# Patient Record
Sex: FEMALE | Race: WHITE | NOT HISPANIC OR LATINO | Employment: OTHER | URBAN - METROPOLITAN AREA
[De-identification: names, ages, dates, MRNs, and addresses within clinical notes are randomized per-mention and may not be internally consistent; named-entity substitution may affect disease eponyms.]

---

## 2017-01-26 ENCOUNTER — ALLSCRIPTS OFFICE VISIT (OUTPATIENT)
Dept: OTHER | Facility: OTHER | Age: 72
End: 2017-01-26

## 2017-01-26 DIAGNOSIS — R73.09 OTHER ABNORMAL GLUCOSE: ICD-10-CM

## 2017-01-26 DIAGNOSIS — Z12.31 ENCOUNTER FOR SCREENING MAMMOGRAM FOR MALIGNANT NEOPLASM OF BREAST: ICD-10-CM

## 2017-01-26 DIAGNOSIS — M81.0 AGE-RELATED OSTEOPOROSIS WITHOUT CURRENT PATHOLOGICAL FRACTURE: ICD-10-CM

## 2017-01-26 DIAGNOSIS — E55.9 VITAMIN D DEFICIENCY: ICD-10-CM

## 2017-01-26 DIAGNOSIS — I10 ESSENTIAL (PRIMARY) HYPERTENSION: ICD-10-CM

## 2017-02-02 ENCOUNTER — ALLSCRIPTS OFFICE VISIT (OUTPATIENT)
Dept: OTHER | Facility: OTHER | Age: 72
End: 2017-02-02

## 2017-02-20 ENCOUNTER — GENERIC CONVERSION - ENCOUNTER (OUTPATIENT)
Dept: OTHER | Facility: OTHER | Age: 72
End: 2017-02-20

## 2017-02-24 ENCOUNTER — ALLSCRIPTS OFFICE VISIT (OUTPATIENT)
Dept: OTHER | Facility: OTHER | Age: 72
End: 2017-02-24

## 2017-02-27 ENCOUNTER — GENERIC CONVERSION - ENCOUNTER (OUTPATIENT)
Dept: OTHER | Facility: OTHER | Age: 72
End: 2017-02-27

## 2017-06-14 ENCOUNTER — ALLSCRIPTS OFFICE VISIT (OUTPATIENT)
Dept: OTHER | Facility: OTHER | Age: 72
End: 2017-06-14

## 2017-06-14 DIAGNOSIS — M54.50 LOW BACK PAIN: ICD-10-CM

## 2017-06-14 LAB
BILIRUB UR QL STRIP: 3
CLARITY UR: NORMAL
COLOR UR: YELLOW
GLUCOSE (HISTORICAL): NORMAL
HGB UR QL STRIP.AUTO: NORMAL
KETONES UR STRIP-MCNC: NORMAL MG/DL
LEUKOCYTE ESTERASE UR QL STRIP: NORMAL
NITRITE UR QL STRIP: NORMAL
PH UR STRIP.AUTO: 5 [PH]
PROT UR STRIP-MCNC: NORMAL MG/DL
SP GR UR STRIP.AUTO: 1.02
UROBILINOGEN UR QL STRIP.AUTO: 1

## 2017-09-19 ENCOUNTER — TRANSCRIBE ORDERS (OUTPATIENT)
Dept: ADMINISTRATIVE | Facility: HOSPITAL | Age: 72
End: 2017-09-19

## 2017-09-19 DIAGNOSIS — M51.26 HERNIATED NUCLEUS PULPOSUS, LUMBAR: Primary | ICD-10-CM

## 2017-09-25 ENCOUNTER — HOSPITAL ENCOUNTER (OUTPATIENT)
Dept: MRI IMAGING | Facility: HOSPITAL | Age: 72
Discharge: HOME | End: 2017-09-25
Payer: COMMERCIAL

## 2017-09-25 DIAGNOSIS — M51.26 HERNIATED NUCLEUS PULPOSUS, LUMBAR: ICD-10-CM

## 2017-09-25 PROCEDURE — 72148 MRI LUMBAR SPINE W/O DYE: CPT

## 2018-01-09 NOTE — MISCELLANEOUS
Message   Recorded as Task   Date: 02/20/2017 10:54 AM, Created By: Ashley Juarez   Task Name: Call Back   Assigned To: Davida Anders   Regarding Patient: Gregory Ayers, Status: In Progress   Comment:    Ely Barreto - 20 Feb 2017 10:54 AM     TASK CREATED  Caller: Self; Other; [phone] (Home)  DR Maximiliano Lino - PT IS CALLING TO LET YOU KNOW THAT SHE CAN'T DO FASTING BW BECAUSE SHE IS A DIABETIC  PT'S FRIEND BARAK ISSA  HE STATES THE PT IS NOT A DIABETIC  BUT SHE THINKS SHE IS    WOULD LIKE TO SPEAK TO  YOU  George Zeng - 20 Feb 2017 11:11 AM     TASK IN PROGRESS   George Zeng - 20 Feb 2017 11:11 AM     TASK REASSIGNED: Previously Assigned To [facility]entr,team   PT told me if she skips breakfast, she gets hypoglycemic  Told pt it was ok to eat a small breakfast before getting BW        Signatures   Electronically signed by : Axel Hood DO; Feb 20 2017 12:34PM EST                       (Author)

## 2018-01-12 VITALS
DIASTOLIC BLOOD PRESSURE: 62 MMHG | RESPIRATION RATE: 16 BRPM | HEART RATE: 76 BPM | HEIGHT: 62 IN | TEMPERATURE: 97.7 F | WEIGHT: 165 LBS | BODY MASS INDEX: 30.36 KG/M2 | SYSTOLIC BLOOD PRESSURE: 112 MMHG

## 2018-01-13 VITALS
HEART RATE: 60 BPM | BODY MASS INDEX: 28.71 KG/M2 | HEIGHT: 62 IN | SYSTOLIC BLOOD PRESSURE: 104 MMHG | WEIGHT: 156 LBS | DIASTOLIC BLOOD PRESSURE: 60 MMHG | RESPIRATION RATE: 16 BRPM | TEMPERATURE: 97.7 F

## 2018-01-14 VITALS
RESPIRATION RATE: 16 BRPM | DIASTOLIC BLOOD PRESSURE: 82 MMHG | SYSTOLIC BLOOD PRESSURE: 110 MMHG | BODY MASS INDEX: 26.13 KG/M2 | HEART RATE: 64 BPM | WEIGHT: 142 LBS | TEMPERATURE: 98.6 F | HEIGHT: 62 IN

## 2018-01-16 NOTE — PROGRESS NOTES
Assessment    1  Viral infection (403 04) (B34 9)    Plan  Need for influenza vaccination, Need for pneumococcal vaccination    · Fluzone High-Dose 0 5 ML Intramuscular Suspension Prefilled Syringe  Need for pneumococcal vaccination    · Prevnar 13 Intramuscular Suspension  Viral infection    · Fluticasone Propionate 50 MCG/ACT Nasal Suspension (Flonase Allergy Relief);  USE 1 SPRAY IN EACH NOSTRIL TWICE DAILY    Discussion/Summary    72 yo F who presents with c/o productive cough, runny nose, congestion, sore throat, headaches  Rapid Flu done in office today is negative  Pt received Pneumococcal and Influenza vaccine today  Advised her to continue supportive therapy with Tylenol, Flonase, Nyquil, drinking a lot of fluids, salt water gargle and throat lozenges for sore throat  Pt told to follow up if symptoms do not get better in 2 weeks or if symptoms worsen despite current management  Chief Complaint  f/u meds      History of Present Illness  Marlen Joseph is a 72 yo F who presents today for c/o headaches, subjective fever, sore throat, cough with whitish sputum, congestion and runny nose that started 2 days ago and has progressively been getting worse  Pt has tried Nyquil and Flonase with mild relief  Pt's  was recently sick with similar symptoms and got better with supportive treatment  Review of Systems    Constitutional: fever, feeling poorly and chills  Eyes: No complaints of eye pain, no red eyes, no eyesight problems, no discharge, no dry eyes, no itching of eyes  ENT: sore throat and nasal discharge, but no earache, no nosebleeds, no hearing loss and no hoarseness  Cardiovascular: No complaints of slow heart rate, no fast heart rate, no chest pain, no palpitations, no leg claudication, no lower extremity edema  Respiratory: shortness of breath and cough, but no wheezing  Gastrointestinal: no abdominal pain, no nausea, no vomiting, no constipation and no diarrhea  Genitourinary: No complaints of dysuria, no incontinence, no pelvic pain, no dysmenorrhea, no vaginal discharge or bleeding  Musculoskeletal: myalgias, but no arthralgias  Integumentary: No complaints of skin rash or lesions, no itching, no skin wounds, no breast pain or lump  Neurological: headache, but no tingling, no dizziness, no limb weakness and no difficulty walking  Active Problems    1  Abnormal neurological exam (781 99) (R29 90)   2  Benign essential hypertension (401 1) (I10)   3  Bilateral kidney stones (592 0) (N20 0)   4  Bilateral sacroiliitis (720 2) (M46 1)   5  Breast neoplasm (239 3) (D49 3)   6  Cervical myelopathy (721 1) (G95 9)   7  Chronic bronchitis with COPD (chronic obstructive pulmonary disease) (491 20) (J44 9)   8  Colon cancer screening (V76 51) (Z12 11)   9  DDD (degenerative disc disease), lumbosacral (722 52) (M51 37)   10  Depression (311) (F32 9)   11  Encounter for screening mammogram for breast cancer (V76 12) (Z12 31)   12  Facet arthropathy (721 90) (M12 88)   13  Hypovitaminosis D (268 9) (E55 9)   14  Lumbar foraminal stenosis (724 02) (M99 83)   15  Lumbar pain (724 2) (M54 5)   16  Myofascial pain (729 1) (M79 1)   17  Myositis (729 1) (M60 9)   18  Prediabetes (790 29) (R73 09)   19  Restless legs syndrome (RLS) (333 94) (G25 81)   20  Right lumbar radiculopathy (724 4) (M54 16)   21  Screening for depression (V79 0) (Z13 89)   22  Spastic hypertonia (781 0) (R25 8)   23  Weakness of right hip (729 89) (M62 81)    Past Medical History    1  History of asthma (V12 69) (Z87 09)   2  History of kidney disease (V13 09) (Z87 448)   3  History of malignant neoplasm of breast (V10 3) (Z85 3)    Surgical History    1  History of Arthrodesis Cervical C___   2  History of Breast Surgery Lumpectomy   3  History of Tonsillectomy    Family History  Mother    1  Family history of diabetes mellitus (V18 0) (Z83 3)  Family History    2   Family history of hypertension (V17 49) (Z82 49)   3  Family history of malignant neoplasm of breast (V16 3) (Z80 3)    Social History    · Current every day smoker (305 1) (F17 200)    Current Meds   1  Advair Diskus 250-50 MCG/DOSE Inhalation Aerosol Powder Breath Activated; INHALE 1   PUFF TWICE DAILY; Therapy: 36LEV3257 to (Last Rx:2017)  Requested for: 2017 Ordered   2  AmLODIPine Besylate 5 MG Oral Tablet; TAKE 1 TABLET DAILY; Last Rx:2017   Ordered   3  Ergocalciferol 95001 UNIT CAPS; TAKE 1 CAPSULE WEEKLY; Therapy: 40Uwj9052 to (Last Rx:2015)  Requested for: 2015 Ordered   4  Gabapentin 600 MG Oral Tablet; TAKE 1 TABLET 3 TIMES DAILY; Therapy: 37RXA0016 to (Evaluate:2016)  Requested for: 20BFH4121; Last   Rx:67Rlo6554; Status: ACTIVE - Renewal Denied Ordered   5  Gabapentin 800 MG Oral Tablet; Take 1 tablet twice daily  Requested for: 28MRB9812;   Last Rx:2017 Ordered   6  Lisinopril 10 MG Oral Tablet; TAKE 1 TABLET DAILY  Requested for: 49KID9766; Last   [id number] Ordered   7  Naproxen 500 MG Oral Tablet; TAKE 1 TABLET EVERY 12 HOURS AS NEEDED; Therapy: 82JFW0548 to (Yao Benjamin)  Requested for: 39AWW0715; Last   Rx:2017 Ordered   8  PredniSONE 10 MG Oral Tablet; TAKE 1 TABLET DAILY; Therapy: 04EUY6855 to (Venice Castellanos)  Requested for: 2015; Last   Rx:2015 Ordered   9  ROPINIRole HCl - 0 5 MG Oral Tablet; TAKE 1 TABLET Bedtime; Last Rx:2017   Ordered   10  Sertraline HCl - 50 MG Oral Tablet; TAKE 1 TABLET DAILY  Requested for: 67ROY6460;    Last Rx:2017 Ordered   11  TiZANidine HCl - 4 MG Oral Tablet; TAKE 1/2 TO 1 TABLET 3 TIMES DAILY AS NEEDED; Therapy: 15BSQ3879 to (Evaluate:74Vxh2185)  Requested for: 49DFV3021; Last    Rx:36Kwb3338 Ordered   12  TraZODone HCl - 100 MG Oral Tablet; Therapy: (Recorded:97Xee3840) to Recorded   13  Ventolin  (90 Base) MCG/ACT Inhalation Aerosol Solution;     Therapy: (Recorded:20Qhn8033) to Recorded    Allergies    1  No Known Drug Allergies    Vitals  Vital Signs    Recorded: 82DPU0708 11:03AM   Heart Rate 68   Respiration 18   Systolic 360   Diastolic 80   Height 5 ft 2 in   Weight 150 lb 7 oz   BMI Calculated 27 52   BSA Calculated 1 69   O2 Saturation 98     Physical Exam    Constitutional   General appearance: No acute distress, well appearing and well nourished  Eyes   Conjunctiva and lids: No swelling, erythema or discharge  Pupils and irises: Equal, round and reactive to light  Ears, Nose, Mouth, and Throat   External inspection of ears and nose: Normal     Otoscopic examination: Tympanic membranes translucent with normal light reflex  Canals patent without erythema  Nasal mucosa, septum, and turbinates: Normal without edema or erythema  Oropharynx: Normal with no erythema, edema, exudate or lesions  Pulmonary   Respiratory effort: No increased work of breathing or signs of respiratory distress  Auscultation of lungs: Clear to auscultation  Cardiovascular   Palpation of heart: Normal PMI, no thrills  Auscultation of heart: Normal rate and rhythm, normal S1 and S2, without murmurs  Examination of extremities for edema and/or varicosities: Normal     Lymphatic   Palpation of lymph nodes in neck: No lymphadenopathy  Skin   Skin and subcutaneous tissue: Normal without rashes or lesions  Psychiatric   Orientation to person, place, and time: Normal     Mood and affect: Normal          Future Appointments    Date/Time Provider Specialty Site   02/17/2017 11:30 AM Yuni Fournier DO Family [facility]     Signatures   Electronically signed by :  Jaime Bender MD; Feb  3 2017  2:21PM EST                       (Author)    Electronically signed by : PASHA Thao ; Mar 10 2017 12:33PM EST

## 2018-01-22 VITALS
SYSTOLIC BLOOD PRESSURE: 130 MMHG | WEIGHT: 150.44 LBS | HEART RATE: 68 BPM | DIASTOLIC BLOOD PRESSURE: 80 MMHG | RESPIRATION RATE: 18 BRPM | BODY MASS INDEX: 27.68 KG/M2 | OXYGEN SATURATION: 98 % | HEIGHT: 62 IN

## 2018-11-08 ENCOUNTER — TELEPHONE (OUTPATIENT)
Dept: NEUROLOGY | Facility: CLINIC | Age: 73
End: 2018-11-08

## 2018-11-28 ENCOUNTER — TELEPHONE (OUTPATIENT)
Dept: OBGYN CLINIC | Facility: HOSPITAL | Age: 73
End: 2018-11-28

## 2018-11-28 NOTE — TELEPHONE ENCOUNTER
[place] [phone]    William Pak, patient's boyfriend is calling to make an appt for [facility], for her low back pain, with one of our doctors but he does not know who  William Pak is stating that no one is giving her a referral    Patient has medicare for insurance  Patient has seen Dr Antonette Fox back in 2015  William Pak is stating that his psychologist recommends that the patient sees [facility]  Patient was seeing a doctor at [facility]/Dr Green but she stopped seeing him because they would not prescribe the patient pain meds  I did mention that we will not offer pain meds at the first appt & that the doctor may not recommend pain meds but William Pak is stating that once we see her walk we will know that she needs meds  William Pak is stating that the patient treated at [facility]  at a [facility]/Dr Grace on [facility] but he states that they closed up  William Pak is also stating that the patient was just given tramadol from a doctor up by Pia Handley in [place]  Patient will have records sent to [phone]

## 2018-12-07 ENCOUNTER — APPOINTMENT (EMERGENCY)
Dept: RADIOLOGY | Facility: HOSPITAL | Age: 73
End: 2018-12-07
Payer: MEDICARE

## 2018-12-07 ENCOUNTER — HOSPITAL ENCOUNTER (EMERGENCY)
Facility: HOSPITAL | Age: 73
Discharge: HOME | End: 2018-12-07
Attending: EMERGENCY MEDICINE | Admitting: EMERGENCY MEDICINE
Payer: MEDICARE

## 2018-12-07 VITALS
OXYGEN SATURATION: 97 % | BODY MASS INDEX: 25.52 KG/M2 | RESPIRATION RATE: 18 BRPM | WEIGHT: 139.55 LBS | HEART RATE: 109 BPM | TEMPERATURE: 97.9 F

## 2018-12-07 DIAGNOSIS — M54.50 ACUTE EXACERBATION OF CHRONIC LOW BACK PAIN: Primary | ICD-10-CM

## 2018-12-07 DIAGNOSIS — G89.29 ACUTE EXACERBATION OF CHRONIC LOW BACK PAIN: Primary | ICD-10-CM

## 2018-12-07 PROCEDURE — 72100 X-RAY EXAM L-S SPINE 2/3 VWS: CPT

## 2018-12-07 PROCEDURE — 99283 EMERGENCY DEPT VISIT LOW MDM: CPT

## 2018-12-07 RX ORDER — TRAMADOL HYDROCHLORIDE 50 MG/1
50 TABLET ORAL EVERY 6 HOURS PRN
Qty: 10 TABLET | Refills: 0 | Status: SHIPPED | OUTPATIENT
Start: 2018-12-07 | End: 2018-12-17

## 2018-12-07 RX ORDER — BIOTIN 1 MG
1000 TABLET ORAL DAILY
COMMUNITY
End: 2019-09-03 | Stop reason: ALTCHOICE

## 2018-12-07 NOTE — ED PROVIDER NOTES
History  Chief Complaint   Patient presents with    Back Pain     Pt states "I have pain in my spine" pt had recent fall on 11/2  Pt reports that she was supposed to follow up with neuro but has not been able to  Pt states she has" 3 pinched nerves in spine"  History provided by:  Patient and spouse  Back Pain   Location:  Lumbar spine  Quality:  Aching  Radiates to:  Does not radiate  Pain severity:  Severe  Pain is:  Same all the time  Onset quality:  Gradual  Duration:  1 week  Timing:  Constant  Progression:  Worsening  Chronicity:  Chronic  Context comment:  Chronic back pain  Jose L Enter a week ago worsening pain  Relieved by:  Being still  Worsened by:  Bending and twisting  Associated symptoms: no abdominal pain, no abdominal swelling, no bladder incontinence, no bowel incontinence, no chest pain, no fever, no headaches, no numbness, no paresthesias, no tingling and no weakness        Prior to Admission Medications   Prescriptions Last Dose Informant Patient Reported? Taking? Cholecalciferol (VITAMIN D3) 1000 units CAPS   Yes Yes   Sig: Take 1,000 Units by mouth daily   amLODIPine (NORVASC) 5 mg tablet   Yes Yes   Sig: Take 5 mg by mouth daily   gabapentin (NEURONTIN) 300 mg capsule   Yes No   Sig: Take 300 mg by mouth 3 (three) times a day      Facility-Administered Medications: None       Past Medical History:   Diagnosis Date    Asthma     Cancer ([facility] [address] )     breast    Hypertension     Kidney stone        Past Surgical History:   Procedure Laterality Date    HYSTERECTOMY      MASTECTOMY Right        History reviewed  No pertinent family history  I have reviewed and agree with the history as documented  Social History   Substance Use Topics    Smoking status: Current Every Day Smoker     Packs/day: 0 50    Smokeless tobacco: Never Used    Alcohol use No        Review of Systems   Constitutional: Negative for fever  Respiratory: Negative for chest tightness and shortness of breath  Cardiovascular: Negative for chest pain  Gastrointestinal: Negative for abdominal pain and bowel incontinence  Genitourinary: Negative for bladder incontinence  Musculoskeletal: Positive for back pain  Skin: Negative for rash  Neurological: Negative for dizziness, tingling, weakness, light-headedness, numbness, headaches and paresthesias  Physical Exam  Physical Exam   Constitutional: She appears well-developed  HENT:   Head: Atraumatic  Eyes: Conjunctivae are normal  Right eye exhibits no discharge  Left eye exhibits no discharge  No scleral icterus  Neck: Neck supple  No tracheal deviation present  Pulmonary/Chest: Effort normal  No stridor  No respiratory distress  Musculoskeletal: She exhibits no deformity  No spinous process tenderness, hypertonicity paraspinal musculature consistent with chronic  muscle spasm , +2 patella and Achilles reflex bilaterally  Normal sensation normal muscle strength bilateral lower extremities     Neurological: She is alert  She exhibits normal muscle tone  Coordination normal    Skin: Skin is warm and dry  No rash noted  No erythema  No pallor  Psychiatric: She has a normal mood and affect  Vitals reviewed  Vital Signs  ED Triage Vitals [12/07/18 1030]   Temperature Pulse Respirations BP SpO2   97 9 °F (36 6 °C) (!) 109 18 -- 97 %      Temp Source Heart Rate Source Patient Position - Orthostatic VS BP Location FiO2 (%)   Oral Monitor -- -- --      Pain Score       7           Vitals:    12/07/18 1030   Pulse: (!) 109       Visual Acuity      ED Medications  Medications - No data to display    Diagnostic Studies  Results Reviewed     None                 XR lumbar spine 2 or 3 views   ED Interpretation by Glody Clinton DO (12/07 1115)   Degenerative changes, arthrosclerotic disease  , spondylolisthesis of L4 on L5      Final Result by Mack Corrigan MD (12/07 1154)      Stable exam   No acute osseous abnormality           Workstation performed: [id number]                    Procedures  Procedures       Phone Contacts  ED Phone Contact    ED Course                               MDM  Number of Diagnoses or Management Options  Acute exacerbation of chronic low back pain: new and requires workup  Diagnosis management comments: Denies history of severe or worsening lower extremity weakness/numbness  Denies history of saddle anesthesia/perineal anesthesia  Denies bowel or bladder incontinence/retention  History does not suggest diagnosis of cauda equina syndrome  Patient denies history of IVDA, denies history of fevers, no recent surgeries or any procedures to suggest a transient bacteremia leading to a diagnosis of epidural abscess  Denies history of blood thinner use with recent history of lumbar puncture or any violation of the epidural space to suggest history of epidural hematoma  Therefore these above diagnoses (cauda equina syndrome, epidural abscess, epidural hematoma) were not pursued with diagnostic imaging  Amount and/or Complexity of Data Reviewed  Tests in the radiology section of CPT®: reviewed and ordered  Decide to obtain previous medical records or to obtain history from someone other than the patient: yes  Obtain history from someone other than the patient: yes  Review and summarize past medical records: yes  Independent visualization of images, tracings, or specimens: yes      CritCare Time    Disposition  Final diagnoses:   Acute exacerbation of chronic low back pain     Time reflects when diagnosis was documented in both MDM as applicable and the Disposition within this note     Time User Action Codes Description Comment    12/7/2018 11:15 AM Sharyle Beverage Add [M54 5,  G89 29] Acute exacerbation of chronic low back pain       ED Disposition     ED Disposition Condition Comment    Discharge  Jed Ornelas discharge to home/self care      Condition at discharge: Good        Follow-up Information     Follow up With Specialties Details Why Contact Info Additional Information    [facility] Call today To arrange for the next available appointment [id number] [id number] [phone] [address] [facility], [place], [place], [address]          Discharge Medication List as of 12/7/2018 11:16 AM      START taking these medications    Details   traMADol (ULTRAM) 50 mg tablet Take 1 tablet (50 mg total) by mouth every 6 (six) hours as needed for moderate pain for up to 10 days Label No driving, Starting Fri 12/7/2018, Until Mon 12/17/2018, Print         CONTINUE these medications which have NOT CHANGED    Details   amLODIPine (NORVASC) 5 mg tablet Take 5 mg by mouth daily, Until Discontinued, Historical Med      Cholecalciferol (VITAMIN D3) 1000 units CAPS Take 1,000 Units by mouth daily, Historical Med      gabapentin (NEURONTIN) 300 mg capsule Take 300 mg by mouth 3 (three) times a day, Until Discontinued, Historical Med           No discharge procedures on file      ED Provider  Electronically Signed by           Veronica Zelaya DO  12/07/18 8668

## 2018-12-07 NOTE — DISCHARGE INSTRUCTIONS
Acute Low Back Pain, Ambulatory Care   GENERAL INFORMATION:   Acute low back pain  is discomfort in your lower back area that lasts for less than 12 weeks  The word acute is used to describe pain that starts suddenly, worsens quickly, and lasts for a short time  Common symptoms include the following:   · Back stiffness or spasms    · Pain down the back or side of one leg    · Holding yourself in an unusual position or posture to decrease your back pain    · Not being able to find a sitting position that is comfortable    · Slow increase in your pain for 24 to 48 hours after you stress your back    · Tenderness on your lower back or severe pain when you move your back  Seek immediate care for the following symptoms:   · Severe pain    · Sudden stiffness and heaviness in both buttocks down to both legs    · Numbness or weakness in one leg, or pain in both legs    · Numbness in your genital area or across your lower back    · Unable to control your urine or bowel movements  Treatment for acute low back pain  may include any of the following:  · Medicines:      ¨ NSAIDs  help decrease swelling and pain or fever  This medicine is available with or without a doctor's order  NSAIDs can cause stomach bleeding or kidney problems in certain people  If you take blood thinner medicine, always ask your healthcare provider if NSAIDs are safe for you  Always read the medicine label and follow directions  ¨ Muscle relaxers  help decrease muscle spasms pain  ¨ Prescription pain medicine  may be given  Ask how to take this medicine safely  · Surgery  may be needed if your pain is severe and other treatments do not work  Surgery may be needed for conditions of the lumbar spine, such as herniated disc or spinal stenosis  Manage your symptoms:   · Sleep on a firm mattress  If you do not have a firm mattress, have someone move your mattress to the floor for a few days   A piece of plywood under your mattress can also help make it firmer  · Apply ice  on your lower back for 15 to 20 minutes every hour or as directed  Use an ice pack, or put crushed ice in a plastic bag  Cover it with a towel  Ice helps prevent tissue damage and decreases swelling and pain  You can alternate ice and heat  · Apply heat  on your lower back for 20 to 30 minutes every 2 hours for as many days as directed  Heat helps decrease pain and muscle spasms  · Go to physical therapy  A physical therapist teaches you exercises to help improve movement and strength, and to decrease pain  Prevent acute low back pain:   · Use proper body mechanics  ¨ Bend at the hips and knees when you  objects  Do not bend from the waist  Use your leg muscles as you lift the load  Do not use your back  Keep the object close to your chest as you lift it  Try not to twist or lift anything above your waist     ¨ Change your position often when you stand for long periods of time  Rest one foot on a small box or footrest, and then switch to the other foot often  ¨ Try not to sit for long periods of time  When you do, sit in a straight-backed chair with your feet flat on the floor  Never reach, pull, or push while you are sitting  · Exercise regularly  Warm up before you exercise  Do exercises that strengthen your back muscles  Ask about the best exercise plan for you  · Maintain a healthy weight  Ask your healthcare provider how much you should weigh  Ask him to help you create a weight loss plan if you are overweight  Follow up with your healthcare provider as directed:  Return for a follow-up visit if you still have pain after 1 to 3 weeks of treatment  You may need to visit an orthopedist if your back pain lasts more than 6 to 12 weeks  Write down your questions so you remember to ask them during your visits  CARE AGREEMENT:   You have the right to help plan your care  Learn about your health condition and how it may be treated   Discuss treatment options with your caregivers to decide what care you want to receive  You always have the right to refuse treatment  The above information is an  only  It is not intended as medical advice for individual conditions or treatments  Talk to your doctor, nurse or pharmacist before following any medical regimen to see if it is safe and effective for you  © 2014 [address] is for End User's use only and may not be sold, redistributed or otherwise used for commercial purposes  All illustrations and images included in CareNotes® are the copyrighted property of A D A M , Inc  or Nick Mina

## 2018-12-10 ENCOUNTER — VBI (OUTPATIENT)
Dept: FAMILY MEDICINE CLINIC | Facility: CLINIC | Age: 73
End: 2018-12-10

## 2018-12-10 NOTE — TELEPHONE ENCOUNTER
Pt was seen in [facility] on 12/7/18  CC: Back Pain  DX: Acute exacerbation of chronic low back pain   Left message, informed of  on call, office hours and phone number

## 2018-12-14 NOTE — TELEPHONE ENCOUNTER
Manfred Mederos but his phone is not in service at this time  If he calls back please make him aware we tried to reach him about and update on prior records from Dr Johnson Pae  We have not received them yet  Thank you

## 2018-12-27 NOTE — TELEPHONE ENCOUNTER
Tried to reach patient again  Voicemail box for phone number [phone] has not been set up yet  The other number listed for Trudy Zarate has a womens voice on the voicemail so I did not leave a message

## 2019-01-07 ENCOUNTER — TELEPHONE (OUTPATIENT)
Dept: NEUROLOGY | Facility: CLINIC | Age: 74
End: 2019-01-07

## 2019-01-09 NOTE — TELEPHONE ENCOUNTER
Tried to call patient to make her aware that we have not received her records yet  The phone number listed is states "the number you are trying to reach is not reachable" and hangs up  No further attempts will be made at this time  If patient calls back please make her aware we have not received her records

## 2019-01-14 ENCOUNTER — TRANSCRIBE ORDERS (OUTPATIENT)
Dept: NEUROSURGERY | Facility: CLINIC | Age: 74
End: 2019-01-14

## 2019-01-14 DIAGNOSIS — M54.50 LOWER BACK PAIN: Primary | ICD-10-CM

## 2019-01-26 ENCOUNTER — APPOINTMENT (EMERGENCY)
Dept: CT IMAGING | Facility: HOSPITAL | Age: 74
DRG: 552 | End: 2019-01-26
Payer: MEDICARE

## 2019-01-26 ENCOUNTER — HOSPITAL ENCOUNTER (INPATIENT)
Facility: HOSPITAL | Age: 74
LOS: 4 days | Discharge: HOME HEALTH | DRG: 552 | End: 2019-01-31
Attending: EMERGENCY MEDICINE | Admitting: INTERNAL MEDICINE
Payer: MEDICARE

## 2019-01-26 DIAGNOSIS — M54.59 INTRACTABLE LOW BACK PAIN: Primary | ICD-10-CM

## 2019-01-26 DIAGNOSIS — R26.2 AMBULATORY DYSFUNCTION: ICD-10-CM

## 2019-01-26 DIAGNOSIS — R41.89 COGNITIVE IMPAIRMENT: ICD-10-CM

## 2019-01-26 PROBLEM — M54.16 LUMBAR BACK PAIN WITH RADICULOPATHY AFFECTING RIGHT LOWER EXTREMITY: Status: ACTIVE | Noted: 2019-01-26

## 2019-01-26 PROBLEM — I10 ESSENTIAL HYPERTENSION: Status: ACTIVE | Noted: 2019-01-26

## 2019-01-26 LAB
ALBUMIN SERPL BCP-MCNC: 3.7 G/DL (ref 3.5–5)
ALP SERPL-CCNC: 69 U/L (ref 46–116)
ALT SERPL W P-5'-P-CCNC: 20 U/L (ref 12–78)
AMPHETAMINES SERPL QL SCN: NEGATIVE
ANION GAP SERPL CALCULATED.3IONS-SCNC: 9 MMOL/L (ref 4–13)
AST SERPL W P-5'-P-CCNC: 13 U/L (ref 5–45)
BACTERIA UR QL AUTO: ABNORMAL /HPF
BARBITURATES UR QL: NEGATIVE
BASOPHILS # BLD AUTO: 0.03 THOUSANDS/ΜL (ref 0–0.1)
BASOPHILS NFR BLD AUTO: 0 % (ref 0–1)
BENZODIAZ UR QL: NEGATIVE
BILIRUB SERPL-MCNC: 0.8 MG/DL (ref 0.2–1)
BILIRUB UR QL STRIP: NEGATIVE
BUN SERPL-MCNC: 21 MG/DL (ref 5–25)
CALCIUM SERPL-MCNC: 9 MG/DL (ref 8.3–10.1)
CHLORIDE SERPL-SCNC: 104 MMOL/L (ref 100–108)
CLARITY UR: ABNORMAL
CO2 SERPL-SCNC: 26 MMOL/L (ref 21–32)
COCAINE UR QL: NEGATIVE
COLOR UR: ABNORMAL
CREAT SERPL-MCNC: 0.89 MG/DL (ref 0.6–1.3)
EOSINOPHIL # BLD AUTO: 0.08 THOUSAND/ΜL (ref 0–0.61)
EOSINOPHIL NFR BLD AUTO: 1 % (ref 0–6)
ERYTHROCYTE [DISTWIDTH] IN BLOOD BY AUTOMATED COUNT: 12.8 % (ref 11.6–15.1)
GFR SERPL CREATININE-BSD FRML MDRD: 65 ML/MIN/1.73SQ M
GLUCOSE SERPL-MCNC: 104 MG/DL (ref 65–140)
GLUCOSE UR STRIP-MCNC: NEGATIVE MG/DL
HCT VFR BLD AUTO: 39.8 % (ref 34.8–46.1)
HGB BLD-MCNC: 13 G/DL (ref 11.5–15.4)
HGB UR QL STRIP.AUTO: ABNORMAL
IMM GRANULOCYTES # BLD AUTO: 0.03 THOUSAND/UL (ref 0–0.2)
IMM GRANULOCYTES NFR BLD AUTO: 0 % (ref 0–2)
KETONES UR STRIP-MCNC: ABNORMAL MG/DL
LEUKOCYTE ESTERASE UR QL STRIP: NEGATIVE
LYMPHOCYTES # BLD AUTO: 1.82 THOUSANDS/ΜL (ref 0.6–4.47)
LYMPHOCYTES NFR BLD AUTO: 20 % (ref 14–44)
MCH RBC QN AUTO: 30.7 PG (ref 26.8–34.3)
MCHC RBC AUTO-ENTMCNC: 32.7 G/DL (ref 31.4–37.4)
MCV RBC AUTO: 94 FL (ref 82–98)
METHADONE UR QL: NEGATIVE
MONOCYTES # BLD AUTO: 1.03 THOUSAND/ΜL (ref 0.17–1.22)
MONOCYTES NFR BLD AUTO: 11 % (ref 4–12)
NEUTROPHILS # BLD AUTO: 6.22 THOUSANDS/ΜL (ref 1.85–7.62)
NEUTS SEG NFR BLD AUTO: 68 % (ref 43–75)
NITRITE UR QL STRIP: NEGATIVE
NON-SQ EPI CELLS URNS QL MICRO: ABNORMAL /HPF
NRBC BLD AUTO-RTO: 0 /100 WBCS
OPIATES UR QL SCN: NEGATIVE
PCP UR QL: NEGATIVE
PH UR STRIP.AUTO: 6 [PH] (ref 4.5–8)
PLATELET # BLD AUTO: 301 THOUSANDS/UL (ref 149–390)
PMV BLD AUTO: 9.5 FL (ref 8.9–12.7)
POTASSIUM SERPL-SCNC: 3.6 MMOL/L (ref 3.5–5.3)
PROT SERPL-MCNC: 7 G/DL (ref 6.4–8.2)
PROT UR STRIP-MCNC: ABNORMAL MG/DL
RBC # BLD AUTO: 4.23 MILLION/UL (ref 3.81–5.12)
RBC #/AREA URNS AUTO: ABNORMAL /HPF
SODIUM SERPL-SCNC: 139 MMOL/L (ref 136–145)
SP GR UR STRIP.AUTO: >=1.03 (ref 1–1.03)
THC UR QL: POSITIVE
UROBILINOGEN UR QL STRIP.AUTO: 0.2 E.U./DL
WBC # BLD AUTO: 9.21 THOUSAND/UL (ref 4.31–10.16)
WBC #/AREA URNS AUTO: ABNORMAL /HPF

## 2019-01-26 PROCEDURE — 80053 COMPREHEN METABOLIC PANEL: CPT | Performed by: PHYSICIAN ASSISTANT

## 2019-01-26 PROCEDURE — 99285 EMERGENCY DEPT VISIT HI MDM: CPT

## 2019-01-26 PROCEDURE — 99220 PR INITIAL OBSERVATION CARE/DAY 70 MINUTES: CPT | Performed by: INTERNAL MEDICINE

## 2019-01-26 PROCEDURE — 96375 TX/PRO/DX INJ NEW DRUG ADDON: CPT

## 2019-01-26 PROCEDURE — 85025 COMPLETE CBC W/AUTO DIFF WBC: CPT | Performed by: PHYSICIAN ASSISTANT

## 2019-01-26 PROCEDURE — 80307 DRUG TEST PRSMV CHEM ANLYZR: CPT | Performed by: PHYSICIAN ASSISTANT

## 2019-01-26 PROCEDURE — 81001 URINALYSIS AUTO W/SCOPE: CPT

## 2019-01-26 PROCEDURE — 36415 COLL VENOUS BLD VENIPUNCTURE: CPT | Performed by: PHYSICIAN ASSISTANT

## 2019-01-26 PROCEDURE — 72131 CT LUMBAR SPINE W/O DYE: CPT

## 2019-01-26 PROCEDURE — 96374 THER/PROPH/DIAG INJ IV PUSH: CPT

## 2019-01-26 RX ORDER — DOCUSATE SODIUM 100 MG/1
100 CAPSULE, LIQUID FILLED ORAL 2 TIMES DAILY
Status: DISCONTINUED | OUTPATIENT
Start: 2019-01-26 | End: 2019-01-31 | Stop reason: HOSPADM

## 2019-01-26 RX ORDER — GABAPENTIN 300 MG/1
300 CAPSULE ORAL 3 TIMES DAILY
Status: DISCONTINUED | OUTPATIENT
Start: 2019-01-26 | End: 2019-01-31 | Stop reason: HOSPADM

## 2019-01-26 RX ORDER — ONDANSETRON 2 MG/ML
4 INJECTION INTRAMUSCULAR; INTRAVENOUS EVERY 6 HOURS PRN
Status: DISCONTINUED | OUTPATIENT
Start: 2019-01-26 | End: 2019-01-31 | Stop reason: HOSPADM

## 2019-01-26 RX ORDER — ACETAMINOPHEN 325 MG/1
975 TABLET ORAL ONCE
Status: COMPLETED | OUTPATIENT
Start: 2019-01-26 | End: 2019-01-26

## 2019-01-26 RX ORDER — PANTOPRAZOLE SODIUM 40 MG/1
40 TABLET, DELAYED RELEASE ORAL DAILY
Status: COMPLETED | OUTPATIENT
Start: 2019-01-27 | End: 2019-01-29

## 2019-01-26 RX ORDER — LIDOCAINE 50 MG/G
1 PATCH TOPICAL DAILY
Status: COMPLETED | OUTPATIENT
Start: 2019-01-27 | End: 2019-01-27

## 2019-01-26 RX ORDER — NICOTINE 21 MG/24HR
1 PATCH, TRANSDERMAL 24 HOURS TRANSDERMAL DAILY
Status: DISCONTINUED | OUTPATIENT
Start: 2019-01-27 | End: 2019-01-31 | Stop reason: HOSPADM

## 2019-01-26 RX ORDER — HYDROMORPHONE HCL/PF 1 MG/ML
0.2 SYRINGE (ML) INJECTION EVERY 4 HOURS PRN
Status: DISCONTINUED | OUTPATIENT
Start: 2019-01-26 | End: 2019-01-29

## 2019-01-26 RX ORDER — ACETAMINOPHEN 325 MG/1
975 TABLET ORAL EVERY 8 HOURS SCHEDULED
Status: DISCONTINUED | OUTPATIENT
Start: 2019-01-26 | End: 2019-01-31 | Stop reason: HOSPADM

## 2019-01-26 RX ORDER — MELATONIN
1000 DAILY
Status: DISCONTINUED | OUTPATIENT
Start: 2019-01-27 | End: 2019-01-31 | Stop reason: HOSPADM

## 2019-01-26 RX ORDER — ONDANSETRON 2 MG/ML
4 INJECTION INTRAMUSCULAR; INTRAVENOUS ONCE
Status: COMPLETED | OUTPATIENT
Start: 2019-01-26 | End: 2019-01-26

## 2019-01-26 RX ORDER — AMLODIPINE BESYLATE 5 MG/1
5 TABLET ORAL DAILY
Status: DISCONTINUED | OUTPATIENT
Start: 2019-01-27 | End: 2019-01-31 | Stop reason: HOSPADM

## 2019-01-26 RX ORDER — MORPHINE SULFATE 4 MG/ML
3 INJECTION, SOLUTION INTRAMUSCULAR; INTRAVENOUS ONCE
Status: COMPLETED | OUTPATIENT
Start: 2019-01-26 | End: 2019-01-26

## 2019-01-26 RX ORDER — KETOROLAC TROMETHAMINE 30 MG/ML
15 INJECTION, SOLUTION INTRAMUSCULAR; INTRAVENOUS ONCE
Status: DISCONTINUED | OUTPATIENT
Start: 2019-01-26 | End: 2019-01-26

## 2019-01-26 RX ORDER — OXYCODONE HYDROCHLORIDE 5 MG/1
2.5 TABLET ORAL EVERY 4 HOURS PRN
Status: DISCONTINUED | OUTPATIENT
Start: 2019-01-26 | End: 2019-01-31 | Stop reason: HOSPADM

## 2019-01-26 RX ORDER — NAPROXEN 250 MG/1
250 TABLET ORAL 2 TIMES DAILY WITH MEALS
Status: COMPLETED | OUTPATIENT
Start: 2019-01-27 | End: 2019-01-29

## 2019-01-26 RX ADMIN — OXYCODONE HYDROCHLORIDE 2.5 MG: 5 TABLET ORAL at 19:57

## 2019-01-26 RX ADMIN — GABAPENTIN 300 MG: 300 CAPSULE ORAL at 21:50

## 2019-01-26 RX ADMIN — ACETAMINOPHEN 975 MG: 325 TABLET, FILM COATED ORAL at 15:51

## 2019-01-26 RX ADMIN — ONDANSETRON 4 MG: 2 INJECTION INTRAMUSCULAR; INTRAVENOUS at 17:04

## 2019-01-26 RX ADMIN — MORPHINE SULFATE 3 MG: 4 INJECTION INTRAVENOUS at 17:06

## 2019-01-26 RX ADMIN — ACETAMINOPHEN 975 MG: 325 TABLET ORAL at 21:50

## 2019-01-26 RX ADMIN — DOCUSATE SODIUM 100 MG: 100 CAPSULE, LIQUID FILLED ORAL at 19:36

## 2019-01-26 NOTE — ASSESSMENT & PLAN NOTE
· Patient presents with worsening of her pre-existing chronic back pain  She denies any recent trauma or fall  She is unable to ambulate  · CT scan of the lumbar sacral spine in the emergency room shows:Lumbar spondylosis with stable grade 1 spondylolisthesis of L4 on L5 with degenerative discogenic disease most notable at L4-L5 and L5-S1   2   Severe spinal stenosis at L4-L5 with advanced facet arthropathy, correlate for right greater than left L5 and S1 radiculopathy     · Start scheduled Tylenol, gabapentin, Lidoderm patch, muscle relaxant  · Patient does not appear to have any symptoms/signs of cauda equina syndrome or cord compression    · Physical therapy evaluation will be obtained

## 2019-01-26 NOTE — H&P
H&P- Wes Parish 1945, 72 y o  female MRN: [id number]    Unit/Bed#: ED 28 Encounter: [id number]    Primary Care Provider: Catalina Avelar MD   Date and time admitted to hospital: 1/26/2019  1:56 PM        * Lumbar back pain with radiculopathy affecting right lower extremity   Assessment & Plan    · Patient presents with worsening of her pre-existing chronic back pain  She denies any recent trauma or fall  She is unable to ambulate  · CT scan of the lumbar sacral spine in the emergency room shows:Lumbar spondylosis with stable grade 1 spondylolisthesis of L4 on L5 with degenerative discogenic disease most notable at L4-L5 and L5-S1   2   Severe spinal stenosis at L4-L5 with advanced facet arthropathy, correlate for right greater than left L5 and S1 radiculopathy     · Start scheduled Tylenol, gabapentin, Lidoderm patch, muscle relaxant  · Patient does not appear to have any symptoms/signs of cauda equina syndrome or cord compression  · Physical therapy evaluation will be obtained     Essential hypertension   Assessment & Plan    Continue with outpatient dose of amlodipine     Ambulatory dysfunction   Assessment & Plan    Secondary to above  Maintain fall precautions  Physical therapy and occupational therapy consultation will be obtained  VTE Prophylaxis: Enoxaparin (Lovenox)  / sequential compression device   Code Status: Full Code  POLST: POLST form is not discussed and not completed at this time  Discussion with family: D/w Pt  Anticipated Length of Stay:  Patient will be admitted on an Observation basis with an anticipated length of stay of  < 2 midnights  Justification for Hospital Stay:     Total Time for Visit, including Counseling / Coordination of Care: 30 minutes  Greater than 50% of this total time spent on direct patient counseling and coordination of care      Chief Complaint:  Back pain  History of Present Illness:    Yoshi Ornelas is a 72 y o  female who presents with worsening of her chronic low back pain  Patient has history of lumbar spinal stenosis with L5-S1 disc herniation and has chronic back pain since her fall 1 year back     She is normally ambulatory despite her back pain and denies any falls however for the last 1 day, she started having worsening of her low back pain in the lumbar region radiating down her right leg  Her pain had worsened to an extent that she was unable to get up on her own and walk and was crawling in her house she denied any urinary retention or constipation  She denied any saddle anesthesia  She was using over-the-counter ibuprofen and Lidoderm patch for pain control without any relief  She was unable to stand up and walk without assistance in the emergency room and is being admitted for pain control and physical therapy evaluation    Review of Systems:    Review of Systems   Constitutional: Positive for appetite change  HENT: Negative  Eyes: Negative  Respiratory: Negative  Cardiovascular: Negative  Gastrointestinal: Negative  Genitourinary: Negative  Musculoskeletal: Positive for back pain and gait problem  Allergic/Immunologic: Negative  Neurological: Positive for weakness  Hematological: Negative  Psychiatric/Behavioral: Negative  Past Medical and Surgical History:     Past Medical History:   Diagnosis Date    Asthma     Cancer ([facility] [address] )     breast    Hypertension     Kidney stone        Past Surgical History:   Procedure Laterality Date    HYSTERECTOMY      MASTECTOMY Right        Meds/Allergies:    Prior to Admission medications    Medication Sig Start Date End Date Taking?  Authorizing Provider   amLODIPine (NORVASC) 5 mg tablet Take 5 mg by mouth daily   Yes Historical Provider, MD   Cholecalciferol (VITAMIN D3) 1000 units CAPS Take 1,000 Units by mouth daily   Yes Historical Provider, MD   gabapentin (NEURONTIN) 300 mg capsule Take 300 mg by mouth 3 (three) times a day Not taking due to vianney "crushing them and snorting them"     Historical Provider, MD     I have reviewed home medications using allscripts  Allergies: No Known Allergies    Social History:     Marital Status: /Civil Union   Substance Use History:   History   Alcohol Use No     History   Smoking Status    Current Every Day Smoker    Packs/day: 0 50    Types: Cigarettes   Smokeless Tobacco    Never Used     History   Drug Use No       Family History:    non-contributory    Physical Exam:     Vitals:   Blood Pressure: 120/77 (01/26/19 1755)  Pulse: 93 (01/26/19 1755)  Temperature: 98 3 °F (36 8 °C) (01/26/19 1359)  Respirations: 18 (01/26/19 1755)  Height: 5' 1 75" (156 8 cm) (01/26/19 1359)  Weight - Scale: 65 5 kg (144 lb 6 4 oz) (01/26/19 1359)  SpO2: 97 % (01/26/19 1755)    Physical Exam   Constitutional: She is oriented to person, place, and time  No distress  HENT:   Head: Normocephalic and atraumatic  Mouth/Throat: Oropharynx is clear and moist    Eyes: Pupils are equal, round, and reactive to light  Conjunctivae are normal    Neck: Normal range of motion  Neck supple  Cardiovascular: Normal rate and regular rhythm  Pulmonary/Chest: Effort normal and breath sounds normal    Abdominal: Soft  Bowel sounds are normal    Genitourinary:   Genitourinary Comments: Normal rectal tone per ER   Musculoskeletal: She exhibits no edema  Neurological: She is alert and oriented to person, place, and time  Patient has some focal tenderness in the lumbosacral region  No area of fluctuance identified  Able to move bilateral lower extremity  Reflexes 2+ in bilateral lower extremity  No sensory deficit in bilateral lower extremity   Skin: Skin is warm  She is not diaphoretic  Psychiatric: She has a normal mood and affect  Additional Data:     Lab Results: I have personally reviewed pertinent reports          Results from last 7 days  Lab Units 01/26/19  1714   WBC Thousand/uL 9 21   HEMOGLOBIN g/dL 13 0 HEMATOCRIT % 39 8   PLATELETS Thousands/uL 301   NEUTROS PCT % 68   LYMPHS PCT % 20   MONOS PCT % 11   EOS PCT % 1       Results from last 7 days  Lab Units 01/26/19  1714   SODIUM mmol/L 139   POTASSIUM mmol/L 3 6   CHLORIDE mmol/L 104   CO2 mmol/L 26   BUN mg/dL 21   CREATININE mg/dL 0 89   ANION GAP mmol/L 9   CALCIUM mg/dL 9 0   ALBUMIN g/dL 3 7   TOTAL BILIRUBIN mg/dL 0 80   ALK PHOS U/L 69   ALT U/L 20   AST U/L 13   GLUCOSE RANDOM mg/dL 104                       Imaging: I have personally reviewed pertinent reports  CT lumbar spine without contrast   Final Result by Bi Reich MD (01/26 8372)         1  Lumbar spondylosis with stable grade 1 spondylolisthesis of L4 on L5 with degenerative discogenic disease most notable at L4-L5 and L5-S1    2   Severe spinal stenosis at L4-L5 with advanced facet arthropathy, correlate for right greater than left L5 and S1 radiculopathy      Overall degree of spinal disease appears to be stable when comparing to the MRI examination of September 25, 2017  Workstation performed: [id number]             EKG, Pathology, and Other Studies Reviewed on Admission:   · EKG:NA    Allscripts / Epic Records Reviewed: Yes     ** Please Note: This note has been constructed using a voice recognition system   **

## 2019-01-26 NOTE — ED PROVIDER NOTES
History  Chief Complaint   Patient presents with    Back Pain     Pt presents to ED via EMS from home after having sudden onset lower back pain starting last night w/ intermitten pain radiating down right leg  Pt denies recent fall or trauma  Pt (+) hx HTN, asthma, breast cancer  74-year-old female presents to emergency room for evaluation of low back pain  States she has been having a problem with her low back for over a year since she fell, however over the past week and a half symptoms have increased and she can no longer walk  Patient states she is crawling around her house and this morning unable to pull herself to the toilet so she grabbed a towel to urinate on  She denies bowel or bladder dysfunction  Denies saddle anesthesia  She is able to stand with assistance  She is taking naproxen and using a lidocaine patch without relief  Patient states she had x-rays done in the past   Denies history of back surgery or procedures  Patient states when she was a kid she had where leg braces  Denies IV drug abuse  Denies confirmed fever, admits to feeling a little hot once in a while  History provided by:  Patient      Prior to Admission Medications   Prescriptions Last Dose Informant Patient Reported? Taking? Cholecalciferol (VITAMIN D3) 1000 units CAPS  Self Yes Yes   Sig: Take 1,000 Units by mouth daily   amLODIPine (NORVASC) 5 mg tablet  Self Yes Yes   Sig: Take 5 mg by mouth daily   gabapentin (NEURONTIN) 300 mg capsule  Self Yes No   Sig: Take 300 mg by mouth 3 (three) times a day Not taking due to fiance "crushing them and snorting them"       Facility-Administered Medications: None       Past Medical History:   Diagnosis Date    Asthma     Cancer ([facility] [address] )     breast    Hypertension     Kidney stone        Past Surgical History:   Procedure Laterality Date    HYSTERECTOMY      MASTECTOMY Right        History reviewed  No pertinent family history    I have reviewed and agree with the history as documented  Social History   Substance Use Topics    Smoking status: Current Every Day Smoker     Packs/day: 0 50     Types: Cigarettes    Smokeless tobacco: Never Used    Alcohol use No        Review of Systems   Constitutional: Negative for chills and fever  Respiratory: Negative for shortness of breath  Cardiovascular: Negative for chest pain  Gastrointestinal: Negative for abdominal pain and vomiting  Genitourinary: Negative for dysuria  Musculoskeletal: Positive for back pain  Negative for neck pain  Skin: Negative for rash and wound  Neurological: Negative for syncope  All other systems reviewed and are negative  Physical Exam  Physical Exam   Constitutional: She appears well-developed and well-nourished  HENT:   Head: Normocephalic and atraumatic  Right Ear: External ear normal    Left Ear: External ear normal    Eyes: Conjunctivae are normal    Neck: Neck supple  Cardiovascular: Normal rate, regular rhythm, normal heart sounds and intact distal pulses  Pulmonary/Chest: Effort normal and breath sounds normal    Abdominal: Soft  Bowel sounds are normal  She exhibits no distension  There is no tenderness  There is no CVA tenderness  Musculoskeletal:        Lumbar back: She exhibits decreased range of motion, tenderness and bony tenderness  She exhibits no swelling and no deformity  5/5 strength of bilateral knee flexion and extension  Negative straight leg raise  No foot drop  Able to stand with assistance only, UNABLE to walk     Neurological: She is alert  Reflex Scores:       Patellar reflexes are 2+ on the right side and 2+ on the left side  Skin: Skin is warm and dry  No rash noted  Psychiatric: She has a normal mood and affect  Her behavior is normal  Thought content normal  Her speech is tangential  Cognition and memory are normal  She is attentive  Nursing note and vitals reviewed        Vital Signs  ED Triage Vitals [01/26/19 1359] Temperature Pulse Respirations Blood Pressure SpO2   98 3 °F (36 8 °C) 89 16 (!) 171/106 97 %      Temp src Heart Rate Source Patient Position - Orthostatic VS BP Location FiO2 (%)   -- Monitor Lying Right arm --      Pain Score       8           Vitals:    01/26/19 1359 01/26/19 1755   BP: (!) 171/106 120/77   Pulse: 89 93   Patient Position - Orthostatic VS: Lying        Visual Acuity      ED Medications  Medications   acetaminophen (TYLENOL) tablet 975 mg (975 mg Oral Given 1/26/19 1551)   morphine (PF) 4 mg/mL injection 3 mg (3 mg Intravenous Given 1/26/19 1706)   ondansetron (ZOFRAN) injection 4 mg (4 mg Intravenous Given 1/26/19 1704)       Diagnostic Studies  Results Reviewed     Procedure Component Value Units Date/Time    Comprehensive metabolic panel [[id number]] Collected:  01/26/19 1714    Lab Status:  Final result Specimen:  Blood from Arm, Left Updated:  01/26/19 1800     Sodium 139 mmol/L      Potassium 3 6 mmol/L      Chloride 104 mmol/L      CO2 26 mmol/L      ANION GAP 9 mmol/L      BUN 21 mg/dL      Creatinine 0 89 mg/dL      Glucose 104 mg/dL      Calcium 9 0 mg/dL      AST 13 U/L      ALT 20 U/L      Alkaline Phosphatase 69 U/L      Total Protein 7 0 g/dL      Albumin 3 7 g/dL      Total Bilirubin 0 80 mg/dL      eGFR 65 ml/min/1 73sq m     Narrative:         National Kidney Disease Education Program recommendations are as follows:  GFR calculation is accurate only with a steady state creatinine  Chronic Kidney disease less than 60 ml/min/1 73 sq  meters  Kidney failure less than 15 ml/min/1 73 sq  meters      Rapid drug screen, urine [[id number]]  (Abnormal) Collected:  01/26/19 1719    Lab Status:  Final result Specimen:  Urine from Urine, Clean Catch Updated:  01/26/19 1741     Amph/Meth UR Negative     Barbiturate Ur Negative     Benzodiazepine Urine Negative     Cocaine Urine Negative     Methadone Urine Negative     Opiate Urine Negative     PCP Ur Negative     THC Urine Positive (A) Narrative:         Presumptive report  If requested, specimen will be sent to reference lab for confirmation  FOR MEDICAL PURPOSES ONLY  IF CONFIRMATION NEEDED PLEASE CONTACT THE LAB WITHIN 5 DAYS  Drug Screen Cutoff Levels:  AMPHETAMINE/METHAMPHETAMINES  1000 ng/mL  BARBITURATES     200 ng/mL  BENZODIAZEPINES     200 ng/mL  COCAINE      300 ng/mL  METHADONE      300 ng/mL  OPIATES      300 ng/mL  PHENCYCLIDINE     25 ng/mL  THC       50 ng/mL    CBC and differential [[id number]] Collected:  01/26/19 1714    Lab Status:  Final result Specimen:  Blood from Arm, Left Updated:  01/26/19 1732     WBC 9 21 Thousand/uL      RBC 4 23 Million/uL      Hemoglobin 13 0 g/dL      Hematocrit 39 8 %      MCV 94 fL      MCH 30 7 pg      MCHC 32 7 g/dL      RDW 12 8 %      MPV 9 5 fL      Platelets 106 Thousands/uL      nRBC 0 /100 WBCs      Neutrophils Relative 68 %      Immat GRANS % 0 %      Lymphocytes Relative 20 %      Monocytes Relative 11 %      Eosinophils Relative 1 %      Basophils Relative 0 %      Neutrophils Absolute 6 22 Thousands/µL      Immature Grans Absolute 0 03 Thousand/uL      Lymphocytes Absolute 1 82 Thousands/µL      Monocytes Absolute 1 03 Thousand/µL      Eosinophils Absolute 0 08 Thousand/µL      Basophils Absolute 0 03 Thousands/µL     Urine Microscopic [[id number]] Collected:  01/26/19 1724    Lab Status:   In process Specimen:  Urine from Urine, Clean Catch Updated:  01/26/19 1727    ED Urine Macroscopic [[id number]]  (Abnormal) Collected:  01/26/19 1724    Lab Status:  Final result Specimen:  Urine Updated:  01/26/19 1723     Color, UA Brenda     Clarity, UA Slightly Cloudy     pH, UA 6 0     Leukocytes, UA Negative     Nitrite, UA Negative     Protein, UA Trace (A) mg/dl      Glucose, UA Negative mg/dl      Ketones, UA 15 (1+) (A) mg/dl      Urobilinogen, UA 0 2 E U /dl      Bilirubin, UA Negative     Blood, UA Trace (A)     Specific Gravity, UA >=1 030    Narrative:       CLINITEK RESULT CT lumbar spine without contrast   Final Result by Silvano Silva MD (01/26 1752)         1  Lumbar spondylosis with stable grade 1 spondylolisthesis of L4 on L5 with degenerative discogenic disease most notable at L4-L5 and L5-S1    2   Severe spinal stenosis at L4-L5 with advanced facet arthropathy, correlate for right greater than left L5 and S1 radiculopathy      Overall degree of spinal disease appears to be stable when comparing to the MRI examination of September 25, 2017  Workstation performed: [id number]                    Procedures  Procedures       Phone Contacts  ED Phone Contact    ED Course  ED Course as of Jan 26 1813   Sat Jan 26, 2019   1753 Pain improving, will attempt ambulation    1800 Patient able to ambulate however unstable and needs assistance  [address] [employer] paged                                MDM  Number of Diagnoses or Management Options  Ambulatory dysfunction:   Intractable low back pain:      Amount and/or Complexity of Data Reviewed  Clinical lab tests: ordered and reviewed  Tests in the radiology section of CPT®: ordered and reviewed  Discuss the patient with other providers: yes (  [facility])    Risk of Complications, Morbidity, and/or Mortality  General comments: Severe back pain needs pain control and PT consult, chronic disease stable on CT       Patient Progress  Patient progress: stable    CritCare Time    Disposition  Final diagnoses:   Intractable low back pain   Ambulatory dysfunction     Time reflects when diagnosis was documented in both MDM as applicable and the Disposition within this note     Time User Action Codes Description Comment    1/26/2019  6:11 PM Letta Collet L Add [M54 5] Intractable low back pain     1/26/2019  6:11 PM Manju Alfred Add [R26 2] Ambulatory dysfunction       ED Disposition     ED Disposition Condition Comment    Admit  Case was discussed with Daniel Baker and the patient's admission status was agreed to be Admission Status: observation status to the service of Dr Olea Arlene   Follow-up Information    None         Patient's Medications   Discharge Prescriptions    No medications on file     No discharge procedures on file      ED Provider  Electronically Signed by           Brook Araujo PA-C  01/26/19 2614

## 2019-01-26 NOTE — ASSESSMENT & PLAN NOTE
Secondary to above  Maintain fall precautions  Physical therapy and occupational therapy consultation will be obtained

## 2019-01-27 LAB
ANION GAP SERPL CALCULATED.3IONS-SCNC: 8 MMOL/L (ref 4–13)
BUN SERPL-MCNC: 28 MG/DL (ref 5–25)
CALCIUM SERPL-MCNC: 8.7 MG/DL (ref 8.3–10.1)
CHLORIDE SERPL-SCNC: 104 MMOL/L (ref 100–108)
CO2 SERPL-SCNC: 25 MMOL/L (ref 21–32)
CREAT SERPL-MCNC: 0.87 MG/DL (ref 0.6–1.3)
GFR SERPL CREATININE-BSD FRML MDRD: 67 ML/MIN/1.73SQ M
GLUCOSE P FAST SERPL-MCNC: 122 MG/DL (ref 65–99)
GLUCOSE SERPL-MCNC: 122 MG/DL (ref 65–140)
POTASSIUM SERPL-SCNC: 3.7 MMOL/L (ref 3.5–5.3)
SODIUM SERPL-SCNC: 137 MMOL/L (ref 136–145)

## 2019-01-27 PROCEDURE — 97112 NEUROMUSCULAR REEDUCATION: CPT | Performed by: PHYSICAL THERAPIST

## 2019-01-27 PROCEDURE — 99232 SBSQ HOSP IP/OBS MODERATE 35: CPT | Performed by: INTERNAL MEDICINE

## 2019-01-27 PROCEDURE — 97163 PT EVAL HIGH COMPLEX 45 MIN: CPT | Performed by: PHYSICAL THERAPIST

## 2019-01-27 PROCEDURE — G8979 MOBILITY GOAL STATUS: HCPCS | Performed by: PHYSICAL THERAPIST

## 2019-01-27 PROCEDURE — 80048 BASIC METABOLIC PNL TOTAL CA: CPT | Performed by: INTERNAL MEDICINE

## 2019-01-27 PROCEDURE — G8978 MOBILITY CURRENT STATUS: HCPCS | Performed by: PHYSICAL THERAPIST

## 2019-01-27 RX ORDER — METHYLPREDNISOLONE 4 MG/1
16 TABLET ORAL DAILY
Status: COMPLETED | OUTPATIENT
Start: 2019-01-29 | End: 2019-01-29

## 2019-01-27 RX ORDER — METHYLPREDNISOLONE 4 MG/1
8 TABLET ORAL DAILY
Status: COMPLETED | OUTPATIENT
Start: 2019-01-31 | End: 2019-01-31

## 2019-01-27 RX ORDER — METHYLPREDNISOLONE 4 MG/1
12 TABLET ORAL DAILY
Status: COMPLETED | OUTPATIENT
Start: 2019-01-30 | End: 2019-01-30

## 2019-01-27 RX ORDER — METHYLPREDNISOLONE 4 MG/1
4 TABLET ORAL DAILY
Status: DISCONTINUED | OUTPATIENT
Start: 2019-02-01 | End: 2019-01-31 | Stop reason: HOSPADM

## 2019-01-27 RX ADMIN — LIDOCAINE 1 PATCH: 50 PATCH TOPICAL at 09:19

## 2019-01-27 RX ADMIN — DOCUSATE SODIUM 100 MG: 100 CAPSULE, LIQUID FILLED ORAL at 09:19

## 2019-01-27 RX ADMIN — GABAPENTIN 300 MG: 300 CAPSULE ORAL at 22:17

## 2019-01-27 RX ADMIN — ACETAMINOPHEN 975 MG: 325 TABLET ORAL at 22:17

## 2019-01-27 RX ADMIN — ENOXAPARIN SODIUM 40 MG: 40 INJECTION SUBCUTANEOUS at 09:19

## 2019-01-27 RX ADMIN — GABAPENTIN 300 MG: 300 CAPSULE ORAL at 17:09

## 2019-01-27 RX ADMIN — PANTOPRAZOLE SODIUM 40 MG: 40 TABLET, DELAYED RELEASE ORAL at 06:35

## 2019-01-27 RX ADMIN — DOCUSATE SODIUM 100 MG: 100 CAPSULE, LIQUID FILLED ORAL at 17:09

## 2019-01-27 RX ADMIN — AMLODIPINE BESYLATE 5 MG: 5 TABLET ORAL at 09:19

## 2019-01-27 RX ADMIN — ACETAMINOPHEN 975 MG: 325 TABLET ORAL at 13:45

## 2019-01-27 RX ADMIN — ACETAMINOPHEN 975 MG: 325 TABLET ORAL at 06:35

## 2019-01-27 RX ADMIN — GABAPENTIN 300 MG: 300 CAPSULE ORAL at 09:19

## 2019-01-27 RX ADMIN — METHYLPREDNISOLONE 24 MG: 16 TABLET ORAL at 17:11

## 2019-01-27 RX ADMIN — NAPROXEN 250 MG: 250 TABLET ORAL at 06:35

## 2019-01-27 RX ADMIN — VITAMIN D, TAB 1000IU (100/BT) 1000 UNITS: 25 TAB at 09:19

## 2019-01-27 RX ADMIN — NAPROXEN 250 MG: 250 TABLET ORAL at 17:09

## 2019-01-27 NOTE — PROGRESS NOTES
Progress Note Yue Cooper 1945, 72 y o  female MRN: [id number]    Unit/Bed#: -01 Encounter: [id number]    Primary Care Provider: Celeste Billings MD   Date and time admitted to hospital: 1/26/2019  1:56 PM        * Lumbar back pain with radiculopathy affecting right lower extremity   Assessment & Plan    · Patient presents with worsening of her pre-existing chronic back pain  She denies any recent trauma or fall  She is unable to ambulate  · CT scan of the lumbar sacral spine in the emergency room shows:Lumbar spondylosis with stable grade 1 spondylolisthesis of L4 on L5 with degenerative discogenic disease most notable at L4-L5 and L5-S1   2   Severe spinal stenosis at L4-L5 with advanced facet arthropathy, correlate for right greater than left L5 and S1 radiculopathy     · Start scheduled Tylenol, gabapentin, Lidoderm patch, muscle relaxant  Patient continues to complain of back pain  Will give short course of tapering steroids  · Patient does not appear to have any symptoms/signs of cauda equina syndrome or cord compression  · Physical therapy evaluation appreciated  Essential hypertension   Assessment & Plan    Continue with outpatient dose of amlodipine     Ambulatory dysfunction   Assessment & Plan    Secondary to above  Maintain fall precautions  She lives on the 3rd floor of an apartment complex with no elevator  Has unable to make it to her spine doctor appointment because of her inability to go up and down the stairs  She also has trouble getting up from bed and her mattress has been on the floor  Physical therapy and occupational therapy consultation will be obtained    She may benefit from short-term rehabilitation in view of her ongoing ambulatory dysfunction         VTE Pharmacologic Prophylaxis:   Pharmacologic: Enoxaparin (Lovenox)  Mechanical VTE Prophylaxis in Place: No    Patient Centered Rounds: I have performed bedside rounds with nursing staff today     Discussions with Specialists or Other Care Team Provider:  Physical therapy and case management    Education and Discussions with Family / Patient:  Discussed with patient    Time Spent for Care: 30 minutes  More than 50% of total time spent on counseling and coordination of care as described above  Current Length of Stay: 0 day(s)    Current Patient Status: Observation   Certification Statement: The patient, admitted on an observation basis, will now require > 2 midnight hospital stay due to Ongoing management of back pain and physical therapy evaluation  May need a short-term rehabilitation in view of her ambulatory dysfunction and home safety upon discharge    Discharge Plan:  Currently not medically stable for discharge    Code Status: Level 1 - Full Code      Subjective:   Seen and examined  Still continues to complain of back pain  Patient states that she continues to have severe back pain  Denies any urinary complaint  She states that she has 3 flight of steps to go to her apartment and has not been able to follow up with the spine doctor as an outpatient secondary to and her inability to go  up and down the stairs  Objective:     Vitals:   Temp (24hrs), Av 1 °F (36 7 °C), Min:97 5 °F (36 4 °C), Max:98 5 °F (36 9 °C)    Temp:  [97 5 °F (36 4 °C)-98 5 °F (36 9 °C)] 97 5 °F (36 4 °C)  HR:  [83-93] 83  Resp:  [16-18] 18  BP: (120-142)/(66-83) 129/66  SpO2:  [95 %-97 %] 95 %  Body mass index is 26 63 kg/m²  Input and Output Summary (last 24 hours): Intake/Output Summary (Last 24 hours) at 19 1430  Last data filed at 19 1300   Gross per 24 hour   Intake              460 ml   Output              150 ml   Net              310 ml       Physical Exam:     Physical Exam   Constitutional: She is oriented to person, place, and time  No distress  HENT:   Head: Normocephalic and atraumatic  Eyes: Pupils are equal, round, and reactive to light   Conjunctivae are normal  Neck: Neck supple  Cardiovascular: Normal rate and regular rhythm  Pulmonary/Chest: Effort normal    Abdominal: Soft  Bowel sounds are normal    Musculoskeletal: She exhibits no edema  Neurological: She is alert and oriented to person, place, and time  Skin: Skin is warm  She is not diaphoretic  Additional Data:     Labs:      Results from last 7 days  Lab Units 01/26/19  1714   WBC Thousand/uL 9 21   HEMOGLOBIN g/dL 13 0   HEMATOCRIT % 39 8   PLATELETS Thousands/uL 301   NEUTROS PCT % 68   LYMPHS PCT % 20   MONOS PCT % 11   EOS PCT % 1       Results from last 7 days  Lab Units 01/27/19  0517 01/26/19  1714   SODIUM mmol/L 137 139   POTASSIUM mmol/L 3 7 3 6   CHLORIDE mmol/L 104 104   CO2 mmol/L 25 26   BUN mg/dL 28* 21   CREATININE mg/dL 0 87 0 89   ANION GAP mmol/L 8 9   CALCIUM mg/dL 8 7 9 0   ALBUMIN g/dL  --  3 7   TOTAL BILIRUBIN mg/dL  --  0 80   ALK PHOS U/L  --  69   ALT U/L  --  20   AST U/L  --  13   GLUCOSE RANDOM mg/dL 122 104                           * I Have Reviewed All Lab Data Listed Above  * Additional Pertinent Lab Tests Reviewed:  Chelsea Emerson Admission Reviewed    Imaging:    Imaging Reports Reviewed Today Include: NA    Recent Cultures (last 7 days):           Last 24 Hours Medication List:     Current Facility-Administered Medications:  acetaminophen 975 mg Oral Q8H Ines Chavez MD   amLODIPine 5 mg Oral Daily Mignon Velazquez MD   cholecalciferol 1,000 Units Oral Daily Mignon Velazquez MD   docusate sodium 100 mg Oral BID Mignon Velazquez MD   enoxaparin 40 mg Subcutaneous Daily Mignon Velazquez MD   gabapentin 300 mg Oral TID Mignon Velazquez MD   HYDROmorphone 0 2 mg Intravenous Q4H PRN Mignon Velazquez MD   lidocaine 1 patch Topical Daily Mignon Velazquez MD   methylPREDNISolone 24 mg Oral Daily Mignon Velazquez MD   Followed by       Jovana Martinez ON 1/28/2019] methylPREDNISolone 20 mg Oral Daily Mignon Velazquez MD   Followed by       Jovana Martinez ON 1/29/2019] methylPREDNISolone 16 mg Oral Daily Gabi Schroeder MD   Followed by       Poncho Blue ON 1/30/2019] methylPREDNISolone 12 mg Oral Daily Gabi Schroeder MD   Followed by       Poncho Blue ON 1/31/2019] methylPREDNISolone 8 mg Oral Daily Gabi Schroeder MD   Followed by       Poncho Blue ON 2/1/2019] methylPREDNISolone 4 mg Oral Daily Gabi Schroeder MD   naloxone 0 04 mg Intravenous Q1MIN PRN Gabi Schroeder MD   naproxen 250 mg Oral BID With Aron Barajas MD   And       pantoprazole 40 mg Oral Daily Gabi Schroeder MD   nicotine 1 patch Transdermal Daily Gabi Schroeder MD   ondansetron 4 mg Intravenous Q6H PRN Gabi Schroeder MD   oxyCODONE 2 5 mg Oral Q4H PRN Gabi Schroeder MD        Today, Patient Was Seen By: Gabi Schroeder MD    ** Please Note: Dictation voice to text software may have been used in the creation of this document   **

## 2019-01-27 NOTE — PHYSICIAN ADVISOR
Current patient class: Inpatient  The patient is currently on Hospital Day: 2 at [facility]      The patient was admitted to the hospital at [phone] on 1/27/19 for the following diagnosis:  Back pain [M54 9]  Intractable low back pain [M54 5]  Ambulatory dysfunction [R26 2]       There is documentation in the medical record of an expected length of stay of at least 2 midnights  The patient is therefore expected to satisfy the 2 midnight benchmark and given the 2 midnight presumption is appropriate for INPATIENT ADMISSION  Given this expectation of a satisfying stay, CMS instructs us that the patient is most often appropriate for inpatient admission under part A provided medical necessity is documented in the chart  After review of the relevant documentation, labs, vital signs and test results, the patient is appropriate for INPATIENT ADMISSION  Admission to the hospital as an inpatient is a complex decision making process which requires the practitioner to consider the patients presenting complaint, history and physical examination and all relevant testing  With this in mind, in this case, the patient was deemed appropriate for INPATIENT ADMISSION  After review of the documentation and testing available at the time of the admission I concur with this clinical determination of medical necessity  Rationale is as follows: The patient is a 72 yrs old Female who presented to the ED at 1/26/2019  1:56 PM with a chief complaint of Back Pain (Pt presents to ED via EMS from home after having sudden onset lower back pain starting last night w/ intermitten pain radiating down right leg  Pt denies recent fall or trauma  Pt (+) hx HTN, asthma, breast cancer   )     Given the need for further hospitalization, and along with the documentation of medical necessity present in the chart, the patient is appropriate for inpatient admission    The patient is expected to satisfy the 2 midnight benchmark, and will require further acute medical care  The patient does have comorbid conditions which increases the risk for significant adverse outcome  Given this the patient is appropriate for inpatient admission        The patients vitals on arrival were ED Triage Vitals   Temperature Pulse Respirations Blood Pressure SpO2   01/26/19 1359 01/26/19 1359 01/26/19 1359 01/26/19 1359 01/26/19 1359   98 3 °F (36 8 °C) 89 16 (!) 171/106 97 %      Temp Source Heart Rate Source Patient Position - Orthostatic VS BP Location FiO2 (%)   01/26/19 1847 01/26/19 1359 01/26/19 1359 01/26/19 1359 --   Oral Monitor Lying Right arm       Pain Score       01/26/19 1359       8           Past Medical History:   Diagnosis Date    Asthma     Cancer ([address] )     breast    Hypertension     Kidney stone      Past Surgical History:   Procedure Laterality Date    HYSTERECTOMY      MASTECTOMY Right            Consults have been placed to:   None    Vitals:    01/26/19 1847 01/26/19 2300 01/27/19 0700 01/27/19 1527   BP: 142/83 121/67 129/66 112/68   BP Location: Right arm Right arm Left arm Left arm   Pulse: 87 83  82   Resp: 16 18  18   Temp: 98 5 °F (36 9 °C) 98 3 °F (36 8 °C) 97 5 °F (36 4 °C) 98 6 °F (37 °C)   TempSrc: Oral Oral Oral Oral   SpO2: 97% 95% 95% 96%   Weight:       Height:           Most recent labs:    Recent Labs      01/26/19   1714  01/27/19   0517   WBC  9 21   --    HGB  13 0   --    HCT  39 8   --    PLT  301   --    K  3 6  3 7   CALCIUM  9 0  8 7   BUN  21  28*   CREATININE  0 89  0 87   AST  13   --    ALT  20   --    ALKPHOS  69   --        Scheduled Meds:  Current Facility-Administered Medications:  acetaminophen 975 mg Oral Q8H Garo De Anda MD   amLODIPine 5 mg Oral Daily Gabi Schroeder MD   cholecalciferol 1,000 Units Oral Daily Gabi Schroeder MD   docusate sodium 100 mg Oral BID Gabi Schroeder MD   enoxaparin 40 mg Subcutaneous Daily Gabi Schroeder MD   gabapentin 300 mg Oral TID Rafia Herman Denise Mclaughlin MD   HYDROmorphone 0 2 mg Intravenous Q4H PRN Dulce Gómez MD   lidocaine 1 patch Topical Daily Dulce Gómez MD   [START ON 1/28/2019] methylPREDNISolone 20 mg Oral Daily Dulce Gómez MD   Followed by       Jennifer Mcclain ON 1/29/2019] methylPREDNISolone 16 mg Oral Daily Dulce Gómez MD   Followed by       Jennifer Mcclain ON 1/30/2019] methylPREDNISolone 12 mg Oral Daily Dulce Gómez MD   Followed by       Jennifer Mcclain ON 1/31/2019] methylPREDNISolone 8 mg Oral Daily Dulce Gómez MD   Followed by       Jennifer Mcclain ON 2/1/2019] methylPREDNISolone 4 mg Oral Daily Dulce Gómez MD   naloxone 0 04 mg Intravenous Q1MIN PRN uDlce Gómez MD   naproxen 250 mg Oral BID With Neo Randle MD   And       pantoprazole 40 mg Oral Daily Dulce Gómez MD   nicotine 1 patch Transdermal Daily Dulce Gómez MD   ondansetron 4 mg Intravenous Q6H PRN Dulce Gómez MD   oxyCODONE 2 5 mg Oral Q4H PRN Dulce Gómez MD     Continuous Infusions:   PRN Meds:  HYDROmorphone    naloxone    ondansetron    oxyCODONE    Surgical procedures (if appropriate):

## 2019-01-27 NOTE — UTILIZATION REVIEW
Initial Clinical Review    Admission: Date/Time/Statement: 1/26/19 @ 1812 OBSERVATION  CHANGED TO INPATIENT ON 1/27/19 @ 1433 FOR ONGOING MANAGEMENT OF BACK PAIN,  PHYSICAL THERAPY EVALUATION  Ordered      01/27/19   Inpatient Admission Once     Transfer Service: General Medicine       Question Answer Comment   Admitting Physician Kiki Nevarez    Level of Care Med Surg    Estimated length of stay More than 2 Midnights    Certification I certify that inpatient services are medically necessary for this patient for a duration of greater than two midnights  See H&P and MD Progress Notes for additional information about the patient's course of treatment  01/27/19 1433       ED: Date/Time/Mode of Arrival:   ED Arrival Information     Expected Arrival Acuity Means of Arrival Escorted By Service Admission Type    - 1/26/2019 13:55 Less Urgent [facility] Emergency [facility] General Medicine Urgent    Arrival Complaint    -        Chief Complaint:   Chief Complaint   Patient presents with    Back Pain     Pt presents to ED via EMS from home after having sudden onset lower back pain starting last night w/ intermitten pain radiating down right leg  Pt denies recent fall or trauma  Pt (+) hx HTN, asthma, breast cancer  History of Illness: 79-year-old female presents to emergency room for evaluation of low back pain  States she has been having a problem with her low back for over a year since she fell, however over the past week and a half symptoms have increased and she can no longer walk  Patient states she is crawling around her house and this morning unable to pull herself to the toilet so she grabbed a towel to urinate on  She denies bowel or bladder dysfunction  Denies saddle anesthesia  She is able to stand with assistance  She is taking naproxen and using a lidocaine patch without relief  Patient states she had x-rays done in the past   Denies history of back surgery or procedures    Patient states when she was a kid she had where leg braces  Denies IV drug abuse  Denies confirmed fever, admits to feeling a little hot once in a while  ED Vital Signs:   ED Triage Vitals   Temperature Pulse Respirations Blood Pressure SpO2   01/26/19 1359 01/26/19 1359 01/26/19 1359 01/26/19 1359 01/26/19 1359   98 3 °F (36 8 °C) 89 16 (!) 171/106 97 %      Temp Source Heart Rate Source Patient Position - Orthostatic VS BP Location FiO2 (%)   01/26/19 1847 01/26/19 1359 01/26/19 1359 01/26/19 1359 --   Oral Monitor Lying Right arm       Pain Score       01/26/19 1359       8        Wt Readings from Last 1 Encounters:   01/26/19 65 5 kg (144 lb 6 4 oz)     Vital Signs (abnormal): WNL    Pertinent Labs/Diagnostic Test Results:     BUN/Creatinine = 28/0 87    Urine tox screen: positive for [facility]    01/26/19 1724     Color, UA  Brenda    Clarity, UA  Slightly Cloudy    pH, UA 4 5 - 8 0 6 0    Leukocytes, UA Negative Negative    Nitrite, UA Negative Negative    Protein, UA Negative mg/dl Trace     Glucose, UA Negative mg/dl Negative    Ketones, UA Negative mg/dl 15 (1+)     Urobilinogen, UA 0 2, 1 0 E U /dl E U /dl 0 2    Bilirubin, UA Negative Negative    Blood, UA Negative Trace     Specific [place], UA 1 003 - 1 030 >=1 030       01/26/19 1724    RBC, UA None Seen, 0-5 /hpf 0-5    WBC, UA None Seen, 0-5, 5-55, 5-65 /hpf 1-2     Epithelial Cells None Seen, Occasional /hpf Moderate     Bacteria, UA None Seen, Occasional /hpf Innumerable       CT lumbar spine: 1  Lumbar spondylosis with stable grade 1 spondylolisthesis of L4 on L5 with degenerative discogenic disease most notable at L4-L5 and L5-S1  Severe spinal stenosis at L4-L5 with advanced facet arthropathy, correlate for right greater than left L5 and S1 radiculopathy  Overall degree of spinal disease appears to be stable when comparing to the MRI examination of September 25, 2017           ED Treatment:   Medication Administration from 01/26/2019 1355 to 01/26/2019 1845       Date/Time Order Dose Route Action Action by Comments     01/26/2019 1551 acetaminophen (TYLENOL) tablet 975 mg 975 mg Oral Given Ale Arreguin RN      01/26/2019 1706 morphine (PF) 4 mg/mL injection 3 mg 3 mg Intravenous Given Ale Arreguin RN      01/26/2019 1704 ondansetron (ZOFRAN) injection 4 mg 4 mg Intravenous Given Ale Arreguin RN         Past Medical/Surgical History: Active Ambulatory Problems     Diagnosis Date Noted    No Active Ambulatory Problems     Resolved Ambulatory Problems     Diagnosis Date Noted    No Resolved Ambulatory Problems     Past Medical History:   Diagnosis Date    Asthma     Cancer ([facility] [address]     Hypertension     Kidney stone      Admitting Diagnosis: Back pain [M54 9]  Intractable low back pain [M54 5]  Ambulatory dysfunction [R26 2]  Age/Sex: 72 y o  female     Assessment/Plan:            * Lumbar back pain with radiculopathy affecting right lower extremity   Assessment & Plan     · Patient presents with worsening of her pre-existing chronic back pain  She denies any recent trauma or fall  She is unable to ambulate      · CT scan of the lumbar sacral spine in the emergency room shows:Lumbar spondylosis with stable grade 1 spondylolisthesis of L4 on L5 with degenerative discogenic disease most notable at L4-L5 and L5-S1  2   Severe spinal stenosis at L4-L5 with advanced facet arthropathy, correlate for right greater than left L5 and S1 radiculopathy     · Start scheduled Tylenol, gabapentin, Lidoderm patch, muscle relaxant  · Patient does not appear to have any symptoms/signs of cauda equina syndrome or cord compression  · Physical therapy evaluation will be obtained      Essential hypertension   Assessment & Plan     Continue with outpatient dose of amlodipine      Ambulatory dysfunction   Assessment & Plan     Secondary to above  Maintain fall precautions    Physical therapy and occupational therapy consultation will be obtained            Admission Orders: up with assistance, PT/OT eval and treat, sequential compression device  Scheduled Meds:   Current Facility-Administered Medications:  acetaminophen 975 mg Oral Q8H Shadia Mejias MD   amLODIPine 5 mg Oral Daily Isaac Ray MD   cholecalciferol 1,000 Units Oral Daily Isaac Ray MD   docusate sodium 100 mg Oral BID Isaac Ray MD   enoxaparin 40 mg Subcutaneous Daily Isaac Ray MD   gabapentin 300 mg Oral TID Isaac Ray MD   HYDROmorphone 0 2 mg Intravenous Q4H PRN Isaac Ray MD   lidocaine 1 patch Topical Daily Isaac Ray MD   naloxone 0 04 mg Intravenous Q1MIN PRN Isaac Ray MD   naproxen 250 mg Oral BID With Sulma Méndez MD   And       pantoprazole 40 mg Oral Daily Isaac Ray MD   nicotine 1 patch Transdermal Daily Isaac Ray MD   ondansetron 4 mg Intravenous Q6H PRN Isaac Ray MD   oxyCODONE 2 5 mg Oral Q4H PRN Isaac Ray MD     Continuous Infusions:    PRN Meds: HYDROmorphone    naloxone    ondansetron    oxyCODONE    ==============================================================  Continued Stay Review    Date: 1/27/19 @ 1531    Vital Signs: /68 (BP Location: Left arm)   Pulse 82   Temp 98 6 °F (37 °C) (Oral)   Resp 18   Ht 5' 1 75" (1 568 m)   Wt 65 5 kg (144 lb 6 4 oz)   SpO2 96%   BMI 26 63 kg/m²   Assessment/Plan:     Lumbar back pain with radiculopathy affecting right lower extremity   Assessment & Plan     · Patient presents with worsening of her pre-existing chronic back pain  She denies any recent trauma or fall  She is unable to ambulate      · CT scan of the lumbar sacral spine in the emergency room shows:Lumbar spondylosis with stable grade 1 spondylolisthesis of L4 on L5 with degenerative discogenic disease most notable at L4-L5 and L5-S1    2   Severe spinal stenosis at L4-L5 with advanced facet arthropathy, correlate for right greater than left L5 and S1 radiculopathy     · Start scheduled Tylenol, gabapentin, Lidoderm patch, muscle relaxant  Patient continues to complain of back pain  Will give short course of tapering steroids  · Patient does not appear to have any symptoms/signs of cauda equina syndrome or cord compression  · Physical therapy evaluation appreciated       Essential hypertension   Assessment & Plan     Continue with outpatient dose of amlodipine      Ambulatory dysfunction   Assessment & Plan     Secondary to above  Maintain fall precautions  She lives on the 3rd floor of an apartment complex with no elevator  Has unable to make it to her spine doctor appointment because of her inability to go up and down the stairs  She also has trouble getting up from bed and her mattress has been on the floor  Physical therapy and occupational therapy consultation will be obtained  She may benefit from short-term rehabilitation in view of her ongoing ambulatory dysfunction            VTE Pharmacologic Prophylaxis:   Pharmacologic: Enoxaparin (Lovenox)  Mechanical VTE Prophylaxis in Place: No     Certification Statement: The patient, admitted on an observation basis, will now require > 2 midnight hospital stay due to Ongoing management of back pain and physical therapy evaluation  May need a short-term rehabilitation in view of her ambulatory dysfunction and home safety upon discharge      Medications:   Scheduled Meds:   Current Facility-Administered Medications:  acetaminophen 975 mg Oral Q8H [address]   amLODIPine 5 mg Oral Daily   cholecalciferol 1,000 Units Oral Daily   docusate sodium 100 mg Oral BID   enoxaparin 40 mg Subcutaneous Daily   gabapentin 300 mg Oral TID   HYDROmorphone 0 2 mg Intravenous Q4H PRN   lidocaine 1 patch Topical Daily   methylPREDNISolone 24 mg Oral Daily   Followed by      Jennifer Mcclain ON 1/28/2019] methylPREDNISolone 20 mg Oral Daily   Followed by      Jennifer Mcclain ON 1/29/2019] methylPREDNISolone 16 mg Oral Daily   Followed by      Jennifer Mcclain ON 1/30/2019] methylPREDNISolone 12 mg Oral Daily Followed by      Larisa Preciado ON 1/31/2019] methylPREDNISolone 8 mg Oral Daily   Followed by      Larisa Preciado ON 2/1/2019] methylPREDNISolone 4 mg Oral Daily   naloxone 0 04 mg Intravenous Q1MIN PRN   naproxen 250 mg Oral BID With Meals   And      pantoprazole 40 mg Oral Daily   nicotine 1 patch Transdermal Daily   ondansetron 4 mg Intravenous Q6H PRN   oxyCODONE 2 5 mg Oral Q4H PRN         Age/Sex: 72 y o  female

## 2019-01-27 NOTE — PHYSICAL THERAPY NOTE
PHYSICAL THERAPY EVALUATION NOTE     01/27/19 0845   Note Type   Note type Eval/Treat   Pain Assessment   Pain Assessment 0-10   Pain Score 8   Pain Type Acute pain  (acute on chronic pain)   Pain Location Back   Pain Orientation Lower; Other (Comment)  (L4-S1 region)   Effect of Pain on Daily Activities increased with movement    Home Living   Type of [address] Two level;Stairs to enter with rails  (Full flight to enter, steps in home to attic )   Home Equipment Other (Comment)  (uses rollator for mobility)   Prior Function   Level of [place] Independent with ADLs and functional mobility   Lives With Significant other   Receives Help From Friend(s)   ADL Assistance Independent   IADLs Independent   Falls in the last 6 months 1 to 4  (reports 1 fall about 2-3 months ago  Uriel Bill on stomach)   Restrictions/Precautions   Weight Bearing Precautions Per Order No   Other Precautions Cognitive; Bed Alarm   General   Additional Pertinent History Per H&P: Marijo Bernheim Pysher is a 72 y o  female who presents with worsening of her chronic low back pain  Patient has history of lumbar spinal stenosis with L5-S1 disc herniation and has chronic back pain since her fall 1 year back     She is normally ambulatory despite her back pain and denies any falls however for the last 1 day, she started having worsening of her low back pain in the lumbar region radiating down her right leg  Her pain had worsened to an extent that she was unable to get up on her own and walk and was crawling in her house she denied any urinary retention or constipation  She denied any saddle anesthesia  She was using over-the-counter ibuprofen and Lidoderm patch for pain control without any relief    She was unable to stand up and walk without assistance in the emergency room and is being admitted for pain control and physical therapy evaluation   Family/Caregiver Present No Cognition   Overall Cognitive Status Impaired   Arousal/Participation Cooperative   Orientation Level Oriented to person;Oriented to place;Oriented to time;Oriented to situation   Memory Decreased short term memory;Decreased recall of recent events   Following Commands Follows multistep commands with increased time or repetition   RLE Assessment   RLE Assessment X  (Strength 3/5 gorssly )   LLE Assessment   LLE Assessment X  (Strength 3/5 gorssly )   Coordination   Movements are Fluid and Coordinated 0   Coordination and Movement Description limited 2/2 pain   Sensation WFL   Light Touch   RLE Light Touch Grossly intact   LLE Light Touch Grossly intact   Bed Mobility   Rolling L 6  Modified independent   Additional items Bedrails   Supine to Sit 6  Modified independent   Additional items Bedrails;HOB elevated   Sit to Supine Unable to assess  (patient left seated EOB to eat breakfast)   Transfers   Sit to Stand 5  Supervision   Additional items Assist x 1  (close supervision due to pain)   Stand to Sit 5  Supervision   Ambulation/Elevation   Gait pattern Step to  (antalgica, Decreased stance time on R  )   Gait Assistance 4  Minimal assist   Additional items Assist x 1   Assistive Device Rolling walker   Distance 1 foot  (Advance retreat performed both sides - unsteady due to pain)   Stair Management Assistance Not tested  (due to poor tolerance with standing and stepping)   Balance   Static Sitting Fair +   Static Standing Fair +  (while holding RW)   Dynamic Standing Fair   Ambulatory Fair  (while holding RW)   Endurance Deficit   Endurance Deficit Yes   Activity Tolerance   Activity Tolerance Patient limited by pain   Nurse Made Aware spoke with nursing   Assessment   Prognosis Fair   Problem List Decreased strength;Decreased endurance; Impaired balance;Decreased mobility; Decreased cognition; Impaired judgement;Pain   Assessment Edilson Ornelas is a 72 y o  female who was admitted to [facility] on 1/26/9 , 2/2 acute on chronic LBP  She had a fall 2-3 months ago but reports that she feel on her stomach and after the fall felt okay, wasn't until days after where he back pain worsened  She denies and bowel and bladder changes and saddle numbness  PMH includes but is not limited to Asthma, breast CA, HTN and kidney stones  Prior to admission, patient lived with her fiance in a apartment, FF steps to enter and steps to her attic  Upon evaluation, the demonstrate LBP around L4-S1 which was worse with all movements  No specific directional preferences, better steady vs moving  LE myotomes intact as well as sensation  + slump and SLR for LBP  They can continue to benefit from IP PT at this time in order to address the impairments discussed  Treatment to focus on improving LBP, trial flexion bias with exercises due to spinal stenosis and degenerative changes as well as promoting TrA activation with belly draw in  Recommend rehab at this time due to patient inability to safely amb and perform steps  If she is able to improve her LBP to the point where she can amb and perform steps safely, she is a good OP PT candidate  Clinical complexity is unpredictable 2/2 severity of LBP which is limiting patients ability to perform safe mobility with increased fall risk due to pain  Barriers to Discharge Inaccessible home environment   Goals   Patient Goals less back pain   STG Expiration Date 02/03/19   Short Term Goal #1 Patient will decrease pain to 5/10 or less to allow for improved mobility tolerance that includes walking and steps  Patient will improve walking distance with RW to 200 feet to allow for return to appartment  Patient will negotiate FF of steps using HRx1  Patient will perform all bed mobility and transfers independently to allow for transition from hospital to home or rehab with improved efficiency      Treatment Day 1   Plan   Treatment/Interventions Functional transfer training;LE strengthening/ROM; Elevations; Therapeutic exercise;Gait training;Spoke to nursing   PT Frequency 5x/wk   Recommendation   Recommendation Post acute IP rehab  (If patient progress with safe amb and steps - DC home,OPPT)   Equipment Recommended Other (Comment)  (has rollator )   PT - OK to Discharge Yes   Additional Comments once medically cleared   Barthel Index   Feeding 10   Bathing 5   Grooming Score 5   Dressing Score 10   Bladder Score 10   Bowels Score 10   Toilet Use Score 5   Transfers (Bed/Chair) Score 10   Mobility (Level Surface) Score 5   Stairs Score 0   Barthel Index Score 70     Gerardo Rivera PT      Time In: 835 am  Time Out: 845 am  Total Time: 10 min      S:  Patient reports no significant relief with flexion bias to LS  Still c/o pain with transitional movements  O:  Pain 8/10 reported with movement  A:  Patient with no hard neurological signs of serious pathology both objectively and subjectively, Recommend continued PT to facilitate movement and tolerance OOB  Trial flexion due to degenerative changes as well as TrA activation for stability  P: Patient is truly and OP PT candidate but at this time will recommend rehab due to inability to safely amb or perform steps which she needs to do in order to return home      Gerardo Rivera, PT          Patient Name: Karla Miranda Pyconnor  AJLPG'N Date: 1/27/2019

## 2019-01-27 NOTE — PLAN OF CARE
Problem: PHYSICAL THERAPY ADULT  Goal: Performs mobility at highest level of function for planned discharge setting  See evaluation for individualized goals  Treatment/Interventions: Functional transfer training, LE strengthening/ROM, Elevations, Therapeutic exercise, Gait training, Spoke to nursing  Equipment Recommended: Other (Comment) (has rollator )       See flowsheet documentation for full assessment, interventions and recommendations  Prognosis: Fair  Problem List: Decreased strength, Decreased endurance, Impaired balance, Decreased mobility, Decreased cognition, Impaired judgement, Pain  Assessment: Josy Ornelas is a 72 y o  female who was admitted to [facility] on 1/26/9 , 2/2 acute on chronic LBP  She had a fall 2-3 months ago but reports that she feel on her stomach and after the fall felt okay, wasn't until days after where he back pain worsened  She denies and bowel and bladder changes and saddle numbness  PMH includes but is not limited to Asthma, breast CA, HTN and kidney stones  Prior to admission, patient lived with her fiance in a apartment, FF steps to enter and steps to her attic  Upon evaluation, the demonstrate LBP around L4-S1 which was worse with all movements  No specific directional preferences, better steady vs moving  LE myotomes intact as well as sensation  + slump and SLR for LBP  They can continue to benefit from IP PT at this time in order to address the impairments discussed  Treatment to focus on improving LBP, trial flexion bias with exercises due to spinal stenosis and degenerative changes as well as promoting TrA activation with belly draw in  Recommend rehab at this time due to patient inability to safely amb and perform steps   If she is able to improve her LBP to the point where she can amb and perform steps safely, she is a good OP PT candidate  Clinical complexity is unpredictable 2/2 severity of LBP which is limiting patients ability to perform safe mobility with increased fall risk due to pain  Barriers to Discharge: Inaccessible home environment     Recommendation: Post acute IP rehab (If patient progress with safe amb and steps - DC home,OPPT)     PT - OK to Discharge: Yes    See flowsheet documentation for full assessment

## 2019-01-27 NOTE — ASSESSMENT & PLAN NOTE
· Patient presents with worsening of her pre-existing chronic back pain  She denies any recent trauma or fall  She is unable to ambulate  · CT scan of the lumbar sacral spine in the emergency room shows:Lumbar spondylosis with stable grade 1 spondylolisthesis of L4 on L5 with degenerative discogenic disease most notable at L4-L5 and L5-S1   2   Severe spinal stenosis at L4-L5 with advanced facet arthropathy, correlate for right greater than left L5 and S1 radiculopathy     · Start scheduled Tylenol, gabapentin, Lidoderm patch, muscle relaxant  Patient continues to complain of back pain  Will give short course of tapering steroids  · Patient does not appear to have any symptoms/signs of cauda equina syndrome or cord compression  · Physical therapy evaluation appreciated

## 2019-01-27 NOTE — ASSESSMENT & PLAN NOTE
Secondary to above  Maintain fall precautions  She lives on the 3rd floor of an apartment complex with no elevator  Has unable to make it to her spine doctor appointment because of her inability to go up and down the stairs  She also has trouble getting up from bed and her mattress has been on the floor  Physical therapy and occupational therapy consultation will be obtained    She may benefit from short-term rehabilitation in view of her ongoing ambulatory dysfunction

## 2019-01-28 PROCEDURE — 97530 THERAPEUTIC ACTIVITIES: CPT

## 2019-01-28 PROCEDURE — 99232 SBSQ HOSP IP/OBS MODERATE 35: CPT | Performed by: INTERNAL MEDICINE

## 2019-01-28 PROCEDURE — 97116 GAIT TRAINING THERAPY: CPT

## 2019-01-28 RX ORDER — IBUPROFEN 400 MG/1
400 TABLET ORAL EVERY 6 HOURS PRN
Status: DISCONTINUED | OUTPATIENT
Start: 2019-01-28 | End: 2019-01-31 | Stop reason: HOSPADM

## 2019-01-28 RX ORDER — KETOROLAC TROMETHAMINE 30 MG/ML
15 INJECTION, SOLUTION INTRAMUSCULAR; INTRAVENOUS ONCE
Status: COMPLETED | OUTPATIENT
Start: 2019-01-28 | End: 2019-01-28

## 2019-01-28 RX ADMIN — AMLODIPINE BESYLATE 5 MG: 5 TABLET ORAL at 08:19

## 2019-01-28 RX ADMIN — GABAPENTIN 300 MG: 300 CAPSULE ORAL at 16:02

## 2019-01-28 RX ADMIN — ENOXAPARIN SODIUM 40 MG: 40 INJECTION SUBCUTANEOUS at 08:18

## 2019-01-28 RX ADMIN — NAPROXEN 250 MG: 250 TABLET ORAL at 07:01

## 2019-01-28 RX ADMIN — GABAPENTIN 300 MG: 300 CAPSULE ORAL at 21:51

## 2019-01-28 RX ADMIN — ACETAMINOPHEN 975 MG: 325 TABLET ORAL at 07:00

## 2019-01-28 RX ADMIN — ACETAMINOPHEN 975 MG: 325 TABLET ORAL at 13:44

## 2019-01-28 RX ADMIN — GABAPENTIN 300 MG: 300 CAPSULE ORAL at 08:19

## 2019-01-28 RX ADMIN — ACETAMINOPHEN 975 MG: 325 TABLET ORAL at 21:51

## 2019-01-28 RX ADMIN — KETOROLAC TROMETHAMINE 15 MG: 30 INJECTION, SOLUTION INTRAMUSCULAR at 04:11

## 2019-01-28 RX ADMIN — DOCUSATE SODIUM 100 MG: 100 CAPSULE, LIQUID FILLED ORAL at 19:04

## 2019-01-28 RX ADMIN — PANTOPRAZOLE SODIUM 40 MG: 40 TABLET, DELAYED RELEASE ORAL at 07:02

## 2019-01-28 RX ADMIN — NAPROXEN 250 MG: 250 TABLET ORAL at 16:02

## 2019-01-28 RX ADMIN — METHYLPREDNISOLONE 20 MG: 16 TABLET ORAL at 10:43

## 2019-01-28 RX ADMIN — VITAMIN D, TAB 1000IU (100/BT) 1000 UNITS: 25 TAB at 08:19

## 2019-01-28 RX ADMIN — DOCUSATE SODIUM 100 MG: 100 CAPSULE, LIQUID FILLED ORAL at 08:19

## 2019-01-28 RX ADMIN — NICOTINE 1 PATCH: 14 PATCH TRANSDERMAL at 08:20

## 2019-01-28 NOTE — PROGRESS NOTES
Progress Note Lilibeth Marcos 1945, 72 y o  female MRN: [id number]    Unit/Bed#: -01 Encounter: [id number]    Primary Care Provider: Yasmeen Garcia MD   Date and time admitted to hospital: 1/26/2019  1:56 PM        * Lumbar back pain with radiculopathy affecting right lower extremity   Assessment & Plan    · Patient presents with worsening of her pre-existing chronic back pain  She denies any recent trauma or fall  · CT scan of the lumbar sacral spine in the emergency room shows:Lumbar spondylosis with stable grade 1 spondylolisthesis of L4 on L5 with degenerative discogenic disease most notable at L4-L5 and L5-S1  Severe spinal stenosis at L4-L5 with advanced facet arthropathy, correlate for right greater than left L5 and S1 radiculopathy     · Start scheduled Tylenol, gabapentin, Lidoderm patch, muscle relaxant  Patient continues to complain of back pain  Will give short course of tapering steroids  · Patient does not appear to have any symptoms/signs of cauda equina syndrome or cord compression  · Physical therapy evaluation appreciated  Await OT evaluation  May need short-term rehab on discharge  Essential hypertension   Assessment & Plan    Continue with outpatient dose of amlodipine     Ambulatory dysfunction   Assessment & Plan    Secondary to above  Maintain fall precautions  She lives on the 3rd floor of an apartment complex with no elevator  Has unable to make it to her spine doctor appointment because of her inability to go up and down the stairs  She also has trouble getting up from bed and her mattress has been on the floor  Physical therapy input appreciated   Occupational therapy consultation will be obtained    She may benefit from short-term rehabilitation in view of her ongoing ambulatory dysfunction         VTE Pharmacologic Prophylaxis:   Pharmacologic: Enoxaparin (Lovenox)  Mechanical VTE Prophylaxis in Place: No    Patient Centered Rounds: I have performed bedside rounds with nursing staff today  Discussions with Specialists or Other Care Team Provider:  Discussed with case management  Education and Discussions with Family / Patient:  Patient refuses any family update  Time Spent for Care: 30 minutes  More than 50% of total time spent on counseling and coordination of care as described above  Current Length of Stay: 1 day(s)    Current Patient Status: Inpatient   Certification Statement: The patient will continue to require additional inpatient hospital stay due to Ongoing PT OT evaluation to assess home safety and ability to do stairs prior to discharge  If he is unable to do so she might need short-term rehabilitation  Discharge Plan:  Currently not be stable for discharge  Await OT evaluation  Ongoing physical therapy evaluation for possible need for short-term rehab  Code Status: Level 1 - Full Code      Subjective:   Patient seen and examined  States that her back pain has somewhat improved  Objective:     Vitals:   Temp (24hrs), Av 5 °F (36 9 °C), Min:97 9 °F (36 6 °C), Max:98 9 °F (37 2 °C)    Temp:  [97 9 °F (36 6 °C)-98 9 °F (37 2 °C)] 98 9 °F (37 2 °C)  HR:  [82-85] 82  Resp:  [18] 18  BP: (112-169)/(68-76) 169/74  SpO2:  [96 %] 96 %  Body mass index is 26 63 kg/m²  Input and Output Summary (last 24 hours): Intake/Output Summary (Last 24 hours) at 19 1114  Last data filed at 19 0900   Gross per 24 hour   Intake              460 ml   Output              450 ml   Net               10 ml       Physical Exam:     Physical Exam   Constitutional: No distress  HENT:   Head: Normocephalic and atraumatic  Mouth/Throat: Oropharynx is clear and moist    Eyes: Pupils are equal, round, and reactive to light  Neck: Neck supple  Cardiovascular: Normal rate and regular rhythm  Pulmonary/Chest: Effort normal and breath sounds normal    Abdominal: Soft   Bowel sounds are normal    Musculoskeletal:   Mild focal tenderness lumbar region at L3-L4 level  Straight leg raising test negative bilaterally   Neurological: She is alert  No cranial nerve deficit  Coordination normal    Skin: Skin is warm  She is not diaphoretic  Additional Data:     Labs:      Results from last 7 days  Lab Units 01/26/19  1714   WBC Thousand/uL 9 21   HEMOGLOBIN g/dL 13 0   HEMATOCRIT % 39 8   PLATELETS Thousands/uL 301   NEUTROS PCT % 68   LYMPHS PCT % 20   MONOS PCT % 11   EOS PCT % 1       Results from last 7 days  Lab Units 01/27/19  0517 01/26/19  1714   SODIUM mmol/L 137 139   POTASSIUM mmol/L 3 7 3 6   CHLORIDE mmol/L 104 104   CO2 mmol/L 25 26   BUN mg/dL 28* 21   CREATININE mg/dL 0 87 0 89   ANION GAP mmol/L 8 9   CALCIUM mg/dL 8 7 9 0   ALBUMIN g/dL  --  3 7   TOTAL BILIRUBIN mg/dL  --  0 80   ALK PHOS U/L  --  69   ALT U/L  --  20   AST U/L  --  13   GLUCOSE RANDOM mg/dL 122 104                           * I Have Reviewed All Lab Data Listed Above  * Additional Pertinent Lab Tests Reviewed:  Chelsea Emerson Admission Reviewed    Imaging:    Imaging Reports Reviewed Today Include: NA      Recent Cultures (last 7 days):           Last 24 Hours Medication List:     Current Facility-Administered Medications:  acetaminophen 975 mg Oral Q8H Jerry Combs MD   amLODIPine 5 mg Oral Daily Frank Lr MD   cholecalciferol 1,000 Units Oral Daily Frank Lr MD   docusate sodium 100 mg Oral BID Frank Lr MD   enoxaparin 40 mg Subcutaneous Daily Frank Lr MD   gabapentin 300 mg Oral TID Frank Lr MD   HYDROmorphone 0 2 mg Intravenous Q4H PRN Frank Lr MD   ibuprofen 400 mg Oral Q6H PRN Mann Lopez PA-C   [START ON 1/29/2019] methylPREDNISolone 16 mg Oral Daily Frank Lr MD   Followed by       Judith Burns ON 1/30/2019] methylPREDNISolone 12 mg Oral Daily Frank Lr MD   Followed by       Judith Burns ON 1/31/2019] methylPREDNISolone 8 mg Oral Daily Frank Lr MD   Followed by       Judith Burns ON 2/1/2019] methylPREDNISolone 4 mg Oral Daily Peggye Gottron, MD   naloxone 0 04 mg Intravenous Q1MIN PRN Peggye Gottron, MD   naproxen 250 mg Oral BID With Abhijit Hansen MD   And       pantoprazole 40 mg Oral Daily Peggye Gottron, MD   nicotine 1 patch Transdermal Daily Peggye Gottron, MD   ondansetron 4 mg Intravenous Q6H PRN Peggye Gottron, MD   oxyCODONE 2 5 mg Oral Q4H PRN Peggye Gottron, MD        Today, Patient Was Seen By: Peggye Gottron, MD    ** Please Note: Dictation voice to text software may have been used in the creation of this document   **

## 2019-01-28 NOTE — PHYSICAL THERAPY NOTE
PHYSICAL THERAPY NOTE    Patient Name: Corrinne Katz Pyconnor  VKXVL'T Date: 19 8093   Pain Assessment   Pain Assessment 0-10   Pain Score 3   Pain Type Acute pain   Pain Location Back   Pain Orientation Lower   Restrictions/Precautions   Weight Bearing Precautions Per Order No   Other Precautions Cognitive; Bed Alarm   General   Family/Caregiver Present No   Subjective   Subjective Patient seated OOB in recliner and is agreeable to therapy session  Patient identifers obtained from name,  & ID bracelet  Bed Mobility   Supine to Sit Unable to assess   Sit to Supine Unable to assess   Additional Comments Patient seated OOB in recliner pre and post session with chair alarm engaged, call bell and belongings in reach  Transfers   Sit to Stand 5  Supervision   Additional items Assist x 1; Armrests; Increased time required;Verbal cues   Stand to Sit 5  Supervision   Additional items Assist x 1; Armrests; Increased time required;Verbal cues   Ambulation/Elevation   Gait pattern Antalgic;Decreased R stance; Step to;Excessively slow   Gait Assistance 4  Minimal assist   Additional items Assist x 1;Verbal cues   Assistive Device Rolling walker   Distance 150' x2   Stair Management Assistance 4  Minimal assist   Additional items Assist x 1;Verbal cues; Increased time required   Stair Management Technique With walker; Foreward  (6" curb step)   Number of Stairs 5  (6" curb step with B UE support on roller walker)   Balance   Static Sitting Fair +   Static Standing Fair +   Dynamic Standing Fair   Ambulatory Fair   Activity Tolerance   Activity Tolerance Patient limited by pain   Nurse Made Aware Spoke to AFSHIN Gonzalez   Assessment   Prognosis Fair   Problem List Decreased strength;Decreased endurance; Impaired balance;Decreased mobility; Decreased cognition; Impaired judgement;Pain   Assessment Patient expresses decrease in low back pain and eager to ambulate  Patient remains supervision for sit<>stand transfers, unable to progress to modified independence seconadry to impulsiveness and safety concerns  Provided patient with instruction for body positioning and hand placement  Patient demonstrated ability to ambulate increased gait distance with roller walker and min a x1  Requires instruction for walker guidance and steadying  Patient requires consistnet redirection to task and cuing for safety as patient presents with impulsiveness tendencies  Provided education on safety techniques with poor understanding and follow through  Patient able to ascend and descend 6" curb step x 5 trials with min a x2 and B UE support on roller walker  Provided instruction with stepping sequence with fair understanding  Continue to focus on OOB mobility and progression of ambulation and stair training as approrpriate  Barriers to Discharge Inaccessible home environment   Goals   Patient Goals to move arounda nd be able to walk more   STG Expiration Date 02/03/19   Treatment Day 2   Plan   Treatment/Interventions Functional transfer training;LE strengthening/ROM; Elevations; Therapeutic exercise;Gait training;Spoke to nursing   PT Frequency 5x/wk   Recommendation   Recommendation Post acute IP rehab  (If patient progress with safe amb and steps - DC home,OPPT)   Equipment Recommended Other (Comment)  (has rollator)       Deidre Quintana, PTA

## 2019-01-28 NOTE — ASSESSMENT & PLAN NOTE
· Patient presents with worsening of her pre-existing chronic back pain  She denies any recent trauma or fall  · CT scan of the lumbar sacral spine in the emergency room shows:Lumbar spondylosis with stable grade 1 spondylolisthesis of L4 on L5 with degenerative discogenic disease most notable at L4-L5 and L5-S1  Severe spinal stenosis at L4-L5 with advanced facet arthropathy, correlate for right greater than left L5 and S1 radiculopathy     · Start scheduled Tylenol, gabapentin, Lidoderm patch, muscle relaxant  Patient continues to complain of back pain  Will give short course of tapering steroids  · Patient does not appear to have any symptoms/signs of cauda equina syndrome or cord compression  · Physical therapy evaluation appreciated  Await OT evaluation  May need short-term rehab on discharge

## 2019-01-28 NOTE — SOCIAL WORK
LOS 1  Patient is not a bundle or a readmission  CM spoke with patient at the bedside  Patient lives in [place] in a 3rd floor apartment with her David Fajardo  Patient has 2 flights of steps to get to her apartment  There is no elevator  Patient uses a rollator at home  Patient does her own bathing and dressing  Patient's fitaylore cooks and cleans the apartment  Denies history of inpatient rehab and VNA  Patient uses [facility] in [place] [address]  Patient has prescription insurance  Patient does not have an advance directive/living will  Patient declined information  Patient's emergency contact is her David Fajardo  His number is   Patient is not employed and her license is   Patient uses the bus and walks  Patient will need assistance with transport at discharge  Patient denies history of alcohol abuse  Patient stated history of THC use in the past  Patient has a history of depression that she follows up with Dr Deonte Olivares  CM discussed with patient at the bedside re:DCP  Patient is agreeable to STR at discharge  Patient would like to go to a facility in [place]  Patient is open to referral sent to [facility]  CM offered to call family for an update  Patient declined  Patient stated she does not talk to her son or daughter  Patient will update her bhavik with plan of care  CM updated Slim with plan of care  CM reviewed discharge planning process including the following: identifying caregivers at home, preference for d/c planning needs, [facility] Meds to Bed program, availability of treatment team to discuss questions or concerns patient and/or family may have regarding diagnosis, plan of care, old or new medications and discharge planning   CM will continue to follow for care coordination and update assessment as necessary

## 2019-01-28 NOTE — ASSESSMENT & PLAN NOTE
Secondary to above  Maintain fall precautions  She lives on the 3rd floor of an apartment complex with no elevator  Has unable to make it to her spine doctor appointment because of her inability to go up and down the stairs  She also has trouble getting up from bed and her mattress has been on the floor  Physical therapy input appreciated   Occupational therapy consultation will be obtained    She may benefit from short-term rehabilitation in view of her ongoing ambulatory dysfunction

## 2019-01-28 NOTE — PLAN OF CARE
Problem: PHYSICAL THERAPY ADULT  Goal: Performs mobility at highest level of function for planned discharge setting  See evaluation for individualized goals  Treatment/Interventions: Functional transfer training, LE strengthening/ROM, Elevations, Therapeutic exercise, Gait training, Spoke to nursing  Equipment Recommended: Other (Comment) (has rollator )       See flowsheet documentation for full assessment, interventions and recommendations  Outcome: Progressing  Prognosis: Fair  Problem List: Decreased strength, Decreased endurance, Impaired balance, Decreased mobility, Decreased cognition, Impaired judgement, Pain  Assessment: Patient expresses decrease in low back pain and eager to ambulate  Patient remains supervision for sit<>stand transfers, unable to progress to modified independence seconadry to impulsiveness and safety concerns  Provided patient with instruction for body positioning and hand placement  Patient demonstrated ability to ambulate increased gait distance with roller walker and min a x1  Requires instruction for walker guidance and steadying  Patient requires consistnet redirection to task and cuing for safety as patient presents with impulsiveness tendencies  Provided education on safety techniques with poor understanding and follow through  Patient able to ascend and descend 6" curb step x 5 trials with min a x2 and B UE support on roller walker  Provided instruction with stepping sequence with fair understanding  Continue to focus on OOB mobility and progression of ambulation and stair training as approrpriate  Barriers to Discharge: Inaccessible home environment     Recommendation: Post acute IP rehab (If patient progress with safe amb and steps - DC home,OPPT)     PT - OK to Discharge: Yes    See flowsheet documentation for full assessment

## 2019-01-28 NOTE — PLAN OF CARE
Problem: DISCHARGE PLANNING - CARE MANAGEMENT  Goal: Discharge to post-acute care or home with appropriate resources  INTERVENTIONS:  - Conduct assessment to determine patient/family and health care team treatment goals, and need for post-acute services based on payer coverage, community resources, and patient preferences, and barriers to discharge  - Address psychosocial, clinical, and financial barriers to discharge as identified in assessment in conjunction with the patient/family and health care team  - Arrange appropriate level of post-acute services according to patients   needs and preference and payer coverage in collaboration with the physician and health care team  - Communicate with and update the patient/family, physician, and health care team regarding progress on the discharge plan  - Arrange appropriate transportation to post-acute venues  Outcome: Progressing  LOS 1  Patient is not a bundle or a readmission  CM spoke with patient at the bedside  Patient lives in [facility] in a 3rd floor apartment with her Rivka Celis  Patient has 2 flights of steps to get to her apartment  There is no elevator  Patient uses a rollator at home  Patient does her own bathing and dressing  Patient's fiancee cooks and cleans the apartment  Denies history of inpatient rehab and VNA  Patient uses [facility] in [facility] [address]  Patient has prescription insurance  Patient does not have an advance directive/living will  Patient declined information  Patient's emergency contact is her Rivka Celis  His number is   Patient is not employed and her license is   Patient uses the bus and walks  Patient will need assistance with transport at discharge  Patient denies history of alcohol abuse  Patient stated history of THC use in the past  Patient has a history of depression that she follows up with Dr Annie Delarosa CM discussed with patient at the bedside re:DCP  Patient is agreeable to STR at discharge  Patient would like to go to a facility in [facility]  Patient is open to referral sent to [facility]  CM offered to call family for an update  Patient declined  Patient stated she does not talk to her son or daughter  Patient will update her vianneye with plan of care  CM updated Slim with plan of care  CM reviewed discharge planning process including the following: identifying caregivers at home, preference for d/c planning needs, [facility] Meds to Bed program, availability of treatment team to discuss questions or concerns patient and/or family may have regarding diagnosis, plan of care, old or new medications and discharge planning   CM will continue to follow for care coordination and update assessment as necessary

## 2019-01-29 PROCEDURE — 99232 SBSQ HOSP IP/OBS MODERATE 35: CPT | Performed by: INTERNAL MEDICINE

## 2019-01-29 PROCEDURE — G8988 SELF CARE GOAL STATUS: HCPCS

## 2019-01-29 PROCEDURE — 97166 OT EVAL MOD COMPLEX 45 MIN: CPT

## 2019-01-29 PROCEDURE — G8987 SELF CARE CURRENT STATUS: HCPCS

## 2019-01-29 RX ADMIN — NICOTINE 1 PATCH: 14 PATCH TRANSDERMAL at 08:25

## 2019-01-29 RX ADMIN — VITAMIN D, TAB 1000IU (100/BT) 1000 UNITS: 25 TAB at 08:23

## 2019-01-29 RX ADMIN — METHYLPREDNISOLONE 16 MG: 4 TABLET ORAL at 08:23

## 2019-01-29 RX ADMIN — ENOXAPARIN SODIUM 40 MG: 40 INJECTION SUBCUTANEOUS at 08:23

## 2019-01-29 RX ADMIN — IBUPROFEN 400 MG: 400 TABLET ORAL at 08:25

## 2019-01-29 RX ADMIN — ACETAMINOPHEN 975 MG: 325 TABLET ORAL at 21:12

## 2019-01-29 RX ADMIN — GABAPENTIN 300 MG: 300 CAPSULE ORAL at 08:23

## 2019-01-29 RX ADMIN — AMLODIPINE BESYLATE 5 MG: 5 TABLET ORAL at 08:23

## 2019-01-29 RX ADMIN — DOCUSATE SODIUM 100 MG: 100 CAPSULE, LIQUID FILLED ORAL at 08:23

## 2019-01-29 RX ADMIN — NAPROXEN 250 MG: 250 TABLET ORAL at 17:00

## 2019-01-29 RX ADMIN — ACETAMINOPHEN 975 MG: 325 TABLET ORAL at 14:13

## 2019-01-29 RX ADMIN — NAPROXEN 250 MG: 250 TABLET ORAL at 07:32

## 2019-01-29 RX ADMIN — ACETAMINOPHEN 975 MG: 325 TABLET ORAL at 07:32

## 2019-01-29 RX ADMIN — GABAPENTIN 300 MG: 300 CAPSULE ORAL at 17:00

## 2019-01-29 RX ADMIN — PANTOPRAZOLE SODIUM 40 MG: 40 TABLET, DELAYED RELEASE ORAL at 07:32

## 2019-01-29 RX ADMIN — DOCUSATE SODIUM 100 MG: 100 CAPSULE, LIQUID FILLED ORAL at 17:00

## 2019-01-29 RX ADMIN — GABAPENTIN 300 MG: 300 CAPSULE ORAL at 20:22

## 2019-01-29 NOTE — PLAN OF CARE
Problem: OCCUPATIONAL THERAPY ADULT  Goal: Performs self-care activities at highest level of function for planned discharge setting  See evaluation for individualized goals  Treatment Interventions: ADL retraining, Functional transfer training, Endurance training, Cognitive reorientation, Patient/family training, Compensatory technique education, Energy conservation, Activityengagement          See flowsheet documentation for full assessment, interventions and recommendations  Limitation: Decreased Safe judgement during ADL, Decreased endurance, Decreased cognition, Decreased high-level ADLs, Decreased self-care trans     Assessment: Pt is a 72 y o  female seen for OT evaluation s/p admit to [facility] on 1/26/2019 w/ Lumbar back pain with radiculopathy affecting right lower extremity  Comorbidities affecting pt's functional performance at time of assessment include: previous surgery, cancer history and asthma, +smoker  Personal factors affecting pt at time of IE include:steps to enter environment, behavioral pattern and limited insight into deficits  Prior to admission, pt was independent w self care and functional mobility using a rollator for support  Upon evaluation: Pt needs S for completion of LB dressing, cues for LB dressing for back protection, S for transfers 2* the following deficits impacting occupational performance: weakness, decreased balance, decreased tolerance, impaired problem solving, impulsivity and decreased safety awareness  Pt demonstrated decreased insight, was impulsive and not oriented to date/own age  Pt to benefit from continued skilled OT tx while in the hospital to address deficits as defined above and maximize level of functional independence w ADL's and functional mobility  Occupational Performance areas to address include: bathing/shower, functional mobility and clothing management, ongoing cog assessment as appropriate to assess for safety   Pt scored 75 /100 on the barthel index  Based on findings from OT evaluation and functional performance deficits, pt has been identified as a moderate complexity evaluation  From OT standpoint, recommendation at time of d/c would be home with family support vs short term rehab pending ability to climb stairs safely       OT Discharge Recommendation: Other (Comment) (home vs ST rehab pending abilty to negotiate stairs safely)

## 2019-01-29 NOTE — ASSESSMENT & PLAN NOTE
· Presented with worsening back pain and Ambulatory Dysfunction   · CT scan - stable grade 1 spondylolisthesis of L4 on L5 with DJD most notable at L4-L5 and L5-S1  Severe spinal stenosis at L4-L5 with advanced facet arthropathy, correlate for right greater than left L5 and S1 radiculopathy  · Tylenol, gabapentin, Lidoderm patch, muscle relaxant  short course of tapering steroids    · Recommended rehab, apparently initially agreed but today wants to go home  · Neuropsych eval

## 2019-01-29 NOTE — PROGRESS NOTES
Spoke with slim PA  Okay for patient to not have an IV tonight  Will reassess tomorrow pending discharge plans  Will see patient tonight to assess prior IV site

## 2019-01-29 NOTE — PROGRESS NOTES
Progress Note Amaury Filler 1945, 72 y o  female MRN: [id number]    Unit/Bed#: -01 Encounter: [id number]    Primary Care Provider: Gaby Mclaughlin MD   Date and time admitted to hospital: 2019  1:56 PM    Essential hypertension   Assessment & Plan    Continue with outpatient dose of amlodipine     Ambulatory dysfunction   Assessment & Plan    PT/OT eval noted  Would benefit from rehab     * Lumbar back pain with radiculopathy affecting right lower extremity   Assessment & Plan    · Presented with worsening back pain and Ambulatory Dysfunction   · CT scan - stable grade 1 spondylolisthesis of L4 on L5 with DJD most notable at L4-L5 and L5-S1  Severe spinal stenosis at L4-L5 with advanced facet arthropathy, correlate for right greater than left L5 and S1 radiculopathy  · Tylenol, gabapentin, Lidoderm patch, muscle relaxant  short course of tapering steroids  · Recommended rehab, apparently initially agreed but today wants to go home  · Neuropsych eval       VTE Pharmacologic Prophylaxis: Enoxaparin (Lovenox)  Mechanical: Mechanical VTE prophylaxis in place  Patient Centered Rounds: I have performed bedside rounds with nursing staff today  Discussions with Specialists or Other Care Team Provider:     Education and Discussions with Family / Patient: called spouse, could not leave a message    Time Spent for Care: More than 50% of total time spent on counseling and coordination of care as described above      Current Length of Stay: 2 day(s)    Current Patient Status: Inpatient   Certification Statement: The patient will continue to require additional inpatient hospital stay due to as above    Discharge Plan:    Code Status: Level 1 - Full Code      Subjective: no new complaints offered    Objective:     Vitals:   Temp (24hrs), Av 2 °F (36 8 °C), Min:97 6 °F (36 4 °C), Max:98 8 °F (37 1 °C)    Temp:  [97 6 °F (36 4 °C)-98 8 °F (37 1 °C)] 98 6 °F (37 °C)  HR:  [73-98] 98  Resp: [18-20] 20  BP: (134-170)/(62-93) 136/74  SpO2:  [92 %-98 %] 96 %  Body mass index is 26 63 kg/m²  Input and Output Summary (last 24 hours): Intake/Output Summary (Last 24 hours) at 01/29/19 1540  Last data filed at 01/29/19 0858   Gross per 24 hour   Intake                0 ml   Output              850 ml   Net             -850 ml       Physical Exam   HENT:   Head: Normocephalic  Eyes: Pupils are equal, round, and reactive to light  Cardiovascular: Normal heart sounds  Pulmonary/Chest: Effort normal    Abdominal: Soft  Neurological: She is alert  Skin: There is pallor  Additional Data:     Labs:      Results from last 7 days  Lab Units 01/26/19  1714   WBC Thousand/uL 9 21   HEMOGLOBIN g/dL 13 0   HEMATOCRIT % 39 8   PLATELETS Thousands/uL 301   NEUTROS PCT % 68   LYMPHS PCT % 20   MONOS PCT % 11   EOS PCT % 1       Results from last 7 days  Lab Units 01/27/19  0517 01/26/19  1714   POTASSIUM mmol/L 3 7 3 6   CHLORIDE mmol/L 104 104   CO2 mmol/L 25 26   BUN mg/dL 28* 21   CREATININE mg/dL 0 87 0 89   CALCIUM mg/dL 8 7 9 0   ALK PHOS U/L  --  69   ALT U/L  --  20   AST U/L  --  13           * I Have Reviewed All Lab Data Listed Above      Imaging:  Imaging Personally Reviewed by Myself Includes:     Cultures:   Blood Culture: No results found for: BLOODCX  Urine Culture: No results found for: URINECX  Sputum Culture: No components found for: SPUTUMCX  Wound Culture: No results found for: WOUNDCULT    Last 24 Hours Medication List:     Current Facility-Administered Medications:  acetaminophen 975 mg Oral Q8H Henry Franco MD   amLODIPine 5 mg Oral Daily Violetta Menjivar MD   cholecalciferol 1,000 Units Oral Daily Violetta Menjivar MD   docusate sodium 100 mg Oral BID Violetta Menjivar MD   enoxaparin 40 mg Subcutaneous Daily Violetta Menjivar MD   gabapentin 300 mg Oral TID Violetta Menjivar MD   ibuprofen 400 mg Oral Q6H PRN Hakeem Blake PA-C   [START ON 1/30/2019] methylPREDNISolone 12 mg Oral Daily Don Raygoza MD   Followed by       Emerson Rodriguez ON 1/31/2019] methylPREDNISolone 8 mg Oral Daily Don Raygoza MD   Followed by       Emerson Rodriguez ON 2/1/2019] methylPREDNISolone 4 mg Oral Daily Don Raygoza MD   naloxone 0 04 mg Intravenous Q1MIN PRN Don Raygoza MD   naproxen 250 mg Oral BID With Homero Medrano MD   nicotine 1 patch Transdermal Daily Don Raygoza MD   ondansetron 4 mg Intravenous Q6H PRN Don Raygoza MD   oxyCODONE 2 5 mg Oral Q4H PRN Don Raygoza MD        Today, Patient Was Seen By: Laxmi Sloan MD    ** Please Note: Dragon 360 Dictation voice to text software may have been used in the creation of this document   **

## 2019-01-29 NOTE — PROGRESS NOTES
Upon entering the room patient had IV dressing partially removed, exposing the catheter  Patient complained that "catheter wasn't in" and it was "turning black"  Upon inspection, IV site had small amount of dried blood  Patient agreeable to dressing change  After removing the dressing, patient began pulling at the IV  Patient said "I don't even need this, they never used it  I'm going home tomorrow  I want it out  I worked at [facility] and I know how to take these out " Patient requested for a doctor to look at the site  Attempted to replace dressing to call slim  Patient would not allow a dressing to be placed and pulled arm away  Patient removed IV by self  SLIM paged  Will continue to monitor

## 2019-01-29 NOTE — OCCUPATIONAL THERAPY NOTE
Occupational Therapy Evaluation      Roseanne Amaralconnor    1/29/2019    Patient Active Problem List   Diagnosis    Lumbar back pain with radiculopathy affecting right lower extremity    Ambulatory dysfunction    Essential hypertension       Past Medical History:   Diagnosis Date    Asthma     Cancer ([address] )     breast    Hypertension     Kidney stone        Past Surgical History:   Procedure Laterality Date    HYSTERECTOMY      MASTECTOMY Right       01/29/19 0927   Note Type   Note type Eval only   Restrictions/Precautions   Other Precautions Cognitive; Chair Alarm; Fall Risk   Pain Assessment   Pain Assessment No/denies pain  (althought pt states her back always hurts)   Pain Score No Pain   Home Living   Type of [address] level;Stairs to enter with rails   Bathroom Shower/Tub Tub/shower unit   Bathroom Toilet Standard   Home Equipment Other (Comment)  (rollator)   Additional Comments pt has 2 full flight of stairs to enter her apartment  states she typically holds onto the railing when climbing up, and scoots down on her buttocks   Prior Function   Level of [place] Independent with ADLs and functional mobility; Needs assistance with IADLs   Lives With Significant other   Receives Help From Family   ADL Assistance Independent   IADLs Needs assistance   Falls in the last 6 months 1 to 4   Comments pt states she can bathe and dress herself and that her Sallye Cutting does the cleaning and cooking "because I am lazy"   Lifestyle   Autonomy pt states she had one fall in which she fell onto her stomach  Normally they sleep on a mattress about 5-6 inches above the ground, and she rolls over on to her belly before holding onto door frame to stand up   Reciprocal Relationships supportive sig  other   Intrinsic Gratification "smoke my cigarettes"   Psychosocial   Patient Behaviors/Mood Anxious   Subjective   Subjective "I am pretty calm right now"   ADL   Eating Assistance 7  Independent Grooming Assistance 7  Independent   Grooming Deficit Setup; Increased time to complete;Wash/dry hands; Wash/dry face; Teeth care;Brushing hair;Standing with assistive device   UB Bathing Assistance 7  Independent   LB Bathing Assistance 5  Supervision/Setup   UB Dressing Assistance 7  Independent   LB Dressing Assistance 6  Modified independent   Transfers   Sit to Stand 5  Supervision   Stand to Sit 5  Supervision   Stand pivot 5  Supervision   Toilet transfer 5  Supervision   Functional Mobility   Functional Mobility 6  Modified independent   Additional items Rolling walker   Balance   Static Sitting Good   Dynamic Sitting Fair +   Static Standing Fair   Dynamic Standing Fair   Activity Tolerance   Activity Tolerance Patient tolerated treatment well   RUE Assessment   RUE Assessment WFL   LUE Assessment   LUE Assessment WFL   Hand Function   Gross Motor Coordination Functional   Fine Motor Coordination Functional   Sensation   Additional Comments admits to having "funny" feeling in her hands ever since having had cancer treatment   Cognition   Overall Cognitive Status Impaired   Arousal/Participation Alert; Cooperative   Attention Attends with cues to redirect   Orientation Level Oriented to person;Disoriented to time;Disoriented to place   Memory Decreased recall of recent events;Decreased short term memory   Following Commands Follows one step commands without difficulty   Comments pt unable to name this place/setting without prompting /cues, statse she doesn't care what month/year it is and that she doesn't pay attention  states she is probably 72 y/o  Pt with decreased probl solving, impulsive, decreased insight, poor safety    Assessment   Limitation Decreased Safe judgement during ADL;Decreased endurance;Decreased cognition;Decreased high-level ADLs; Decreased self-care trans   Assessment Pt is a 72 y o  female seen for OT evaluation s/p admit to [address] [place] [address] on 1/26/2019 w/ Lumbar back pain with radiculopathy affecting right lower extremity  Comorbidities affecting pt's functional performance at time of assessment include: previous surgery, cancer history and asthma, +smoker  Personal factors affecting pt at time of IE include:steps to enter environment, behavioral pattern and limited insight into deficits  Prior to admission, pt was independent w self care and functional mobility using a rollator for support  Upon evaluation: Pt needs S for completion of LB dressing, cues for LB dressing for back protection, S for transfers 2* the following deficits impacting occupational performance: weakness, decreased balance, decreased tolerance, impaired problem solving, impulsivity and decreased safety awareness  Pt demonstrated decreased insight, was impulsive and not oriented to date/own age  Pt to benefit from continued skilled OT tx while in the hospital to address deficits as defined above and maximize level of functional independence w ADL's and functional mobility  Occupational Performance areas to address include: bathing/shower, functional mobility and clothing management, ongoing cog assessment as appropriate to assess for safety  Pt scored 75 /100 on the barthel index  Based on findings from OT evaluation and functional performance deficits, pt has been identified as a moderate complexity evaluation  From OT standpoint, recommendation at time of d/c would be home with family support vs short term rehab pending ability to climb stairs safely  Goals   Patient Goals to go home   LTG Time Frame 3-7   Long Term Goal #1 see below   Plan   Treatment Interventions ADL retraining;Functional transfer training; Endurance training;Cognitive reorientation;Patient/family training; Compensatory technique education; Energy conservation; Activityengagement   Goal Expiration Date 02/05/19   OT Frequency 3-5x/wk   Recommendation   OT Discharge Recommendation Other (Comment)  (home vs ST rehab pending abilty to negotiate stairs safely)   Barthel Index   Feeding 10   Bathing 0   Grooming Score 5   Dressing Score 10   Bladder Score 10   Bowels Score 10   Toilet Use Score 10   Transfers (Bed/Chair) Score 10   Mobility (Level Surface) Score 10   Stairs Score 0   Barthel Index Score 75     OT GOALS TO BE ACHIEVED IN ONE WEEK:        Pt will complete LB bathing and dressing independently using back protection techniques to minimize pain    Pt will tolerate standing at sinkside x 5 min w fair + balance for increased safety w hygiene    Pt will verbalize and demonstrate good back protections strategies with all self care and functional mobility to minimize backpain and promote ongoing independence with self care    Pt will participate in ongoing cognitive assessment and training as appropriate to assist w safest dc plan    Pt will complete functional mobility in room and bathroom mod I  with good safety

## 2019-01-29 NOTE — PLAN OF CARE
DISCHARGE PLANNING - CARE MANAGEMENT     Discharge to post-acute care or home with appropriate resources Progressing        PAIN - ADULT     Verbalizes/displays adequate comfort level or baseline comfort level Progressing        Potential for Falls     Patient will remain free of falls Progressing        SAFETY ADULT     Maintain or return to baseline ADL function Progressing     Maintain or return mobility status to optimal level Progressing

## 2019-01-30 ENCOUNTER — TELEPHONE (OUTPATIENT)
Dept: NEUROSURGERY | Facility: CLINIC | Age: 74
End: 2019-01-30

## 2019-01-30 PROBLEM — R41.89 COGNITIVE IMPAIRMENT: Status: ACTIVE | Noted: 2019-01-30

## 2019-01-30 PROCEDURE — 97530 THERAPEUTIC ACTIVITIES: CPT

## 2019-01-30 PROCEDURE — 97116 GAIT TRAINING THERAPY: CPT

## 2019-01-30 PROCEDURE — 99232 SBSQ HOSP IP/OBS MODERATE 35: CPT | Performed by: INTERNAL MEDICINE

## 2019-01-30 RX ADMIN — GABAPENTIN 300 MG: 300 CAPSULE ORAL at 17:32

## 2019-01-30 RX ADMIN — NICOTINE 1 PATCH: 14 PATCH TRANSDERMAL at 09:11

## 2019-01-30 RX ADMIN — ACETAMINOPHEN 975 MG: 325 TABLET ORAL at 13:36

## 2019-01-30 RX ADMIN — AMLODIPINE BESYLATE 5 MG: 5 TABLET ORAL at 09:07

## 2019-01-30 RX ADMIN — GABAPENTIN 300 MG: 300 CAPSULE ORAL at 21:32

## 2019-01-30 RX ADMIN — ACETAMINOPHEN 975 MG: 325 TABLET ORAL at 06:59

## 2019-01-30 RX ADMIN — ENOXAPARIN SODIUM 40 MG: 40 INJECTION SUBCUTANEOUS at 09:09

## 2019-01-30 RX ADMIN — ACETAMINOPHEN 975 MG: 325 TABLET ORAL at 21:32

## 2019-01-30 RX ADMIN — DOCUSATE SODIUM 100 MG: 100 CAPSULE, LIQUID FILLED ORAL at 09:07

## 2019-01-30 RX ADMIN — GABAPENTIN 300 MG: 300 CAPSULE ORAL at 09:06

## 2019-01-30 RX ADMIN — VITAMIN D, TAB 1000IU (100/BT) 1000 UNITS: 25 TAB at 09:06

## 2019-01-30 RX ADMIN — DOCUSATE SODIUM 100 MG: 100 CAPSULE, LIQUID FILLED ORAL at 17:32

## 2019-01-30 RX ADMIN — METHYLPREDNISOLONE 12 MG: 4 TABLET ORAL at 09:06

## 2019-01-30 NOTE — PROGRESS NOTES
Progress Note - Shwetha Bhakta 1945, 72 y o  female MRN: [id number]    Unit/Bed#: -01 Encounter: [id number]    Primary Care Provider: Dana Mcburney, MD   Date and time admitted to hospital: 1/26/2019  1:56 PM    Cognitive impairment   Assessment & Plan    Associated with poor social circumstance and some irrational decisions  competence eval done - waiting input  If incompetent, will need to find next of kin/ guardianship     Essential hypertension   Assessment & Plan    Continue with outpatient dose of amlodipine     Ambulatory dysfunction   Assessment & Plan    PT/OT eval noted  Pt still wavering     * Lumbar back pain with radiculopathy affecting right lower extremity   Assessment & Plan    · Presented with worsening back pain and Ambulatory Dysfunction   · CT scan - stable grade 1 spondylolisthesis of L4 on L5 with DJD most notable at L4-L5 and L5-S1  Severe spinal stenosis at L4-L5 with advanced facet arthropathy, correlate for right greater than left L5 and S1 radiculopathy  · Tylenol, gabapentin, Lidoderm patch, muscle relaxant  short course of tapering steroids  · Recommended rehab, apparently initially agreed but today wants to go home  · Pt requested RICHELLE - last done in [facility] about 5 years ago - will refer as outpt  · Remains medically ready       VTE Pharmacologic Prophylaxis: Enoxaparin (Lovenox)  Mechanical: Mechanical VTE prophylaxis in place  Patient Centered Rounds: I have performed bedside rounds with nursing staff today  Discussions with Specialists or Other Care Team Provider:     Education and Discussions with Family / Patient:     Time Spent for Care: More than 50% of total time spent on counseling and coordination of care as described above      Current Length of Stay: 3 day(s)    Current Patient Status: Inpatient   Certification Statement: The patient will continue to require additional inpatient hospital stay due to as above    Discharge Plan:    Code Status: Level 1 - Full Code      Subjective: still some back pain    Objective:     Vitals:   Temp (24hrs), Av °F (36 7 °C), Min:97 7 °F (36 5 °C), Max:98 2 °F (36 8 °C)    Temp:  [97 7 °F (36 5 °C)-98 2 °F (36 8 °C)] 98 2 °F (36 8 °C)  HR:  [75-99] 99  Resp:  [18-22] 18  BP: (140-172)/(77-82) 140/82  SpO2:  [94 %-99 %] 97 %  Body mass index is 26 63 kg/m²  Input and Output Summary (last 24 hours): Intake/Output Summary (Last 24 hours) at 19 1632  Last data filed at 19 1248   Gross per 24 hour   Intake              460 ml   Output             1200 ml   Net             -740 ml       Physical Exam   Eyes: Pupils are equal, round, and reactive to light  Cardiovascular: Normal heart sounds  Pulmonary/Chest: Effort normal    Abdominal: Soft  Neurological: She is alert  Additional Data:     Labs:      Results from last 7 days  Lab Units 19  1714   WBC Thousand/uL 9 21   HEMOGLOBIN g/dL 13 0   HEMATOCRIT % 39 8   PLATELETS Thousands/uL 301   NEUTROS PCT % 68   LYMPHS PCT % 20   MONOS PCT % 11   EOS PCT % 1       Results from last 7 days  Lab Units 19  0517 19  1714   POTASSIUM mmol/L 3 7 3 6   CHLORIDE mmol/L 104 104   CO2 mmol/L 25 26   BUN mg/dL 28* 21   CREATININE mg/dL 0 87 0 89   CALCIUM mg/dL 8 7 9 0   ALK PHOS U/L  --  69   ALT U/L  --  20   AST U/L  --  13           * I Have Reviewed All Lab Data Listed Above      Imaging:  Imaging Personally Reviewed by Myself Includes:     Cultures:   Blood Culture: No results found for: BLOODCX  Urine Culture: No results found for: URINECX  Sputum Culture: No components found for: SPUTUMCX  Wound Culture: No results found for: WOUNDCULT    Last 24 Hours Medication List:     Current Facility-Administered Medications:  acetaminophen 975 mg Oral Q8H Jerry Combs MD   amLODIPine 5 mg Oral Daily Frank Lr MD   cholecalciferol 1,000 Units Oral Daily Frank Lr MD   docusate sodium 100 mg Oral BID Guerda Naveen Gudino MD   enoxaparin 40 mg Subcutaneous Daily Gianna Figueroa MD   gabapentin 300 mg Oral TID Gianna Figueroa MD   ibuprofen 400 mg Oral Q6H PRN Ariana Mcfarland PA-C   [START ON 1/31/2019] methylPREDNISolone 8 mg Oral Daily Gianna Figueroa MD   Followed by       Praveena Kamara ON 2/1/2019] methylPREDNISolone 4 mg Oral Daily Gianna Figueroa MD   naloxone 0 04 mg Intravenous Q1MIN PRN Gianna Figueroa MD   nicotine 1 patch Transdermal Daily Gianna Figueroa MD   ondansetron 4 mg Intravenous Q6H PRN Gianna Figueroa MD   oxyCODONE 2 5 mg Oral Q4H PRN Gianna Figueroa MD        Today, Patient Was Seen By: Gia Olsen MD    ** Please Note: Dragon 360 Dictation voice to text software may have been used in the creation of this document   **

## 2019-01-30 NOTE — ASSESSMENT & PLAN NOTE
Associated with poor social circumstance and some irrational decisions  competence eval done - waiting input  If incompetent, will need to find next of kin/ guardianship

## 2019-01-30 NOTE — PLAN OF CARE
Problem: PHYSICAL THERAPY ADULT  Goal: Performs mobility at highest level of function for planned discharge setting  See evaluation for individualized goals  Treatment/Interventions: Functional transfer training, LE strengthening/ROM, Elevations, Therapeutic exercise, Gait training, Spoke to nursing  Equipment Recommended: Other (Comment) (has rollator )       See flowsheet documentation for full assessment, interventions and recommendations  Outcome: Progressing  Prognosis: Fair  Problem List: Decreased strength, Decreased endurance, Impaired balance, Decreased mobility, Decreased cognition, Impaired judgement, Pain  Assessment: Patient expresses fatigue and increased pain however is agreeable to participate  Patient remians supervision for sit<>stand transfer for safety and secondary to increase pain  patient requires extensive cuing at time for safety and use of roller walker  Patient demonstrated ability to ambulate increased gait distance with roller walker and min a to contact guard assist x1  Assistance and verbal instruction required for walker mangement and obstacle avoidance  Poor insight to deficts and safety requiring continuous instruction  Patient refused stair training trial secondary to increased pain post ambulation, nursing notifed  Continue to focus on transfer and ambulation progression with continued trials of stair training as appropriate  Barriers to Discharge: Inaccessible home environment     Recommendation: Post acute IP rehab (If patient progress with safe amb and steps - DC home,OPPT)     PT - OK to Discharge: Yes    See flowsheet documentation for full assessment

## 2019-01-30 NOTE — PHYSICAL THERAPY NOTE
PHYSICAL THERAPY NOTE    Patient Name: Florence Vogeler  SDSOW'X Date: 19 1035   Pain Assessment   Pain Assessment 0-10   Pain Score 3   Pain Type Acute pain   Pain Location Back;Leg   Pain Orientation Left   Restrictions/Precautions   Weight Bearing Precautions Per Order No   Other Precautions Cognitive; Chair Alarm; Fall Risk   General   Family/Caregiver Present No   Subjective   Subjective Patient seated OOB in recliner and is agreeable to therapy session  Patient identifers obtained from name &   Bed Mobility   Supine to Sit Unable to assess   Sit to Supine Unable to assess   Additional Comments Patient seated OOB in recliner with chair alarm engaged, call bell and belongings in reach  Transfers   Sit to Stand 5  Supervision   Additional items Assist x 1; Armrests; Increased time required;Verbal cues   Stand to Sit 5  Supervision   Additional items Assist x 1; Armrests; Increased time required;Verbal cues   Ambulation/Elevation   Gait pattern Antalgic;Decreased R stance; Step to;Excessively slow   Gait Assistance 4  Minimal assist   Additional items Assist x 1;Verbal cues   Assistive Device Rolling walker   Distance 200' x1, 75' x1   Stair Management Assistance Not tested  (increased pain)   Balance   Static Sitting Good   Dynamic Sitting Fair +   Static Standing Fair   Dynamic Standing Fair   Ambulatory Fair   Endurance Deficit   Endurance Deficit Yes   Endurance Deficit Description limited activity secondary to pain   Activity Tolerance   Activity Tolerance Patient limited by fatigue;Patient limited by pain   Nurse Made Aware Spoke to Yolanda Seth RN    Assessment   Prognosis Fair   Problem List Decreased strength;Decreased endurance; Impaired balance;Decreased mobility; Decreased cognition; Impaired judgement;Pain   Assessment Patient expresses fatigue and increased pain however is agreeable to participate   Patient remians supervision for sit<>stand transfer for safety and secondary to increase pain  patient requires extensive cuing at time for safety and use of roller walker  Patient demonstrated ability to ambulate increased gait distance with roller walker and min a to contact guard assist x1  Assistance and verbal instruction required for walker mangement and obstacle avoidance  Poor insight to deficts and safety requiring continuous instruction  Patient refused stair training trial secondary to increased pain post ambulation, nursing notifed  Continue to focus on transfer and ambulation progression with continued trials of stair training as appropriate  Barriers to Discharge Inaccessible home environment   Goals   Patient Goals none stated   STG Expiration Date 02/03/19   Treatment Day 3   Plan   Treatment/Interventions Functional transfer training;LE strengthening/ROM; Elevations; Therapeutic exercise;Gait training;Spoke to nursing   PT Frequency 5x/wk   Recommendation   Recommendation Post acute IP rehab  (If patient progress with safe amb and steps - DC home,OPPT)   Equipment Recommended Other (Comment)  (has rollator)       Virginia Farr, ERICK

## 2019-01-30 NOTE — ASSESSMENT & PLAN NOTE
· Presented with worsening back pain and Ambulatory Dysfunction   · CT scan - stable grade 1 spondylolisthesis of L4 on L5 with DJD most notable at L4-L5 and L5-S1  Severe spinal stenosis at L4-L5 with advanced facet arthropathy, correlate for right greater than left L5 and S1 radiculopathy  · Tylenol, gabapentin, Lidoderm patch, muscle relaxant  short course of tapering steroids    · Recommended rehab, apparently initially agreed but today wants to go home  · Pt requested RICHELLE - last done in [facility] about 5 years ago - will refer as outpt  · Remains medically ready

## 2019-01-31 VITALS
TEMPERATURE: 98.2 F | BODY MASS INDEX: 26.57 KG/M2 | HEART RATE: 104 BPM | RESPIRATION RATE: 18 BRPM | DIASTOLIC BLOOD PRESSURE: 69 MMHG | WEIGHT: 144.4 LBS | HEIGHT: 62 IN | SYSTOLIC BLOOD PRESSURE: 138 MMHG | OXYGEN SATURATION: 97 %

## 2019-01-31 PROCEDURE — 99239 HOSP IP/OBS DSCHRG MGMT >30: CPT | Performed by: INTERNAL MEDICINE

## 2019-01-31 PROCEDURE — 97164 PT RE-EVAL EST PLAN CARE: CPT

## 2019-01-31 RX ORDER — ACETAMINOPHEN 325 MG/1
650 TABLET ORAL EVERY 6 HOURS PRN
Qty: 30 TABLET | Refills: 0 | Status: SHIPPED | OUTPATIENT
Start: 2019-01-31 | End: 2019-08-02 | Stop reason: HOSPADM

## 2019-01-31 RX ORDER — IBUPROFEN 400 MG/1
400 TABLET ORAL EVERY 6 HOURS PRN
Qty: 30 TABLET | Refills: 0 | Status: SHIPPED | OUTPATIENT
Start: 2019-01-31 | End: 2019-04-24

## 2019-01-31 RX ADMIN — GABAPENTIN 300 MG: 300 CAPSULE ORAL at 16:47

## 2019-01-31 RX ADMIN — ENOXAPARIN SODIUM 40 MG: 40 INJECTION SUBCUTANEOUS at 09:19

## 2019-01-31 RX ADMIN — AMLODIPINE BESYLATE 5 MG: 5 TABLET ORAL at 09:17

## 2019-01-31 RX ADMIN — GABAPENTIN 300 MG: 300 CAPSULE ORAL at 09:17

## 2019-01-31 RX ADMIN — ACETAMINOPHEN 975 MG: 325 TABLET ORAL at 13:19

## 2019-01-31 RX ADMIN — ACETAMINOPHEN 975 MG: 325 TABLET ORAL at 05:55

## 2019-01-31 RX ADMIN — DOCUSATE SODIUM 100 MG: 100 CAPSULE, LIQUID FILLED ORAL at 09:17

## 2019-01-31 RX ADMIN — VITAMIN D, TAB 1000IU (100/BT) 1000 UNITS: 25 TAB at 09:19

## 2019-01-31 RX ADMIN — METHYLPREDNISOLONE 8 MG: 4 TABLET ORAL at 09:17

## 2019-01-31 RX ADMIN — NICOTINE 1 PATCH: 14 PATCH TRANSDERMAL at 09:18

## 2019-01-31 NOTE — DISCHARGE SUMMARY
Discharge Summary - [address] [facility]    Patient Information: Emeka Ornelas 72 y o  female MRN: [id number]  Unit/Bed#: -01 Encounter: [id number]    Discharging Physician / Practitioner: New Villafuerte MD  PCP: Jose Ramon Bowman MD  Admission Date: 1/26/2019  Discharge Date: 01/31/19    Reason for Admission: low back pain    Discharge Diagnoses:     Principal Problem:    Lumbar back pain with radiculopathy affecting right lower extremity  Active Problems:    Ambulatory dysfunction    Essential hypertension    Cognitive impairment  Resolved Problems:    * No resolved hospital problems  *      Consultations During Hospital Stay:  · Dr Tc Velasquez Neuropsychoogist    Procedures Performed:     · CT lumbar spine      Incidental Findings:   · none    Test Results Pending at Discharge (will require follow up):   · none     Outpatient Tests Requested:  · none    Complications: none    Hospital Course:     Emeka Ornelas is a 72 y o  female patient who originally presented to the hospital on 1/26/2019 due to low back pain  She responded to a medrol dose pack with improvement in ambulatory dysfunction as documented by PT  CT showed severe spinal stenosis L4-5 and advanced facet arthropathy with other levels DJD to lesser degree  She manifested some coginitive impairment and also has poor social situation with estranged children and lives with significant other as per pt report and case management input  She was deemed incompetent to make medical decisions  Based on [facility] Informed Consent policy #12 which allows for pt to be released under the care of significant other when children/ NOK not available, pt was discharged home with significant other  This was advised to me by case management team after taking opinion from legal team   Pt reports improvement with spinal injection about 5 years ago at [facility]  She was recommended to get this done as outpt  Written information provided   Called PCP personally to sign out  Condition at Discharge: fair     Discharge Day Visit / Exam:     Vitals: Blood Pressure: 138/69 (01/31/19 1521)  Pulse: 104 (01/31/19 1521)  Temperature: 98 2 °F (36 8 °C) (01/31/19 1521)  Temp Source: Oral (01/31/19 1521)  Respirations: 18 (01/31/19 1521)  Height: 5' 1 75" (156 8 cm) (01/26/19 1359)  Weight - Scale: 65 5 kg (144 lb 6 4 oz) (01/26/19 1359)  SpO2: 97 % (01/31/19 1521)  Exam:   Physical Exam   Constitutional: She is oriented to person, place, and time  HENT:   Head: Normocephalic  Eyes: Pupils are equal, round, and reactive to light  Cardiovascular: Normal heart sounds  Pulmonary/Chest: Effort normal    Abdominal: Soft  Neurological: She is alert and oriented to person, place, and time  Discharge instructions/Information to patient and family:   See after visit summary for information provided to patient and family  Provisions for Follow-Up Care:  See after visit summary for information related to follow-up care and any pertinent home health orders  Disposition: Homeunder the care of significant other who assumed responsibility for patient as pt was deemed incompetent to make informed medical decisions  Discharge Statement:  I spent 45 minutes discharging the patient  This time was spent on the day of discharge  I had direct contact with the patient on the day of discharge  Greater than 50% of the total time was spent examining patient, answering all patient questions, arranging and discussing plan of care with patient as well as directly providing post-discharge instructions  Additional time then spent on discharge activities  Discharge Medications:  See after visit summary for reconciled discharge medications provided to patient and family  ** Please Note: Dragon 360 Dictation voice to text software may have been used in the creation of this document   **

## 2019-01-31 NOTE — SOCIAL WORK
CM spoke with patient and patient's only emergency contact, significant other Ian Rodriguez, with whom patient lives in an apartment in [place], present at bedside, to discuss discharge planning  Patient was deemed by psych evaluation this morning not to have capacity  CM reviewed case with CM director  PT re-evaluated patient today  PT recommendation is home with family support / home PT  Patient and significant other would like Home PT with [facility] or any available VNA  Referral placed to [facility] through [place]  Patient states that she wants to go home with significant other  Significant other wants to take patient home, states that she has a walker at home and he cares for her at home; that they see the same outpatient psychologist  CM asked patient and significant other if patient has any children or family members to contact  Patient and significant other both state that patient has a son and daughter, however both patient and significant other request that CM not contact either of them  State that daughter Víctor Mills does not want to help and state that patient's son "is not in the picture because patient assisted in his skilled nursing sentence" and that he wants nothing to do with them either  Patient is medically cleared for discharge and written for discharge  Patient's significant other Ian Rodriguez will be coming by bus to assist with discharge instructions and both will require Lyft transportation home due to finances  CM provided Lyft waiver to significant other Ian Rodriguez

## 2019-01-31 NOTE — UTILIZATION REVIEW
Continued Stay Review    Date: 1/31/2019    Vital Signs: /65   Pulse 67   Temp 97 8 °F (36 6 °C) (Oral)   Resp 18   Ht 5' 1 75" (1 568 m)   Wt 65 5 kg (144 lb 6 4 oz)   SpO2 95%   BMI 26 63 kg/m²     Medications:   Scheduled Meds:   Current Facility-Administered Medications:  acetaminophen 975 mg Oral Q8H [address]   amLODIPine 5 mg Oral Daily   cholecalciferol 1,000 Units Oral Daily   docusate sodium 100 mg Oral BID   enoxaparin 40 mg Subcutaneous Daily   gabapentin 300 mg Oral TID   ibuprofen 400 mg Oral Q6H PRN   [START ON 2/1/2019] methylPREDNISolone 4 mg Oral Daily   naloxone 0 04 mg Intravenous Q1MIN PRN   nicotine 1 patch Transdermal Daily   ondansetron 4 mg Intravenous Q6H PRN   oxyCODONE 2 5 mg Oral Q4H PRN     Continuous Infusions:    PRN Meds: not used  Abnormal Labs/Diagnostic Results: none today    Age/Sex: 72 y o  female     Assessment/Plan: This is a 72year old female from home who presented 1/26 with worsening back pain and ambulatory dysfunction  Patient treated with tylenol, gabapentin, lidoderm patch, muscle relaxants and a short course of steroids  Patient shown to have cognitive impairment, today neuro psyche has evaluated and deemed patient incompetent to make medical decisions  She is requiring short term rehab,  Will need to find next of kin/guardianship to assist in planning for this patient    Discharge Plan:  Short term rehab is recommended

## 2019-01-31 NOTE — PHYSICAL THERAPY NOTE
PHYSICAL THERAPY Re-Evaluation NOTE    Patient Name: Martin Ornelas  TSGYD'N Date: 1/31/2019 01/31/19 2388   Note Type   Note type Re-eval   Pain Assessment   Pain Assessment 0-10   Pain Score 4  (increased to 6/10 with pain)   Pain Type Acute pain   Pain Location Back   Pain Orientation Lower   General   Additional Pertinent History Pt  is a 71 yo F who is being seen today for re-evaluation due to progression and requesting to be discharged home  Dx: lumbar back pain with radiculopathy   Family/Caregiver Present No   Cognition   Overall Cognitive Status Impaired   Arousal/Participation Cooperative   Attention Attends with cues to redirect   Orientation Level Oriented to person;Oriented to place; Disoriented to time;Disoriented to situation   Memory Decreased long term memory;Decreased short term memory;Decreased recall of recent events   Following Commands Follows one step commands with increased time or repetition   Comments Pt  was identified with full name and birthdate via ID bracelet   RUE Assessment   RUE Assessment WFL   LUE Assessment   LUE Assessment WFL   RLE Assessment   RLE Assessment (grossly 4-/5)   LLE Assessment   LLE Assessment (grossly 4-/5)   Coordination   Movements are Fluid and Coordinated 0   Coordination and Movement Description ataxic motion with poor ability for motor sequencing   Sensation WFL   Light Touch   RLE Light Touch Grossly intact   LLE Light Touch Grossly intact   Bed Mobility   Supine to Sit 6  Modified independent   Additional items Bedrails   Sit to Supine 6  Modified independent   Additional Comments Pt  performed bed mobility mod I with demonstrating proper posture and sequencing with use of bedrails for UE support during transition   Transfers   Sit to Stand 5  Supervision   Additional items Assist x 1; Increased time required; Bedrails   Stand to Sit 5  Supervision   Additional items Impulsive;Verbal cues; Assist x 1   Additional Comments Pt  performed sit<>stands with supervision with VCs for hand placement, sequencing, and noting impulsivity to return to sit  Ambulation/Elevation   Gait pattern Antalgic;Narrow YISEL; Decreased foot clearance; Excessively slow; Inconsistent jeffrey; Ataxia; Redundant gait at times; Short stride   Gait Assistance 5  Supervision   Additional items Assist x 1   Assistive Device Rolling walker   Distance 150'x2   Stair Management Assistance 5  Supervision   Additional items Assist x 1;Verbal cues  (for sequencing and BUE support of RHR)   Stair Management Technique Step to pattern; One rail R   Number of Stairs 15   Balance   Static Sitting Good   Dynamic Sitting Fair +   Static Standing Fair -   Dynamic Standing Fair -   Ambulatory Fair -   Endurance Deficit   Endurance Deficit Yes   Endurance Deficit Description postural and gait degradation with fatigue   Activity Tolerance   Activity Tolerance Patient limited by pain; Patient limited by fatigue   Medical Staff Made Aware Spoke to [place]   Nurse Made Aware Spoke to AFSHIN Hewtit   Assessment   Prognosis Good   Problem List Decreased strength;Decreased endurance; Impaired balance;Decreased mobility; Decreased safety awareness; Impaired judgement;Pain   Assessment Pt  is a 71 yo F who is being seen today for re-evaluation due to progression and requesting to be discharged home  Dx: lumbar back pain with radiculopathy  Pt  Was identified with full name and birthdate  Pt  was identified with full name and birthdate via ID bracelet  Pt  performed bed mobility mod I with demonstrating proper posture and sequencing with use of bedrails for UE support during transition  Pt  performed sit<>stands with supervision with VCs for hand placement, sequencing, and noting impulsivity to return to sit  Pt  Ambulated supervision with 'x2 with the following notable gait deviations; Antalgic;Narrow YISEL; Decreased foot clearance; Excessively slow; Inconsistent jeffrey; Ataxia; Redundant gait at times; Short stride  Pt  Negotiated 15 steps with supervision and bilateral UE support of RHR with step to pattern  Pt  Demonstrated improving strength, balance, sequencing, and decreased need for hands on assistance during functional mobility  Pt  Will benefit from continued skilled therapy to address the above mobility deficits for increased independence and return to PLOF  Discharge recommendation is HHPT with family support for supervision with all functional mobility, including stair negotiation, transfers and gait  Pt  Has demonstrated improvement towards functional goals set upon evaluation  Goals   Patient Goals to go home   STG Expiration Date 02/03/19   Treatment Day 0   Plan   Treatment/Interventions Functional transfer training; Therapeutic exercise;Elevations;LE strengthening/ROM; Endurance training;Cognitive reorientation;Patient/family training;Equipment eval/education; Bed mobility;Gait training;Spoke to nursing;Spoke to case management   PT Frequency 5x/wk   Recommendation   Recommendation Home PT; Home with family support   Equipment Recommended Walker   PT - OK to Discharge Yes   Additional Comments to home, with supervision for all functional mobility including stair negotiation, transfers, and gait   Barthel Index   Feeding 10   Bathing 0   Grooming Score 5   Dressing Score 10   Bladder Score 10   Bowels Score 10   Toilet Use Score 10   Transfers (Bed/Chair) Score 10   Mobility (Level Surface) Score 10   Stairs Score 5   Barthel Index Score 80       1/31/2019 Isa Hoyos PT

## 2019-01-31 NOTE — PLAN OF CARE
Problem: DISCHARGE PLANNING - CARE MANAGEMENT  Goal: Discharge to post-acute care or home with appropriate resources  INTERVENTIONS:  - Conduct assessment to determine patient/family and health care team treatment goals, and need for post-acute services based on payer coverage, community resources, and patient preferences, and barriers to discharge  - Address psychosocial, clinical, and financial barriers to discharge as identified in assessment in conjunction with the patient/family and health care team  - Arrange appropriate level of post-acute services according to patients   needs and preference and payer coverage in collaboration with the physician and health care team  - Communicate with and update the patient/family, physician, and health care team regarding progress on the discharge plan  - Arrange appropriate transportation to post-acute venues   Outcome: Progressing  CM spoke with patient and patient's only emergency contact, significant other Evelyn Barahona, with whom patient lives in an apartment in [place], present at bedside, to discuss discharge planning  Patient was deemed by psych evaluation this morning not to have capacity  CM reviewed case with CM director  PT re-evaluated patient today  PT recommendation is home with family support / home PT  Patient and significant other would like Home PT with [facility] or any available VNA  Referral placed to [facility] through [facility]  Patient states that she wants to go home with significant other  Significant other wants to take patient home, states that she has a walker at home and he cares for her at home; they see the same outpatient psychologist   CM asked patient and significant other if patient has any children or family members to contact  Patient and significant other both state that patient has a son and daughter, however both patient and significant other request that CM not contact either of them    State that daughter Natividad Tam does not want to help and state that patient's son "is not in the picture because patient assisted in his group home sentence" and that he wants nothing to do with them either  Patient is medically cleared and written for discharge  Patient's significant other Nora Blood will be coming by bus to assist with discharge instructions and both will require Lyft transportation home due to finances  [employer] provided Lyft waiver to significant other Nora Blood

## 2019-01-31 NOTE — CONSULTS
Consultation - Neuropsychology/Psychology Department  Marijo Bernheim Pysher 72 y o  female MRN: [id number]  Unit/Bed#: -01 Encounter: [id number]        Reason for Consultation:  Marijo Bernheim Pysher is a 72y o  year old female who was referred for a Neuropsychological Exam to assess cognitive functioning and comment on capacity to make informed medical decisions  History of Present Illness  Presents with worsening of chronic low back pain; pulled out IV  Physician Requesting Consult: Aziza Yang MD    PROBLEM LIST:  Patient Active Problem List   Diagnosis    Lumbar back pain with radiculopathy affecting right lower extremity    Ambulatory dysfunction    Essential hypertension    Cognitive impairment         Historical Information   Past Medical History:   Diagnosis Date    Asthma     Cancer ([address] )     breast    Hypertension     Kidney stone      Past Surgical History:   Procedure Laterality Date    HYSTERECTOMY      MASTECTOMY Right      Social History   History   Alcohol Use No     History   Drug Use No     History   Smoking Status    Current Every Day Smoker    Packs/day: 0 50    Types: Cigarettes   Smokeless Tobacco    Never Used     Family History: History reviewed  No pertinent family history      Meds/Allergies   current meds:   Current Facility-Administered Medications   Medication Dose Route Frequency    acetaminophen (TYLENOL) tablet 975 mg  975 mg Oral Q8H [address]    amLODIPine (NORVASC) tablet 5 mg  5 mg Oral Daily    cholecalciferol (VITAMIN D3) tablet 1,000 Units  1,000 Units Oral Daily    docusate sodium (COLACE) capsule 100 mg  100 mg Oral BID    enoxaparin (LOVENOX) subcutaneous injection 40 mg  40 mg Subcutaneous Daily    gabapentin (NEURONTIN) capsule 300 mg  300 mg Oral TID    ibuprofen (MOTRIN) tablet 400 mg  400 mg Oral Q6H PRN    methylprednisolone (MEDROL) tablet 8 mg  8 mg Oral Daily    Followed by   Dasia Hugo ON 2/1/2019] methylprednisolone (MEDROL) tablet 4 mg  4 mg Oral Daily    naloxone (NARCAN) 0 04 mg/mL syringe 0 04 mg  0 04 mg Intravenous Q1MIN PRN    nicotine (NICODERM CQ) 14 mg/24hr TD 24 hr patch 1 patch  1 patch Transdermal Daily    ondansetron (ZOFRAN) injection 4 mg  4 mg Intravenous Q6H PRN    oxyCODONE (ROXICODONE) IR tablet 2 5 mg  2 5 mg Oral Q4H PRN       No Known Allergies      Family and Social Support:   No Data Recorded    Behavioral Observations: Alert, UNABLE to state the Year, Day/Week, Day/Month, [place]  Patient was hyperverbal and easily distracted necessitating frequent re-direction; provided only very limited medical history; affect pleasant, denied depressed mood and reported she was on depression medications in the past; denied any cognitive problems  Patient stated she only rarely leaves her home stating, "I don't trust anybody"  Patient's thoughts appeared illogical  Appetite and sleep described as fair  Cognitive Examination    General Cognitive Functioning MMSE = 16/28 Impaired with deficits in orientation, recall, and working memory; [employer] of Information = Mildly Impaired    Attention/Concentration Auditory Selective Attention = Average; Auditory Vigilance = Moderately Impaired; Information Processing Speed = Borderline    Frontal Systems/Executive Functioning Mental Flexibility/Cognitive Control = Impaired; Working Memory = Moderately Impaired Abstract Reasoning = Borderline; Generative Ability = Average,  Commonsense Reasoning and Judgement = Impaired    Language Functioning Phonemic Fluency = Average; Semantic Retrieval = Borderline; Comprehension of Complex Ideational Material = Moderately Impaired; Praxis = Within Normal Limits; Repetition = Within Normal Limits; Basic Reading = Within Normal Limits; Following Commands = Within Normal Limits    Memory Functioning Narrative Recall - Short Delay = Mildly Impaired;  Long Delay Narrative Recall = Severely Impaired;     Visuo-Spatial Abilities Not Assessed    Functional Knowledge Health & Safety Knowledge = Impaired;     Emotional Functioning: Denied depressed mood and anxiety; thoughts appeared irrational    Summary/Impression: Results of Neuropsychological exam revealed cognitive deficits/weaknesses in recall, working memory, orientation, abstract reasoning ability, commonsense reasoning and judgment, semantic retrieval, comprehension of complex ideational material, auditory vigilance and mental flexibility/cogniitve control  Profile is consistent with Major Neurocognitive Disorder  On a measure assessing awareness of personal health status and ability to evaluate health problems, handle medical emergencies and take safety precautions, patient performed in an IMPAIRED range of functioning  At this time, patient does not appear to have capacity to make fully informed medical decisions

## 2019-01-31 NOTE — PLAN OF CARE
Problem: PHYSICAL THERAPY ADULT  Goal: Performs mobility at highest level of function for planned discharge setting  See evaluation for individualized goals  Treatment/Interventions: Functional transfer training, LE strengthening/ROM, Elevations, Therapeutic exercise, Gait training, Spoke to nursing  Equipment Recommended: Other (Comment) (has rollator )       See flowsheet documentation for full assessment, interventions and recommendations  Outcome: Progressing  Prognosis: Good  Problem List: Decreased strength, Decreased endurance, Impaired balance, Decreased mobility, Decreased safety awareness, Impaired judgement, Pain  Assessment: Pt  is a 73 yo F who is being seen today for re-evaluation due to progression and requesting to be discharged home  Dx: lumbar back pain with radiculopathy  Pt  Was identified with full name and birthdate  Pt  was identified with full name and birthdate via ID bracelet  Pt  performed bed mobility mod I with demonstrating proper posture and sequencing with use of bedrails for UE support during transition  Pt  performed sit<>stands with supervision with VCs for hand placement, sequencing, and noting impulsivity to return to sit  Pt  Ambulated supervision with 'x2 with the following notable gait deviations; Antalgic;Narrow YISEL; Decreased foot clearance; Excessively slow; Inconsistent jeffrey; Ataxia; Redundant gait at times; Short stride  Pt  Negotiated 15 steps with supervision and bilateral UE support of RHR with step to pattern  Pt  Demonstrated improving strength, balance, sequencing, and decreased need for hands on assistance during functional mobility  Pt  Will benefit from continued skilled therapy to address the above mobility deficits for increased independence and return to PLOF  Discharge recommendation is HHPT with family support for supervision with all functional mobility, including stair negotiation, transfers and gait   Pt  Has demonstrated improvement towards functional goals set upon evaluation  Barriers to Discharge: Inaccessible home environment     Recommendation: Home PT, Home with family support     PT - OK to Discharge: Yes    See flowsheet documentation for full assessment

## 2019-02-01 ENCOUNTER — TELEPHONE (OUTPATIENT)
Dept: FAMILY MEDICINE CLINIC | Facility: CLINIC | Age: 74
End: 2019-02-01

## 2019-02-01 ENCOUNTER — TRANSITIONAL CARE MANAGEMENT (OUTPATIENT)
Dept: FAMILY MEDICINE CLINIC | Facility: CLINIC | Age: 74
End: 2019-02-01

## 2019-02-01 NOTE — TELEPHONE ENCOUNTER
Dr Alphonso Farris called re this pt ,wanted to relay a message,pt being discharged from [place] [id number] and needs to be seen in a week to 10 days for low back pain ,she may need joint injections and will need referral also wanted you to know that pt is incompetent and her spouse or partner is going to be managing her care

## 2019-02-05 ENCOUNTER — TELEPHONE (OUTPATIENT)
Dept: OBGYN CLINIC | Facility: HOSPITAL | Age: 74
End: 2019-02-05

## 2019-02-05 NOTE — TELEPHONE ENCOUNTER
Patient's s/o called to reschedule her appointment for 2/20  He states the time was too early for him to bring her  He rescheduled for 3/1

## 2019-02-06 ENCOUNTER — TELEPHONE (OUTPATIENT)
Dept: OBGYN CLINIC | Facility: CLINIC | Age: 74
End: 2019-02-06

## 2019-03-01 ENCOUNTER — CONSULT (OUTPATIENT)
Dept: PAIN MEDICINE | Facility: CLINIC | Age: 74
End: 2019-03-01
Payer: COMMERCIAL

## 2019-03-01 VITALS — SYSTOLIC BLOOD PRESSURE: 120 MMHG | TEMPERATURE: 98 F | HEART RATE: 83 BPM | DIASTOLIC BLOOD PRESSURE: 76 MMHG

## 2019-03-01 DIAGNOSIS — M48.062 LUMBAR STENOSIS WITH NEUROGENIC CLAUDICATION: Primary | ICD-10-CM

## 2019-03-01 PROCEDURE — 99204 OFFICE O/P NEW MOD 45 MIN: CPT | Performed by: ANESTHESIOLOGY

## 2019-03-01 RX ORDER — DICLOFENAC SODIUM 75 MG/1
75 TABLET, DELAYED RELEASE ORAL 2 TIMES DAILY
Qty: 60 TABLET | Refills: 1 | Status: SHIPPED | OUTPATIENT
Start: 2019-03-01 | End: 2019-04-26 | Stop reason: SDUPTHER

## 2019-03-01 NOTE — PROGRESS NOTES
Assessment:  1  Lumbar stenosis with neurogenic claudication        Plan:  The patient's symptoms, history/physical are consistent with pain that is multifactorial in origin but predominantly the result of her spinal stenosis which is leading to right-sided radicular symptoms  At this time, I discussed performing a right L4-5 transforaminal epidural steroid injection to help reduce swelling and inflammation which is leading to her pain symptoms  She was apprised of the most common risks and would like to proceed  She will be scheduled for an upcoming Tuesday or Thursday under fluoroscopic guidance  In addition, I will start her on diclofenac 75 mg twice daily to help with the pain  She was advised to take it with a meal to help decrease stomach irritation  Complete risks and benefits including bleeding, infection, tissue reaction, nerve injury and allergic reaction were discussed  The approach was demonstrated using models and literature was provided  Verbal and written consent was obtained  My impressions and treatment recommendations were discussed in detail with the patient who verbalized understanding and had no further questions  Discharge instructions were provided  I personally saw and examined the patient and I agree with the above discussed plan of care  Orders Placed This Encounter   Procedures    FL spine and pain procedure     Standing Status:   Future     Standing Expiration Date:   3/1/2023     Order Specific Question:   Reason for Exam:     Answer:   Right L4-5 TF RICHELLE     Order Specific Question:   Anticoagulant hold needed?      Answer:   No     New Medications Ordered This Visit   Medications    ADVAIR DISKUS 250-50 MCG/DOSE inhaler     Refill:  0    PROAIR  (90 Base) MCG/ACT inhaler     Refill:  0    diclofenac (VOLTAREN) 75 mg EC tablet     Sig: Take 1 tablet (75 mg total) by mouth 2 (two) times a day     Dispense:  60 tablet     Refill:  1       History of Present Illness:    Sharita Ornelas is a 72 y o  female who presents for consultation in regards to pain in her low back and difficulty walking  Symptoms have been present chronically for many years and has been getting progressively worse  Symptoms are moderate to severe rated 4-8/10 on a numeric rating scale and felt constantly  Symptoms in the lower back are described to be dull/aching, throbbing with numbness and paresthesias that shoots down into both legs worse on the right  Symptoms are aggravated with standing, walking and relieved with sitting down and relaxation  There is no change with coughing, sneezing or bowel movements  Treatment history has included prior epidural steroid injection many years ago which provided significant relief  She has been using her 's naproxen which provided some mild relief  I have personally reviewed and/or updated the patient's past medical history, past surgical history, family history, social history, current medications, allergies, and vital signs today  Review of Systems:    Review of Systems   Constitutional: Positive for fever  Negative for unexpected weight change  HENT: Negative for trouble swallowing  Eyes: Positive for visual disturbance  Respiratory: Negative for shortness of breath and wheezing  Cardiovascular: Negative for chest pain and palpitations  Gastrointestinal: Negative for constipation, diarrhea, nausea and vomiting  Endocrine: Negative for cold intolerance, heat intolerance and polydipsia  Genitourinary: Negative for difficulty urinating and frequency  Musculoskeletal: Positive for gait problem and joint swelling  Negative for arthralgias and myalgias  Skin: Negative for rash  Neurological: Positive for weakness  Negative for dizziness, seizures, syncope and headaches  Hematological: Does not bruise/bleed easily  Psychiatric/Behavioral: Negative for dysphoric mood     All other systems reviewed and are negative  Patient Active Problem List   Diagnosis    Lumbar back pain with radiculopathy affecting right lower extremity    Ambulatory dysfunction    Essential hypertension    Cognitive impairment       Past Medical History:   Diagnosis Date    Asthma     Cancer ([address] )     breast    Hypertension     Kidney stone        Past Surgical History:   Procedure Laterality Date    HYSTERECTOMY      MASTECTOMY Right        No family history on file  Social History     Occupational History    Not on file   Tobacco Use    Smoking status: Current Every Day Smoker     Packs/day: 0 50     Types: Cigarettes    Smokeless tobacco: Never Used   Substance and Sexual Activity    Alcohol use: No    Drug use: No    Sexual activity: Not on file       Current Outpatient Medications on File Prior to Visit   Medication Sig    acetaminophen (TYLENOL) 325 mg tablet Take 2 tablets (650 mg total) by mouth every 6 (six) hours as needed for mild pain    ADVAIR DISKUS 250-50 MCG/DOSE inhaler     amLODIPine (NORVASC) 5 mg tablet Take 5 mg by mouth daily    Cholecalciferol (VITAMIN D3) 1000 units CAPS Take 1,000 Units by mouth daily    gabapentin (NEURONTIN) 300 mg capsule Take 300 mg by mouth 3 (three) times a day Not taking due to fiance "crushing them and snorting them"     ibuprofen (MOTRIN) 400 mg tablet Take 1 tablet (400 mg total) by mouth every 6 (six) hours as needed for mild pain or moderate pain for up to 5 days    PROAIR  (90 Base) MCG/ACT inhaler      No current facility-administered medications on file prior to visit  No Known Allergies    Physical Exam:    /76   Pulse 83   Temp 98 °F (36 7 °C) (Oral)     Constitutional: normal, well developed, well nourished, alert, in no distress and non-toxic and no overt pain behavior    Eyes: anicteric  HEENT: grossly intact  Neck: supple, symmetric, trachea midline and no masses   Pulmonary:even and unlabored  Cardiovascular:No edema or pitting edema present  Skin:Normal without rashes or lesions and well hydrated  Psychiatric:Mood and affect appropriate  Neurologic:Cranial Nerves II-XII grossly intact  Musculoskeletal:antalgic and ambulates with a walker     Lumbar Spine Exam  Appearance:  Normal lordosis  Palpation/Tenderness:  left lumbar paraspinal tenderness  right lumbar paraspinal tenderness  left sacroiliac joint tenderness  right sacroiliac joint tenderness  Sensory:  no sensory deficits noted  Range of Motion:  Flexion: Moderately limited  with pain  Extension:  Severely limited  with pain  Lateral Flexion - Left:  Moderately limited  with pain  Lateral Flexion - Right:  Moderately limited  with pain  Rotation - Left:  Moderately limited  with pain  Rotation - Right:  Moderately limited  without pain  Motor Strength:  Left hip flexion:  5/5  Left hip extension:  5/5  Right hip flexion:  5/5  Right hip extension:  5/5  Left knee flexion:  5/5  Left knee extension:  5/5  Right knee flexion:  5/5  Right knee extension:  4/5  Left foot dorsiflexion:  5/5  Left foot plantar flexion:  5/5  Right foot dorsiflexion:  4/5  Right foot plantar flexion:  5/5  Reflexes:  Left Patellar:  2+   Right Patellar:  3+   Left Achilles:  2+   Right Achilles:  2+   Special Tests:  Left Straight Leg Test:  positive  Right Straight Leg Test:  positive  Left Josh's Maneuver:  negative  Right Josh's Maneuver:  negative      Imaging    CT LUMBAR SPINE (1/26/2019)     INDICATION:   Low back pain, >6wks conservative tx, persistent-progressive sx, surgical candidate      COMPARISON: Radiographs of lumbar spine from December 7, 2018 and MRI of the lumbar spine from September 25, 2017     TECHNIQUE:  Contiguous axial images through the lumbar spine were obtained  Sagittal and coronal reconstructions were performed        Radiation dose length product (DLP) for this visit:  525 mGy-cm     This examination, like all CT scans performed in the [facility], was performed utilizing techniques to minimize radiation dose exposure, including the use of iterative   reconstruction and automated exposure control        IMAGE QUALITY:  Diagnostic      FINDINGS:     ALIGNMENT:  There is maintain lumbar lordosis with stable grade 1 anterolisthesis of L4 on L5      VERTEBRAL BODIES:  No fracture  No lytic or blastic lesion  Diffuse osteopenia/osteoporosis      DEGENERATIVE CHANGES:     Lower Thoracic spine:  Normal lower thoracic disc spaces  ]     L1-2:  Normal disc height  No herniation  Normal facet joints  No canal or foraminal stenosis      L2-3:  Mild circumferential disc bulge, partially contained by the posterior longitudinal ligament  No significant spinal canal or foraminal stenosis      L3-4:  Mild circumferential disc bulge, partially contained by the posterior longitudinal ligament  No significant spinal canal or foraminal stenosis      L4-5:  Uncovering of the posterior disc margin due to the anterolisthesis, circumferential disc bulge, severe right greater than left facet arthropathy and buckling of the ligamentum flavum which appears partially calcified  There is severe right   lateral recess stenosis, correlate for L5 radiculopathy  There is moderate to severe spinal stenosis and mild bilateral foraminal stenosis      L5-S1:  Disc bulge, eccentric to the right with right greater than left lateral recess encroachment, correlate for S1 radiculopathy  Also noted is a disc extrusion with superior and inferior migration  Prominence of the epidural fat with tapering of   the thecal sac  Mild bilateral foraminal stenosis      PARASPINAL SOFT TISSUES:  No prevertebral paravertebral soft tissue edema  Aortoiliac vascular calcifications  MRI LUMBAR SPINE WITHOUT CONTRAST (9/25/2019)     INDICATION:  M51 26: Other intervertebral disc displacement, lumbar region  History taken directly from the electronic ordering system  Spinal stenosis    Evaluate for disc herniation      COMPARISON:  Radiograph 7/6/2015     TECHNIQUE:  Sagittal T1, sagittal T2, sagittal inversion recovery, axial T1 and axial T2, coronal T2        IMAGE QUALITY:  Diagnostic     FINDINGS:     ALIGNMENT:  Grade 1 anterolisthesis of L4 on L5 is similar to the prior radiograph      MARROW SIGNAL:  Scattered degenerative endplate changes  No focally suspicious marrow lesions  No bone marrow edema or compression abnormality       DISTAL CORD AND CONUS:  Normal size and signal within the distal cord and conus  The conus ends at the L2 level      PARASPINAL SOFT TISSUES:  Paraspinal soft tissues are unremarkable      SACRUM:  Normal signal within the sacrum  No evidence of insufficiency or stress fracture      LOWER THORACIC DISC SPACES:  Normal disc height and signal   No disc herniation, canal stenosis or foraminal narrowing      LUMBAR DISC SPACES:          L1-L2:  Normal      L2-L3:  Normal      L3-L4:  Normal      L4-L5:  There is uncovering of the intervertebral disc space  Advanced facet hypertrophy noted  Severe tricompartmental narrowing      L5-S1:  There is a central disc herniation, extrusion type  Herniated disc material extends caudally along the dorsal margin of S1 for approximately 12 mm  Moderate central canal narrowing  Moderate to severe narrowing of the right subarticular   recess  Mild to moderate left neural foraminal narrowing      IMPRESSION:     Advanced spondylotic narrowing at L4-5 where there is a grade 1 anterolisthesis likely related to facet hypertrophy    Central disc extrusion L5-S1 and associated narrowing, eccentrically more prominent on the right

## 2019-03-13 ENCOUNTER — TELEPHONE (OUTPATIENT)
Dept: NEUROLOGY | Facility: CLINIC | Age: 74
End: 2019-03-13

## 2019-03-19 ENCOUNTER — TELEPHONE (OUTPATIENT)
Dept: RADIOLOGY | Facility: CLINIC | Age: 74
End: 2019-03-19

## 2019-03-19 DIAGNOSIS — M48.062 SPINAL STENOSIS OF LUMBAR REGION WITH NEUROGENIC CLAUDICATION: Primary | ICD-10-CM

## 2019-03-19 NOTE — TELEPHONE ENCOUNTER
Patient called this morning to check the date she is scheduled for an injection  I never scheduled her  There is no order in Epic for an injection for her  I did check you note from 3/1/19 and it did state that she would like to move forward with an injection  Was there another reason that an order was not put in? I wanted to check with you before I call the patient back   Thank you

## 2019-03-26 ENCOUNTER — TELEPHONE (OUTPATIENT)
Dept: OBGYN CLINIC | Facility: HOSPITAL | Age: 74
End: 2019-03-26

## 2019-03-26 NOTE — TELEPHONE ENCOUNTER
Dr Dary Villarreal patient - Lumbar back pain for procedure on 4/16/19    Patient calling that she is in pain  The Diclofenac tab 75mg BID is not helping  I suggested that she try adding tylenol 1000mg TID, patient states she has no allergies or liver disorders/no hisory in chart  Heat alternating with ice to back  Can use heat for radicular pain going down legs  Please advise when patient will need to stop diclofenac sodium, ASA  Is it 5 days prior to injection? Patient was not aware she had to stop anything  Thanks

## 2019-03-26 NOTE — TELEPHONE ENCOUNTER
I called to s/w pt and she had me s/w her fiance Michelle Vela  I explained to Michelle Vela that she does not need to stop her diclofenac or the ASA for the procedure on 4/16/19  Michelle Vela said he sometimes gives her his naproxen, I explained that she should not be taking naproxen while taking the diclofenac  I explained she can take either naproxen or diclofenac but not both  Michelle Vela understood

## 2019-04-16 ENCOUNTER — HOSPITAL ENCOUNTER (OUTPATIENT)
Dept: RADIOLOGY | Facility: CLINIC | Age: 74
Discharge: HOME | End: 2019-04-16
Attending: ANESTHESIOLOGY | Admitting: ANESTHESIOLOGY
Payer: COMMERCIAL

## 2019-04-16 VITALS
TEMPERATURE: 98.3 F | HEART RATE: 91 BPM | SYSTOLIC BLOOD PRESSURE: 160 MMHG | RESPIRATION RATE: 20 BRPM | OXYGEN SATURATION: 100 % | DIASTOLIC BLOOD PRESSURE: 95 MMHG

## 2019-04-16 DIAGNOSIS — M48.062 SPINAL STENOSIS OF LUMBAR REGION WITH NEUROGENIC CLAUDICATION: ICD-10-CM

## 2019-04-16 PROCEDURE — 64484 NJX AA&/STRD TFRM EPI L/S EA: CPT | Performed by: ANESTHESIOLOGY

## 2019-04-16 PROCEDURE — 64483 NJX AA&/STRD TFRM EPI L/S 1: CPT | Performed by: ANESTHESIOLOGY

## 2019-04-16 RX ORDER — METHYLPREDNISOLONE ACETATE 80 MG/ML
80 INJECTION, SUSPENSION INTRA-ARTICULAR; INTRALESIONAL; INTRAMUSCULAR; PARENTERAL; SOFT TISSUE ONCE
Status: COMPLETED | OUTPATIENT
Start: 2019-04-16 | End: 2019-04-16

## 2019-04-16 RX ORDER — BUPIVACAINE HCL/PF 2.5 MG/ML
10 VIAL (ML) INJECTION ONCE
Status: COMPLETED | OUTPATIENT
Start: 2019-04-16 | End: 2019-04-16

## 2019-04-16 RX ORDER — 0.9 % SODIUM CHLORIDE 0.9 %
10 VIAL (ML) INJECTION ONCE
Status: COMPLETED | OUTPATIENT
Start: 2019-04-16 | End: 2019-04-16

## 2019-04-16 RX ADMIN — BUPIVACAINE HYDROCHLORIDE 2 ML: 2.5 INJECTION, SOLUTION EPIDURAL; INFILTRATION; INTRACAUDAL at 15:17

## 2019-04-16 RX ADMIN — METHYLPREDNISOLONE ACETATE 80 MG: 80 INJECTION, SUSPENSION INTRA-ARTICULAR; INTRALESIONAL; INTRAMUSCULAR; PARENTERAL; SOFT TISSUE at 15:17

## 2019-04-16 RX ADMIN — IOHEXOL 1 ML: 300 INJECTION, SOLUTION INTRAVENOUS at 15:17

## 2019-04-16 RX ADMIN — Medication 5 ML: at 15:15

## 2019-04-16 RX ADMIN — SODIUM CHLORIDE 5 ML: 9 INJECTION, SOLUTION INTRAMUSCULAR; INTRAVENOUS; SUBCUTANEOUS at 15:15

## 2019-04-23 ENCOUNTER — TELEPHONE (OUTPATIENT)
Dept: PAIN MEDICINE | Facility: CLINIC | Age: 74
End: 2019-04-23

## 2019-04-24 ENCOUNTER — OFFICE VISIT (OUTPATIENT)
Dept: NEUROLOGY | Facility: CLINIC | Age: 74
End: 2019-04-24
Payer: COMMERCIAL

## 2019-04-24 VITALS
DIASTOLIC BLOOD PRESSURE: 84 MMHG | SYSTOLIC BLOOD PRESSURE: 114 MMHG | WEIGHT: 140 LBS | BODY MASS INDEX: 25.76 KG/M2 | HEIGHT: 62 IN | HEART RATE: 85 BPM

## 2019-04-24 DIAGNOSIS — M48.062 SPINAL STENOSIS OF LUMBAR REGION WITH NEUROGENIC CLAUDICATION: ICD-10-CM

## 2019-04-24 DIAGNOSIS — R41.89 COGNITIVE IMPAIRMENT: ICD-10-CM

## 2019-04-24 DIAGNOSIS — M54.16 LUMBAR BACK PAIN WITH RADICULOPATHY AFFECTING RIGHT LOWER EXTREMITY: Primary | ICD-10-CM

## 2019-04-24 DIAGNOSIS — R29.898 RIGHT LEG WEAKNESS: ICD-10-CM

## 2019-04-24 DIAGNOSIS — R26.2 AMBULATORY DYSFUNCTION: ICD-10-CM

## 2019-04-24 PROCEDURE — 99205 OFFICE O/P NEW HI 60 MIN: CPT | Performed by: PSYCHIATRY & NEUROLOGY

## 2019-04-24 RX ORDER — NAPROXEN 500 MG/1
500 TABLET ORAL 2 TIMES DAILY
Refills: 0 | COMMUNITY
Start: 2019-03-09 | End: 2019-05-14 | Stop reason: ALTCHOICE

## 2019-04-26 ENCOUNTER — TELEPHONE (OUTPATIENT)
Dept: PAIN MEDICINE | Facility: MEDICAL CENTER | Age: 74
End: 2019-04-26

## 2019-04-26 DIAGNOSIS — M48.062 LUMBAR STENOSIS WITH NEUROGENIC CLAUDICATION: ICD-10-CM

## 2019-04-26 RX ORDER — DICLOFENAC SODIUM 75 MG/1
TABLET, DELAYED RELEASE ORAL
Qty: 60 TABLET | Refills: 1 | Status: SHIPPED | OUTPATIENT
Start: 2019-04-26 | End: 2019-05-11 | Stop reason: SDUPTHER

## 2019-05-06 ENCOUNTER — TELEPHONE (OUTPATIENT)
Dept: PAIN MEDICINE | Facility: CLINIC | Age: 74
End: 2019-05-06

## 2019-05-11 ENCOUNTER — TELEPHONE (OUTPATIENT)
Dept: PAIN MEDICINE | Facility: CLINIC | Age: 74
End: 2019-05-11

## 2019-05-11 DIAGNOSIS — M48.062 LUMBAR STENOSIS WITH NEUROGENIC CLAUDICATION: ICD-10-CM

## 2019-05-11 RX ORDER — DICLOFENAC SODIUM 75 MG/1
75 TABLET, DELAYED RELEASE ORAL 2 TIMES DAILY PRN
Qty: 60 TABLET | Refills: 1 | Status: SHIPPED | OUTPATIENT
Start: 2019-05-11 | End: 2019-08-02 | Stop reason: HOSPADM

## 2019-05-13 ENCOUNTER — TELEPHONE (OUTPATIENT)
Dept: NEUROSURGERY | Facility: CLINIC | Age: 74
End: 2019-05-13

## 2019-05-13 ENCOUNTER — TELEPHONE (OUTPATIENT)
Dept: PAIN MEDICINE | Facility: CLINIC | Age: 74
End: 2019-05-13

## 2019-05-14 ENCOUNTER — CONSULT (OUTPATIENT)
Dept: NEUROSURGERY | Facility: CLINIC | Age: 74
End: 2019-05-14
Payer: COMMERCIAL

## 2019-05-14 VITALS
BODY MASS INDEX: 26.46 KG/M2 | HEIGHT: 62 IN | DIASTOLIC BLOOD PRESSURE: 80 MMHG | HEART RATE: 69 BPM | WEIGHT: 143.8 LBS | TEMPERATURE: 98 F | SYSTOLIC BLOOD PRESSURE: 140 MMHG

## 2019-05-14 DIAGNOSIS — M48.062 SPINAL STENOSIS OF LUMBAR REGION WITH NEUROGENIC CLAUDICATION: ICD-10-CM

## 2019-05-14 DIAGNOSIS — M43.16 SPONDYLOLISTHESIS OF LUMBAR REGION: Primary | ICD-10-CM

## 2019-05-14 DIAGNOSIS — M54.16 LUMBAR BACK PAIN WITH RADICULOPATHY AFFECTING RIGHT LOWER EXTREMITY: ICD-10-CM

## 2019-05-14 DIAGNOSIS — R26.2 AMBULATORY DYSFUNCTION: ICD-10-CM

## 2019-05-14 PROCEDURE — 99204 OFFICE O/P NEW MOD 45 MIN: CPT | Performed by: PHYSICIAN ASSISTANT

## 2019-05-21 ENCOUNTER — HOSPITAL ENCOUNTER (OUTPATIENT)
Dept: RADIOLOGY | Facility: HOSPITAL | Age: 74
Discharge: HOME | End: 2019-05-21
Payer: COMMERCIAL

## 2019-05-21 DIAGNOSIS — M43.16 SPONDYLOLISTHESIS OF LUMBAR REGION: ICD-10-CM

## 2019-05-21 DIAGNOSIS — M54.16 LUMBAR BACK PAIN WITH RADICULOPATHY AFFECTING RIGHT LOWER EXTREMITY: ICD-10-CM

## 2019-05-21 DIAGNOSIS — M48.062 SPINAL STENOSIS OF LUMBAR REGION WITH NEUROGENIC CLAUDICATION: ICD-10-CM

## 2019-05-21 PROCEDURE — 72114 X-RAY EXAM L-S SPINE BENDING: CPT

## 2019-06-06 ENCOUNTER — HOSPITAL ENCOUNTER (OUTPATIENT)
Dept: MRI IMAGING | Facility: HOSPITAL | Age: 74
Discharge: HOME | End: 2019-06-06
Payer: COMMERCIAL

## 2019-06-06 DIAGNOSIS — M48.062 SPINAL STENOSIS OF LUMBAR REGION WITH NEUROGENIC CLAUDICATION: ICD-10-CM

## 2019-06-06 DIAGNOSIS — M54.16 LUMBAR BACK PAIN WITH RADICULOPATHY AFFECTING RIGHT LOWER EXTREMITY: ICD-10-CM

## 2019-06-06 DIAGNOSIS — M43.16 SPONDYLOLISTHESIS OF LUMBAR REGION: ICD-10-CM

## 2019-06-06 PROCEDURE — 72148 MRI LUMBAR SPINE W/O DYE: CPT

## 2019-06-07 ENCOUNTER — TELEPHONE (OUTPATIENT)
Dept: NEUROSURGERY | Facility: CLINIC | Age: 74
End: 2019-06-07

## 2019-06-13 ENCOUNTER — OFFICE VISIT (OUTPATIENT)
Dept: NEUROSURGERY | Facility: CLINIC | Age: 74
End: 2019-06-13
Payer: COMMERCIAL

## 2019-06-13 VITALS
DIASTOLIC BLOOD PRESSURE: 80 MMHG | BODY MASS INDEX: 26.65 KG/M2 | HEART RATE: 88 BPM | TEMPERATURE: 98.5 F | HEIGHT: 62 IN | SYSTOLIC BLOOD PRESSURE: 132 MMHG | WEIGHT: 144.8 LBS

## 2019-06-13 DIAGNOSIS — M47.816 SPONDYLOSIS WITHOUT MYELOPATHY OR RADICULOPATHY, LUMBAR REGION: ICD-10-CM

## 2019-06-13 DIAGNOSIS — M54.16 LUMBAR BACK PAIN WITH RADICULOPATHY AFFECTING RIGHT LOWER EXTREMITY: Primary | ICD-10-CM

## 2019-06-13 DIAGNOSIS — R26.2 AMBULATORY DYSFUNCTION: ICD-10-CM

## 2019-06-13 PROCEDURE — 99214 OFFICE O/P EST MOD 30 MIN: CPT | Performed by: PHYSICIAN ASSISTANT

## 2019-06-13 RX ORDER — CHLORHEXIDINE GLUCONATE 0.12 MG/ML
15 RINSE ORAL ONCE
Status: CANCELLED | OUTPATIENT
Start: 2019-06-13 | End: 2019-06-13

## 2019-06-18 ENCOUNTER — TELEPHONE (OUTPATIENT)
Dept: NEUROSURGERY | Facility: CLINIC | Age: 74
End: 2019-06-18

## 2019-06-19 ENCOUNTER — APPOINTMENT (OUTPATIENT)
Dept: LAB | Facility: CLINIC | Age: 74
End: 2019-06-19
Payer: COMMERCIAL

## 2019-06-19 ENCOUNTER — TRANSCRIBE ORDERS (OUTPATIENT)
Dept: LAB | Facility: CLINIC | Age: 74
End: 2019-06-19

## 2019-06-19 ENCOUNTER — LAB (OUTPATIENT)
Dept: LAB | Facility: CLINIC | Age: 74
End: 2019-06-19
Payer: COMMERCIAL

## 2019-06-19 DIAGNOSIS — R26.2 AMBULATORY DYSFUNCTION: ICD-10-CM

## 2019-06-19 DIAGNOSIS — Z01.818 OTHER SPECIFIED PRE-OPERATIVE EXAMINATION: Primary | ICD-10-CM

## 2019-06-19 DIAGNOSIS — M54.16 LUMBAR BACK PAIN WITH RADICULOPATHY AFFECTING RIGHT LOWER EXTREMITY: ICD-10-CM

## 2019-06-19 DIAGNOSIS — Z01.818 OTHER SPECIFIED PRE-OPERATIVE EXAMINATION: ICD-10-CM

## 2019-06-19 DIAGNOSIS — M47.816 SPONDYLOSIS WITHOUT MYELOPATHY OR RADICULOPATHY, LUMBAR REGION: ICD-10-CM

## 2019-06-19 LAB
ABO GROUP BLD: NORMAL
ALBUMIN SERPL BCP-MCNC: 3.9 G/DL (ref 3.5–5)
ALP SERPL-CCNC: 67 U/L (ref 46–116)
ALT SERPL W P-5'-P-CCNC: 44 U/L (ref 12–78)
ANION GAP SERPL CALCULATED.3IONS-SCNC: 8 MMOL/L (ref 4–13)
APTT PPP: 31 SECONDS (ref 26–38)
AST SERPL W P-5'-P-CCNC: 25 U/L (ref 5–45)
BASOPHILS # BLD AUTO: 0.09 THOUSANDS/ΜL (ref 0–0.1)
BASOPHILS NFR BLD AUTO: 2 % (ref 0–1)
BILIRUB SERPL-MCNC: 0.4 MG/DL (ref 0.2–1)
BILIRUB UR QL STRIP: NEGATIVE
BLD GP AB SCN SERPL QL: NEGATIVE
BUN SERPL-MCNC: 19 MG/DL (ref 5–25)
CALCIUM SERPL-MCNC: 8.8 MG/DL (ref 8.3–10.1)
CHLORIDE SERPL-SCNC: 104 MMOL/L (ref 100–108)
CLARITY UR: CLEAR
CO2 SERPL-SCNC: 26 MMOL/L (ref 21–32)
COLOR UR: YELLOW
CREAT SERPL-MCNC: 0.83 MG/DL (ref 0.6–1.3)
EOSINOPHIL # BLD AUTO: 0.23 THOUSAND/ΜL (ref 0–0.61)
EOSINOPHIL NFR BLD AUTO: 5 % (ref 0–6)
ERYTHROCYTE [DISTWIDTH] IN BLOOD BY AUTOMATED COUNT: 15.5 % (ref 11.6–15.1)
EST. AVERAGE GLUCOSE BLD GHB EST-MCNC: 154 MG/DL
GFR SERPL CREATININE-BSD FRML MDRD: 71 ML/MIN/1.73SQ M
GLUCOSE SERPL-MCNC: 112 MG/DL (ref 65–140)
GLUCOSE UR STRIP-MCNC: NEGATIVE MG/DL
HBA1C MFR BLD: 7 % (ref 4.2–6.3)
HCT VFR BLD AUTO: 40.3 % (ref 34.8–46.1)
HGB BLD-MCNC: 12.5 G/DL (ref 11.5–15.4)
HGB UR QL STRIP.AUTO: NEGATIVE
IMM GRANULOCYTES # BLD AUTO: 0.03 THOUSAND/UL (ref 0–0.2)
IMM GRANULOCYTES NFR BLD AUTO: 1 % (ref 0–2)
INR PPP: 1.02 (ref 0.86–1.17)
KETONES UR STRIP-MCNC: NEGATIVE MG/DL
LEUKOCYTE ESTERASE UR QL STRIP: NEGATIVE
LYMPHOCYTES # BLD AUTO: 1.62 THOUSANDS/ΜL (ref 0.6–4.47)
LYMPHOCYTES NFR BLD AUTO: 35 % (ref 14–44)
MCH RBC QN AUTO: 27.2 PG (ref 26.8–34.3)
MCHC RBC AUTO-ENTMCNC: 31 G/DL (ref 31.4–37.4)
MCV RBC AUTO: 88 FL (ref 82–98)
MONOCYTES # BLD AUTO: 0.43 THOUSAND/ΜL (ref 0.17–1.22)
MONOCYTES NFR BLD AUTO: 9 % (ref 4–12)
NEUTROPHILS # BLD AUTO: 2.29 THOUSANDS/ΜL (ref 1.85–7.62)
NEUTS SEG NFR BLD AUTO: 48 % (ref 43–75)
NITRITE UR QL STRIP: NEGATIVE
NRBC BLD AUTO-RTO: 0 /100 WBCS
PH UR STRIP.AUTO: 6.5 [PH]
PLATELET # BLD AUTO: 388 THOUSANDS/UL (ref 149–390)
PMV BLD AUTO: 9.8 FL (ref 8.9–12.7)
POTASSIUM SERPL-SCNC: 3.9 MMOL/L (ref 3.5–5.3)
PROT SERPL-MCNC: 7.4 G/DL (ref 6.4–8.2)
PROT UR STRIP-MCNC: NEGATIVE MG/DL
PROTHROMBIN TIME: 13.1 SECONDS (ref 11.8–14.2)
RBC # BLD AUTO: 4.6 MILLION/UL (ref 3.81–5.12)
RH BLD: POSITIVE
SODIUM SERPL-SCNC: 138 MMOL/L (ref 136–145)
SP GR UR STRIP.AUTO: <=1.005 (ref 1–1.03)
SPECIMEN EXPIRATION DATE: NORMAL
UROBILINOGEN UR QL STRIP.AUTO: 0.2 E.U./DL
WBC # BLD AUTO: 4.69 THOUSAND/UL (ref 4.31–10.16)

## 2019-06-19 PROCEDURE — 86901 BLOOD TYPING SEROLOGIC RH(D): CPT

## 2019-06-19 PROCEDURE — 85025 COMPLETE CBC W/AUTO DIFF WBC: CPT

## 2019-06-19 PROCEDURE — 86900 BLOOD TYPING SEROLOGIC ABO: CPT

## 2019-06-19 PROCEDURE — 81003 URINALYSIS AUTO W/O SCOPE: CPT | Performed by: NEUROLOGICAL SURGERY

## 2019-06-19 PROCEDURE — 87081 CULTURE SCREEN ONLY: CPT

## 2019-06-19 PROCEDURE — 86850 RBC ANTIBODY SCREEN: CPT

## 2019-06-19 PROCEDURE — 36415 COLL VENOUS BLD VENIPUNCTURE: CPT

## 2019-06-19 PROCEDURE — 85610 PROTHROMBIN TIME: CPT

## 2019-06-19 PROCEDURE — 93005 ELECTROCARDIOGRAM TRACING: CPT

## 2019-06-19 PROCEDURE — 80053 COMPREHEN METABOLIC PANEL: CPT

## 2019-06-19 PROCEDURE — 85730 THROMBOPLASTIN TIME PARTIAL: CPT

## 2019-06-19 PROCEDURE — 83036 HEMOGLOBIN GLYCOSYLATED A1C: CPT

## 2019-06-19 PROCEDURE — 87147 CULTURE TYPE IMMUNOLOGIC: CPT

## 2019-06-20 LAB
ATRIAL RATE: 75 BPM
P AXIS: 54 DEGREES
PR INTERVAL: 160 MS
QRS AXIS: 9 DEGREES
QRSD INTERVAL: 100 MS
QT INTERVAL: 390 MS
QTC INTERVAL: 435 MS
T WAVE AXIS: 54 DEGREES
VENTRICULAR RATE: 75 BPM

## 2019-06-20 PROCEDURE — 93010 ELECTROCARDIOGRAM REPORT: CPT | Performed by: INTERNAL MEDICINE

## 2019-06-21 LAB
MRSA NOSE QL CULT: ABNORMAL
MRSA NOSE QL CULT: ABNORMAL

## 2019-06-26 ENCOUNTER — ANESTHESIA EVENT (OUTPATIENT)
Dept: PERIOP | Facility: HOSPITAL | Age: 74
End: 2019-06-26

## 2019-06-27 ENCOUNTER — DOCUMENTATION (OUTPATIENT)
Dept: NEUROSURGERY | Facility: CLINIC | Age: 74
End: 2019-06-27

## 2019-06-28 ENCOUNTER — ANESTHESIA (OUTPATIENT)
Dept: PERIOP | Facility: HOSPITAL | Age: 74
End: 2019-06-28

## 2019-07-16 ENCOUNTER — ANESTHESIA EVENT (OUTPATIENT)
Dept: PERIOP | Facility: HOSPITAL | Age: 74
DRG: 454 | End: 2019-07-16
Payer: COMMERCIAL

## 2019-07-16 ENCOUNTER — DOCUMENTATION (OUTPATIENT)
Dept: NEUROSURGERY | Facility: CLINIC | Age: 74
End: 2019-07-16

## 2019-07-16 NOTE — ANESTHESIA PREPROCEDURE EVALUATION
Review of Systems/Medical History          Cardiovascular  EKG reviewed, Hypertension , CAD ,    Pulmonary  Smoker cigarette smoker  , Asthma , well controlled/ stable Last rescue: < 1 month ago ,   Comment: Smoked this am     GI/Hepatic       Kidney stones,        Endo/Other     GYN    Breast cancer        Hematology  Anemia ,     Musculoskeletal  Back pain , spinal stenosis,   Arthritis     Neurology   Psychology   Depression ,     Comment: ADHD         Physical Exam    Airway    Mallampati score: II  TM Distance: >3 FB  Neck ROM: full     Dental   Comment: Multiple Cracked teeth,     Cardiovascular      Pulmonary  Rhonchi,     Other Findings        Lab Results   Component Value Date    GLUC 112 06/19/2019    GLUF 122 (H) 01/27/2019    ALT 44 06/19/2019    AST 25 06/19/2019    BUN 19 06/19/2019    CALCIUM 8 8 06/19/2019     06/19/2019    CO2 26 06/19/2019    CREATININE 0 83 06/19/2019    INR 1 02 06/19/2019    HCT 40 3 06/19/2019    HGB 12 5 06/19/2019    PROT 7 6 08/21/2014    HGBA1C 7 0 (H) 06/19/2019    MG 2 0 08/16/2014    PHOS 3 5 08/16/2014     06/19/2019    K 3 9 06/19/2019     (L) 08/21/2014    WBC 4 69 06/19/2019       Anesthesia Plan  ASA Score- 3     Anesthesia Type- general with ASA Monitors  Additional Monitors:   Airway Plan: ETT  Plan Factors-Patient not instructed to abstain from smoking on day of procedure  Patient smoked on day of surgery  Induction- intravenous  Postoperative Plan-     Informed Consent- Anesthetic plan and risks discussed with patient  I personally reviewed this patient with the CRNA  Discussed and agreed on the Anesthesia Plan with the CRNA  Waylon Bell

## 2019-07-17 ENCOUNTER — ANESTHESIA (OUTPATIENT)
Dept: PERIOP | Facility: HOSPITAL | Age: 74
DRG: 454 | End: 2019-07-17
Payer: COMMERCIAL

## 2019-07-17 ENCOUNTER — HOSPITAL ENCOUNTER (OUTPATIENT)
Dept: RADIOLOGY | Facility: HOSPITAL | Age: 74
Setting detail: SURGERY ADMIT
Discharge: HOME | DRG: 454 | End: 2019-07-17
Payer: COMMERCIAL

## 2019-07-17 ENCOUNTER — HOSPITAL ENCOUNTER (INPATIENT)
Facility: HOSPITAL | Age: 74
LOS: 16 days | Discharge: NURSING, REHAB OR LONG-TERM CARE | DRG: 454 | End: 2019-08-02
Attending: NEUROLOGICAL SURGERY | Admitting: NEUROLOGICAL SURGERY
Payer: COMMERCIAL

## 2019-07-17 DIAGNOSIS — R41.0 DELIRIUM: ICD-10-CM

## 2019-07-17 DIAGNOSIS — M54.16 LUMBAR BACK PAIN WITH RADICULOPATHY AFFECTING RIGHT LOWER EXTREMITY: ICD-10-CM

## 2019-07-17 DIAGNOSIS — Z98.890 POST-OPERATIVE STATE: ICD-10-CM

## 2019-07-17 DIAGNOSIS — R41.89 COGNITIVE IMPAIRMENT: ICD-10-CM

## 2019-07-17 DIAGNOSIS — M48.062 SPINAL STENOSIS OF LUMBAR REGION WITH NEUROGENIC CLAUDICATION: Primary | ICD-10-CM

## 2019-07-17 DIAGNOSIS — G93.40 ENCEPHALOPATHY ACUTE: ICD-10-CM

## 2019-07-17 PROBLEM — M43.16 SPONDYLOLISTHESIS OF LUMBAR REGION: Status: ACTIVE | Noted: 2019-07-17

## 2019-07-17 LAB
ABO GROUP BLD: NORMAL
ANION GAP SERPL CALCULATED.3IONS-SCNC: 4 MMOL/L (ref 4–13)
ATRIAL RATE: 91 BPM
BLD GP AB SCN SERPL QL: NEGATIVE
BUN SERPL-MCNC: 20 MG/DL (ref 5–25)
CALCIUM SERPL-MCNC: 8 MG/DL (ref 8.3–10.1)
CHLORIDE SERPL-SCNC: 114 MMOL/L (ref 100–108)
CO2 SERPL-SCNC: 19 MMOL/L (ref 21–32)
CREAT SERPL-MCNC: 0.98 MG/DL (ref 0.6–1.3)
GFR SERPL CREATININE-BSD FRML MDRD: 58 ML/MIN/1.73SQ M
GLUCOSE P FAST SERPL-MCNC: 168 MG/DL (ref 65–99)
GLUCOSE SERPL-MCNC: 118 MG/DL (ref 65–140)
GLUCOSE SERPL-MCNC: 168 MG/DL (ref 65–140)
GLUCOSE SERPL-MCNC: 180 MG/DL (ref 65–140)
MAGNESIUM SERPL-MCNC: 2.2 MG/DL (ref 1.6–2.6)
P AXIS: 65 DEGREES
POTASSIUM SERPL-SCNC: 4.6 MMOL/L (ref 3.5–5.3)
PR INTERVAL: 196 MS
QRS AXIS: 26 DEGREES
QRSD INTERVAL: 113 MS
QT INTERVAL: 388 MS
QTC INTERVAL: 478 MS
RH BLD: POSITIVE
SODIUM SERPL-SCNC: 137 MMOL/L (ref 136–145)
SPECIMEN EXPIRATION DATE: NORMAL
T WAVE AXIS: 65 DEGREES
VENTRICULAR RATE: 91 BPM

## 2019-07-17 PROCEDURE — C1713 ANCHOR/SCREW BN/BN,TIS/BN: HCPCS | Performed by: NEUROLOGICAL SURGERY

## 2019-07-17 PROCEDURE — 01NB4ZZ RELEASE LUMBAR NERVE, PERCUTANEOUS ENDOSCOPIC APPROACH: ICD-10-PCS | Performed by: NEUROLOGICAL SURGERY

## 2019-07-17 PROCEDURE — 86850 RBC ANTIBODY SCREEN: CPT | Performed by: NEUROLOGICAL SURGERY

## 2019-07-17 PROCEDURE — 22853 INSJ BIOMECHANICAL DEVICE: CPT | Performed by: NEUROLOGICAL SURGERY

## 2019-07-17 PROCEDURE — 93005 ELECTROCARDIOGRAM TRACING: CPT

## 2019-07-17 PROCEDURE — 22840 INSERT SPINE FIXATION DEVICE: CPT | Performed by: NEUROLOGICAL SURGERY

## 2019-07-17 PROCEDURE — 93010 ELECTROCARDIOGRAM REPORT: CPT | Performed by: INTERNAL MEDICINE

## 2019-07-17 PROCEDURE — 0SG0471 FUSION OF LUMBAR VERTEBRAL JOINT WITH AUTOLOGOUS TISSUE SUBSTITUTE, POSTERIOR APPROACH, POSTERIOR COLUMN, PERCUTANEOUS ENDOSCOPIC APPROACH: ICD-10-PCS | Performed by: NEUROLOGICAL SURGERY

## 2019-07-17 PROCEDURE — 20930 SP BONE ALGRFT MORSEL ADD-ON: CPT | Performed by: NEUROLOGICAL SURGERY

## 2019-07-17 PROCEDURE — 0ST20ZZ RESECTION OF LUMBAR VERTEBRAL DISC, OPEN APPROACH: ICD-10-PCS | Performed by: NEUROLOGICAL SURGERY

## 2019-07-17 PROCEDURE — 01NR4ZZ RELEASE SACRAL NERVE, PERCUTANEOUS ENDOSCOPIC APPROACH: ICD-10-PCS | Performed by: NEUROLOGICAL SURGERY

## 2019-07-17 PROCEDURE — 86900 BLOOD TYPING SEROLOGIC ABO: CPT | Performed by: NEUROLOGICAL SURGERY

## 2019-07-17 PROCEDURE — 82948 REAGENT STRIP/BLOOD GLUCOSE: CPT

## 2019-07-17 PROCEDURE — 86901 BLOOD TYPING SEROLOGIC RH(D): CPT | Performed by: NEUROLOGICAL SURGERY

## 2019-07-17 PROCEDURE — 63047 LAM FACETEC & FORAMOT LUMBAR: CPT | Performed by: NEUROLOGICAL SURGERY

## 2019-07-17 PROCEDURE — 0SG04AJ FUSION OF LUMBAR VERTEBRAL JOINT WITH INTERBODY FUSION DEVICE, POSTERIOR APPROACH, ANTERIOR COLUMN, PERCUTANEOUS ENDOSCOPIC APPROACH: ICD-10-PCS | Performed by: NEUROLOGICAL SURGERY

## 2019-07-17 PROCEDURE — 80048 BASIC METABOLIC PNL TOTAL CA: CPT | Performed by: ANESTHESIOLOGY

## 2019-07-17 PROCEDURE — C1769 GUIDE WIRE: HCPCS | Performed by: NEUROLOGICAL SURGERY

## 2019-07-17 PROCEDURE — 22633 ARTHRD CMBN 1NTRSPC LUMBAR: CPT | Performed by: NEUROLOGICAL SURGERY

## 2019-07-17 PROCEDURE — 72100 X-RAY EXAM L-S SPINE 2/3 VWS: CPT

## 2019-07-17 PROCEDURE — 83735 ASSAY OF MAGNESIUM: CPT | Performed by: ANESTHESIOLOGY

## 2019-07-17 DEVICE — SPACER 7770823 ELEVATE STD 23X8MM
Type: IMPLANTABLE DEVICE | Site: SPINE LUMBAR | Status: FUNCTIONAL
Brand: ELEVATE™ SPINAL SYSTEM

## 2019-07-17 DEVICE — DBM T45005 5CC PASTE GRAFTON PLUS
Type: IMPLANTABLE DEVICE | Site: SPINE LUMBAR | Status: FUNCTIONAL
Brand: GRAFTON®AND GRAFTON PLUS®DEMINERALIZED BONE MATRIX (DBM)

## 2019-07-17 DEVICE — SCREW SET 4.75MM SOLERA PERC: Type: IMPLANTABLE DEVICE | Site: SPINE LUMBAR | Status: FUNCTIONAL

## 2019-07-17 DEVICE — SCREW 54850046545 4.75VOYAGER ATS 6.5X45
Type: IMPLANTABLE DEVICE | Site: SPINE LUMBAR | Status: FUNCTIONAL
Brand: CD HORIZON® SOLERA® VOYAGER™ SPINAL SYSTEM

## 2019-07-17 DEVICE — ROD 641003035 35MM CAPPED ROD 4.75MM CCM
Type: IMPLANTABLE DEVICE | Site: SPINE LUMBAR | Status: FUNCTIONAL
Brand: CD HORIZON® SOLERA® SPINAL SYSTEM

## 2019-07-17 RX ORDER — OXYCODONE HYDROCHLORIDE 5 MG/1
5 TABLET ORAL EVERY 4 HOURS PRN
Status: DISCONTINUED | OUTPATIENT
Start: 2019-07-17 | End: 2019-07-19

## 2019-07-17 RX ORDER — ONDANSETRON 2 MG/ML
4 INJECTION INTRAMUSCULAR; INTRAVENOUS ONCE AS NEEDED
Status: DISCONTINUED | OUTPATIENT
Start: 2019-07-17 | End: 2019-07-17 | Stop reason: HOSPADM

## 2019-07-17 RX ORDER — HEPARIN SODIUM 5000 [USP'U]/ML
5000 INJECTION, SOLUTION INTRAVENOUS; SUBCUTANEOUS EVERY 8 HOURS SCHEDULED
Status: DISCONTINUED | OUTPATIENT
Start: 2019-07-18 | End: 2019-08-02 | Stop reason: HOSPADM

## 2019-07-17 RX ORDER — SODIUM CHLORIDE, SODIUM LACTATE, POTASSIUM CHLORIDE, CALCIUM CHLORIDE 600; 310; 30; 20 MG/100ML; MG/100ML; MG/100ML; MG/100ML
75 INJECTION, SOLUTION INTRAVENOUS CONTINUOUS
Status: DISCONTINUED | OUTPATIENT
Start: 2019-07-17 | End: 2019-07-17

## 2019-07-17 RX ORDER — FENTANYL CITRATE/PF 50 MCG/ML
50 SYRINGE (ML) INJECTION
Status: DISCONTINUED | OUTPATIENT
Start: 2019-07-17 | End: 2019-07-17 | Stop reason: HOSPADM

## 2019-07-17 RX ORDER — HYDROMORPHONE HCL/PF 1 MG/ML
0.5 SYRINGE (ML) INJECTION
Status: DISCONTINUED | OUTPATIENT
Start: 2019-07-17 | End: 2019-07-17 | Stop reason: HOSPADM

## 2019-07-17 RX ORDER — SODIUM CHLORIDE 9 MG/ML
125 INJECTION, SOLUTION INTRAVENOUS CONTINUOUS
Status: DISCONTINUED | OUTPATIENT
Start: 2019-07-17 | End: 2019-07-17

## 2019-07-17 RX ORDER — BISACODYL 10 MG
10 SUPPOSITORY, RECTAL RECTAL DAILY PRN
Status: DISCONTINUED | OUTPATIENT
Start: 2019-07-17 | End: 2019-08-02 | Stop reason: HOSPADM

## 2019-07-17 RX ORDER — ACETAMINOPHEN 325 MG/1
650 TABLET ORAL EVERY 6 HOURS PRN
Status: DISCONTINUED | OUTPATIENT
Start: 2019-07-17 | End: 2019-07-17 | Stop reason: SDUPTHER

## 2019-07-17 RX ORDER — HYDROMORPHONE HCL/PF 1 MG/ML
0.5 SYRINGE (ML) INJECTION
Status: DISCONTINUED | OUTPATIENT
Start: 2019-07-17 | End: 2019-07-18

## 2019-07-17 RX ORDER — LIDOCAINE HYDROCHLORIDE 10 MG/ML
INJECTION, SOLUTION INFILTRATION; PERINEURAL AS NEEDED
Status: DISCONTINUED | OUTPATIENT
Start: 2019-07-17 | End: 2019-07-17 | Stop reason: SURG

## 2019-07-17 RX ORDER — GLYCOPYRROLATE 0.2 MG/ML
INJECTION INTRAMUSCULAR; INTRAVENOUS AS NEEDED
Status: DISCONTINUED | OUTPATIENT
Start: 2019-07-17 | End: 2019-07-17 | Stop reason: SURG

## 2019-07-17 RX ORDER — OXYCODONE HYDROCHLORIDE 10 MG/1
10 TABLET ORAL EVERY 4 HOURS PRN
Status: DISCONTINUED | OUTPATIENT
Start: 2019-07-17 | End: 2019-07-18

## 2019-07-17 RX ORDER — SODIUM CHLORIDE 30 MG/ML INHALATION SOLUTION 30 MG/ML
4 SOLUTION INHALANT ONCE
Status: DISCONTINUED | OUTPATIENT
Start: 2019-07-17 | End: 2019-07-17 | Stop reason: HOSPADM

## 2019-07-17 RX ORDER — PROPOFOL 10 MG/ML
INJECTION, EMULSION INTRAVENOUS CONTINUOUS PRN
Status: DISCONTINUED | OUTPATIENT
Start: 2019-07-17 | End: 2019-07-17 | Stop reason: SURG

## 2019-07-17 RX ORDER — EPHEDRINE SULFATE 50 MG/ML
INJECTION INTRAVENOUS AS NEEDED
Status: DISCONTINUED | OUTPATIENT
Start: 2019-07-17 | End: 2019-07-17 | Stop reason: SURG

## 2019-07-17 RX ORDER — CEFAZOLIN SODIUM 2 G/50ML
2000 SOLUTION INTRAVENOUS ONCE
Status: COMPLETED | OUTPATIENT
Start: 2019-07-17 | End: 2019-07-17

## 2019-07-17 RX ORDER — BUPIVACAINE HYDROCHLORIDE AND EPINEPHRINE 5; 5 MG/ML; UG/ML
INJECTION, SOLUTION EPIDURAL; INTRACAUDAL; PERINEURAL AS NEEDED
Status: DISCONTINUED | OUTPATIENT
Start: 2019-07-17 | End: 2019-07-17 | Stop reason: HOSPADM

## 2019-07-17 RX ORDER — HYDROMORPHONE HYDROCHLORIDE 2 MG/ML
INJECTION, SOLUTION INTRAMUSCULAR; INTRAVENOUS; SUBCUTANEOUS AS NEEDED
Status: DISCONTINUED | OUTPATIENT
Start: 2019-07-17 | End: 2019-07-17 | Stop reason: SURG

## 2019-07-17 RX ORDER — SODIUM CHLORIDE 9 MG/ML
100 INJECTION, SOLUTION INTRAVENOUS CONTINUOUS
Status: DISCONTINUED | OUTPATIENT
Start: 2019-07-17 | End: 2019-07-19

## 2019-07-17 RX ORDER — ALBUMIN, HUMAN INJ 5% 5 %
SOLUTION INTRAVENOUS CONTINUOUS PRN
Status: DISCONTINUED | OUTPATIENT
Start: 2019-07-17 | End: 2019-07-17 | Stop reason: SURG

## 2019-07-17 RX ORDER — SUCCINYLCHOLINE/SOD CL,ISO/PF 100 MG/5ML
SYRINGE (ML) INTRAVENOUS AS NEEDED
Status: DISCONTINUED | OUTPATIENT
Start: 2019-07-17 | End: 2019-07-17 | Stop reason: SURG

## 2019-07-17 RX ORDER — ONDANSETRON 2 MG/ML
INJECTION INTRAMUSCULAR; INTRAVENOUS AS NEEDED
Status: DISCONTINUED | OUTPATIENT
Start: 2019-07-17 | End: 2019-07-17 | Stop reason: SURG

## 2019-07-17 RX ORDER — ACETAMINOPHEN 325 MG/1
650 TABLET ORAL EVERY 6 HOURS PRN
Status: DISCONTINUED | OUTPATIENT
Start: 2019-07-17 | End: 2019-07-18

## 2019-07-17 RX ORDER — PROPOFOL 10 MG/ML
INJECTION, EMULSION INTRAVENOUS AS NEEDED
Status: DISCONTINUED | OUTPATIENT
Start: 2019-07-17 | End: 2019-07-17 | Stop reason: SURG

## 2019-07-17 RX ORDER — ACETAMINOPHEN 325 MG/1
975 TABLET ORAL ONCE
Status: COMPLETED | OUTPATIENT
Start: 2019-07-17 | End: 2019-07-17

## 2019-07-17 RX ORDER — CHLORHEXIDINE GLUCONATE 0.12 MG/ML
15 RINSE ORAL ONCE
Status: COMPLETED | OUTPATIENT
Start: 2019-07-17 | End: 2019-07-17

## 2019-07-17 RX ORDER — LEVALBUTEROL 1.25 MG/.5ML
1.25 SOLUTION, CONCENTRATE RESPIRATORY (INHALATION) ONCE
Status: COMPLETED | OUTPATIENT
Start: 2019-07-17 | End: 2019-07-17

## 2019-07-17 RX ORDER — DOCUSATE SODIUM 100 MG/1
100 CAPSULE, LIQUID FILLED ORAL 2 TIMES DAILY
Status: DISCONTINUED | OUTPATIENT
Start: 2019-07-17 | End: 2019-08-02 | Stop reason: HOSPADM

## 2019-07-17 RX ORDER — SODIUM CHLORIDE, SODIUM LACTATE, POTASSIUM CHLORIDE, CALCIUM CHLORIDE 600; 310; 30; 20 MG/100ML; MG/100ML; MG/100ML; MG/100ML
125 INJECTION, SOLUTION INTRAVENOUS CONTINUOUS
Status: DISCONTINUED | OUTPATIENT
Start: 2019-07-17 | End: 2019-07-17

## 2019-07-17 RX ORDER — ALBUTEROL SULFATE 90 UG/1
1 AEROSOL, METERED RESPIRATORY (INHALATION) EVERY 4 HOURS PRN
Status: DISCONTINUED | OUTPATIENT
Start: 2019-07-17 | End: 2019-08-02 | Stop reason: HOSPADM

## 2019-07-17 RX ORDER — SODIUM CHLORIDE FOR INHALATION 0.9 %
VIAL, NEBULIZER (ML) INHALATION
Status: COMPLETED
Start: 2019-07-17 | End: 2019-07-17

## 2019-07-17 RX ORDER — VANCOMYCIN HYDROCHLORIDE 1 G/200ML
1000 INJECTION, SOLUTION INTRAVENOUS ONCE
Status: COMPLETED | OUTPATIENT
Start: 2019-07-17 | End: 2019-07-17

## 2019-07-17 RX ORDER — FENTANYL CITRATE 50 UG/ML
INJECTION, SOLUTION INTRAMUSCULAR; INTRAVENOUS AS NEEDED
Status: DISCONTINUED | OUTPATIENT
Start: 2019-07-17 | End: 2019-07-17 | Stop reason: SURG

## 2019-07-17 RX ORDER — DEXAMETHASONE SODIUM PHOSPHATE 10 MG/ML
INJECTION, SOLUTION INTRAMUSCULAR; INTRAVENOUS AS NEEDED
Status: DISCONTINUED | OUTPATIENT
Start: 2019-07-17 | End: 2019-07-17 | Stop reason: SURG

## 2019-07-17 RX ORDER — AMLODIPINE BESYLATE 5 MG/1
5 TABLET ORAL DAILY
Status: DISCONTINUED | OUTPATIENT
Start: 2019-07-18 | End: 2019-08-02 | Stop reason: HOSPADM

## 2019-07-17 RX ORDER — GABAPENTIN 100 MG/1
100 CAPSULE ORAL ONCE
Status: COMPLETED | OUTPATIENT
Start: 2019-07-17 | End: 2019-07-17

## 2019-07-17 RX ORDER — LIDOCAINE HYDROCHLORIDE 10 MG/ML
0.5 INJECTION, SOLUTION EPIDURAL; INFILTRATION; INTRACAUDAL; PERINEURAL ONCE AS NEEDED
Status: COMPLETED | OUTPATIENT
Start: 2019-07-17 | End: 2019-07-17

## 2019-07-17 RX ORDER — ONDANSETRON 2 MG/ML
4 INJECTION INTRAMUSCULAR; INTRAVENOUS EVERY 6 HOURS PRN
Status: DISCONTINUED | OUTPATIENT
Start: 2019-07-17 | End: 2019-08-02 | Stop reason: HOSPADM

## 2019-07-17 RX ORDER — CEFAZOLIN SODIUM 1 G/50ML
1000 SOLUTION INTRAVENOUS EVERY 8 HOURS
Status: COMPLETED | OUTPATIENT
Start: 2019-07-17 | End: 2019-07-18

## 2019-07-17 RX ORDER — LIDOCAINE HYDROCHLORIDE 10 MG/ML
1 INJECTION, SOLUTION INFILTRATION; PERINEURAL ONCE
Status: DISCONTINUED | OUTPATIENT
Start: 2019-07-17 | End: 2019-07-17 | Stop reason: HOSPADM

## 2019-07-17 RX ADMIN — HYDROMORPHONE HYDROCHLORIDE 0.5 MG: 2 INJECTION, SOLUTION INTRAMUSCULAR; INTRAVENOUS; SUBCUTANEOUS at 13:59

## 2019-07-17 RX ADMIN — DEXAMETHASONE SODIUM PHOSPHATE 10 MG: 10 INJECTION, SOLUTION INTRAMUSCULAR; INTRAVENOUS at 11:23

## 2019-07-17 RX ADMIN — REMIFENTANIL HYDROCHLORIDE 0.2 MCG/KG/MIN: 1 INJECTION, POWDER, LYOPHILIZED, FOR SOLUTION INTRAVENOUS at 10:29

## 2019-07-17 RX ADMIN — EPHEDRINE SULFATE 10 MG: 50 INJECTION, SOLUTION INTRAVENOUS at 11:30

## 2019-07-17 RX ADMIN — PHENYLEPHRINE HYDROCHLORIDE 200 MCG: 10 INJECTION INTRAVENOUS at 11:05

## 2019-07-17 RX ADMIN — LIDOCAINE HYDROCHLORIDE 100 MG: 10 INJECTION, SOLUTION INFILTRATION; PERINEURAL at 11:23

## 2019-07-17 RX ADMIN — PHENYLEPHRINE HYDROCHLORIDE 30 MCG/MIN: 10 INJECTION INTRAVENOUS at 10:58

## 2019-07-17 RX ADMIN — GLYCOPYRROLATE 0.2 MG: 0.2 INJECTION, SOLUTION INTRAMUSCULAR; INTRAVENOUS at 11:27

## 2019-07-17 RX ADMIN — FENTANYL CITRATE 50 MCG: 50 INJECTION, SOLUTION INTRAMUSCULAR; INTRAVENOUS at 10:26

## 2019-07-17 RX ADMIN — DOCUSATE SODIUM 100 MG: 100 CAPSULE, LIQUID FILLED ORAL at 18:04

## 2019-07-17 RX ADMIN — VANCOMYCIN HYDROCHLORIDE 1000 MG: 1 INJECTION, SOLUTION INTRAVENOUS at 10:00

## 2019-07-17 RX ADMIN — ACETAMINOPHEN 975 MG: 325 TABLET ORAL at 09:28

## 2019-07-17 RX ADMIN — ALBUMIN (HUMAN): 12.5 SOLUTION INTRAVENOUS at 12:00

## 2019-07-17 RX ADMIN — OXYCODONE HYDROCHLORIDE 10 MG: 10 TABLET ORAL at 23:27

## 2019-07-17 RX ADMIN — GABAPENTIN 100 MG: 100 CAPSULE ORAL at 09:28

## 2019-07-17 RX ADMIN — LIDOCAINE HYDROCHLORIDE 40 MG: 10 INJECTION, SOLUTION INFILTRATION; PERINEURAL at 10:26

## 2019-07-17 RX ADMIN — ONDANSETRON 4 MG: 2 INJECTION INTRAMUSCULAR; INTRAVENOUS at 13:28

## 2019-07-17 RX ADMIN — OXYCODONE HYDROCHLORIDE 10 MG: 10 TABLET ORAL at 18:23

## 2019-07-17 RX ADMIN — ISODIUM CHLORIDE 3 ML: 0.03 SOLUTION RESPIRATORY (INHALATION) at 09:28

## 2019-07-17 RX ADMIN — CEFAZOLIN SODIUM 1000 MG: 1 SOLUTION INTRAVENOUS at 18:20

## 2019-07-17 RX ADMIN — PROPOFOL 130 MG: 10 INJECTION, EMULSION INTRAVENOUS at 10:26

## 2019-07-17 RX ADMIN — SODIUM CHLORIDE 100 ML/HR: 0.9 INJECTION, SOLUTION INTRAVENOUS at 16:38

## 2019-07-17 RX ADMIN — EPHEDRINE SULFATE 5 MG: 50 INJECTION, SOLUTION INTRAVENOUS at 13:00

## 2019-07-17 RX ADMIN — SODIUM CHLORIDE: 0.9 INJECTION, SOLUTION INTRAVENOUS at 10:55

## 2019-07-17 RX ADMIN — SODIUM CHLORIDE, SODIUM LACTATE, POTASSIUM CHLORIDE, AND CALCIUM CHLORIDE: .6; .31; .03; .02 INJECTION, SOLUTION INTRAVENOUS at 12:46

## 2019-07-17 RX ADMIN — FENTANYL CITRATE 50 MCG: 50 INJECTION, SOLUTION INTRAMUSCULAR; INTRAVENOUS at 13:43

## 2019-07-17 RX ADMIN — PHENYLEPHRINE HYDROCHLORIDE 100 MCG: 10 INJECTION INTRAVENOUS at 13:00

## 2019-07-17 RX ADMIN — LEVALBUTEROL 1.25 MG: 1.25 SOLUTION, CONCENTRATE RESPIRATORY (INHALATION) at 09:28

## 2019-07-17 RX ADMIN — CEFAZOLIN SODIUM 2000 MG: 2 SOLUTION INTRAVENOUS at 11:03

## 2019-07-17 RX ADMIN — Medication 100 MG: at 10:26

## 2019-07-17 RX ADMIN — FENTANYL CITRATE 25 MCG: 50 INJECTION, SOLUTION INTRAMUSCULAR; INTRAVENOUS at 15:21

## 2019-07-17 RX ADMIN — SODIUM CHLORIDE, SODIUM LACTATE, POTASSIUM CHLORIDE, AND CALCIUM CHLORIDE 125 ML/HR: .6; .31; .03; .02 INJECTION, SOLUTION INTRAVENOUS at 10:04

## 2019-07-17 RX ADMIN — LIDOCAINE HYDROCHLORIDE 2 ML: 10 INJECTION, SOLUTION EPIDURAL; INFILTRATION; INTRACAUDAL; PERINEURAL at 10:04

## 2019-07-17 RX ADMIN — PROPOFOL 120 MCG/KG/MIN: 10 INJECTION, EMULSION INTRAVENOUS at 10:29

## 2019-07-17 RX ADMIN — CHLORHEXIDINE GLUCONATE 15 ML: 1.2 RINSE ORAL at 09:28

## 2019-07-17 NOTE — ANESTHESIA POSTPROCEDURE EVALUATION
Post-Op Assessment Note    ECG unchanged, K, Mag Normal  Monitor-Occ  PVC  Stable PostOp    BP      Temp      Pulse     Resp      SpO2

## 2019-07-17 NOTE — ANESTHESIA POSTPROCEDURE EVALUATION
Post-Op Assessment Note    CV Status:  Stable    Pain management: adequate     Mental Status:  Alert and awake   Hydration Status:  Euvolemic   PONV Controlled:  Controlled   Airway Patency:  Patent   Post Op Vitals Reviewed: Yes      Staff: CRNA           BP   131/61   Temp     Pulse  92   Resp      SpO2   100

## 2019-07-17 NOTE — OP NOTE
OPERATIVE REPORT  PATIENT NAME: Josh Ornelas    :  1945  MRN: [id number]  Pt Location: [facility] OR ROOM 17    SURGERY DATE: 2019    Surgeon(s) and Role:     * Antonina Meek MD - Primary    Preop Diagnosis:  Lumbar back pain with radiculopathy affecting right lower extremity [M54 16]  Spondylosis without myelopathy or radiculopathy, lumbar region [M47 816]    Post-Op Diagnosis Codes:     * Lumbar back pain with radiculopathy affecting right lower extremity [M54 16]     * Spondylosis without myelopathy or radiculopathy, lumbar region [M47 816]    Procedure(s) (LRB):  Left L4-5 hemilaminectomy and bilateral foraminotomy and Minimally invasive transforaminal lumbar interbody fusion with pedicle screw and myke fixation fusion L4-5, right-sided approach; minimally invasive right L5-S1 foraminotomy and diskectomy (N/A)    Specimen(s):  * No specimens in log *    Estimated Blood Loss:   75 mL    Drains:  Urethral Catheter Latex 16 Fr  (Active)   Number of days: 0       Anesthesia Type:   General    Operative Indications:  Lumbar back pain with radiculopathy affecting right lower extremity [M54 16]  Spondylosis without myelopathy or radiculopathy, lumbar region [M47 816]  Severe spinal stenosis at L4-5 with spondylolisthesis    Operative Findings:  Severe degenerative spinal stenosis with facet hypertrophy and ligamentous hypertrophy producing spinal stenosis  Spondylolisthesis at L4-5  Disc herniation at A1-Q0    Complications:   None    Procedure and Technique:  After adequate general endotracheal anesthesia the patient was placed prone on the operating room table  The back was prepped with Betadine soap and then DuraPrep double layer drapes placed normal fashion and 3 and Betadine impregnated sticky drape was placed over these  A timeout was called all parameters the timeout were followed  The procedure began by imaging the projection of the pedicles onto the skin of the low back    In this region, a 30 mm incision was made  These were made in the pedicular line on both sides  K-wires were then introduced into the L4 and L5 pedicles utilizing a Jamshede needle  Each was tapped and stimulated and found to be within threshold  On the left side screws were introduced  Solara screws with a percutaneous extender utilizing a 6 5 mm by 45mm screw was placed at the L4 level on the left  At the L5 level at 6 5 by 45 mm screw was placed  This was measured and a 35 mm myke was utilized  The screws were then distracted and the myke was tightened into final position  Next attention was placed to the right side  Progressive dilators were placed on the left side and the quadrant retractor system was placed  This was angled medially and checked in the AP and lateral planes fluoroscopically  The intraoperative microscope was used at various degrees of magnification to aid in the identification, illumination, and microdissection necessary to perform this procedure  Next the pars interarticularis was drilled and a partial medial facetectomy was performed  The thecal sac was identified  There were no CSF leaks  The thecal sac was retracted minimally and an annulotomy was performed at the L4/5 level  A series of pituitary rongeurs, Kerrison rongeurs, curettes and rasps were used to perform a complete diskectomy  Demineralized bone matrix was then placed in the to the contralateral side and in 8 mm x 23 mm expandable cage was impacted into position without difficulty  This was expanded  Next 40 mm x 6 5 mm Solara screw was placed at L5 and a 35 x 40 mm screw was placed at S1 a 40 mm cobalt chromium myke was placed and the caps were tightened to the break-off torque  Vancomycin powder was placed into the incision  The fascia was approximated with interrupted 2-0 Vicryl suture  The epidermis was approximated with interrupted inverted 3 0 Vicryl suture  Benzoin and Steri-Strips were placed    Clean sterile dressings were placed  Final x-rays were obtained  The instrumentation was in acceptable position  Patient was then taken to the recovery room having tolerated the procedure well  The K wire was placed to the facet overlying the L5-S1 interspace on the right side  Intraoperative fluoroscopy was used throughout the case to either identification of level  There was radiology over-read for level verification  The skin was then infiltrated with 1% lidocaine with 1 100,000 epinephrine  A #15 blade was used to incise the skin  Bovie and bipolar were used throughout the procedure to maintain operative exposure  Intraoperative microscope was used at various degrees of magnification to aid in the Identification, illumination, and microdissection necessary to perform this procedure  Progressive dilators were placed, the sheath was placed and this was affixed to the bedside connector  The Mid[place] Nickolas drill was utilized to perform a hemilaminotomy  A medial facetectomy and decompressive foraminotomy were performed with the use of Kerrison rongeurs, foraminotomy rongeurs and curved curettes  A complete decompressive foraminotomy was performed on the bilaterally utilizing the foraminotomy rongeurs and Kerrison rongeurs  The dura appeared to be thinned and it is for these reasons DuraSeal was used to reinforce this area  Copious quantities of irrigation were used to cleanse out the region  There were no CSF leaks The retractors were removed  The fascial incisions were closed with a running 2 0 Vicryl suture  The subcutaneous tissues were approximated with interrupted 2 0 Vicryl suture  The skin was closed with an interrupted inverted 3 0 Vicryl suture  Benzoin and Steri-Strips were placed on the skin         I was present for the entire procedure    Patient Disposition:  PACU     SIGNATURE: Antonina Meek MD  DATE: July 17, 2019  TIME: 1:56 PM    8/5/19 Various typographical corrections and detail of level made

## 2019-07-17 NOTE — INTERVAL H&P NOTE
H&P reviewed  After examining the patient I find no changes in the patients condition since the H&P had been written    /63   Pulse 78   Temp (!) 96 9 °F (36 1 °C) (Tympanic Core)   Resp 18   Ht 5' 1 5" (1 562 m)   Wt 63 5 kg (140 lb)   SpO2 99%   BMI 26 02 kg/m²     Plan:  MIS transforaminal lumbar interbody fusion with pedicle screw and myke fixation fusion L4/5, Right sided approach, MIS right L5/[id number]

## 2019-07-17 NOTE — PLAN OF CARE
Problem: PAIN - ADULT  Goal: Verbalizes/displays adequate comfort level or baseline comfort level  Description  Interventions:  - Encourage patient to monitor pain and request assistance  - Assess pain using appropriate pain scale  - Administer analgesics based on type and severity of pain and evaluate response  - Implement non-pharmacological measures as appropriate and evaluate response  - Consider cultural and social influences on pain and pain management  - Notify physician/advanced practitioner if interventions unsuccessful or patient reports new pain  Outcome: Progressing     Problem: INFECTION - ADULT  Goal: Absence or prevention of progression during hospitalization  Description  INTERVENTIONS:  - Assess and monitor for signs and symptoms of infection  - Monitor lab/diagnostic results  - Monitor all insertion sites, i e  indwelling lines, tubes, and drains  - Monitor endotracheal (as able) and nasal secretions for changes in amount and color  - [place] appropriate cooling/warming therapies per order  - Administer medications as ordered  - Instruct and encourage patient and family to use good hand hygiene technique  - Identify and instruct in appropriate isolation precautions for identified infection/condition  Outcome: Progressing     Problem: SAFETY ADULT  Goal: Patient will remain free of falls  Description  INTERVENTIONS:  - Assess patient frequently for physical needs  -  Identify cognitive and physical deficits and behaviors that affect risk of falls    -  [place] fall precautions as indicated by assessment   - Educate patient/family on patient safety including physical limitations  - Instruct patient to call for assistance with activity based on assessment  - Modify environment to reduce risk of injury  - Consider OT/PT consult to assist with strengthening/mobility  Outcome: Progressing  Goal: Maintain or return to baseline ADL function  Description  INTERVENTIONS:  -  Assess patient's ability to carry out ADLs; assess patient's baseline for ADL function and identify physical deficits which impact ability to perform ADLs (bathing, care of mouth/teeth, toileting, grooming, dressing, etc )  - Assess/evaluate cause of self-care deficits   - Assess range of motion  - Assess patient's mobility; develop plan if impaired  - Assess patient's need for assistive devices and provide as appropriate  - Encourage maximum independence but intervene and supervise when necessary  ¯ Involve family in performance of ADLs  ¯ Assess for home care needs following discharge   ¯ Request OT consult to assist with ADL evaluation and planning for discharge  ¯ Provide patient education as appropriate  Outcome: Progressing  Goal: Maintain or return mobility status to optimal level  Description  INTERVENTIONS:  - Assess patient's baseline mobility status (ambulation, transfers, stairs, etc )    - Identify cognitive and physical deficits and behaviors that affect mobility  - Identify mobility aids required to assist with transfers and/or ambulation (gait belt, sit-to-stand, lift, walker, cane, etc )  - [place] fall precautions as indicated by assessment  - Record patient progress and toleration of activity level on Mobility SBAR; progress patient to next Phase/Stage  - Instruct patient to call for assistance with activity based on assessment  - Request Rehabilitation consult to assist with strengthening/weightbearing, etc   Outcome: Progressing     Problem: DISCHARGE PLANNING  Goal: Discharge to home or other facility with appropriate resources  Description  INTERVENTIONS:  - Identify barriers to discharge w/patient and caregiver  - Arrange for needed discharge resources and transportation as appropriate  - Identify discharge learning needs (meds, wound care, etc )  - Arrange for interpretive services to assist at discharge as needed  - Refer to Case Management Department for coordinating discharge planning if the patient needs post-hospital services based on physician/advanced practitioner order or complex needs related to functional status, cognitive ability, or social support system  Outcome: Progressing     Problem: Knowledge Deficit  Goal: Patient/family/caregiver demonstrates understanding of disease process, treatment plan, medications, and discharge instructions  Description  Complete learning assessment and assess knowledge base    Interventions:  - Provide teaching at level of understanding  - Provide teaching via preferred learning methods  Outcome: Progressing

## 2019-07-18 ENCOUNTER — APPOINTMENT (INPATIENT)
Dept: RADIOLOGY | Facility: HOSPITAL | Age: 74
DRG: 454 | End: 2019-07-18
Payer: COMMERCIAL

## 2019-07-18 LAB
ANION GAP SERPL CALCULATED.3IONS-SCNC: 8 MMOL/L (ref 4–13)
BUN SERPL-MCNC: 19 MG/DL (ref 5–25)
CALCIUM SERPL-MCNC: 8.3 MG/DL (ref 8.3–10.1)
CHLORIDE SERPL-SCNC: 113 MMOL/L (ref 100–108)
CO2 SERPL-SCNC: 18 MMOL/L (ref 21–32)
CREAT SERPL-MCNC: 0.88 MG/DL (ref 0.6–1.3)
ERYTHROCYTE [DISTWIDTH] IN BLOOD BY AUTOMATED COUNT: 16 % (ref 11.6–15.1)
GFR SERPL CREATININE-BSD FRML MDRD: 66 ML/MIN/1.73SQ M
GLUCOSE SERPL-MCNC: 107 MG/DL (ref 65–140)
HCT VFR BLD AUTO: 31.3 % (ref 34.8–46.1)
HGB BLD-MCNC: 9.4 G/DL (ref 11.5–15.4)
MCH RBC QN AUTO: 26.2 PG (ref 26.8–34.3)
MCHC RBC AUTO-ENTMCNC: 30 G/DL (ref 31.4–37.4)
MCV RBC AUTO: 87 FL (ref 82–98)
PLATELET # BLD AUTO: 337 THOUSANDS/UL (ref 149–390)
PLATELET # BLD AUTO: 337 THOUSANDS/UL (ref 149–390)
PMV BLD AUTO: 10.3 FL (ref 8.9–12.7)
PMV BLD AUTO: 10.3 FL (ref 8.9–12.7)
POTASSIUM SERPL-SCNC: 3.8 MMOL/L (ref 3.5–5.3)
RBC # BLD AUTO: 3.59 MILLION/UL (ref 3.81–5.12)
SODIUM SERPL-SCNC: 139 MMOL/L (ref 136–145)
WBC # BLD AUTO: 10.56 THOUSAND/UL (ref 4.31–10.16)

## 2019-07-18 PROCEDURE — 72100 X-RAY EXAM L-S SPINE 2/3 VWS: CPT

## 2019-07-18 PROCEDURE — G8987 SELF CARE CURRENT STATUS: HCPCS

## 2019-07-18 PROCEDURE — 80048 BASIC METABOLIC PNL TOTAL CA: CPT | Performed by: NEUROLOGICAL SURGERY

## 2019-07-18 PROCEDURE — 85049 AUTOMATED PLATELET COUNT: CPT | Performed by: NEUROLOGICAL SURGERY

## 2019-07-18 PROCEDURE — G8988 SELF CARE GOAL STATUS: HCPCS

## 2019-07-18 PROCEDURE — G8978 MOBILITY CURRENT STATUS: HCPCS

## 2019-07-18 PROCEDURE — 97167 OT EVAL HIGH COMPLEX 60 MIN: CPT

## 2019-07-18 PROCEDURE — NC001 PR NO CHARGE: Performed by: PHYSICIAN ASSISTANT

## 2019-07-18 PROCEDURE — G8979 MOBILITY GOAL STATUS: HCPCS

## 2019-07-18 PROCEDURE — 85027 COMPLETE CBC AUTOMATED: CPT | Performed by: NEUROLOGICAL SURGERY

## 2019-07-18 PROCEDURE — 97163 PT EVAL HIGH COMPLEX 45 MIN: CPT

## 2019-07-18 PROCEDURE — 99024 POSTOP FOLLOW-UP VISIT: CPT | Performed by: PHYSICIAN ASSISTANT

## 2019-07-18 RX ORDER — ACETAMINOPHEN 325 MG/1
975 TABLET ORAL EVERY 8 HOURS SCHEDULED
Status: DISCONTINUED | OUTPATIENT
Start: 2019-07-18 | End: 2019-08-02 | Stop reason: HOSPADM

## 2019-07-18 RX ORDER — SENNOSIDES 8.6 MG
1 TABLET ORAL
Status: DISCONTINUED | OUTPATIENT
Start: 2019-07-18 | End: 2019-08-02 | Stop reason: HOSPADM

## 2019-07-18 RX ORDER — OLANZAPINE 10 MG/1
10 INJECTION, POWDER, LYOPHILIZED, FOR SOLUTION INTRAMUSCULAR ONCE
Status: COMPLETED | OUTPATIENT
Start: 2019-07-18 | End: 2019-07-19

## 2019-07-18 RX ORDER — OXYCODONE HYDROCHLORIDE 5 MG/1
7.5 TABLET ORAL EVERY 4 HOURS PRN
Status: DISCONTINUED | OUTPATIENT
Start: 2019-07-18 | End: 2019-07-19

## 2019-07-18 RX ORDER — HALOPERIDOL 5 MG/ML
2 INJECTION INTRAMUSCULAR ONCE
Status: COMPLETED | OUTPATIENT
Start: 2019-07-18 | End: 2019-07-18

## 2019-07-18 RX ADMIN — ACETAMINOPHEN 975 MG: 325 TABLET ORAL at 22:43

## 2019-07-18 RX ADMIN — OXYCODONE HYDROCHLORIDE 7.5 MG: 5 TABLET ORAL at 22:44

## 2019-07-18 RX ADMIN — SENNOSIDES 8.6 MG: 8.6 TABLET, FILM COATED ORAL at 22:43

## 2019-07-18 RX ADMIN — OXYCODONE HYDROCHLORIDE 10 MG: 10 TABLET ORAL at 08:12

## 2019-07-18 RX ADMIN — SODIUM CHLORIDE 100 ML/HR: 0.9 INJECTION, SOLUTION INTRAVENOUS at 02:58

## 2019-07-18 RX ADMIN — AMLODIPINE BESYLATE 5 MG: 5 TABLET ORAL at 08:12

## 2019-07-18 RX ADMIN — HYDROMORPHONE HYDROCHLORIDE 0.5 MG: 1 INJECTION, SOLUTION INTRAMUSCULAR; INTRAVENOUS; SUBCUTANEOUS at 02:30

## 2019-07-18 RX ADMIN — ACETAMINOPHEN 975 MG: 325 TABLET ORAL at 13:16

## 2019-07-18 RX ADMIN — HALOPERIDOL LACTATE 2 MG: 5 INJECTION INTRAMUSCULAR at 04:10

## 2019-07-18 RX ADMIN — HEPARIN SODIUM 5000 UNITS: 5000 INJECTION INTRAVENOUS; SUBCUTANEOUS at 22:46

## 2019-07-18 RX ADMIN — DOCUSATE SODIUM 100 MG: 100 CAPSULE, LIQUID FILLED ORAL at 08:12

## 2019-07-18 RX ADMIN — CEFAZOLIN SODIUM 1000 MG: 1 SOLUTION INTRAVENOUS at 02:30

## 2019-07-18 RX ADMIN — DOCUSATE SODIUM 100 MG: 100 CAPSULE, LIQUID FILLED ORAL at 17:09

## 2019-07-18 RX ADMIN — OXYCODONE HYDROCHLORIDE 7.5 MG: 5 TABLET ORAL at 13:16

## 2019-07-18 NOTE — PHYSICAL THERAPY NOTE
PHYSICAL THERAPY EVALUATION  NAME:  Maureen Ornelas  DATE: 07/18/19    AGE:   72 y o    Mrn:   [id number]  ADMIT DX:  Lumbar back pain with radiculopathy affecting right lower extremity [M54 16]  Spondylosis without myelopathy or radiculopathy, lumbar region [M47 816]    Past Medical History:   Diagnosis Date    ADHD     Asthma     controlled    [facility] [address] [facility]     Cancer ([facility] [address] [facility]     breast    Depression     History of fall     History of prediabetes     Hypertension     Iron deficiency anemia     Kidney stone     Myofascial pain     Osteoporosis        Past Surgical History:   Procedure Laterality Date    CERVICAL SPINE SURGERY  2013    Multilevel posterior cervical decompression / fusion    HYSTERECTOMY      MASTECTOMY Right     LA ARTHDSIS POST/POSTEROLATRL/POSTINTERBODY LUMBAR N/A 7/17/2019    Procedure: Left L4-5 hemilaminectomy and bilateral foraminotomy and Minimally invasive transforaminal lumbar interbody fusion with pedicle screw and myke fixation fusion L4-5, right-sided approach; minimally invasive right L5-S1 foraminotomy and diskectomy;  Surgeon: Vikas Grubbs MD;  Location: [facility];  Service: Neurosurgery       Length Of Stay: 1    PHYSICAL THERAPY EVALUATION:        07/18/19 1202   Note Type   Note type Eval only   Pain Assessment   Pain Assessment 0-10   Pain Score 8   Pain Location Back   Pain Orientation Lower   Pain Descriptors Aching   Pain Frequency Constant/continuous   Pain Onset Ongoing   Clinical Progression Gradually improving   Effect of Pain on Daily Activities incrased pain with activity    Patient's Stated Pain Goal No pain   Hospital Pain Intervention(s) Ambulation/increased activity;Repositioned   Response to Interventions tolerated    Home Living   Type of Home Apartment   Home Layout Multi-level  (2 flights of steps to enter )   Home Equipment Other (Comment)  (rollator as per pt )   Additional Comments Pt reports living with significant other who is able to assist of if needed    Prior Function   Level of [place] Independent with ADLs and functional mobility   Lives With Significant other   Receives Help From Family   ADL Assistance Independent   Comments Pt reports the use of a rollator for ambulation PTA    Restrictions/Precautions   Weight Bearing Precautions Per Order No   Braces or Orthoses   (no brace as per Elkin Cormier from [employer])   Other Precautions Cognitive; Chair Alarm; Bed Alarm;Agitated; Impulsive; Fall Risk;Pain;Multiple lines;Spinal precautions  (posey belt on post session )   Cognition   Overall Cognitive Status Impaired   Arousal/Participation Responsive   Attention Difficulty attending to directions   Orientation Level Oriented to person   Memory Decreased short term memory;Decreased recall of recent events;Decreased recall of precautions   Following Commands Follows one step commands with increased time or repetition   RUE Assessment   RUE Assessment WFL   LUE Assessment   LUE Assessment WFL   RLE Assessment   RLE Assessment WFL   Strength RLE   RLE Overall Strength 4-/5   LLE Assessment   LLE Assessment WFL   Strength LLE   LLE Overall Strength 4-/5   Bed Mobility   Additional Comments NA, Pt seated OOB in chair at time of PT eval    Transfers   Sit to Stand 4  Minimal assistance   Additional items Assist x 1; Increased time required;Verbal cues   Stand to Sit 4  Minimal assistance   Additional items Assist x 1; Increased time required;Verbal cues   Additional Comments VC and TC needed for hand placement and safety    Ambulation/Elevation   Gait pattern Excessively slow; Short stride; Foward flexed; Inconsistent jeffrey;Decreased foot clearance; Improper Weight shift   Gait Assistance 4  Minimal assist   Additional items Assist x 1   Assistive Device Rolling walker   Distance 30ft x 2   Balance   Static Sitting Fair +   Static Standing Fair   Ambulatory Fair -   Endurance Deficit   Endurance Deficit Yes   Endurance Deficit Description fatigue and pain    Activity Tolerance   Activity Tolerance Patient limited by fatigue;Patient limited by pain; Other (Comment)  (behavioral )   Nurse Made Aware Pt appropriate to be seen and mobilize per nsg    Assessment   Prognosis Fair   Problem List Decreased strength;Decreased range of motion;Decreased endurance; Impaired balance;Decreased mobility;Pain;Decreased cognition; Impaired judgement;Decreased safety awareness;Orthopedic restrictions   Assessment Pt is 72 y o  female seen for PT evaluation s/p admit to [facility] on 7/17/2019  Two pt identifiers were used to confirm  Pt presented s/p Left L4-5 hemilaminectomy and bilateral foraminotomy and Minimally invasive transforaminal lumbar interbody fusion with pedicle screw and myke fixation fusion L4-5, right-sided approach; minimally invasive right L5-S1 foraminotomy and diskectomy (N/A) which was performed on 7/17/2019  Pt was admitted with a primary dx of: spinal stenosis of lumbar region with neurogenic claudication  PT now consulted for assessment of mobility and d/c needs  Pt with OOB  orders  Pts current co morbidities effecting treatment include: AH, asthma, cancer, depression, HTN, myofascial pain, osteoporosis, and personal factors including TANYA home  Pts current clinical presentation is Unstable/ Unpredictable (high complexity) due to Ongoing medical management for primary dx, Increased reliance on more restrictive AD compared to baseline, Decreased activity tolerance compared to baseline, Fall risk, Cog status, Spinal precautions at current time    Prior to admission, pt was I with ambulation with the use of a rollator as per pt  Upon evaluation, pt currently is requiring; min A for transfers and min A for ambulation w/ RW   Pt denies any lightheadedness or dizziness with ambulation   Pt presents at PT eval functioning below baseline and currently w/ overall mobility deficits 2* to: BLE weakness, impaired balance, decreased endurance, gait deviations, pain, decreased activity tolerance compared to baseline, decreased safety awareness, impaired judgement, fall risk, orthopedic restrictions, decreased cognition  Pt currently at a fall risk 2* to impairments listed above  Based on the aforementioned PT evaluation, pt will continue to benefit from skilled Acute PT interventions to address stated impairments; to maximize functional mobility; for ongoing pt/ family training; and DME needs  At conclusion of PT session pt returned back in chair and chair alarm engaged with phone and call bell within reach  Pt denies any further questions at this time  PT is currently recommending rehab  Pt/ family agreeable to plan and goals as stated on evaluation  PT will continue to follow during hospital stay  Barriers to Discharge Inaccessible home environment   Goals   Patient Goals " to go home"   STG Expiration Date 07/28/19   Short Term Goal #1 In 10 days pt will complete: 1) Bed mobility skills with S to increase safety and independence as well as decrease caregiver burden  2) Functional transfers with S to promote increased independence, safety, and QOL in the home environment  3) Ambulate 150' using least restrictive AD with S without LOB and stable vitals so that pt can negotiate home environment safely and promote independence with functional mobility and return to PLOF  4) Stair training up/ down 20 step/s using rail/s with S so that pt can enter/negotiate home environment safely and decrease fall risk  5) Improve balance grades to Good to increase safety with all mobility and decrease fall risk  6) Improve BLE strength by 1/2 grade to help increase overall functional mobility and decrease fall risk  7) PT for ongoing pt and family education; DME needs and D/C planning to promote highest level of function in least restrictive environment  Plan   Treatment/Interventions Functional transfer training;LE strengthening/ROM; Elevations; Therapeutic exercise; Endurance training;Patient/family training;Equipment eval/education; Bed mobility;Gait training;Spoke to nursing;Spoke to case management;OT;Family   PT Frequency Other (Comment)  (3-6x a week )   Recommendation   Recommendation Short-term skilled PT   Equipment Recommended Walker  (RW)   PT - OK to Discharge Yes  (to rehab when medically cleared )   Modified [place] Scale   Modified [place] Scale 4   B[place] Index   Feeding 10   Bathing 0   Grooming Score 0   Dressing Score 5   Bladder Score 10   Bowels Score 10   Toilet Use Score 5   Transfers (Bed/Chair) Score 10   Mobility (Level Surface) Score 0   Stairs Score 0   B[place] Index Score 50   Davina Rajput, PT

## 2019-07-18 NOTE — SOCIAL WORK
CM  spoke with pts brittanyfriend, Tae Ball, via phone to review the CM role and discuss possible dc needs  Pt was lives in an apartment with 2 flights of TANYA  Pt was IPTA  However, boyfriend sts , pt needs "a lot of encouragement" to perform ADL's  Pt has DME of rolling walker and is ambulatory independant   Pt has bathroom/bedroom on first with 0 steps  Neither pt nor boyfriend drive, they use public transportation, usually a bus  Pts boyfriend, Corwin Bui,  Denies pt has any history of drug/etoh abuse  Pt has history of mental illness, [place] denies any inpatient psych admissions  Pts boyfriend, Tae Ball,  denies any history of SNF/Rehab for the pt  Pt has used [facility] in the past  Pt does not have a  POA/LW  Preferred Pharmacy: [facility]: isaac Mayer, [phone]  PCP: Dr Hattie Doshi    CM reviewed d/c planning process including the following: identifying help at home, patient preference for d/c planning needs, Discharge Lounge, [facility] Meds to Bed program, availability of treatment team to discuss questions or concerns patient and/or family may have regarding understanding medications and recognizing signs and symptoms once discharged  CM also encouraged patient to follow up with all recommended appointments after discharge  Patient advised of importance for patient and family to participate in managing patients medical well being

## 2019-07-18 NOTE — PROGRESS NOTES
07/18/19 190   Plan of Care   Comments Provided support to staff     Assessment Completed by: Unit visit

## 2019-07-18 NOTE — PLAN OF CARE
Problem: PHYSICAL THERAPY ADULT  Goal: Performs mobility at highest level of function for planned discharge setting  See evaluation for individualized goals  Description  Treatment/Interventions: Functional transfer training, LE strengthening/ROM, Elevations, Therapeutic exercise, Endurance training, Patient/family training, Equipment eval/education, Bed mobility, Gait training, Spoke to nursing, Spoke to case management, OT, Family  Equipment Recommended: Walker(RW)       See flowsheet documentation for full assessment, interventions and recommendations  Note:   Prognosis: Fair  Problem List: Decreased strength, Decreased range of motion, Decreased endurance, Impaired balance, Decreased mobility, Pain, Decreased cognition, Impaired judgement, Decreased safety awareness, Orthopedic restrictions  Assessment: Pt is 72 y o  female seen for PT evaluation s/p admit to [facility] on 7/17/2019  Two pt identifiers were used to confirm  Pt presented s/p Left L4-5 hemilaminectomy and bilateral foraminotomy and Minimally invasive transforaminal lumbar interbody fusion with pedicle screw and myke fixation fusion L4-5, right-sided approach; minimally invasive right L5-S1 foraminotomy and diskectomy (N/A) which was performed on 7/17/2019  Pt was admitted with a primary dx of: spinal stenosis of lumbar region with neurogenic claudication  PT now consulted for assessment of mobility and d/c needs  Pt with OOB  orders  Pts current co morbidities effecting treatment include: AH, asthma, cancer, depression, HTN, myofascial pain, osteoporosis, and personal factors including TANYA home  Pts current clinical presentation is Unstable/ Unpredictable (high complexity) due to Ongoing medical management for primary dx, Increased reliance on more restrictive AD compared to baseline, Decreased activity tolerance compared to baseline, Fall risk, Cog status, Spinal precautions at current time     Prior to admission, pt was I with ambulation with the use of a rollator as per pt  Upon evaluation, pt currently is requiring; min A for transfers and min A for ambulation w/ RW   Pt denies any lightheadedness or dizziness with ambulation  Pt presents at PT eval functioning below baseline and currently w/ overall mobility deficits 2* to: BLE weakness, impaired balance, decreased endurance, gait deviations, pain, decreased activity tolerance compared to baseline, decreased safety awareness, impaired judgement, fall risk, orthopedic restrictions, decreased cognition  Pt currently at a fall risk 2* to impairments listed above  Based on the aforementioned PT evaluation, pt will continue to benefit from skilled Acute PT interventions to address stated impairments; to maximize functional mobility; for ongoing pt/ family training; and DME needs  At conclusion of PT session pt returned back in chair and chair alarm engaged with phone and call bell within reach  Pt denies any further questions at this time  PT is currently recommending rehab  Pt/ family agreeable to plan and goals as stated on evaluation  PT will continue to follow during hospital stay  Barriers to Discharge: Inaccessible home environment     Recommendation: Short-term skilled PT     PT - OK to Discharge: Yes(to rehab when medically cleared )    See flowsheet documentation for full assessment

## 2019-07-18 NOTE — OCCUPATIONAL THERAPY NOTE
[address] Evaluation     Patient Name: Gray Ornelas  [id number] Date: 7/18/2019  Problem List  Patient Active Problem List   Diagnosis    Lumbar back pain with radiculopathy affecting right lower extremity    Ambulatory dysfunction    Essential hypertension    Cognitive impairment    Spinal stenosis of lumbar region with neurogenic claudication    Right leg weakness    Spondylosis without myelopathy or radiculopathy, lumbar region    Spondylolisthesis of lumbar region     Past Medical History  Past Medical History:   Diagnosis Date    ADHD     Asthma     controlled    [facility]ilateral [facility]ro[facility] [address] [facility]     Cancer ([facility] [address] [facility]     breast    Depression     History of fall     History of prediabetes     Hypertension     Iron deficiency anemia     Kidney stone     Myofascial pain     Osteoporosis      Past Surgical History  Past Surgical History:   Procedure Laterality Date    CERVICAL SPINE SURGERY  2013    Multilevel posterior cervical decompression / fusion    HYSTERECTOMY      MASTECTOMY Right     PA ARTHDSIS POST/POSTEROLATRL/POSTINTERBODY LUMBAR N/A 7/17/2019    Procedure: Left L4-5 hemilaminectomy and bilateral foraminotomy and Minimally invasive transforaminal lumbar interbody fusion with pedicle screw and myke fixation fusion L4-5, right-sided approach; minimally invasive right L5-S1 foraminotomy and diskectomy;  Surgeon: Elias Riojas MD;  Location: [facility];  Service: Neurosurgery           07/18/19 1201   Note Type   Note type Eval/Treat   Restrictions/Precautions   Weight Bearing Precautions Per Order No   Braces or Orthoses   (NO BRACE PER Yetta Pi)   Other Precautions Cognitive;Agitated; Chair Alarm; Bed Alarm;Contact/isolation; Impulsive; Restraints;Multiple lines; Fall Risk;Pain;Spinal precautions  (POSEY BELT )   Pain Assessment   Pain Assessment 0-10   Pain Score 9   Pain Type Acute pain;Surgical pain   Pain Location Back   Patient's Stated Pain Goal No pain   Hospital Pain Intervention(s) Repositioned; Ambulation/increased activity; Distraction; Emotional support   Response to Interventions TOLERATED    Home Living   Type of Home Apartment   Home Layout Multi-level  (2 FF TO ENTER )   Bathroom Shower/Tub Tub/shower unit   Bathroom Toilet Standard   Home Equipment   (ROLLATOR)   Additional Comments  + USE OF ROLLATOR AT BASELINE    Prior Function   Level of [place] Independent with ADLs and functional mobility   Lives With Significant other   Receives Help From Family   ADL Assistance Independent   IADLs Needs assistance   Lifestyle   Autonomy DIFFICULT TO OBTAIN PLOF FROM PT AND SIGNIFICANT OTHER AT THIS TIME 2' EASILY DISTRACTED AND NONSENSICAL CONVERSATION  PT IS BELIEVED TO BE INDEPENDENT WITH ADLS AT BASELINE AND HAS ASSIST FOR IADLS AS NEEDED  RELIES ON PUBLIC TRANSPORTATION   Reciprocal Relationships LIVES WITH SIGNIFICANT OTHER/FIANCE OF 7 YEARS, STATING "WE DON'T BELIEVE IN MARRIAGE, WE BOTH LEFT OUR SPOUSES FOR EACHOTHER"   Service to Others PT IS BELIEVED TO BE UNEMPLOYED   Intrinsic Gratification SPENDS MOST OF HER TIME WATCHING TV    Subjective   Subjective "I DON'T KEEP TRACK OF THAT STUFF, I KNEW I NEVER WOULD"- IN REGARDS TO ORIENTATION QUESTIONS  "I'M ONE YEAR OLDER THAN THE LAST TIME YOU ASKED ME"  ADL   Eating Assistance 5  Supervision/Setup   Grooming Assistance 4  Minimal Assistance   UB Bathing Assistance 4  Minimal Assistance   LB Bathing Assistance 3  Moderate Assistance   UB Dressing Assistance 4  Minimal Assistance   LB Dressing Assistance 3  Moderate Assistance   Toileting Assistance  3  Moderate Assistance   Functional Assistance 3  Moderate Assistance   Bed Mobility   Additional Comments PT SITTING OOB UPON ARRIVAL IN POSEY BELT WITH ALARM ON- RETURNED WITH ALARM ON AND POSEY   Transfers   Sit to Stand 4  Minimal assistance   Additional items Assist x 1; Increased time required;Verbal cues; Impulsive   Stand to Sit 4  Minimal assistance   Additional items Assist x 1; Increased time required; Impulsive;Verbal cues   Functional Mobility   Functional Mobility 4  Minimal assistance   Additional items Rolling walker   Balance   Static Sitting Fair +   Static Standing Fair   Ambulatory Fair -   Activity Tolerance   Activity Tolerance Other (Comment)  (COB/BEHAVIORAL )   Medical Staff Made Aware SPOKE TO CM REGARDING D/C PLAN AND CONCERNS FOR SOCIAL SUPPORT   Nurse Made Aware APPROPRIATE TO SEE PER RN    RUE Assessment   RUE Assessment WFL   LUE Assessment   LUE Assessment WFL   Cognition   Overall Cognitive Status Impaired   Arousal/Participation Responsive   Attention Difficulty attending to directions   Orientation Level Oriented to person;Disoriented to place; Disoriented to time;Disoriented to situation   Memory Decreased recall of precautions;Decreased recall of recent events;Decreased recall of biographical information;Decreased short term memory   Following Commands Follows one step commands inconsistently   Comments PT ORIENTED TO PERSON ONLY  EASILY DISTRACTED AND TANGENTIAL T/O SESSION  NONSENSICAL CONVERSATION WITH THERAPIST AND SIGNIFICANT OTHER  SIGNIFICANT OTHER WITH SIMILAR BEHAVIORS AT TIMES  LIMITED INSIGHT/JUDGEMENT/SAFETY AWARENESS- ANTICIPATE LIMITED CARRY OVER OF LEARNED SAFETY PRECAUTIONS/SPINAL PRECAUTIONS  PER PT'S CHART, PREVIOUS CAPACITY EVAL IN JANUARY DEEMED PT TO NOT HAVE CAPACITY  RECOMMEND ALARM ON FOR SAFETY AT ALL TIMES  Assessment   Limitation Decreased ADL status; Decreased Safe judgement during ADL;Decreased cognition;Decreased endurance;Decreased self-care trans;Decreased high-level ADLs;Mood limitation   Prognosis Good;Guarded   Assessment 71 YO Female SEEN FOR INITIAL OCCUPATIONAL THERAPY EVALUATION S/P ELECTIVE L L4-5 HEMILAMINECTOMY AND B/L FORAMINOTOMY AND MINIM SERENA INVASIVE TRANSFORAMINAL LUMBAR INTERBODY FUSION WITH PEDICLE SCREW AND SVETLANA FIXATION FUSION L4-5, R-SIDED APPROACH; MINIMALLY INVASIVE R L5-S1 FORAMINOTOMY AND DISKECTOMY 2' LUMBAR BACK PAIN WITH RADICULOPATHY AND SPINAL STENOSIS OF LUMBAR REGION WITH NEUROGENIC CLAUDICATION  PT CURRENTLY HAS THE FOLLOWING RESTRICTIONS;SPINAL PRECAUTIONS - NO NEED FOR BRACE PER NEUROSX  PT IN POSEY BELT 2' POST-OP CONFUSION/AGRESSION  PROBLEMS LIST INCLUDES AMBULATORY DYSFUNCTION, HTN AND COGNITIVE IMPAIRMENT  DIFFICULT TO OBTAIN PLOF FROM PT AND SIGNIFICANT OTHER AT THIS TIME 2' EASILY DISTRACTED AND NONSENSICAL CONVERSATION  PT IS BELIEVED TO BE INDEPENDENT WITH ADLS AT BASELINE AND HAS ASSIST FOR IADLS AS NEEDED  RELIES ON PUBLIC TRANSPORTATION  PT CURRENTLY REQUIRES OVERALL MOD A WITH ADLS, AND MIN A WITH TRANSFERS /FUNCTIONAL MOBILITY WITH USE OF RW  PT DEMONSTRATED SIGNIFICANT COGNITIVE CONCERNS T/O SESSION INCLUDING DISORIENTATION X3, EASILY DISTRACTED, TANGENTIAL/NONSENSICAL SPEECH, AND LIMITED INSIGHT/JUDGEMENT/SAFETY AWARENESS  PER PT'S CHART, PT PREVIOUSLY DEEMED TO NOT HAVE CAPACITY  ALONG WITH IMPAIRED COGNITION,  PT IS LIMITED 2' PAIN, FATIGUE, IMPAIRED BALANCE, FALL RISK , IMPULSIVE, SPINAL PRECAUTIONS, OVERALL WEAKNESS/DECONDITIONING , LIMITED FAMILY/FRIEND SUPPORT , INACCESSIBLE HOME ENVIRONMENT and OVERALL LIMITED ACTIVITY TOLERANCE  PT EDUCATED ON SPINAL PRECAUTIONS, SLOWING OF PACE and CONTINUE PARTICIPATION IN SELF-CARE/MOBILITY WITH STAFF Alexy Cai   FROM AN OCCUPATIONAL THERAPY PERSPECTIVE, PT WOULD BENEFIT FROM ADDITIONAL OT SERVICES IN AN INPT REHAB SETTING UPON D/C  WILL CONT TO FOLLOW TO ADDRESS THE BELOW DESCRIBED GOALS  Goals   Patient Goals TO GO FOR A WALK   LT Time Frame 10-14   Long Term Goal #1 SEE BELOW    Plan   Treatment Interventions ADL retraining;Functional transfer training; Endurance training;Cognitive reorientation;Patient/family training;Equipment evaluation/education; Compensatory technique education; Activityengagement; Energy conservation   Goal Expiration Date 08/01/19   OT Frequency 3-5x/wk   Recommendation   Recommendation (NEUROPSYCH- CAPACITY EVAL?)   OT Discharge Recommendation Short Term Rehab   OT - OK to Discharge Yes   Barthel Index   Feeding 10   Bathing 0   Grooming Score 0   Dressing Score 5   Bladder Score 10   Bowels Score 10   Toilet Use Score 5   Transfers (Bed/Chair) Score 10   Mobility (Level Surface) Score 0   Stairs Score 0   Barthel Index Score 50   Modified Lamar Scale   Modified Lamar Scale 4       OCCUPATIONAL THERAPY GOALS TO BE MET WITHIN 10-14 DAYS:    -Pt will complete additional cognitive assessment with 100% attention to task in order to assist with safe d/c plan  -Pt will follow 100% simple 2-step commands and be A&O x4 consistently with environmental cues to increase participation in functional activities  -Pt will increase bed mobility to MOD I to participate in functional activities with G tolerance and balance  -Pt will improve functional mobility and transfers to MOD I on/off all surfaces w/ G balance/safety including toileting   -Pt will participate in lt grooming task with MOD I after set-up standing at sink ~3-5 minutes with G safety and balance  -Pt will increase independence in all ADLS to MOD I with G balance sitting upright in chair   -Pt will improve activity tolerance to G for 30 min txment sessions w/ G carry over of learned energy conservation techniques   -Pt will improve independence in lt homemaking activities to MOD I without requiring cues for safety   -Pt will demonstrate G carryover of learned spinal precautions, safety techniques and proper body mechanics in functional and leisure activities with use of DME   -Pt will identify 2-3 coping strategies that promote G mental health that can be completed within the hospital and carry over to d/c environment    -Pt will identify 2-3 leisure activities that promote G mental health that can be completed within the hospital and carry over to d/c environment       Documentation completed by ANGELICA Cuellar, OTR/L

## 2019-07-18 NOTE — PROGRESS NOTES
Patient was jumping out of the bed multiple times throughout the night  Staff tried to redirect her, but patient has cognitive impairment and not following direction  While in her purse she pulled out cigarettes which I told her she could not have, patient gave them to me and I put them in her med box  While checking on patient she was getting items out of her purse, and the PCA noted she had a lighter  While the PCA was trying to get the lighter she threw it across the room  Patient became agitated because she wanted to leave to smoke  We told her that she was not allowed to leave the hospital to smoke, and we can give her belongs back at discharge  Patient continued to scream, and threaten staff  We attempted to redirect her again which was unsuccessful  Security and surgical PA were paged  Spoke with Yarelis Tim PA  He was agreeable to place orders for IM haldol and a soft waist belt  Security came to floor to get patient in posey belt  Patient now restained in bed IM haldol given   Will continue to monitor

## 2019-07-18 NOTE — PROGRESS NOTES
Pt was screaming out, went into room to assess patient found blood on floor  Assessed bautista site, back incision to which she pulled her mepilex off  Site looked intact scant serosanguinous drainage  Applied ABD to site  Blood is believe to have come from prior IV sites that were pulled out by her  Again redirected patient  Spoke with Angela ZAVALA again to make him aware of patients behaviors  Patient did let me put IV site back in  Will continue to monitor

## 2019-07-18 NOTE — PLAN OF CARE
Problem: PAIN - ADULT  Goal: Verbalizes/displays adequate comfort level or baseline comfort level  Description  Interventions:  - Encourage patient to monitor pain and request assistance  - Assess pain using appropriate pain scale  - Administer analgesics based on type and severity of pain and evaluate response  - Implement non-pharmacological measures as appropriate and evaluate response  - Consider cultural and social influences on pain and pain management  - Notify physician/advanced practitioner if interventions unsuccessful or patient reports new pain  Outcome: Progressing     Problem: INFECTION - ADULT  Goal: Absence or prevention of progression during hospitalization  Description  INTERVENTIONS:  - Assess and monitor for signs and symptoms of infection  - Monitor lab/diagnostic results  - Monitor all insertion sites, i e  indwelling lines, tubes, and drains  - Monitor endotracheal (as able) and nasal secretions for changes in amount and color  - [place] appropriate cooling/warming therapies per order  - Administer medications as ordered  - Instruct and encourage patient and family to use good hand hygiene technique  - Identify and instruct in appropriate isolation precautions for identified infection/condition  Outcome: Progressing     Problem: SAFETY ADULT  Goal: Patient will remain free of falls  Description  INTERVENTIONS:  - Assess patient frequently for physical needs  -  Identify cognitive and physical deficits and behaviors that affect risk of falls    -  [place] fall precautions as indicated by assessment   - Educate patient/family on patient safety including physical limitations  - Instruct patient to call for assistance with activity based on assessment  - Modify environment to reduce risk of injury  - Consider OT/PT consult to assist with strengthening/mobility  Outcome: Progressing  Goal: Maintain or return to baseline ADL function  Description  INTERVENTIONS:  -  Assess patient's ability to carry out ADLs; assess patient's baseline for ADL function and identify physical deficits which impact ability to perform ADLs (bathing, care of mouth/teeth, toileting, grooming, dressing, etc )  - Assess/evaluate cause of self-care deficits   - Assess range of motion  - Assess patient's mobility; develop plan if impaired  - Assess patient's need for assistive devices and provide as appropriate  - Encourage maximum independence but intervene and supervise when necessary  ¯ Involve family in performance of ADLs  ¯ Assess for home care needs following discharge   ¯ Request OT consult to assist with ADL evaluation and planning for discharge  ¯ Provide patient education as appropriate  Outcome: Progressing  Goal: Maintain or return mobility status to optimal level  Description  INTERVENTIONS:  - Assess patient's baseline mobility status (ambulation, transfers, stairs, etc )    - Identify cognitive and physical deficits and behaviors that affect mobility  - Identify mobility aids required to assist with transfers and/or ambulation (gait belt, sit-to-stand, lift, walker, cane, etc )  - [place] fall precautions as indicated by assessment  - Record patient progress and toleration of activity level on Mobility SBAR; progress patient to next Phase/Stage  - Instruct patient to call for assistance with activity based on assessment  - Request Rehabilitation consult to assist with strengthening/weightbearing, etc   Outcome: Progressing     Problem: DISCHARGE PLANNING  Goal: Discharge to home or other facility with appropriate resources  Description  INTERVENTIONS:  - Identify barriers to discharge w/patient and caregiver  - Arrange for needed discharge resources and transportation as appropriate  - Identify discharge learning needs (meds, wound care, etc )  - Arrange for interpretive services to assist at discharge as needed  - Refer to Case Management Department for coordinating discharge planning if the patient needs post-hospital services based on physician/advanced practitioner order or complex needs related to functional status, cognitive ability, or social support system  Outcome: Progressing     Problem: Knowledge Deficit  Goal: Patient/family/caregiver demonstrates understanding of disease process, treatment plan, medications, and discharge instructions  Description  Complete learning assessment and assess knowledge base  Interventions:  - Provide teaching at level of understanding  - Provide teaching via preferred learning methods  Outcome: Progressing     Problem: Potential for Falls  Goal: Patient will remain free of falls  Description  INTERVENTIONS:  - Assess patient frequently for physical needs  -  Identify cognitive and physical deficits and behaviors that affect risk of falls    -  [place] fall precautions as indicated by assessment   - Educate patient/family on patient safety including physical limitations  - Instruct patient to call for assistance with activity based on assessment  - Modify environment to reduce risk of injury  - Consider OT/PT consult to assist with strengthening/mobility  Outcome: Progressing     Problem: SAFETY,RESTRAINT: NV/NON-SELF DESTRUCTIVE BEHAVIOR  Goal: Remains free of harm/injury (restraint for non violent/non self-detsructive behavior)  Description  INTERVENTIONS:  - Instruct patient/family regarding restraint use   - Assess and monitor physiologic and psychological status   - Provide interventions and comfort measures to meet assessed patient needs   - Identify and implement measures to help patient regain control  - Assess readiness for release of restraint   Outcome: Progressing  Goal: Returns to optimal restraint-free functioning  Description  INTERVENTIONS:  - Assess the patient's behavior and symptoms that indicate continued need for restraint  - Identify and implement measures to help patient regain control  - Assess readiness for release of restraint   Outcome: Progressing     Problem: Nutrition/Hydration-ADULT  Goal: Nutrient/Hydration intake appropriate for improving, restoring or maintaining nutritional needs  Description  Monitor and assess patient's nutrition/hydration status for malnutrition (ex- brittle hair, bruises, dry skin, pale skin and conjunctiva, muscle wasting, smooth red tongue, and disorientation)  Collaborate with interdisciplinary team and initiate plan and interventions as ordered  Monitor patient's weight and dietary intake as ordered or per policy  Utilize nutrition screening tool and intervene per policy  Determine patient's food preferences and provide high-protein, high-caloric foods as appropriate  INTERVENTIONS:  - Monitor oral intake, urinary output, labs, and treatment plans  - Assess nutrition and hydration status and recommend course of action  - Evaluate amount of meals eaten  - Assist patient with eating if necessary   - Allow adequate time for meals  - Recommend/ encourage appropriate diets, oral nutritional supplements, and vitamin/mineral supplements  - Order, calculate, and assess calorie counts as needed  - Recommend, monitor, and adjust tube feedings and TPN/PPN based on assessed needs  - Assess need for intravenous fluids  - Provide specific nutrition/hydration education as appropriate  - Include patient/family/caregiver in decisions related to nutrition  Outcome: Progressing     Problem: BEHAVIOR  Goal: Pt/Family maintain appropriate behavior and adhere to behavioral management agreement, if implemented  Description  INTERVENTIONS:  - Assess the family dynamic   - Encourage verbalization of thoughts and concerns in a socially appropriate manner  - Assess patient/family's coping skills and non-compliant behavior (including use of illegal substances)    - Utilize positive, consistent limit setting strategies supporting safety of patient, staff and others  - Initiate consult with Case Management, Spiritual Care or other ancillary services as appropriate  - If a patient's/visitor's behavior jeopardizes the safety of the patient, staff, or others, refer to organization procedure     - Notify Security of behavior or suspected illegal substances which indicate the need for search of the patient and/or belongings  - Encourage participation in the decision making process about a behavioral management agreement; implement if patient meets criteria  Outcome: Progressing     Problem: Prexisting or High Potential for Compromised Skin Integrity  Goal: Skin integrity is maintained or improved  Description  INTERVENTIONS:  - Identify patients at risk for skin breakdown  - Assess and monitor skin integrity  - Assess and monitor nutrition and hydration status  - Monitor labs (i e  albumin)  - Assess for incontinence   - Turn and reposition patient  - Assist with mobility/ambulation  - Relieve pressure over bony prominences  - Avoid friction and shearing  - Provide appropriate hygiene as needed including keeping skin clean and dry  - Evaluate need for skin moisturizer/barrier cream  - Collaborate with interdisciplinary team (i e  Nutrition, Rehabilitation, etc )   - Patient/family teaching  Outcome: Progressing

## 2019-07-18 NOTE — SOCIAL WORK
CM reviewed case in care coordination rounds  Pt is not stable for discharge  Discussed with CM specialist that pt had a capacity evaluation in January and pt was deemed not to have capacity  Additional Neuropsych consult placed for evaluation  [facility] Spoke with pt and her boyfriend, Kusum, at bedside  Requested name and phone number of pts Armando altman  Boyfriend sts that "we have nothing to do with them, not with her or the grandchild, they are chopped liver to us "  Pt and boyfriend were agreeable to give name of daughterArmando,  and an "old phone number, that's been changed" ([phone]), boyfriend continues, "im not sure what her last name is right now, it could be Deemer or Pysher, I do know that she works at [employer] in the [employer] department "      Called place to #[phone], in attempt to speak with daughterKelsi, phone appears to be out of service  Called placed to [facility] [employer] ([phone]), in attempt to contact daughter  As per distribution center, 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

## 2019-07-18 NOTE — PROGRESS NOTES
Addendum:  Patient was evaluated by Neuropsychology in January 2019 and deemed to have no capacity  At this time, will place consult for Neuropsychology for reevaluation of capacity  Progress Note - Maureen Ornelas 1945, 72 y o  female MRN: [id number]    Unit/Bed#: [facility] 608-01 Encounter: [id number]    Primary Care Provider: Rashel Tenorio MD   Date and time admitted to hospital: 7/17/2019  7:11 AM        Lumbar back pain with radiculopathy affecting right lower extremity  Assessment & Plan  POD#1 Left L4-5 hemilaminectomy and bilateral foraminotomy and Minimally invasive transforaminal lumbar interbody fusion with pedicle screw and myke fixation fusion L4-5, right-sided approach; minimally invasive right L5-S1 foraminotomy and diskectomy (N/A)  · Upright XR lumbar spine ordered and pending to assess hardware post operatively  · Pain control - patient is somewhat confused, unclear how bad pain truly is as she is moving a lot in chair  · Increase tylenol to 975mg [id number] scheduled  · Continue oxycodone 5mg PRN moderate pain  · Decrease oxycodone to 7 5mg PRN for severe pain given patient's current mental status  · Discontinue dilaudid  · Bowel regimen - patient states she has not had a BM nor is passing gas, unsure how reliable this is  · Continue colace and suppository PRN  · Will add senna  · Mc catheter discontinued today, patient has not urinated since removal  · Continue posey use as patient appears to not be at her baseline mental status and per nursing did not realize she has surgery yesterday  Likely anesthesia and narcotic use at this time, will continue to monitor her  · Dressing taken off by patient, new dressing applied today  · Mobilize as tolerated with assistant, PT / OT evaluation  · DVT ppx:  SCDs and heparin  · Neurosurgery will continue to follow as primary  Please call with questions or concerns      * Spinal stenosis of lumbar region with neurogenic claudication  Assessment & Plan  See plan above        Subjective/Objective   Chief Complaint:  "I am alive"    Subjective:  Patient appears to be confused at this time and ROS is unreliable  She reports knowing she had surgery yesterday but unclear if she is just repeating what staff is telling her  She has back pain and repeatedly states she realizes she will be in pain for a while  She states she is urinating okay  No BM and is not passing gas    Nursing rounds completed with Luz Galan - states patient was aggressive overnight which may have been due to bautista catheter issues  She is doing okay now however is constantly trying to get up from bed or the chair and therefore posey is in place  She appears confused at this time and does not realize she had surgery yesterday  Bautista was removed this morning but nurse states she has not urinated yet  Objective: sitting in chair, posey in place, patient moving constantly in chair trying to readjust seating, NAD    I/O       07/16 0701 - 07/17 0700 07/17 0701 - 07/18 0700 07/18 0701 - 07/19 0700    P  O   360     I V  (mL/kg)  3653 3 (56 1)     IV Piggyback  300     Total Intake(mL/kg)  4313 3 (66 3)     Urine (mL/kg/hr)  2550 500 (1 7)    Blood  75     Total Output  2625 500    Net  +1688 3 -500                 Invasive Devices     Peripheral Intravenous Line            Peripheral IV 07/18/19 Left Forearm less than 1 day                Physical Exam:  Vitals: Blood pressure 127/82, pulse 81, temperature 99 9 °F (37 7 °C), resp  rate 18, height 5' 1 5" (1 562 m), weight 65 1 kg (143 lb 8 3 oz), SpO2 98 %  ,Body mass index is 26 68 kg/m²         General appearance: alert, appears stated age, cooperative and no distress  Head: Normocephalic, without obvious abnormality, atraumatic  Eyes: conjugate gaze  Neck: supple, symmetrical, trachea midline and NT  Back: no kyphosis present, incision with steri strips over top intact with some dried blood noted, new dressing applied, mild TTP  Lungs: non labored breathing  Heart: regular heart rate  Neurologic:   Mental status: GCS 14 (-1 confusion), patient appears to be confused, she does not know the date however this appears to be baseline for her from her most recent office visit on 6/13, doesn't know who the president is, not clear if she understands she had surgery yesterday however reports she did, tries to deflect questions that she is unsure about, inappropriate thought process at times, slightly decreased attention / concentration  Cranial nerves: grossly intact (Cranial nerves II-XII)  Sensory: normal to LT and PP BLE, JPS 3/3 bilateral hallux, DST difficult to assess given mental status  Motor: moving all extremities with some mild pain inhibition proximal RLE however patient is seen moving all over in chair and able to bring legs up without issue   RLE:  4+-5/5 HF  Reflexes: 3+ and brisk with bilateral yost, no clonus noted  Coordination: finger to nose abnormal bilaterally with slight dysmetria but able to self correct, no drift bilaterally      Lab Results:  Results from last 7 days   Lab Units 07/18/19  0500   WBC Thousand/uL 10 56*   HEMOGLOBIN g/dL 9 4*   HEMATOCRIT % 31 3*   PLATELETS Thousands/uL 337  337     Results from last 7 days   Lab Units 07/18/19  0500 07/17/19  1407   POTASSIUM mmol/L 3 8 4 6   CHLORIDE mmol/L 113* 114*   CO2 mmol/L 18* 19*   BUN mg/dL 19 20   CREATININE mg/dL 0 88 0 98   CALCIUM mg/dL 8 3 8 0*     Results from last 7 days   Lab Units 07/17/19  1407   MAGNESIUM mg/dL 2 2       Imaging Studies: I have personally reviewed pertinent reports  and I have personally reviewed pertinent films in PACS    EKG, Pathology, and Other Studies: I have personally reviewed pertinent reports        VTE Pharmacologic Prophylaxis: Heparin - ordered    VTE Mechanical Prophylaxis: sequential compression device

## 2019-07-18 NOTE — PLAN OF CARE
Problem: SAFETY,RESTRAINT: NV/NON-SELF DESTRUCTIVE BEHAVIOR  Goal: Remains free of harm/injury (restraint for non violent/non self-detsructive behavior)  Description  INTERVENTIONS:  - Instruct patient/family regarding restraint use   - Assess and monitor physiologic and psychological status   - Provide interventions and comfort measures to meet assessed patient needs   - Identify and implement measures to help patient regain control  - Assess readiness for release of restraint   Outcome: Progressing  Goal: Returns to optimal restraint-free functioning  Description  INTERVENTIONS:  - Assess the patient's behavior and symptoms that indicate continued need for restraint  - Identify and implement measures to help patient regain control  - Assess readiness for release of restraint   Outcome: Progressing     Problem: Nutrition/Hydration-ADULT  Goal: Nutrient/Hydration intake appropriate for improving, restoring or maintaining nutritional needs  Description  Monitor and assess patient's nutrition/hydration status for malnutrition (ex- brittle hair, bruises, dry skin, pale skin and conjunctiva, muscle wasting, smooth red tongue, and disorientation)  Collaborate with interdisciplinary team and initiate plan and interventions as ordered  Monitor patient's weight and dietary intake as ordered or per policy  Utilize nutrition screening tool and intervene per policy  Determine patient's food preferences and provide high-protein, high-caloric foods as appropriate       INTERVENTIONS:  - Monitor oral intake, urinary output, labs, and treatment plans  - Assess nutrition and hydration status and recommend course of action  - Evaluate amount of meals eaten  - Assist patient with eating if necessary   - Allow adequate time for meals  - Recommend/ encourage appropriate diets, oral nutritional supplements, and vitamin/mineral supplements  - Order, calculate, and assess calorie counts as needed  - Recommend, monitor, and adjust tube feedings and TPN/PPN based on assessed needs  - Assess need for intravenous fluids  - Provide specific nutrition/hydration education as appropriate  - Include patient/family/caregiver in decisions related to nutrition  Outcome: Progressing     Problem: BEHAVIOR  Goal: Pt/Family maintain appropriate behavior and adhere to behavioral management agreement, if implemented  Description  INTERVENTIONS:  - Assess the family dynamic   - Encourage verbalization of thoughts and concerns in a socially appropriate manner  - Assess patient/family's coping skills and non-compliant behavior (including use of illegal substances)  - Utilize positive, consistent limit setting strategies supporting safety of patient, staff and others  - Initiate consult with Case Management, Spiritual Care or other ancillary services as appropriate  - If a patient's/visitor's behavior jeopardizes the safety of the patient, staff, or others, refer to organization procedure     - Notify Security of behavior or suspected illegal substances which indicate the need for search of the patient and/or belongings  - Encourage participation in the decision making process about a behavioral management agreement; implement if patient meets criteria  Outcome: Progressing

## 2019-07-18 NOTE — ASSESSMENT & PLAN NOTE
POD#1 Left L4-5 hemilaminectomy and bilateral foraminotomy and Minimally invasive transforaminal lumbar interbody fusion with pedicle screw and myke fixation fusion L4-5, right-sided approach; minimally invasive right L5-S1 foraminotomy and diskectomy (N/A)  · Upright XR lumbar spine ordered and pending to assess hardware post operatively  · Pain control - patient is somewhat confused, unclear how bad pain truly is as she is moving a lot in chair  · Increase tylenol to 975mg f3thqhr scheduled  · Continue oxycodone 5mg PRN moderate pain  · Decrease oxycodone to 7 5mg PRN for severe pain given patient's current mental status  · Discontinue dilaudid  · Bowel regimen - patient states she has not had a BM nor is passing gas, unsure how reliable this is  · Continue colace and suppository PRN  · Will add senna  · Mc catheter discontinued today, patient has not urinated since removal  · Continue posey use as patient appears to not be at her baseline mental status and per nursing did not realize she has surgery yesterday  Likely anesthesia and narcotic use at this time, will continue to monitor her  · Dressing taken off by patient, new dressing applied today  · Mobilize as tolerated with assistant, PT / OT evaluation  · DVT ppx:  SCDs and heparin  · Neurosurgery will continue to follow as primary  Please call with questions or concerns

## 2019-07-18 NOTE — UTILIZATION REVIEW
Notification of Inpatient Admission/Inpatient Authorization Request  This is a Notification of Inpatient Admission/Request for Inpatient Authorization for our facility [address] [place], [place] [address]  Be advised that this patient was admitted to our facility under Inpatient Status  Please contact the Utilization Review Department where the patient is receiving care services for additional admission information  Place of Service Code: 24   Place of Service Name: Inpatient Hospital  Presentation Date & Time: 7/17/2019  7:11 AM  Inpatient Admission Date & Time: 7/17/19 1505  Discharge Date & Time: No discharge date for patient encounter  Discharge Disposition (if discharged): Home with [address] [facility]  Attending Physician: Radha Huitron Md [[id number]]  Admission Orders (From admission, onward)    Ordered        07/17/19 1505  Inpatient Admission  Once             Facility: [address] [place] [address])  Network Utilization Review Department  Phone: [phone]; Fax [phone]  [email or web address]  ATTENTION: Please call with any questions or concerns to [phone]  and carefully listen to the prompts so that you are directed to the right person  Send all requests for admission clinical reviews, approved or denied determinations and any other requests to fax [phone]   All voicemails are confidential

## 2019-07-18 NOTE — UTILIZATION REVIEW
Initial Clinical Review    Elective Inpatient surgical procedure    Age/Sex: 72 y o  female     Surgery Date: 7/17    Procedure: S/P Left L4-5 hemilaminectomy and bilateral foraminotomy and Minimally invasive transforaminal lumbar interbody fusion with pedicle screw and myke fixation fusion L4-5, right-sided approach; minimally invasive right L5-S1 foraminotomy and diskectomy (N/A)     Anesthesia: General    Admission Orders: Date/Time/Statement: 7/17/19 @ 1505   Orders Placed This Encounter   Procedures    Inpatient Admission     Standing Status:   Standing     Number of Occurrences:   1     Order Specific Question:   Admitting Physician     Answer: Nina Ramirez [[id number]]     Order Specific Question:   Level of Care     Answer:   Med Surg [[id number]]     Order Specific Question:   Estimated length of stay     Answer:   More than 2 Midnights     Order Specific Question:   Certification     Answer:   I certify that inpatient services are medically necessary for this patient for a duration of greater than two midnights  See H&P and MD Progress Notes for additional information about the patient's course of treatment       Vital Signs: /82   Pulse 81   Temp 99 9 °F (37 7 °C)   Resp 18   Ht 5' 1 5" (1 562 m)   Wt 65 1 kg (143 lb 8 3 oz)   SpO2 98%   BMI 26 68 kg/m²      Diet: Regular  Mobility: OOB  DVT Prophylaxis: Sequential compression device    Scheduled Meds:  Current Facility-Administered Medications:  acetaminophen 975 mg Oral Q8H [address]    albuterol 1 puff Inhalation Q4H PRN    amLODIPine 5 mg Oral Daily    bisacodyl 10 mg Rectal Daily PRN    docusate sodium 100 mg Oral BID    heparin (porcine) 5,000 Units Subcutaneous Q8H [address]    ondansetron 4 mg Intravenous Q6H PRN    oxyCODONE 5 mg Oral Q4H PRN    Dilaudid 0 5 mg Intravenous Q3H PRN 7/18 x1   oxyCODONE 10 mg Oral Q4H PRN 7/17 x2, 7/18 x1   senna 1 tablet Oral HS    sodium chloride 100 mL/hr Intravenous Continuous Last Rate: Stopped (07/18/19 0440) Network Utilization Review Department  Phone: [phone]; Fax [phone]  [email or web address]  ATTENTION: Please call with any questions or concerns to [phone]  and carefully listen to the prompts so that you are directed to the right person  Send all requests for admission clinical reviews, approved or denied determinations and any other requests to fax [phone]   All voicemails are confidential

## 2019-07-18 NOTE — PROGRESS NOTES
Patient had ripped out her left hand IV earlier in the night  I wrapped her left forearm site, and told her we needed to have IV access  Was called in by other nurse, patient had pulled out her left forearm site  I instructed patient that we needed to put in another one  She screamed at me and told me I was not her nurse and if we tried to stick her she would hit us, IM haldol was previously given  Then patient attempted to pull at bautista we redirected her away  Will let patient calm down, and attempt to regain IV access

## 2019-07-18 NOTE — PLAN OF CARE
Problem: OCCUPATIONAL THERAPY ADULT  Goal: Performs self-care activities at highest level of function for planned discharge setting  See evaluation for individualized goals  Description  Treatment Interventions: ADL retraining, Functional transfer training, Endurance training, Cognitive reorientation, Patient/family training, Equipment evaluation/education, Compensatory technique education, Activityengagement, Energy conservation          See flowsheet documentation for full assessment, interventions and recommendations  Note:   Limitation: Decreased ADL status, Decreased Safe judgement during ADL, Decreased cognition, Decreased endurance, Decreased self-care trans, Decreased high-level ADLs, Mood limitation  Prognosis: Good, Guarded  Assessment: 73 YO Female SEEN FOR INITIAL OCCUPATIONAL THERAPY EVALUATION S/P ELECTIVE L L4-5 HEMILAMINECTOMY AND B/L FORAMINOTOMY AND MINIM SERENA INVASIVE TRANSFORAMINAL LUMBAR INTERBODY FUSION WITH PEDICLE SCREW AND SVETLANA FIXATION FUSION L4-5, R-SIDED APPROACH; MINIMALLY INVASIVE R L5-S1 FORAMINOTOMY AND DISKECTOMY 2' LUMBAR BACK PAIN WITH RADICULOPATHY AND SPINAL STENOSIS OF LUMBAR REGION WITH NEUROGENIC CLAUDICATION  PT CURRENTLY HAS THE FOLLOWING RESTRICTIONS;SPINAL PRECAUTIONS - NO NEED FOR BRACE PER NEUROSX  PT IN POSEY BELT 2' POST-OP CONFUSION/AGRESSION  PROBLEMS LIST INCLUDES AMBULATORY DYSFUNCTION, HTN AND COGNITIVE IMPAIRMENT  DIFFICULT TO OBTAIN PLOF FROM PT AND SIGNIFICANT OTHER AT THIS TIME 2' EASILY DISTRACTED AND NONSENSICAL CONVERSATION  PT IS BELIEVED TO BE INDEPENDENT WITH ADLS AT BASELINE AND HAS ASSIST FOR IADLS AS NEEDED  RELIES ON PUBLIC TRANSPORTATION  PT CURRENTLY REQUIRES OVERALL MOD A WITH ADLS, AND MIN A WITH TRANSFERS /FUNCTIONAL MOBILITY WITH USE OF RW  PT DEMONSTRATED SIGNIFICANT COGNITIVE CONCERNS T/O SESSION INCLUDING DISORIENTATION X3, EASILY DISTRACTED, TANGENTIAL/NONSENSICAL SPEECH, AND LIMITED INSIGHT/JUDGEMENT/SAFETY AWARENESS   PER PT'S CHART, PT PREVIOUSLY DEEMED TO NOT HAVE CAPACITY  ALONG WITH IMPAIRED COGNITION,  PT IS LIMITED 2' PAIN, FATIGUE, IMPAIRED BALANCE, FALL RISK , IMPULSIVE, SPINAL PRECAUTIONS, OVERALL WEAKNESS/DECONDITIONING , LIMITED FAMILY/FRIEND SUPPORT , INACCESSIBLE HOME ENVIRONMENT and OVERALL LIMITED ACTIVITY TOLERANCE  PT EDUCATED ON SPINAL PRECAUTIONS, SLOWING OF PACE and CONTINUE PARTICIPATION IN SELF-CARE/MOBILITY WITH STAFF Alexy Cai   FROM AN OCCUPATIONAL THERAPY PERSPECTIVE, PT WOULD BENEFIT FROM ADDITIONAL OT SERVICES IN AN INPT REHAB SETTING UPON D/C  WILL CONT TO FOLLOW TO ADDRESS THE BELOW DESCRIBED GOALS  Recommendation: (NEUROPSYCH- CAPACITY EVAL?)  OT Discharge Recommendation: Short Term Rehab  OT - OK to Discharge:  Yes

## 2019-07-19 PROCEDURE — 99024 POSTOP FOLLOW-UP VISIT: CPT | Performed by: PHYSICIAN ASSISTANT

## 2019-07-19 PROCEDURE — 97116 GAIT TRAINING THERAPY: CPT

## 2019-07-19 PROCEDURE — 97110 THERAPEUTIC EXERCISES: CPT

## 2019-07-19 PROCEDURE — 99223 1ST HOSP IP/OBS HIGH 75: CPT | Performed by: INTERNAL MEDICINE

## 2019-07-19 PROCEDURE — 97535 SELF CARE MNGMENT TRAINING: CPT

## 2019-07-19 RX ORDER — OXYCODONE HYDROCHLORIDE 5 MG/1
5 TABLET ORAL EVERY 4 HOURS PRN
Status: DISCONTINUED | OUTPATIENT
Start: 2019-07-19 | End: 2019-08-02 | Stop reason: HOSPADM

## 2019-07-19 RX ORDER — OXYCODONE HYDROCHLORIDE 5 MG/1
2.5 TABLET ORAL EVERY 4 HOURS PRN
Status: DISCONTINUED | OUTPATIENT
Start: 2019-07-19 | End: 2019-08-02 | Stop reason: HOSPADM

## 2019-07-19 RX ADMIN — OLANZAPINE 10 MG: 10 INJECTION, POWDER, FOR SOLUTION INTRAMUSCULAR at 00:10

## 2019-07-19 RX ADMIN — BISACODYL 10 MG: 10 SUPPOSITORY RECTAL at 18:39

## 2019-07-19 RX ADMIN — AMLODIPINE BESYLATE 5 MG: 5 TABLET ORAL at 09:40

## 2019-07-19 RX ADMIN — DOCUSATE SODIUM 100 MG: 100 CAPSULE, LIQUID FILLED ORAL at 09:40

## 2019-07-19 RX ADMIN — WATER 10 ML: 1 INJECTION INTRAMUSCULAR; INTRAVENOUS; SUBCUTANEOUS at 00:11

## 2019-07-19 RX ADMIN — ACETAMINOPHEN 975 MG: 325 TABLET ORAL at 21:56

## 2019-07-19 RX ADMIN — OXYCODONE HYDROCHLORIDE 5 MG: 5 TABLET ORAL at 15:27

## 2019-07-19 RX ADMIN — OXYCODONE HYDROCHLORIDE 5 MG: 5 TABLET ORAL at 09:41

## 2019-07-19 RX ADMIN — ACETAMINOPHEN 975 MG: 325 TABLET ORAL at 05:45

## 2019-07-19 RX ADMIN — HEPARIN SODIUM 5000 UNITS: 5000 INJECTION INTRAVENOUS; SUBCUTANEOUS at 05:45

## 2019-07-19 RX ADMIN — DOCUSATE SODIUM 100 MG: 100 CAPSULE, LIQUID FILLED ORAL at 18:37

## 2019-07-19 RX ADMIN — OXYCODONE HYDROCHLORIDE 5 MG: 5 TABLET ORAL at 20:27

## 2019-07-19 RX ADMIN — ACETAMINOPHEN 975 MG: 325 TABLET ORAL at 15:27

## 2019-07-19 NOTE — ASSESSMENT & PLAN NOTE
· Patient deemed to have no capacity back in January 2019  · Neuropsychology consulted, appreciate input  · At this time, patient continues to not have capacity to make informed medical decisions  · CM to get in touch with daughter as next of kin to make medical decisions for patient  · Patient has a significant other, but unsure at this time if he is able to make medical decisions for patient

## 2019-07-19 NOTE — PHYSICAL THERAPY NOTE
PHYSICAL THERAPY NOTE          Patient Name: Ricardo Vogeler  GXSTM'I Date: 7/19/2019 07/19/19 1105   Pain Assessment   Pain Assessment 0-10   Pain Score Worst Possible Pain   Pain Type Surgical pain   Pain Location Back   Pain Descriptors Aching   Pain Frequency Constant/continuous   Pain Onset Ongoing   Clinical Progression Gradually improving   Effect of Pain on Daily Activities no change in pain with activity    Patient's Stated Pain Goal No pain   Hospital Pain Intervention(s) Ambulation/increased activity;Repositioned   Response to Interventions tolerated    Restrictions/Precautions   Weight Bearing Precautions Per Order No   Braces or Orthoses   (none as per Neuro sx )   Other Precautions Cognitive; Chair Alarm; Bed Alarm;Multiple lines; Fall Risk;Pain  (b/l wrist restraints on pt post PT session )   General   Chart Reviewed Yes   Response to Previous Treatment Patient with no complaints from previous session  Family/Caregiver Present No   Cognition   Overall Cognitive Status Impaired   Arousal/Participation Responsive   Attention Difficulty attending to directions   Orientation Level Oriented to person   Memory Decreased short term memory;Decreased recall of recent events;Decreased recall of precautions   Subjective   Subjective Pt willing to participate in PT treatment session    Bed Mobility   Additional Comments NA, Pt seated OOB in chair at time of PT treatment session    Transfers   Sit to Stand 4  Minimal assistance   Additional items Assist x 1; Increased time required;Verbal cues   Stand to Sit 4  Minimal assistance   Additional items Assist x 1; Increased time required;Verbal cues   Additional Comments VC and TC needed for hand placement and safety    Ambulation/Elevation   Gait pattern Excessively slow; Short stride; Foward flexed; Inconsistent jeffrey   Gait Assistance 4  Minimal assist   Assistive Device Rolling walker Distance 55ft x 2   (with seated rest break )   Balance   Static Sitting Fair +   Static Standing Fair   Ambulatory Fair -   Endurance Deficit   Endurance Deficit Yes   Endurance Deficit Description fatigue and pain    Activity Tolerance   Activity Tolerance Patient limited by fatigue;Patient limited by pain   Nurse Made Aware Pt appropriate to be seen and mobilize per nsg    Exercises   TKR Sitting;10 reps;AROM; Bilateral  (x 2 sets )   Assessment   Prognosis Fair   Problem List Decreased strength;Decreased endurance; Impaired balance;Decreased mobility;Pain;Orthopedic restrictions;Decreased cognition; Impaired judgement;Decreased safety awareness   Assessment Pt resting in bed at time of PT treatment session  Pt reports having 10/10 pain however is willing to participate in PT treatment session  Pt continues to have difficulty following one step commands and is unable to recall precautions at current time  Pt able to perform all transfers this session with min A x 1 which is slightly improved compared to previous session, however increased time required to complete and increased cues required for hand placement and safety due to cog status  Pt able to tolerate increased ambulation distances this session with min A x 1 and the use of a RW which is slightly improved compared to previous session, however pt continues to demonstrate decreased gait speed, decreased stride length, and forward flexed posture  Pt able to tolerate and perform all therex seated OOB in chair this session without any increase in complaints  VC and TC needed for proper from and pacing as well as to redirect attention  Pt assisted back into chair at conclusion of PT Session with all needs within reach and b/l wrist restraints on pt  Pt denies any further questions at this time  PT will continue to follow  D/C recommendation when medically cleared is rehab       Barriers to Discharge Inaccessible home environment   Goals   Patient Goals "to get better"   STG Expiration Date 07/28/19   Treatment Day 1   Plan   Treatment/Interventions Functional transfer training;LE strengthening/ROM; Elevations; Therapeutic exercise; Endurance training;Patient/family training;Equipment eval/education; Bed mobility;Gait training;Spoke to nursing   Progress Progressing toward goals   PT Frequency Other (Comment)  (3-6x a week )   Recommendation   Recommendation Short-term skilled PT   Equipment Recommended Walker  (RW)   PT - OK to Discharge Yes  (to rehab when medically cleared )   Britni Flores, PT

## 2019-07-19 NOTE — PROGRESS NOTES
Progress Note Anju Henry 1945, 72 y o  female MRN: [id number]    Unit/Bed#: [facility] 198-00 Encounter: [id number]    Primary Care Provider: Valentin Casanova MD   Date and time admitted to hospital: 7/17/2019  7:11 AM        Lumbar back pain with radiculopathy affecting right lower extremity  Assessment & Plan  POD#2 Left L4-5 hemilaminectomy and bilateral foraminotomy and Minimally invasive transforaminal lumbar interbody fusion with pedicle screw and myke fixation fusion L4-5, right-sided approach; minimally invasive right L5-S1 foraminotomy and diskectomy (N/A)  · Imaging reviewed personally and with attending, results are as follows:  · XR spine lumbar 7/18/2019:  Unremarkable appearance of posterior fusion L4-5  Grade 1 anterolisthesis at this level again noted  · Pain control - patient is still slightly confused but is stable from yesterday, will decrease oxycodone   · Continue tylenol to 975mg q3yujhl scheduled  · Decreased oxycodone to 2 5mg PRN moderate pain  · Decrease oxycodone to 5mg PRN for severe pain  · Bowel regimen - patient states she has not had a BM but is passing gas  · Continue colace, senna and suppository PRN  · IV fluids discontinued as patient is tolerating PO well  · Continue posey use and restraints as patient continues to be confused with episodes of agitation last night  · New dressing applied to dressing last night as patient ripped it off sometime yesterday  · Mobilize as tolerated with assistant, PT / OT recommend rehab  · DVT ppx:  SCDs and heparin  · Neurosurgery will continue to follow as primary  Please call with questions or concerns      Cognitive impairment  Assessment & Plan  · Patient deemed to have no capacity back in January 2019  · Neuropsychology consulted, appreciate input  · At this time, patient continues to not have capacity to make informed medical decisions  · CM to get in touch with daughter as next of kin to make medical decisions for patient  · Patient has a significant other, but unsure at this time if he is able to make medical decisions for patient    * Spinal stenosis of lumbar region with neurogenic claudication  Assessment & Plan  See plan above      Subjective/Objective   Chief Complaint:  "I am alive"    Subjective:  Unsure how reliable ROS is given patient's current state  When asked if she remembers having surgery she states she does  She complains of back pain but is unclear why she is in this much pain as she has had surgery in the past without pain  States her legs feel weak, about the same as prior to surgery  She is urinating on her own  Has not had a BM but is passing gas  Eating okay  Denies numbness / tingling, headaches, CP, SOB, abdominal pain, N/V, saddle anesthesia  Nursing rounds completed with Kaitlin Ryan - patient continues with restraints, keeps trying to get up, appears to still be confused  Charge nurse reports incident last night where patient was found to have lighter and cigarettes in her hands presumably trying to smoke in her room  Per medical record, patient became agitated last night, requiring placement of soft limb restraints  She pulled out her IV site  Recommended zyprexa be given to help with agitation  Objective: restraints on, eating breakfast, alert, NAD    I/O       07/17 0701 - 07/18 0700 07/18 0701 - 07/19 0700 07/19 0701 - 07/20 0700    P  O  360 800     I V  (mL/kg) 3653 3 (56 1)      IV Piggyback 300      Total Intake(mL/kg) 4313 3 (66 3) 800 (13 2)     Urine (mL/kg/hr) 2550 650 (0 4)     Blood 75      Total Output 2625 650     Net +1688 3 +150            Unmeasured Urine Occurrence  5 x           Invasive Devices     Peripheral Intravenous Line            Peripheral IV 07/18/19 Left Forearm 1 day                Physical Exam:  Vitals: Blood pressure 150/91, pulse 81, temperature 99 °F (37 2 °C), resp  rate 18, height 5' 1 5" (1 562 m), weight 60 5 kg (133 lb 6 4 oz), SpO2 98 %  ,Body mass index is 24 8 kg/m²  General appearance: alert, appears stated age, cooperative and no distress  Head: Normocephalic, without obvious abnormality, atraumatic  Eyes: conjugate gaze  Neck: supple, symmetrical, trachea midline and NT  Back: no kyphosis present, TTP midline lumbar spine, some bruising noted below incision, incision clean dry intact with gauze and tape over top, patient removed steri strips from left sided incision  Lungs: non labored breathing  Heart: regular heart rate  Neurologic:   Mental status: Alert, not oriented to date or who the president is, unclear if patient fully understands that she has surgery 2 days ago, follows commands, tangential speech, inappropriate thought content  Cranial nerves: grossly intact (Cranial nerves II-XII)  Sensory: normal to LT, intact PP BLE, JPS 3/3 bilateral hallux, DST intact  Motor: moving all extremities without focal weakness, strength 5/5 except as noted:   RLE:  5-/5 HF  Reflexes: 3+ and brisk with +bilateral yost, no clonus noted  Coordination: finger to nose abnormal bilaterally with dysmetria noted L > R but able to self correct, no drift bilaterally      Lab Results:  Results from last 7 days   Lab Units 07/18/19  0500   WBC Thousand/uL 10 56*   HEMOGLOBIN g/dL 9 4*   HEMATOCRIT % 31 3*   PLATELETS Thousands/uL 337  337     Results from last 7 days   Lab Units 07/18/19  0500 07/17/19  1407   POTASSIUM mmol/L 3 8 4 6   CHLORIDE mmol/L 113* 114*   CO2 mmol/L 18* 19*   BUN mg/dL 19 20   CREATININE mg/dL 0 88 0 98   CALCIUM mg/dL 8 3 8 0*     Results from last 7 days   Lab Units 07/17/19  1407   MAGNESIUM mg/dL 2 2       Imaging Studies: I have personally reviewed pertinent reports  and I have personally reviewed pertinent films in PACS    Xr Spine Lumbar 2 Or 3 Views Injury    Result Date: 7/18/2019  Impression: Unremarkable appearance of posterior fusion L4-5  Grade 1 anterolisthesis at this level again noted   Workstation performed: [id number]     Xr Spine Lumbar 2 Or 3 Views Injury    Result Date: 7/18/2019  Impression: Fluoroscopic guidance provided for surgical procedure  Please refer to the separate procedure notes for additional details  Workstation performed: [id number]     EKG, Pathology, and Other Studies: I have personally reviewed pertinent reports        VTE Pharmacologic Prophylaxis: Heparin    VTE Mechanical Prophylaxis: sequential compression device

## 2019-07-19 NOTE — SOCIAL WORK
This writer was notified from [address] that patient will require guardianship as there is no individual willing to serve as a healthcare representative and patient was determined to not have capacity by neuropsych  This writer left a voicemail for [facility], Sally Shelton ([phone]) to discuss this matter to see if the county would be willing to pursue guardianship  Update also provided to Rachid Hess (Legal Services)  RN CM updated and requested to send out SNF referrals

## 2019-07-19 NOTE — OCCUPATIONAL THERAPY NOTE
Occupational Therapy Treatment Note:       07/19/19 1410   Restrictions/Precautions   Other Precautions Cognitive; Fall Risk   Pain Assessment   Pain Assessment No/denies pain   Pain Score No Pain   Cognition   Overall Cognitive Status Impaired   Arousal/Participation Alert   Attention Attends with cues to redirect   Orientation Level Oriented to person;Disoriented to time   Memory Decreased short term memory;Decreased recall of recent events;Decreased recall of precautions   Following Commands Follows one step commands with increased time or repetition   Comments pt requires maximal vc's for redirection to task during toileting and grooming  Activity Tolerance   Activity Tolerance Patient tolerated treatment well   Assessment   Assessment pt participated in ot session and was seen focusing on toileting, functional mobility transfers and grooming instance  pt is in b wrist restraints which were removed and reapplied when in chair  pt tolerated session well  was alert and cooperative  pt was able to walk tofrom bathroom with mod asst x 1 no device  pt was able to wash hands with max cues for technique and to perform task at hand ie wash hands no wash ian area with hand soap from wall dispenser  pt requires mod asst for transfer to  from [facility] toilet  pt offered c/o of painful burning when attempting to uriante on toilet  little to no urine was noted  water was encouragement    Plan   Treatment Interventions ADL retraining;Functional transfer training; Activityengagement; Endurance training;Cognitive reorientation;Patient/family training;Equipment evaluation/education   Goal Expiration Date 08/01/19   Treatment Day 1   OT Frequency 3-5x/wk   Recommendation   OT Discharge Recommendation Short Term Rehab   Barthel Index   Feeding 5   Bathing 0   Grooming Score 0   Dressing Score 5   Bladder Score 10   Bowels Score 10   Toilet Use Score 5   Transfers (Bed/Chair) Score 10   Mobility (Level Surface) Score 0   Stairs Score 0   Barthel Index Score 45   Modified Shawn Scale   Modified [place]uta Scale 4   April A JAIRO Steward

## 2019-07-19 NOTE — PLAN OF CARE
Problem: PHYSICAL THERAPY ADULT  Goal: Performs mobility at highest level of function for planned discharge setting  See evaluation for individualized goals  Description  Treatment/Interventions: Functional transfer training, LE strengthening/ROM, Elevations, Therapeutic exercise, Endurance training, Patient/family training, Equipment eval/education, Bed mobility, Gait training, Spoke to nursing, Spoke to case management, OT, Family  Equipment Recommended: Walker(RW)       See flowsheet documentation for full assessment, interventions and recommendations  Outcome: Progressing  Note:   Prognosis: Fair  Problem List: Decreased strength, Decreased endurance, Impaired balance, Decreased mobility, Pain, Orthopedic restrictions, Decreased cognition, Impaired judgement, Decreased safety awareness  Assessment: Pt resting in bed at time of PT treatment session  Pt reports having 10/10 pain however is willing to participate in PT treatment session  Pt continues to have difficulty following one step commands and is unable to recall precautions at current time  Pt able to perform all transfers this session with min A x 1 which is slightly improved compared to previous session, however increased time required to complete and increased cues required for hand placement and safety due to cog status  Pt able to tolerate increased ambulation distances this session with min A x 1 and the use of a RW which is slightly improved compared to previous session, however pt continues to demonstrate decreased gait speed, decreased stride length, and forward flexed posture  Pt able to tolerate and perform all therex seated OOB in chair this session without any increase in complaints  VC and TC needed for proper from and pacing as well as to redirect attention  Pt assisted back into chair at conclusion of PT Session with all needs within reach and b/l wrist restraints on pt  Pt denies any further questions at this time   PT will continue to follow  D/C recommendation when medically cleared is rehab  Barriers to Discharge: Inaccessible home environment     Recommendation: Short-term skilled PT     PT - OK to Discharge: Yes(to rehab when medically cleared )    See flowsheet documentation for full assessment

## 2019-07-19 NOTE — PLAN OF CARE
Problem: PAIN - ADULT  Goal: Verbalizes/displays adequate comfort level or baseline comfort level  Description  Interventions:  - Encourage patient to monitor pain and request assistance  - Assess pain using appropriate pain scale  - Administer analgesics based on type and severity of pain and evaluate response  - Implement non-pharmacological measures as appropriate and evaluate response  - Consider cultural and social influences on pain and pain management  - Notify physician/advanced practitioner if interventions unsuccessful or patient reports new pain  Outcome: Progressing     Problem: INFECTION - ADULT  Goal: Absence or prevention of progression during hospitalization  Description  INTERVENTIONS:  - Assess and monitor for signs and symptoms of infection  - Monitor lab/diagnostic results  - Monitor all insertion sites, i e  indwelling lines, tubes, and drains  - Monitor endotracheal (as able) and nasal secretions for changes in amount and color  - [place] appropriate cooling/warming therapies per order  - Administer medications as ordered  - Instruct and encourage patient and family to use good hand hygiene technique  - Identify and instruct in appropriate isolation precautions for identified infection/condition  Outcome: Progressing     Problem: SAFETY ADULT  Goal: Patient will remain free of falls  Description  INTERVENTIONS:  - Assess patient frequently for physical needs  -  Identify cognitive and physical deficits and behaviors that affect risk of falls    -  [place] fall precautions as indicated by assessment   - Educate patient/family on patient safety including physical limitations  - Instruct patient to call for assistance with activity based on assessment  - Modify environment to reduce risk of injury  - Consider OT/PT consult to assist with strengthening/mobility  Outcome: Progressing  Goal: Maintain or return to baseline ADL function  Description  INTERVENTIONS:  -  Assess patient's ability to carry out ADLs; assess patient's baseline for ADL function and identify physical deficits which impact ability to perform ADLs (bathing, care of mouth/teeth, toileting, grooming, dressing, etc )  - Assess/evaluate cause of self-care deficits   - Assess range of motion  - Assess patient's mobility; develop plan if impaired  - Assess patient's need for assistive devices and provide as appropriate  - Encourage maximum independence but intervene and supervise when necessary  ¯ Involve family in performance of ADLs  ¯ Assess for home care needs following discharge   ¯ Request OT consult to assist with ADL evaluation and planning for discharge  ¯ Provide patient education as appropriate  Outcome: Progressing  Goal: Maintain or return mobility status to optimal level  Description  INTERVENTIONS:  - Assess patient's baseline mobility status (ambulation, transfers, stairs, etc )    - Identify cognitive and physical deficits and behaviors that affect mobility  - Identify mobility aids required to assist with transfers and/or ambulation (gait belt, sit-to-stand, lift, walker, cane, etc )  - [place] fall precautions as indicated by assessment  - Record patient progress and toleration of activity level on Mobility SBAR; progress patient to next Phase/Stage  - Instruct patient to call for assistance with activity based on assessment  - Request Rehabilitation consult to assist with strengthening/weightbearing, etc   Outcome: Progressing     Problem: DISCHARGE PLANNING  Goal: Discharge to home or other facility with appropriate resources  Description  INTERVENTIONS:  - Identify barriers to discharge w/patient and caregiver  - Arrange for needed discharge resources and transportation as appropriate  - Identify discharge learning needs (meds, wound care, etc )  - Arrange for interpretive services to assist at discharge as needed  - Refer to Case Management Department for coordinating discharge planning if the patient needs post-hospital services based on physician/advanced practitioner order or complex needs related to functional status, cognitive ability, or social support system  Outcome: Progressing     Problem: Knowledge Deficit  Goal: Patient/family/caregiver demonstrates understanding of disease process, treatment plan, medications, and discharge instructions  Description  Complete learning assessment and assess knowledge base  Interventions:  - Provide teaching at level of understanding  - Provide teaching via preferred learning methods  Outcome: Progressing     Problem: Potential for Falls  Goal: Patient will remain free of falls  Description  INTERVENTIONS:  - Assess patient frequently for physical needs  -  Identify cognitive and physical deficits and behaviors that affect risk of falls    -  [place] fall precautions as indicated by assessment   - Educate patient/family on patient safety including physical limitations  - Instruct patient to call for assistance with activity based on assessment  - Modify environment to reduce risk of injury  - Consider OT/PT consult to assist with strengthening/mobility  Outcome: Progressing     Problem: SAFETY,RESTRAINT: NV/NON-SELF DESTRUCTIVE BEHAVIOR  Goal: Remains free of harm/injury (restraint for non violent/non self-detsructive behavior)  Description  INTERVENTIONS:  - Instruct patient/family regarding restraint use   - Assess and monitor physiologic and psychological status   - Provide interventions and comfort measures to meet assessed patient needs   - Identify and implement measures to help patient regain control  - Assess readiness for release of restraint   Outcome: Progressing  Goal: Returns to optimal restraint-free functioning  Description  INTERVENTIONS:  - Assess the patient's behavior and symptoms that indicate continued need for restraint  - Identify and implement measures to help patient regain control  - Assess readiness for release of restraint   Outcome: Progressing     Problem: Nutrition/Hydration-ADULT  Goal: Nutrient/Hydration intake appropriate for improving, restoring or maintaining nutritional needs  Description  Monitor and assess patient's nutrition/hydration status for malnutrition (ex- brittle hair, bruises, dry skin, pale skin and conjunctiva, muscle wasting, smooth red tongue, and disorientation)  Collaborate with interdisciplinary team and initiate plan and interventions as ordered  Monitor patient's weight and dietary intake as ordered or per policy  Utilize nutrition screening tool and intervene per policy  Determine patient's food preferences and provide high-protein, high-caloric foods as appropriate  INTERVENTIONS:  - Monitor oral intake, urinary output, labs, and treatment plans  - Assess nutrition and hydration status and recommend course of action  - Evaluate amount of meals eaten  - Assist patient with eating if necessary   - Allow adequate time for meals  - Recommend/ encourage appropriate diets, oral nutritional supplements, and vitamin/mineral supplements  - Order, calculate, and assess calorie counts as needed  - Recommend, monitor, and adjust tube feedings and TPN/PPN based on assessed needs  - Assess need for intravenous fluids  - Provide specific nutrition/hydration education as appropriate  - Include patient/family/caregiver in decisions related to nutrition  Outcome: Progressing     Problem: BEHAVIOR  Goal: Pt/Family maintain appropriate behavior and adhere to behavioral management agreement, if implemented  Description  INTERVENTIONS:  - Assess the family dynamic   - Encourage verbalization of thoughts and concerns in a socially appropriate manner  - Assess patient/family's coping skills and non-compliant behavior (including use of illegal substances)    - Utilize positive, consistent limit setting strategies supporting safety of patient, staff and others  - Initiate consult with Case Management, Spiritual Care or other ancillary services as appropriate  - If a patient's/visitor's behavior jeopardizes the safety of the patient, staff, or others, refer to organization procedure     - Notify Security of behavior or suspected illegal substances which indicate the need for search of the patient and/or belongings  - Encourage participation in the decision making process about a behavioral management agreement; implement if patient meets criteria  Outcome: Progressing     Problem: Prexisting or High Potential for Compromised Skin Integrity  Goal: Skin integrity is maintained or improved  Description  INTERVENTIONS:  - Identify patients at risk for skin breakdown  - Assess and monitor skin integrity  - Assess and monitor nutrition and hydration status  - Monitor labs (i e  albumin)  - Assess for incontinence   - Turn and reposition patient  - Assist with mobility/ambulation  - Relieve pressure over bony prominences  - Avoid friction and shearing  - Provide appropriate hygiene as needed including keeping skin clean and dry  - Evaluate need for skin moisturizer/barrier cream  - Collaborate with interdisciplinary team (i e  Nutrition, Rehabilitation, etc )   - Patient/family teaching  Outcome: Progressing

## 2019-07-19 NOTE — SOCIAL WORK
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, that number is [phone]  Placed call to afore mentioned number, no answer at present  Will continue to attempt to reach distribution center  CM continued to contact [employer] at [phone], no one answers phone and no voicemail picks up to leave message  Spoke with Elier Montejo, CM specialist, because we are unable to contact any living blood relatives, pts boyfriend may assist in making rehab decisions for patient  CM spoke with pts boyfriend, Isaías Perdomo in attempt to obtain rehab choices   As per Isaías Perdomo, he sts he "cant put up with her no more' and he "wants nothing to do with her no more " Adri Guard he will not accept pt back into his home and that he no longer wishes to be contacted regarding pts care  CM Specialist notified

## 2019-07-19 NOTE — CONSULTS
Consultation - Neuropsychology/Psychology Department  Carmen Ornelas 72 y o  female MRN: [id number]  Unit/Bed#: [facility] 492-72 Encounter: [id number]        Reason for Consultation:  Carmen Ornelas is a 72y o  year old female who was referred for a Neuropsychological Exam to assess cognitive functioning and comment on capacity to to make informed medical decisions  History of Present Illness  Lumbar back pain with radiculopathy affecting right lower extremity    Physician Requesting Consult: Thea Chambers MD    PROBLEM LIST:  Patient Active Problem List   Diagnosis    Lumbar back pain with radiculopathy affecting right lower extremity    Ambulatory dysfunction    Essential hypertension    Cognitive impairment    Spinal stenosis of lumbar region with neurogenic claudication    Right leg weakness    Spondylosis without myelopathy or radiculopathy, lumbar region    Spondylolisthesis of lumbar region         Historical Information   Past Medical History:   Diagnosis Date    ADHD     Asthma     controlled    Bilateral sacroiliitis ([address] )     Cancer ([facility] [address] )     breast    Depression     History of fall     History of prediabetes     Hypertension     Iron deficiency anemia     Kidney stone     Myofascial pain     Osteoporosis      Past Surgical History:   Procedure Laterality Date    CERVICAL SPINE SURGERY  2013    Multilevel posterior cervical decompression / fusion    HYSTERECTOMY      MASTECTOMY Right     SC ARTHDSIS POST/POSTEROLATRL/POSTINTERBODY LUMBAR N/A 7/17/2019    Procedure: Left L4-5 hemilaminectomy and bilateral foraminotomy and Minimally invasive transforaminal lumbar interbody fusion with pedicle screw and myke fixation fusion L4-5, right-sided approach; minimally invasive right L5-S1 foraminotomy and diskectomy;  Surgeon: Thea Chambers MD;  [facility];  Service: Neurosurgery     Social History   Social History     Substance and Sexual Activity   Alcohol Use Not Currently Social History     Substance and Sexual Activity   Drug Use No     Social History     Tobacco Use   Smoking Status Current Every Day Smoker    Packs/day: 0 50    Types: Cigarettes   Smokeless Tobacco Never Used   Tobacco Comment    advised not to smoke prior to procedure declined education     Family History:   Family History   Problem Relation Age of Onset    Diabetes Mother     No Known Problems Father        Meds/Allergies   current meds:   Current Facility-Administered Medications   Medication Dose Route Frequency    acetaminophen (TYLENOL) tablet 975 mg  975 mg Oral Q8H [address]    albuterol (PROVENTIL HFA,VENTOLIN HFA) inhaler 1 puff  1 puff Inhalation Q4H PRN    amLODIPine (NORVASC) tablet 5 mg  5 mg Oral Daily    bisacodyl (DULCOLAX) rectal suppository 10 mg  10 mg Rectal Daily PRN    docusate sodium (COLACE) capsule 100 mg  100 mg Oral BID    heparin (porcine) subcutaneous injection 5,000 Units  5,000 Units Subcutaneous Q8H [address]    ondansetron (ZOFRAN) injection 4 mg  4 mg Intravenous Q6H PRN    oxyCODONE (ROXICODONE) IR tablet 5 mg  5 mg Oral Q4H PRN    oxyCODONE (ROXICODONE) IR tablet 7 5 mg  7 5 mg Oral Q4H PRN    senna (SENOKOT) tablet 8 6 mg  1 tablet Oral HS    sodium chloride 0 9 % infusion  100 mL/hr Intravenous Continuous       No Known Allergies      Family and Social Support:   Freedom of Choice: Yes      Behavioral Observations: Alert, unable to accurately state the year, moth, day/week, day,month, city and name of hospital; thoughts were often tangential and illogical; Patient's affect was pleasant and patient admitted to episodes of depressed mood  When patient was asked reason for hospitalization, she stated, "for stress, [place] was driving me crazy"  She was unable to provide a medical history  Cognitive Examination    General Cognitive Functioning MMSE = Impaired 15/28 with deficits in orientation, working memory;      Attention/Concentration Auditory Selective Attention = Impaired; Auditory Vigilance = Impaired; Information Processing Speed = Impaired    Frontal Systems/Executive Functioning Mental Flexibility/Cognitive Control = Impaired; Working Memory = Impaired Abstract Reasoning = Impaired;  Generative Ability = Impaired,      Language Functioning Confrontation naming = Impaired, Phonemic Fluency = Impaired; Semantic Retrieval = Impaired; Comprehension of Complex Ideational Material = Impaired;  Praxis = Within Normal Limits; Repetition = Within Normal Limits; Basic Reading = Within Normal Limits; Following Commands = Within Normal Limits    Memory Functioning Narrative Recall - Short Delay = Impaired; Long Delay Narrative Recall = Impaired;  Visual Recognition = Impaired    Visuo-Spatial Abilities Not assessed    Functional Knowledge  Health & Safety Knowledge = Impaired;     Summary/Impression: Results of Neuropsychological Exam revealed diffuse cognitive dysfunction and on a measure assessing awareness of personal health status and ability to evaluate health problems, handle medical emergencies and take safety precautions, patient performed in an IMPAIRED range of functioning  At this time, patient does not appear to have capacity to make fully informed medical decisions  Major Neurocognitive Disorder

## 2019-07-19 NOTE — CONSULTS
Consultation - [address] [facility] Johnson 72 y o  female MRN: [id number]  [facility] Encounter: [id number]      Assessment/Plan     Agitated Delirium  -multifactorial including age, recent surgical procedure, life stress including separation from partner, pain, and multiple medical co-morbidities as well as history of delirium/cognitive impairment and acute withdrawal from nicotine (smoking)  -Identify and treat underlying causes, consider checking TSH and B12  -Continue frequent redirection, reorientation, and distraction techniques, consider soothing music in background or tv/magazines related to outdoors which is patients major area of interest  -Avoid deliriogenic medications such as tramadol, benzodiazepines, anticholinergics, and benadryl  -Ensure adequate treatment of pain, consider use of geriatric pain order set  -Monitor for constipation and urinary retention  -Encourage early and frequent mobilization as safely able  -Encourage Hydration/ Nutrition  -encourage good sleep hygiene, limit night time interruptions and ask nursing to turn of tv and lights in patients room during sleeping hours, close shade and encourage quiet environment  -Avoid physical restraints if possible   -Use chemical restraint only when other efforts have failed, recommend zyprexa 2 5mg IM q8h prn  -Monitor Qtc, if greater than 500 do not use antipsychotic medication  -most recent QTc 478 on 7/17/19, mag 2 2, most recent K was 3 8 on 7/18, consider rechecking EKG prior to administration of any antipsychotics  -If QTc greater than 460, monitor and replete and deficiency of  K and Mg, recheck EKG    Cognitive impairment  -multifactorial, during admission for lower back pain in [place] patient was evaluated by Neuropsych and deemed incompetent to make own medical decisions and as she was estranged from family and had a poor social situation she was released to her significant other who today she reports has abandoned her -she was seen by Neuropsych earlier today and it was determined again that she has major neurocognitive disorder and does not have the capacity to make medical decisions  -CM reports that no living relatives were able to be located for the patient and her significant other does not wish to be part of her care and would like not to be contacted so she will need guardianship, CM started the process on her behalf today    Spinal stenosis and spondylolisthesis of lumbar region s/p left L4-5 hemilaminectomy and bilateral foraminotomy  -patient remains confused and encephalopathic, unable to determine if pain is persistent as she is encephalopathic and conversant for short time followed by nodding back to sleep for minute and waking back up agitated  -patient in posey and bilateral wrist restraints at the time of encounter  -continue pain control with Tylenol scheduled 975mg Q8H and Roxicodone for moderate/severe pain  -PT/OT on board and following  -patient is difficult to ambulate due to agitation    Acute pain secondary to recent surgical procedure  -continue geriatric pain regimen, consider lidocaine patch if able to maintain in place for pain adjunct  -continue to encourage use of adjuncts such as heat/ice and repositioning in lieu of opiates as able given encephalopathy     Anemia  -Hb 9 4 down from 12 5 last month, likely multifactorial including acute blood loss related to surgery as well as dilutional component and likely iron deficiency given low MCH and elevated RDW, recommend rechecking CBC to ensure remains stable or resolves as worsening anemia may also contribute to delirium    History of Present Illness   Physician Requesting Consult: Willie Baldwin MD  Reason for Consult / Principal Problem: Cognitive Impairment   Hx and PE limited by: Acute delirium    HPI: Jason Ornelas is a 72y o  year old female who is being seen in consultation by Geriatrics for cognitive impairment   She is a 72 Y/OF with complicated past medical history including HTN, ambulatory dysfunction, cognitive impairment, and lower back pain with radiculopathy who is being seen in consultation by Geriatrics for encephalopathy/cognitive impairment  Following surgery Carloz Viramontes has been agitated and confused with an episode last evening where she removed her surgical dressings and another where she was aggressive towards staff and was attempting to smoke in the hospital  She has required restraints for the past day as she has been unable to be redirected and has been a danger to herself  She has no family that can be contacted to assist with medical decision making and at previous discharges she had returned to live with her significant other which is no longer an option as he has informed [employer] that he does not wish to be involved with her and he wishes not to be contacted  [employer] is working on obtaining guardianship for Carloz Viramontes  On exam she is lying in bed with a waist posey and two soft wrist restraints  She is trying to remove the posey belt and is not easily distracted  She is a poor historian and does not readily participate in conversation or maintain contact with the examiner  She denies pain at the time of my exam and expresses that she is getting up to use the bathroom  In effort to calm her I inquired regarding things she used to enjoy growing up or things she enjoys now and she reports that she enjoys being outside in "the woods" but does not elaborate further and declines further conversation, She is unable to participate in reliable ROS due to encephalopathy       Inpatient consult to Gerontology  Consult performed by: Rufina Jaimes DO  Consult ordered by: Anna Matamoros PA-C        Review of Systems   Unable to perform ROS: Mental status change (Fatemeh is acutely encephalopathic and does not participate in ROS other than denying pain and asking to use toilet)     Geriatric Conditions: Memory: Major cognitive impairment, has been deemed unable to make medical decisions per Neuropsych , Delirium: acute agitated delirium    Historical Information   Past Medical History:   Diagnosis Date    ADHD     Asthma     controlled    [facility] [address] [facility] [address]     Cancer ([facility]     breast    Depression     History of fall     History of prediabetes     Hypertension     Iron deficiency anemia     Kidney stone     Myofascial pain     Osteoporosis      Past Surgical History:   Procedure Laterality Date    CERVICAL SPINE SURGERY  2013    Multilevel posterior cervical decompression / fusion    HYSTERECTOMY      MASTECTOMY Right     [place] ARTHDSIS POST/POSTEROLATRL/POSTINTERBODY LUMBAR N/A 7/17/2019    Procedure: Left L4-5 hemilaminectomy and bilateral foraminotomy and Minimally invasive transforaminal lumbar interbody fusion with pedicle screw and myke fixation fusion L4-5, right-sided approach; minimally invasive right L5-S1 foraminotomy and diskectomy;  Surgeon: Eather Curling, MD;  [facility];  Service: Neurosurgery     Social History   Social History     Substance and Sexual Activity   Alcohol Use Not Currently     Social History     Substance and Sexual Activity   Drug Use No     Social History     Tobacco Use   Smoking Status Current Every Day Smoker    Packs/day: 0 50    Types: Cigarettes   Smokeless Tobacco Never Used   Tobacco Comment    advised not to smoke prior to procedure declined education     Family History: non-contributory: patient is estranged from daughter     Meds/Allergies   all current active meds have been reviewed    No Known Allergies    Objective     Intake/Output Summary (Last 24 hours) at 7/19/2019 1743  Last data filed at 7/19/2019 1310  Gross per 24 hour   Intake 560 ml   Output    Net 560 ml     Invasive Devices     Peripheral Intravenous Line            Peripheral IV 07/18/19 Left Forearm 1 day              Physical Exam   Constitutional: She appears well-developed and well-nourished     Patient appears stated age, unkempt and discheveled  -patient intermittently nods in and out of sleep during exam, becomes upset with ROS questioning but is calm for limited physical exam   HENT:   Head: Normocephalic and atraumatic  Eyes: No scleral icterus  Neck: No JVD present  No tracheal deviation present  Cardiovascular: Normal rate and regular rhythm  No murmur heard  Pulmonary/Chest: Effort normal and breath sounds normal    Abdominal: Soft  There is no tenderness  There is no guarding  Musculoskeletal: She exhibits no edema  Neurological: She is disoriented  Patient appears sleepy but opens eyes to voice and immediately tries to remove soft restraints  -she does not maintain eye contact and drifts in and out of sleep rapidly during questioning   Skin: Skin is warm and dry  She is not diaphoretic  Nursing note and vitals reviewed      Labs reviewed remarkable for:  -Hb low at 9 4, HCT low at 31 3    Imaging Studies remarkable for:  -post op imaging of L-spine revealed posterior fusion L4-5    Family and Social Support: None, patient is estranged from only known blood relative (daughter) and recently significant other has advised CM he no longer wishes to be involved with the patient and she will be awaiting guardianship appointment

## 2019-07-19 NOTE — PROGRESS NOTES
Called by nursing because patient escaped from her chest posey and exhibited signs of acute agitation as well as combativeness  She is post-op day 1 from lumbar minimally invasive hemilaminectomy, foraminotomy, and fusion fixation  Patient has also removed her IV site and is very agitated and restless  According to Neurosurgery note today, she was evaluated by Neuropsychology in January, 2019 and deemed to have no cognitive capacity  Neuropsychiatry was consulted for reevaluation of her capacity although no recent note was visible at this time in the medical record  In order to protect the patient from self harm or harm to others in her care, soft limb restraints were ordered this evening  She however managed to get out of these to remove her IV  At this time she will require chemical management of her acute agitation with psychosis/ cognitive impairment and combativeness  Will try Zyprexa 10 mg IM now to observe if this will alleviate her agitation and acute psychosis  Will await the Neuropsych recommendations for further management        Saba العلي [facility]  7/18/19 6795

## 2019-07-19 NOTE — ASSESSMENT & PLAN NOTE
POD#2 Left L4-5 hemilaminectomy and bilateral foraminotomy and Minimally invasive transforaminal lumbar interbody fusion with pedicle screw and myke fixation fusion L4-5, right-sided approach; minimally invasive right L5-S1 foraminotomy and diskectomy (N/A)  · Imaging reviewed personally and with attending, results are as follows:  · XR spine lumbar 7/18/2019:  Unremarkable appearance of posterior fusion L4-5  Grade 1 anterolisthesis at this level again noted  · Pain control - patient is still slightly confused but is stable from yesterday, will decrease oxycodone   · Continue tylenol to 975mg [id number] scheduled  · Decreased oxycodone to 2 5mg PRN moderate pain  · Decrease oxycodone to 5mg PRN for severe pain  · Bowel regimen - patient states she has not had a BM but is passing gas  · Continue colace, senna and suppository PRN  · IV fluids discontinued as patient is tolerating PO well  · Continue posey use and restraints as patient continues to be confused with episodes of agitation last night  · New dressing applied to dressing last night as patient ripped it off sometime yesterday  · Mobilize as tolerated with assistant, PT / OT recommend rehab  · DVT ppx:  SCDs and heparin  · Neurosurgery will continue to follow as primary  Please call with questions or concerns

## 2019-07-19 NOTE — PLAN OF CARE
Problem: OCCUPATIONAL THERAPY ADULT  Goal: Performs self-care activities at highest level of function for planned discharge setting  See evaluation for individualized goals  Description  Treatment Interventions: ADL retraining, Functional transfer training, Endurance training, Cognitive reorientation, Patient/family training, Equipment evaluation/education, Compensatory technique education, Activityengagement, Energy conservation          See flowsheet documentation for full assessment, interventions and recommendations  Outcome: Progressing  Note:   Limitation: Decreased ADL status, Decreased Safe judgement during ADL, Decreased cognition, Decreased endurance, Decreased self-care trans, Decreased high-level ADLs, Mood limitation  Prognosis: Good, Guarded  Assessment: pt participated in ot session and was seen focusing on toileting, functional mobility transfers and grooming instance  pt is in b wrist restraints which were removed and reapplied when in chair  pt tolerated session well  was alert and cooperative  pt was able to walk tofrom bathroom with mod asst x 1 no device  pt was able to wash hands with max cues for technique and to perform task at hand ie wash hands no wash ian area with hand soap from wall dispenser  pt requires mod asst for transfer to  from [facility] toilet  pt offered c/o of painful burning when attempting to uriante on toilet  little to no urine was noted  water was encouragement   Recommendation: (NEUROPSYCH- CAPACITY EVAL?)  OT Discharge Recommendation: Short Term Rehab  OT - OK to Discharge:  Yes  JAIRO Tong

## 2019-07-20 PROCEDURE — 99024 POSTOP FOLLOW-UP VISIT: CPT | Performed by: NURSE PRACTITIONER

## 2019-07-20 RX ADMIN — HEPARIN SODIUM 5000 UNITS: 5000 INJECTION INTRAVENOUS; SUBCUTANEOUS at 21:37

## 2019-07-20 RX ADMIN — DOCUSATE SODIUM 100 MG: 100 CAPSULE, LIQUID FILLED ORAL at 09:45

## 2019-07-20 RX ADMIN — SENNOSIDES 8.6 MG: 8.6 TABLET, FILM COATED ORAL at 21:37

## 2019-07-20 RX ADMIN — ACETAMINOPHEN 975 MG: 325 TABLET ORAL at 21:36

## 2019-07-20 RX ADMIN — OXYCODONE HYDROCHLORIDE 5 MG: 5 TABLET ORAL at 17:36

## 2019-07-20 RX ADMIN — HEPARIN SODIUM 5000 UNITS: 5000 INJECTION INTRAVENOUS; SUBCUTANEOUS at 05:39

## 2019-07-20 RX ADMIN — DOCUSATE SODIUM 100 MG: 100 CAPSULE, LIQUID FILLED ORAL at 17:36

## 2019-07-20 RX ADMIN — ACETAMINOPHEN 975 MG: 325 TABLET ORAL at 05:39

## 2019-07-20 RX ADMIN — OXYCODONE HYDROCHLORIDE 5 MG: 5 TABLET ORAL at 05:39

## 2019-07-20 RX ADMIN — OXYCODONE HYDROCHLORIDE 5 MG: 5 TABLET ORAL at 09:46

## 2019-07-20 RX ADMIN — AMLODIPINE BESYLATE 5 MG: 5 TABLET ORAL at 09:45

## 2019-07-20 NOTE — PROGRESS NOTES
Progress Note Octavia Ornelas 1945, 72 y o  female MRN: [id number]    Unit/Bed#: [facility] 350-82 Encounter: [id number]    Primary Care Provider: Neal Linares MD   Date and time admitted to hospital: 7/17/2019  7:11 AM        Cognitive impairment  Assessment & Plan  · Patient deemed to have no capacity back in January 2019  · Neuropsychology consulted, appreciate input  · At this time, patient continues to not have capacity to make informed medical decisions  · CM to get in touch with daughter as next of kin to make medical decisions for patient  · Patient has a significant other, but unsure at this time if he is able to make medical decisions for patient    Lumbar back pain with radiculopathy affecting right lower extremity  Assessment & Plan  POD#3 Left L4-5 hemilaminectomy and bilateral foraminotomy and Minimally invasive transforaminal lumbar interbody fusion with pedicle screw and myke fixation fusion L4-5, right-sided approach; minimally invasive right L5-S1 foraminotomy and diskectomy (N/A)  · Imaging reviewed personally and with attending, results are as follows:  · XR spine lumbar 7/18/2019:  Unremarkable appearance of posterior fusion L4-5  Grade 1 anterolisthesis at this level again noted  · Pain control - patient is still slightly confused but is stable from yesterday  · Continue tylenol to 975mg [id number] scheduled  · Decreased oxycodone to 2 5mg PRN moderate pain  · Decrease oxycodone to 5mg PRN for severe pain  · Bowel regimen - patient states she has not had a BM but is passing gas  · Continue colace, senna and suppository PRN  · IV fluids discontinued as patient is tolerating PO well  · Continue posey use and restraints as patient continues to be confused with episodes of agitation last night  · New dressing applied 2 nights ago  · Mobilize as tolerated with assistant, PT / OT recommend rehab  · DVT ppx:  SCDs and heparin  · Neurosurgery will continue to follow as primary    Please call with questions or concerns  * Spinal stenosis of lumbar region with neurogenic claudication  Assessment & Plan  See plan above      Subjective/Objective   Chief Complaint: "What country are you from?"    Subjective: Patient continues to be quite confused  Denies any pain or discomfort  Objective: Patient still confused and slightly agitated, pulling at IV and dressings  I/O       07/18 0701 - 07/19 0700 07/19 0701 - 07/20 0700 07/20 0701 - 07/21 0700    P  O  800 1920     I V  (mL/kg)       IV Piggyback       Total Intake(mL/kg) 800 (13 2) 1920 (32 5)     Urine (mL/kg/hr) 650 (0 4)      Blood       Total Output 650      Net +150 +1920            Unmeasured Urine Occurrence 5 x 8 x           Invasive Devices     None                 Physical Exam:  Vitals: Blood pressure 129/78, pulse 81, temperature 99 3 °F (37 4 °C), resp  rate 18, height 5' 1 5" (1 562 m), weight 59 kg (130 lb 1 1 oz), SpO2 98 %  ,Body mass index is 24 18 kg/m²  General appearance: alert, appears stated age, cooperative and no distress  Head: Normocephalic, without obvious abnormality, atraumatic  Eyes: EOMI, PERRL, pupils 3 mm bilaterally  Neck: supple, symmetrical, trachea midline and NT  Back: no kyphosis present, dressing clean and dry  Lungs: non labored breathing  Heart: regular heart rate  Neurologic:   Mental status: Alert, oriented x1, thought content confused  Cranial nerves: grossly intact (Cranial nerves II-XII)  Sensory: normal to light touch  Motor: moving all extremities without focal weakness, strength 5/5  Reflexes: 2+ and symmetric, felicity   Wetzel's, no clonus  Coordination: finger to nose normal bilaterally, no drift bilaterally      Lab Results:  Results from last 7 days   Lab Units 07/18/19  0500   WBC Thousand/uL 10 56*   HEMOGLOBIN g/dL 9 4*   HEMATOCRIT % 31 3*   PLATELETS Thousands/uL 337  337     Results from last 7 days   Lab Units 07/18/19  0500 07/17/19  1407   POTASSIUM mmol/L 3 8 4 6   CHLORIDE mmol/L 113* 114*   CO2 mmol/L 18* 19*   BUN mg/dL 19 20   CREATININE mg/dL 0 88 0 98   CALCIUM mg/dL 8 3 8 0*     Results from last 7 days   Lab Units 07/17/19  1407   MAGNESIUM mg/dL 2 2             No results found for: TROPONINT  ABG:No results found for: PHART, SWD0TVP, PO2ART, KKE9PBB, N7BBFTLT, BEART, SOURCE    Imaging Studies: I have personally reviewed pertinent reports  and I have personally reviewed pertinent films in PACS    EKG, Pathology, and Other Studies: I have personally reviewed pertinent reports        VTE Pharmacologic Prophylaxis: Sequential compression device (Venodyne)  and Heparin    VTE Mechanical Prophylaxis: sequential compression device

## 2019-07-20 NOTE — ASSESSMENT & PLAN NOTE
POD#3 Left L4-5 hemilaminectomy and bilateral foraminotomy and Minimally invasive transforaminal lumbar interbody fusion with pedicle screw and myke fixation fusion L4-5, right-sided approach; minimally invasive right L5-S1 foraminotomy and diskectomy (N/A)  · Imaging reviewed personally and with attending, results are as follows:  · XR spine lumbar 7/18/2019:  Unremarkable appearance of posterior fusion L4-5  Grade 1 anterolisthesis at this level again noted  · Pain control - patient is still slightly confused but is stable from yesterday  · Continue tylenol to 975mg [id number] scheduled  · Decreased oxycodone to 2 5mg PRN moderate pain  · Decrease oxycodone to 5mg PRN for severe pain  · Bowel regimen - patient states she has not had a BM but is passing gas  · Continue colace, senna and suppository PRN  · IV fluids discontinued as patient is tolerating PO well  · Continue posey use and restraints as patient continues to be confused with episodes of agitation last night  · New dressing applied 2 nights ago  · Mobilize as tolerated with assistant, PT / OT recommend rehab  · DVT ppx:  SCDs and heparin  · Neurosurgery will continue to follow as primary  Please call with questions or concerns

## 2019-07-20 NOTE — PLAN OF CARE
Problem: PAIN - ADULT  Goal: Verbalizes/displays adequate comfort level or baseline comfort level  Description  Interventions:  - Encourage patient to monitor pain and request assistance  - Assess pain using appropriate pain scale  - Administer analgesics based on type and severity of pain and evaluate response  - Implement non-pharmacological measures as appropriate and evaluate response  - Consider cultural and social influences on pain and pain management  - Notify physician/advanced practitioner if interventions unsuccessful or patient reports new pain  Outcome: Progressing     Problem: INFECTION - ADULT  Goal: Absence or prevention of progression during hospitalization  Description  INTERVENTIONS:  - Assess and monitor for signs and symptoms of infection  - Monitor lab/diagnostic results  - Monitor all insertion sites, i e  indwelling lines, tubes, and drains  - Monitor endotracheal (as able) and nasal secretions for changes in amount and color  - [place] appropriate cooling/warming therapies per order  - Administer medications as ordered  - Instruct and encourage patient and family to use good hand hygiene technique  - Identify and instruct in appropriate isolation precautions for identified infection/condition  Outcome: Progressing     Problem: SAFETY ADULT  Goal: Patient will remain free of falls  Description  INTERVENTIONS:  - Assess patient frequently for physical needs  -  Identify cognitive and physical deficits and behaviors that affect risk of falls    -  [place] fall precautions as indicated by assessment   - Educate patient/family on patient safety including physical limitations  - Instruct patient to call for assistance with activity based on assessment  - Modify environment to reduce risk of injury  - Consider OT/PT consult to assist with strengthening/mobility  Outcome: Progressing  Goal: Maintain or return to baseline ADL function  Description  INTERVENTIONS:  -  Assess patient's ability to carry out ADLs; assess patient's baseline for ADL function and identify physical deficits which impact ability to perform ADLs (bathing, care of mouth/teeth, toileting, grooming, dressing, etc )  - Assess/evaluate cause of self-care deficits   - Assess range of motion  - Assess patient's mobility; develop plan if impaired  - Assess patient's need for assistive devices and provide as appropriate  - Encourage maximum independence but intervene and supervise when necessary  ¯ Involve family in performance of ADLs  ¯ Assess for home care needs following discharge   ¯ Request OT consult to assist with ADL evaluation and planning for discharge  ¯ Provide patient education as appropriate  Outcome: Progressing  Goal: Maintain or return mobility status to optimal level  Description  INTERVENTIONS:  - Assess patient's baseline mobility status (ambulation, transfers, stairs, etc )    - Identify cognitive and physical deficits and behaviors that affect mobility  - Identify mobility aids required to assist with transfers and/or ambulation (gait belt, sit-to-stand, lift, walker, cane, etc )  - [place] fall precautions as indicated by assessment  - Record patient progress and toleration of activity level on Mobility SBAR; progress patient to next Phase/Stage  - Instruct patient to call for assistance with activity based on assessment  - Request Rehabilitation consult to assist with strengthening/weightbearing, etc   Outcome: Progressing     Problem: DISCHARGE PLANNING  Goal: Discharge to home or other facility with appropriate resources  Description  INTERVENTIONS:  - Identify barriers to discharge w/patient and caregiver  - Arrange for needed discharge resources and transportation as appropriate  - Identify discharge learning needs (meds, wound care, etc )  - Arrange for interpretive services to assist at discharge as needed  - Refer to Case Management Department for coordinating discharge planning if the patient needs post-hospital services based on physician/advanced practitioner order or complex needs related to functional status, cognitive ability, or social support system  Outcome: Progressing     Problem: Knowledge Deficit  Goal: Patient/family/caregiver demonstrates understanding of disease process, treatment plan, medications, and discharge instructions  Description  Complete learning assessment and assess knowledge base  Interventions:  - Provide teaching at level of understanding  - Provide teaching via preferred learning methods  Outcome: Progressing     Problem: Potential for Falls  Goal: Patient will remain free of falls  Description  INTERVENTIONS:  - Assess patient frequently for physical needs  -  Identify cognitive and physical deficits and behaviors that affect risk of falls    -  [place] fall precautions as indicated by assessment   - Educate patient/family on patient safety including physical limitations  - Instruct patient to call for assistance with activity based on assessment  - Modify environment to reduce risk of injury  - Consider OT/PT consult to assist with strengthening/mobility  Outcome: Progressing     Problem: SAFETY,RESTRAINT: NV/NON-SELF DESTRUCTIVE BEHAVIOR  Goal: Remains free of harm/injury (restraint for non violent/non self-detsructive behavior)  Description  INTERVENTIONS:  - Instruct patient/family regarding restraint use   - Assess and monitor physiologic and psychological status   - Provide interventions and comfort measures to meet assessed patient needs   - Identify and implement measures to help patient regain control  - Assess readiness for release of restraint   Outcome: Progressing  Goal: Returns to optimal restraint-free functioning  Description  INTERVENTIONS:  - Assess the patient's behavior and symptoms that indicate continued need for restraint  - Identify and implement measures to help patient regain control  - Assess readiness for release of restraint   Outcome: Progressing     Problem: Nutrition/Hydration-ADULT  Goal: Nutrient/Hydration intake appropriate for improving, restoring or maintaining nutritional needs  Description  Monitor and assess patient's nutrition/hydration status for malnutrition (ex- brittle hair, bruises, dry skin, pale skin and conjunctiva, muscle wasting, smooth red tongue, and disorientation)  Collaborate with interdisciplinary team and initiate plan and interventions as ordered  Monitor patient's weight and dietary intake as ordered or per policy  Utilize nutrition screening tool and intervene per policy  Determine patient's food preferences and provide high-protein, high-caloric foods as appropriate  INTERVENTIONS:  - Monitor oral intake, urinary output, labs, and treatment plans  - Assess nutrition and hydration status and recommend course of action  - Evaluate amount of meals eaten  - Assist patient with eating if necessary   - Allow adequate time for meals  - Recommend/ encourage appropriate diets, oral nutritional supplements, and vitamin/mineral supplements  - Order, calculate, and assess calorie counts as needed  - Recommend, monitor, and adjust tube feedings and TPN/PPN based on assessed needs  - Assess need for intravenous fluids  - Provide specific nutrition/hydration education as appropriate  - Include patient/family/caregiver in decisions related to nutrition  Outcome: Progressing     Problem: BEHAVIOR  Goal: Pt/Family maintain appropriate behavior and adhere to behavioral management agreement, if implemented  Description  INTERVENTIONS:  - Assess the family dynamic   - Encourage verbalization of thoughts and concerns in a socially appropriate manner  - Assess patient/family's coping skills and non-compliant behavior (including use of illegal substances)    - Utilize positive, consistent limit setting strategies supporting safety of patient, staff and others  - Initiate consult with Case Management, Spiritual Care or other ancillary services as appropriate  - If a patient's/visitor's behavior jeopardizes the safety of the patient, staff, or others, refer to organization procedure     - Notify Security of behavior or suspected illegal substances which indicate the need for search of the patient and/or belongings  - Encourage participation in the decision making process about a behavioral management agreement; implement if patient meets criteria  Outcome: Progressing     Problem: Prexisting or High Potential for Compromised Skin Integrity  Goal: Skin integrity is maintained or improved  Description  INTERVENTIONS:  - Identify patients at risk for skin breakdown  - Assess and monitor skin integrity  - Assess and monitor nutrition and hydration status  - Monitor labs (i e  albumin)  - Assess for incontinence   - Turn and reposition patient  - Assist with mobility/ambulation  - Relieve pressure over bony prominences  - Avoid friction and shearing  - Provide appropriate hygiene as needed including keeping skin clean and dry  - Evaluate need for skin moisturizer/barrier cream  - Collaborate with interdisciplinary team (i e  Nutrition, Rehabilitation, etc )   - Patient/family teaching  Outcome: Progressing

## 2019-07-21 PROCEDURE — 99024 POSTOP FOLLOW-UP VISIT: CPT | Performed by: NURSE PRACTITIONER

## 2019-07-21 RX ORDER — NICOTINE 21 MG/24HR
14 PATCH, TRANSDERMAL 24 HOURS TRANSDERMAL DAILY
Status: DISCONTINUED | OUTPATIENT
Start: 2019-07-21 | End: 2019-08-02 | Stop reason: HOSPADM

## 2019-07-21 RX ADMIN — ACETAMINOPHEN 975 MG: 325 TABLET ORAL at 21:38

## 2019-07-21 RX ADMIN — HEPARIN SODIUM 5000 UNITS: 5000 INJECTION INTRAVENOUS; SUBCUTANEOUS at 21:38

## 2019-07-21 RX ADMIN — ACETAMINOPHEN 975 MG: 325 TABLET ORAL at 05:39

## 2019-07-21 RX ADMIN — HEPARIN SODIUM 5000 UNITS: 5000 INJECTION INTRAVENOUS; SUBCUTANEOUS at 05:39

## 2019-07-21 RX ADMIN — ACETAMINOPHEN 975 MG: 325 TABLET ORAL at 13:30

## 2019-07-21 RX ADMIN — SENNOSIDES 8.6 MG: 8.6 TABLET, FILM COATED ORAL at 21:38

## 2019-07-21 RX ADMIN — BISACODYL 10 MG: 10 SUPPOSITORY RECTAL at 10:21

## 2019-07-21 RX ADMIN — AMLODIPINE BESYLATE 5 MG: 5 TABLET ORAL at 10:20

## 2019-07-21 RX ADMIN — NICOTINE 14 MG: 14 PATCH TRANSDERMAL at 21:39

## 2019-07-21 RX ADMIN — HEPARIN SODIUM 5000 UNITS: 5000 INJECTION INTRAVENOUS; SUBCUTANEOUS at 13:31

## 2019-07-21 RX ADMIN — OXYCODONE HYDROCHLORIDE 5 MG: 5 TABLET ORAL at 02:58

## 2019-07-21 RX ADMIN — OXYCODONE HYDROCHLORIDE 5 MG: 5 TABLET ORAL at 10:20

## 2019-07-21 RX ADMIN — DOCUSATE SODIUM 100 MG: 100 CAPSULE, LIQUID FILLED ORAL at 10:19

## 2019-07-21 RX ADMIN — OXYCODONE HYDROCHLORIDE 5 MG: 5 TABLET ORAL at 19:31

## 2019-07-21 NOTE — PROGRESS NOTES
Progress Note Rodney Vogeler 1945, 72 y o  female MRN: [id number]    Unit/Bed#: [facility] 603-01 Encounter: [id number]    Primary Care Provider: Campbell Rousseau MD   Date and time admitted to hospital: 7/17/2019  7:11 AM        Cognitive impairment  Assessment & Plan  · Patient deemed to have no capacity back in January 2019  · Neuropsychology consulted, appreciate input  · At this time, patient continues to not have capacity to make informed medical decisions  · CM to get in touch with daughter as next of kin to make medical decisions for patient  · Patient has a significant other, but unsure at this time if he is able to make medical decisions for patient    Lumbar back pain with radiculopathy affecting right lower extremity  Assessment & Plan  POD#4 Left L4-5 hemilaminectomy and bilateral foraminotomy and Minimally invasive transforaminal lumbar interbody fusion with pedicle screw and myke fixation fusion L4-5, right-sided approach; minimally invasive right L5-S1 foraminotomy and diskectomy (N/A)  · Imaging reviewed personally and with attending, results are as follows:  · XR spine lumbar 7/18/2019:  Unremarkable appearance of posterior fusion L4-5  Grade 1 anterolisthesis at this level again noted  · Pain control - patient is still slightly confused but is stable from yesterday  · Continue tylenol to 975mg m1nnory scheduled  · Decreased oxycodone to 2 5mg PRN moderate pain  · Decrease oxycodone to 5mg PRN for severe pain  · Bowel regimen, continue colace, senna and suppository PRN  · IV fluids discontinued as patient is tolerating PO well  · Patient continues with episodes of confusion/agitation  Placed on 1:1    · Mobilize as tolerated with assistant, PT / OT recommend rehab  · DVT ppx:  SCDs and heparin  · Neurosurgery will continue to follow as primary  Please call with questions or concerns      * Spinal stenosis of lumbar region with neurogenic claudication  Assessment & Plan  See plan above      Subjective/Objective     Subjective: Patient remains pleasantly confused  Objective: Incision site clean dry and intact  Moving all extremities well  I/O       07/19 0701 - 07/20 0700 07/20 0701 - 07/21 0700 07/21 0701 - 07/22 0700    P  O  1920 480     Total Intake(mL/kg) 1920 (32 5) 480 (8 3)     Urine (mL/kg/hr)       Total Output       Net +1920 +480            Unmeasured Urine Occurrence 8 x 1 x           Invasive Devices     None                 Physical Exam:  Vitals: Blood pressure 130/78, pulse 81, temperature 98 6 °F (37 °C), resp  rate 18, height 5' 1 5" (1 562 m), weight 58 1 kg (128 lb 1 6 oz), SpO2 98 %  ,Body mass index is 23 81 kg/m²  General appearance: alert, appears stated age, cooperative and no distress  Head: Normocephalic, without obvious abnormality, atraumatic  Eyes: EOMI, PERRL  Neck: supple, symmetrical, trachea midline and NT  Back: no kyphosis present, mild tenderness to incision site    Lungs: non labored breathing  Heart: regular heart rate  Neurologic:   Mental status: Alert, oriented x1, thought content appropriate  Cranial nerves: grossly intact (Cranial nerves II-XII)  Sensory: normal to light touch  Motor: moving all extremities without focal weakness, strength 5/5  Reflexes: 2+ and symmetric, no Wetzel's or clonus  Coordination: finger to nose normal bilaterally, no drift bilaterally      Lab Results:  Results from last 7 days   Lab Units 07/18/19  0500   WBC Thousand/uL 10 56*   HEMOGLOBIN g/dL 9 4*   HEMATOCRIT % 31 3*   PLATELETS Thousands/uL 337  337     Results from last 7 days   Lab Units 07/18/19  0500 07/17/19  1407   POTASSIUM mmol/L 3 8 4 6   CHLORIDE mmol/L 113* 114*   CO2 mmol/L 18* 19*   BUN mg/dL 19 20   CREATININE mg/dL 0 88 0 98   CALCIUM mg/dL 8 3 8 0*     Results from last 7 days   Lab Units 07/17/19  1407   MAGNESIUM mg/dL 2 2             No results found for: TROPONINT  ABG:No results found for: PHART, LBL6GOR, PO2ART, KPF3KLS, Z1ENPQTW, EVELINET, SOURCE    Imaging Studies: I have personally reviewed pertinent reports  and I have personally reviewed pertinent films in PACS    EKG, Pathology, and Other Studies: I have personally reviewed pertinent reports        VTE Pharmacologic Prophylaxis: Sequential compression device (Venodyne)  and Heparin    VTE Mechanical Prophylaxis: sequential compression device

## 2019-07-21 NOTE — ASSESSMENT & PLAN NOTE
POD#4 Left L4-5 hemilaminectomy and bilateral foraminotomy and Minimally invasive transforaminal lumbar interbody fusion with pedicle screw and myke fixation fusion L4-5, right-sided approach; minimally invasive right L5-S1 foraminotomy and diskectomy (N/A)  · Imaging reviewed personally and with attending, results are as follows:  · XR spine lumbar 7/18/2019:  Unremarkable appearance of posterior fusion L4-5  Grade 1 anterolisthesis at this level again noted  · Pain control - patient is still slightly confused but is stable from yesterday  · Continue tylenol to 975mg y6jborn scheduled  · Decreased oxycodone to 2 5mg PRN moderate pain  · Decrease oxycodone to 5mg PRN for severe pain  · Bowel regimen, continue colace, senna and suppository PRN  · IV fluids discontinued as patient is tolerating PO well  · Patient continues with episodes of confusion/agitation  Placed on 1:1    · Mobilize as tolerated with assistant, PT / OT recommend rehab  · DVT ppx:  SCDs and heparin  · Neurosurgery will continue to follow as primary  Please call with questions or concerns

## 2019-07-21 NOTE — PLAN OF CARE
Problem: PAIN - ADULT  Goal: Verbalizes/displays adequate comfort level or baseline comfort level  Description  Interventions:  - Encourage patient to monitor pain and request assistance  - Assess pain using appropriate pain scale  - Administer analgesics based on type and severity of pain and evaluate response  - Implement non-pharmacological measures as appropriate and evaluate response  - Consider cultural and social influences on pain and pain management  - Notify physician/advanced practitioner if interventions unsuccessful or patient reports new pain  Outcome: Progressing     Problem: INFECTION - ADULT  Goal: Absence or prevention of progression during hospitalization  Description  INTERVENTIONS:  - Assess and monitor for signs and symptoms of infection  - Monitor lab/diagnostic results  - Monitor all insertion sites, i e  indwelling lines, tubes, and drains  - Monitor endotracheal (as able) and nasal secretions for changes in amount and color  - [place] appropriate cooling/warming therapies per order  - Administer medications as ordered  - Instruct and encourage patient and family to use good hand hygiene technique  - Identify and instruct in appropriate isolation precautions for identified infection/condition  Outcome: Progressing     Problem: SAFETY ADULT  Goal: Patient will remain free of falls  Description  INTERVENTIONS:  - Assess patient frequently for physical needs  -  Identify cognitive and physical deficits and behaviors that affect risk of falls    -  [place] fall precautions as indicated by assessment   - Educate patient/family on patient safety including physical limitations  - Instruct patient to call for assistance with activity based on assessment  - Modify environment to reduce risk of injury  - Consider OT/PT consult to assist with strengthening/mobility  Outcome: Progressing  Goal: Maintain or return to baseline ADL function  Description  INTERVENTIONS:  -  Assess patient's ability to carry out ADLs; assess patient's baseline for ADL function and identify physical deficits which impact ability to perform ADLs (bathing, care of mouth/teeth, toileting, grooming, dressing, etc )  - Assess/evaluate cause of self-care deficits   - Assess range of motion  - Assess patient's mobility; develop plan if impaired  - Assess patient's need for assistive devices and provide as appropriate  - Encourage maximum independence but intervene and supervise when necessary  ¯ Involve family in performance of ADLs  ¯ Assess for home care needs following discharge   ¯ Request OT consult to assist with ADL evaluation and planning for discharge  ¯ Provide patient education as appropriate  Outcome: Progressing  Goal: Maintain or return mobility status to optimal level  Description  INTERVENTIONS:  - Assess patient's baseline mobility status (ambulation, transfers, stairs, etc )    - Identify cognitive and physical deficits and behaviors that affect mobility  - Identify mobility aids required to assist with transfers and/or ambulation (gait belt, sit-to-stand, lift, walker, cane, etc )  - [place] fall precautions as indicated by assessment  - Record patient progress and toleration of activity level on Mobility SBAR; progress patient to next Phase/Stage  - Instruct patient to call for assistance with activity based on assessment  - Request Rehabilitation consult to assist with strengthening/weightbearing, etc   Outcome: Progressing     Problem: DISCHARGE PLANNING  Goal: Discharge to home or other facility with appropriate resources  Description  INTERVENTIONS:  - Identify barriers to discharge w/patient and caregiver  - Arrange for needed discharge resources and transportation as appropriate  - Identify discharge learning needs (meds, wound care, etc )  - Arrange for interpretive services to assist at discharge as needed  - Refer to Case Management Department for coordinating discharge planning if the patient needs post-hospital services based on physician/advanced practitioner order or complex needs related to functional status, cognitive ability, or social support system  Outcome: Progressing     Problem: Knowledge Deficit  Goal: Patient/family/caregiver demonstrates understanding of disease process, treatment plan, medications, and discharge instructions  Description  Complete learning assessment and assess knowledge base  Interventions:  - Provide teaching at level of understanding  - Provide teaching via preferred learning methods  Outcome: Progressing     Problem: Potential for Falls  Goal: Patient will remain free of falls  Description  INTERVENTIONS:  - Assess patient frequently for physical needs  -  Identify cognitive and physical deficits and behaviors that affect risk of falls  -  [place] fall precautions as indicated by assessment   - Educate patient/family on patient safety including physical limitations  - Instruct patient to call for assistance with activity based on assessment  - Modify environment to reduce risk of injury  - Consider OT/PT consult to assist with strengthening/mobility  Outcome: Progressing     Problem: Nutrition/Hydration-ADULT  Goal: Nutrient/Hydration intake appropriate for improving, restoring or maintaining nutritional needs  Description  Monitor and assess patient's nutrition/hydration status for malnutrition (ex- brittle hair, bruises, dry skin, pale skin and conjunctiva, muscle wasting, smooth red tongue, and disorientation)  Collaborate with interdisciplinary team and initiate plan and interventions as ordered  Monitor patient's weight and dietary intake as ordered or per policy  Utilize nutrition screening tool and intervene per policy  Determine patient's food preferences and provide high-protein, high-caloric foods as appropriate       INTERVENTIONS:  - Monitor oral intake, urinary output, labs, and treatment plans  - Assess nutrition and hydration status and recommend course of action  - Evaluate amount of meals eaten  - Assist patient with eating if necessary   - Allow adequate time for meals  - Recommend/ encourage appropriate diets, oral nutritional supplements, and vitamin/mineral supplements  - Order, calculate, and assess calorie counts as needed  - Recommend, monitor, and adjust tube feedings and TPN/PPN based on assessed needs  - Assess need for intravenous fluids  - Provide specific nutrition/hydration education as appropriate  - Include patient/family/caregiver in decisions related to nutrition  Outcome: Progressing     Problem: BEHAVIOR  Goal: Pt/Family maintain appropriate behavior and adhere to behavioral management agreement, if implemented  Description  INTERVENTIONS:  - Assess the family dynamic   - Encourage verbalization of thoughts and concerns in a socially appropriate manner  - Assess patient/family's coping skills and non-compliant behavior (including use of illegal substances)  - Utilize positive, consistent limit setting strategies supporting safety of patient, staff and others  - Initiate consult with Case Management, Spiritual Care or other ancillary services as appropriate  - If a patient's/visitor's behavior jeopardizes the safety of the patient, staff, or others, refer to organization procedure     - Notify Security of behavior or suspected illegal substances which indicate the need for search of the patient and/or belongings  - Encourage participation in the decision making process about a behavioral management agreement; implement if patient meets criteria  Outcome: Progressing     Problem: Prexisting or High Potential for Compromised Skin Integrity  Goal: Skin integrity is maintained or improved  Description  INTERVENTIONS:  - Identify patients at risk for skin breakdown  - Assess and monitor skin integrity  - Assess and monitor nutrition and hydration status  - Monitor labs (i e  albumin)  - Assess for incontinence   - Turn and reposition patient  - Assist with mobility/ambulation  - Relieve pressure over bony prominences  - Avoid friction and shearing  - Provide appropriate hygiene as needed including keeping skin clean and dry  - Evaluate need for skin moisturizer/barrier cream  - Collaborate with interdisciplinary team (i e  Nutrition, Rehabilitation, etc )   - Patient/family teaching  Outcome: Progressing

## 2019-07-22 PROCEDURE — 99024 POSTOP FOLLOW-UP VISIT: CPT | Performed by: NURSE PRACTITIONER

## 2019-07-22 RX ADMIN — DOCUSATE SODIUM 100 MG: 100 CAPSULE, LIQUID FILLED ORAL at 17:02

## 2019-07-22 RX ADMIN — ALBUTEROL SULFATE 1 PUFF: 90 AEROSOL, METERED RESPIRATORY (INHALATION) at 17:02

## 2019-07-22 RX ADMIN — ACETAMINOPHEN 975 MG: 325 TABLET ORAL at 13:17

## 2019-07-22 RX ADMIN — HEPARIN SODIUM 5000 UNITS: 5000 INJECTION INTRAVENOUS; SUBCUTANEOUS at 21:20

## 2019-07-22 RX ADMIN — ACETAMINOPHEN 975 MG: 325 TABLET ORAL at 21:20

## 2019-07-22 RX ADMIN — ACETAMINOPHEN 975 MG: 325 TABLET ORAL at 06:18

## 2019-07-22 RX ADMIN — AMLODIPINE BESYLATE 5 MG: 5 TABLET ORAL at 08:21

## 2019-07-22 RX ADMIN — DOCUSATE SODIUM 100 MG: 100 CAPSULE, LIQUID FILLED ORAL at 08:21

## 2019-07-22 RX ADMIN — OXYCODONE HYDROCHLORIDE 5 MG: 5 TABLET ORAL at 13:17

## 2019-07-22 RX ADMIN — SENNOSIDES 8.6 MG: 8.6 TABLET, FILM COATED ORAL at 21:20

## 2019-07-22 RX ADMIN — OXYCODONE HYDROCHLORIDE 5 MG: 5 TABLET ORAL at 06:18

## 2019-07-22 RX ADMIN — HEPARIN SODIUM 5000 UNITS: 5000 INJECTION INTRAVENOUS; SUBCUTANEOUS at 06:18

## 2019-07-22 RX ADMIN — NICOTINE 14 MG: 14 PATCH TRANSDERMAL at 08:21

## 2019-07-22 NOTE — SOCIAL WORK
Cm reviewed patient during care coordination rounds  Cm continues to work on discharge plan  Cm informed by , that [facility] was notified and that case will be presented to court on 7/30  Cm will send Options paperwork to Aging office to assist with completion of discharge process  Cm also instructed to send referrals to rehab facilities within a 15 mile radius  Cm sent referrals for review  Cm awaiting Options letter and accepting facility

## 2019-07-22 NOTE — UTILIZATION REVIEW
Elective Surgical Continued Stay Review    Date: 7/21            POD#: 4    Current Patient Class: Inpatient Current Level of Care: Med Surg    Assessment/Plan: 72 y o  female, initial surgery date     Pertinent Labs/Diagnostic Results:   7/21 Progress notes:  POD#4 Left L4-5 hemilaminectomy and bilateral foraminotomy and Minimally invasive transforaminal lumbar interbody fusion with pedicle screw and myke fixation fusion L4-5, right-sided approach; minimally invasive right L5-S1 foraminotomy and diskectomy (N/A)  Pain med adjustment, d/c IVF, persistent confusion/ agitation, placed on 1:1  No capacity in Jan 2019 and Neuropsychology consult  Vital Signs:   Blood pressure 130/78, pulse 81, temperature 98 6 °F (37 °C), resp  rate 18, height 5' 1 5" (1 562 m), weight 58 1 kg (128 lb 1 6 oz), SpO2 98 %  ,Body mass index is 23 81 kg/m²  Medications:   Scheduled Meds:   Current Facility-Administered Medications:  acetaminophen 975 mg Oral Q8H [address]   albuterol 1 puff Inhalation Q4H PRN   amLODIPine 5 mg Oral Daily   bisacodyl 10 mg Rectal Daily PRN   docusate sodium 100 mg Oral BID   heparin (porcine) 5,000 Units Subcutaneous Q8H [address]   nicotine 14 mg Transdermal Daily   ondansetron 4 mg Intravenous Q6H PRN   oxyCODONE 2 5 mg Oral Q4H PRN   oxyCODONE 5 mg Oral Q4H PRN   senna 1 tablet Oral HS       Discharge Plan: 7/22 Paper work send to court on 7/30  Options paperwork in progress    Network Utilization Review Department  Phone: [phone]; Fax [phone]  [email or web address]  ATTENTION: Please call with any questions or concerns to [phone]  and carefully listen to the prompts so that you are directed to the right person  Send all requests for admission clinical reviews, approved or denied determinations and any other requests to fax [phone]   All voicemails are confidential

## 2019-07-22 NOTE — ASSESSMENT & PLAN NOTE
POD#5 Left L4-5 hemilaminectomy and bilateral foraminotomy and Minimally invasive transforaminal lumbar interbody fusion with pedicle screw and myke fixation fusion L4-5, right-sided approach; minimally invasive right L5-S1 foraminotomy and diskectomy (N/A)  · Imaging reviewed personally and with attending, results are as follows:  · XR spine lumbar 7/18/2019:  Unremarkable appearance of posterior fusion L4-5  Grade 1 anterolisthesis at this level again noted  · Patient remains quite confused and encephalopathic  · Geriatrics recommends Zyprexa 2 5 mg q8h PRN as chemical restraint for last resort  · Requires guardianship as patient has no next of kin or POA  SO no longer wishes to be involved with patient  · Once obtained can transfer to rehab facility  · IV fluids discontinued as patient is tolerating PO well  · Patient continues with confusion/agitation  Placed on 1:1    · Mobilize as tolerated with assistant, PT / OT recommend rehab  Ambulating well frequently with rolling walker  · DVT ppx: SCDs and heparin  · Neurosurgery will continue to follow as primary  Please call with questions or concerns

## 2019-07-22 NOTE — PROGRESS NOTES
Progress Note Reginald Purcell 1945, 72 y o  female MRN: [id number]    [facility] Encounter: [id number]    Primary Care Provider: Yvon Drake MD   Date and time admitted to hospital: 7/17/2019  7:11 AM        Cognitive impairment  Assessment & Plan  · Patient deemed to have no capacity back in January 2019  · Neuropsychology consulted, appreciate input  · At this time, patient continues to not have capacity to make informed medical decisions  · See note above  Lumbar back pain with radiculopathy affecting right lower extremity  Assessment & Plan  POD#5 Left L4-5 hemilaminectomy and bilateral foraminotomy and Minimally invasive transforaminal lumbar interbody fusion with pedicle screw and myke fixation fusion L4-5, right-sided approach; minimally invasive right L5-S1 foraminotomy and diskectomy (N/A)  · Imaging reviewed personally and with attending, results are as follows:  · XR spine lumbar 7/18/2019:  Unremarkable appearance of posterior fusion L4-5  Grade 1 anterolisthesis at this level again noted  · Patient remains quite confused and encephalopathic  · Geriatrics recommends Zyprexa 2 5 mg q8h PRN as chemical restraint for last resort  · Requires guardianship as patient has no next of kin or POA  SO no longer wishes to be involved with patient  · Once obtained can transfer to rehab facility  · IV fluids discontinued as patient is tolerating PO well  · Patient continues with confusion/agitation  Placed on 1:1    · Mobilize as tolerated with assistant, PT / OT recommend rehab  Ambulating well frequently with rolling walker  · DVT ppx: SCDs and heparin  · Neurosurgery will continue to follow as primary  Please call with questions or concerns      * Spinal stenosis of lumbar region with neurogenic claudication  Assessment & Plan  See plan above      Subjective/Objective   Chief Complaint: "These plants need water "    Subjective: Endorses some mild back pain but is unable to locate where, quickly begins tangential conversation  Denies weakness or numbness  Objective: Ambulating well with rolling walker, very confused and encephalopathic, wondering around making confused statements  Mobility very good  No focal neurological deficits  I/O       07/20 0701 - 07/21 0700 07/21 0701 - 07/22 0700 07/22 0701 - 07/23 0700    P  O  480 342 360    Total Intake(mL/kg) 480 (8 3) 342 (5 9) 360 (6 2)    Net +480 +342 +360           Unmeasured Urine Occurrence 1 x 4 x     Unmeasured Stool Occurrence  2 x           Invasive Devices     None                 Physical Exam:  Vitals: Blood pressure 132/71, pulse 93, temperature 99 6 °F (37 6 °C), resp  rate 18, height 5' 1 5" (1 562 m), weight 58 1 kg (128 lb 1 4 oz), SpO2 98 %  ,Body mass index is 23 81 kg/m²        General appearance: alert, appears stated age, cooperative and no distress  Head: Normocephalic, without obvious abnormality, atraumatic  Eyes: EOMI, PERRL  Neck: supple, symmetrical, trachea midline and NT  Back: kyphosis, mildly tender to lumbar spine  Lungs: non labored breathing  Heart: regular heart rate  Neurologic:   Mental status: Alert, oriented 1, thought content appropriate  Cranial nerves: grossly intact (Cranial nerves II-XII)  Sensory: normal to light touch  Motor: moving all extremities without focal weakness, strength 5/5 throughout  Reflexes: 2+ and symmetric, no Wetzel's or clonus  Coordination: unable to complete      Lab Results:  Results from last 7 days   Lab Units 07/18/19  0500   WBC Thousand/uL 10 56*   HEMOGLOBIN g/dL 9 4*   HEMATOCRIT % 31 3*   PLATELETS Thousands/uL 337  337     Results from last 7 days   Lab Units 07/18/19  0500 07/17/19  1407   POTASSIUM mmol/L 3 8 4 6   CHLORIDE mmol/L 113* 114*   CO2 mmol/L 18* 19*   BUN mg/dL 19 20   CREATININE mg/dL 0 88 0 98   CALCIUM mg/dL 8 3 8 0*     Results from last 7 days   Lab Units 07/17/19  1407   MAGNESIUM mg/dL 2 2             No results found for: [address]   ABG:No results found for: PHART, SBC2EMQ, PO2ART, SCR5WMU, Q4XSQXTU, BEART, SOURCE    Imaging Studies: I have personally reviewed pertinent reports  and I have personally reviewed pertinent films in PACS    EKG, Pathology, and Other Studies: I have personally reviewed pertinent reports        VTE Pharmacologic Prophylaxis: Sequential compression device (Venodyne)  and Heparin    VTE Mechanical Prophylaxis: sequential compression device

## 2019-07-22 NOTE — PLAN OF CARE
Problem: PAIN - ADULT  Goal: Verbalizes/displays adequate comfort level or baseline comfort level  Description  Interventions:  - Encourage patient to monitor pain and request assistance  - Assess pain using appropriate pain scale  - Administer analgesics based on type and severity of pain and evaluate response  - Implement non-pharmacological measures as appropriate and evaluate response  - Consider cultural and social influences on pain and pain management  - Notify physician/advanced practitioner if interventions unsuccessful or patient reports new pain  7/22/2019 0746 by Donna Bright RN  Outcome: Progressing  7/22/2019 0746 by Donna Bright RN  Outcome: Progressing     Problem: INFECTION - ADULT  Goal: Absence or prevention of progression during hospitalization  Description  INTERVENTIONS:  - Assess and monitor for signs and symptoms of infection  - Monitor lab/diagnostic results  - Monitor all insertion sites, i e  indwelling lines, tubes, and drains  - Monitor endotracheal (as able) and nasal secretions for changes in amount and color  - [place] appropriate cooling/warming therapies per order  - Administer medications as ordered  - Instruct and encourage patient and family to use good hand hygiene technique  - Identify and instruct in appropriate isolation precautions for identified infection/condition  7/22/2019 0746 by Donna Bright RN  Outcome: Progressing  7/22/2019 0746 by Donna Bright RN  Outcome: Progressing     Problem: SAFETY ADULT  Goal: Patient will remain free of falls  Description  INTERVENTIONS:  - Assess patient frequently for physical needs  -  Identify cognitive and physical deficits and behaviors that affect risk of falls    -  [place] fall precautions as indicated by assessment   - Educate patient/family on patient safety including physical limitations  - Instruct patient to call for assistance with activity based on assessment  - Modify environment to reduce risk of injury  - Consider OT/PT consult to assist with strengthening/mobility  7/22/2019 0746 by Destin Clark RN  Outcome: Progressing  7/22/2019 0746 by Destin Clark RN  Outcome: Progressing  Goal: Maintain or return to baseline ADL function  Description  INTERVENTIONS:  -  Assess patient's ability to carry out ADLs; assess patient's baseline for ADL function and identify physical deficits which impact ability to perform ADLs (bathing, care of mouth/teeth, toileting, grooming, dressing, etc )  - Assess/evaluate cause of self-care deficits   - Assess range of motion  - Assess patient's mobility; develop plan if impaired  - Assess patient's need for assistive devices and provide as appropriate  - Encourage maximum independence but intervene and supervise when necessary  ¯ Involve family in performance of ADLs  ¯ Assess for home care needs following discharge   ¯ Request OT consult to assist with ADL evaluation and planning for discharge  ¯ Provide patient education as appropriate  7/22/2019 0746 by Destin Clark RN  Outcome: Progressing  7/22/2019 0746 by Destin Clark RN  Outcome: Progressing  Goal: Maintain or return mobility status to optimal level  Description  INTERVENTIONS:  - Assess patient's baseline mobility status (ambulation, transfers, stairs, etc )    - Identify cognitive and physical deficits and behaviors that affect mobility  - Identify mobility aids required to assist with transfers and/or ambulation (gait belt, sit-to-stand, lift, walker, cane, etc )  - [place] fall precautions as indicated by assessment  - Record patient progress and toleration of activity level on Mobility SBAR; progress patient to next Phase/Stage  - Instruct patient to call for assistance with activity based on assessment  - Request Rehabilitation consult to assist with strengthening/weightbearing, etc   7/22/2019 0746 by Destin Clark RN  Outcome: Progressing  7/22/2019 0746 by Destin Clark RN  Outcome: Progressing     Problem: DISCHARGE PLANNING  Goal: Discharge to home or other facility with appropriate resources  Description  INTERVENTIONS:  - Identify barriers to discharge w/patient and caregiver  - Arrange for needed discharge resources and transportation as appropriate  - Identify discharge learning needs (meds, wound care, etc )  - Arrange for interpretive services to assist at discharge as needed  - Refer to Case Management Department for coordinating discharge planning if the patient needs post-hospital services based on physician/advanced practitioner order or complex needs related to functional status, cognitive ability, or social support system  7/22/2019 0746 by Chandrakant Chua RN  Outcome: Progressing  7/22/2019 0746 by Chandrakant Chua RN  Outcome: Progressing     Problem: Knowledge Deficit  Goal: Patient/family/caregiver demonstrates understanding of disease process, treatment plan, medications, and discharge instructions  Description  Complete learning assessment and assess knowledge base  Interventions:  - Provide teaching at level of understanding  - Provide teaching via preferred learning methods  7/22/2019 0746 by Chandrakant Chua RN  Outcome: Progressing  7/22/2019 0746 by Chandrakant Chua RN  Outcome: Progressing     Problem: Potential for Falls  Goal: Patient will remain free of falls  Description  INTERVENTIONS:  - Assess patient frequently for physical needs  -  Identify cognitive and physical deficits and behaviors that affect risk of falls    -  [place] fall precautions as indicated by assessment   - Educate patient/family on patient safety including physical limitations  - Instruct patient to call for assistance with activity based on assessment  - Modify environment to reduce risk of injury  - Consider OT/PT consult to assist with strengthening/mobility  7/22/2019 0746 by Chandrakant Chua RN  Outcome: Progressing  7/22/2019 0746 by Chandrakant Chua RN  Outcome: Progressing Problem: Nutrition/Hydration-ADULT  Goal: Nutrient/Hydration intake appropriate for improving, restoring or maintaining nutritional needs  Description  Monitor and assess patient's nutrition/hydration status for malnutrition (ex- brittle hair, bruises, dry skin, pale skin and conjunctiva, muscle wasting, smooth red tongue, and disorientation)  Collaborate with interdisciplinary team and initiate plan and interventions as ordered  Monitor patient's weight and dietary intake as ordered or per policy  Utilize nutrition screening tool and intervene per policy  Determine patient's food preferences and provide high-protein, high-caloric foods as appropriate  INTERVENTIONS:  - Monitor oral intake, urinary output, labs, and treatment plans  - Assess nutrition and hydration status and recommend course of action  - Evaluate amount of meals eaten  - Assist patient with eating if necessary   - Allow adequate time for meals  - Recommend/ encourage appropriate diets, oral nutritional supplements, and vitamin/mineral supplements  - Order, calculate, and assess calorie counts as needed  - Recommend, monitor, and adjust tube feedings and TPN/PPN based on assessed needs  - Assess need for intravenous fluids  - Provide specific nutrition/hydration education as appropriate  - Include patient/family/caregiver in decisions related to nutrition  7/22/2019 0746 by Montse Portillo RN  Outcome: Progressing  7/22/2019 0746 by Montse Portillo RN  Outcome: Progressing     Problem: BEHAVIOR  Goal: Pt/Family maintain appropriate behavior and adhere to behavioral management agreement, if implemented  Description  INTERVENTIONS:  - Assess the family dynamic   - Encourage verbalization of thoughts and concerns in a socially appropriate manner  - Assess patient/family's coping skills and non-compliant behavior (including use of illegal substances)    - Utilize positive, consistent limit setting strategies supporting safety of patient, staff and others  - Initiate consult with Case Management, Spiritual Care or other ancillary services as appropriate  - If a patient's/visitor's behavior jeopardizes the safety of the patient, staff, or others, refer to organization procedure     - Notify Security of behavior or suspected illegal substances which indicate the need for search of the patient and/or belongings  - Encourage participation in the decision making process about a behavioral management agreement; implement if patient meets criteria  7/22/2019 0746 by Lorelei Valle RN  Outcome: Progressing  7/22/2019 0746 by Lorelei Valle RN  Outcome: Progressing     Problem: Prexisting or High Potential for Compromised Skin Integrity  Goal: Skin integrity is maintained or improved  Description  INTERVENTIONS:  - Identify patients at risk for skin breakdown  - Assess and monitor skin integrity  - Assess and monitor nutrition and hydration status  - Monitor labs (i e  albumin)  - Assess for incontinence   - Turn and reposition patient  - Assist with mobility/ambulation  - Relieve pressure over bony prominences  - Avoid friction and shearing  - Provide appropriate hygiene as needed including keeping skin clean and dry  - Evaluate need for skin moisturizer/barrier cream  - Collaborate with interdisciplinary team (i e  Nutrition, Rehabilitation, etc )   - Patient/family teaching  7/22/2019 0746 by Lorelei Valle RN  Outcome: Progressing  7/22/2019 0746 by Lorelei Valle RN  Outcome: Progressing

## 2019-07-22 NOTE — SOCIAL WORK
This writer spoke with Sharmin Rodriguez from [address] [place] [address] ([phone]; fax: [phone]) who reports she will be able to take on the case for guardianship, psych eval for competency will need to be completed and expert report will also need to be completed [employer] aware  Guardianship hearing is scheduled on 7/30/19

## 2019-07-23 LAB
ALBUMIN SERPL BCP-MCNC: 3.6 G/DL (ref 3.5–5)
ALP SERPL-CCNC: 74 U/L (ref 46–116)
ALT SERPL W P-5'-P-CCNC: 59 U/L (ref 12–78)
ANION GAP SERPL CALCULATED.3IONS-SCNC: 4 MMOL/L (ref 4–13)
AST SERPL W P-5'-P-CCNC: 36 U/L (ref 5–45)
BILIRUB SERPL-MCNC: 0.25 MG/DL (ref 0.2–1)
BUN SERPL-MCNC: 22 MG/DL (ref 5–25)
CALCIUM SERPL-MCNC: 9.2 MG/DL (ref 8.3–10.1)
CHLORIDE SERPL-SCNC: 106 MMOL/L (ref 100–108)
CO2 SERPL-SCNC: 26 MMOL/L (ref 21–32)
CREAT SERPL-MCNC: 0.74 MG/DL (ref 0.6–1.3)
ERYTHROCYTE [DISTWIDTH] IN BLOOD BY AUTOMATED COUNT: 15.9 % (ref 11.6–15.1)
GFR SERPL CREATININE-BSD FRML MDRD: 82 ML/MIN/1.73SQ M
GLUCOSE SERPL-MCNC: 123 MG/DL (ref 65–140)
HCT VFR BLD AUTO: 34.1 % (ref 34.8–46.1)
HGB BLD-MCNC: 10.5 G/DL (ref 11.5–15.4)
MCH RBC QN AUTO: 26.6 PG (ref 26.8–34.3)
MCHC RBC AUTO-ENTMCNC: 30.8 G/DL (ref 31.4–37.4)
MCV RBC AUTO: 86 FL (ref 82–98)
PLATELET # BLD AUTO: 462 THOUSANDS/UL (ref 149–390)
PMV BLD AUTO: 9.8 FL (ref 8.9–12.7)
POTASSIUM SERPL-SCNC: 4 MMOL/L (ref 3.5–5.3)
PROT SERPL-MCNC: 7.4 G/DL (ref 6.4–8.2)
RBC # BLD AUTO: 3.95 MILLION/UL (ref 3.81–5.12)
SODIUM SERPL-SCNC: 136 MMOL/L (ref 136–145)
WBC # BLD AUTO: 5.36 THOUSAND/UL (ref 4.31–10.16)

## 2019-07-23 PROCEDURE — 80053 COMPREHEN METABOLIC PANEL: CPT | Performed by: PHYSICIAN ASSISTANT

## 2019-07-23 PROCEDURE — 97535 SELF CARE MNGMENT TRAINING: CPT

## 2019-07-23 PROCEDURE — 99222 1ST HOSP IP/OBS MODERATE 55: CPT | Performed by: PSYCHIATRY & NEUROLOGY

## 2019-07-23 PROCEDURE — 85027 COMPLETE CBC AUTOMATED: CPT | Performed by: PHYSICIAN ASSISTANT

## 2019-07-23 PROCEDURE — 97116 GAIT TRAINING THERAPY: CPT

## 2019-07-23 PROCEDURE — 97110 THERAPEUTIC EXERCISES: CPT

## 2019-07-23 PROCEDURE — 99024 POSTOP FOLLOW-UP VISIT: CPT | Performed by: NEUROLOGICAL SURGERY

## 2019-07-23 RX ORDER — LANOLIN ALCOHOL/MO/W.PET/CERES
3 CREAM (GRAM) TOPICAL
Status: COMPLETED | OUTPATIENT
Start: 2019-07-23 | End: 2019-07-23

## 2019-07-23 RX ADMIN — ACETAMINOPHEN 975 MG: 325 TABLET ORAL at 06:36

## 2019-07-23 RX ADMIN — ACETAMINOPHEN 975 MG: 325 TABLET ORAL at 23:00

## 2019-07-23 RX ADMIN — HEPARIN SODIUM 5000 UNITS: 5000 INJECTION INTRAVENOUS; SUBCUTANEOUS at 06:37

## 2019-07-23 RX ADMIN — SENNOSIDES 8.6 MG: 8.6 TABLET, FILM COATED ORAL at 21:02

## 2019-07-23 RX ADMIN — ACETAMINOPHEN 975 MG: 325 TABLET ORAL at 15:55

## 2019-07-23 RX ADMIN — HEPARIN SODIUM 5000 UNITS: 5000 INJECTION INTRAVENOUS; SUBCUTANEOUS at 15:56

## 2019-07-23 RX ADMIN — MELATONIN 3 MG: 3 TAB ORAL at 21:01

## 2019-07-23 RX ADMIN — OXYCODONE HYDROCHLORIDE 5 MG: 5 TABLET ORAL at 21:01

## 2019-07-23 RX ADMIN — DOCUSATE SODIUM 100 MG: 100 CAPSULE, LIQUID FILLED ORAL at 17:18

## 2019-07-23 RX ADMIN — HEPARIN SODIUM 5000 UNITS: 5000 INJECTION INTRAVENOUS; SUBCUTANEOUS at 23:00

## 2019-07-23 RX ADMIN — NICOTINE 14 MG: 14 PATCH TRANSDERMAL at 09:57

## 2019-07-23 RX ADMIN — DOCUSATE SODIUM 100 MG: 100 CAPSULE, LIQUID FILLED ORAL at 09:57

## 2019-07-23 RX ADMIN — AMLODIPINE BESYLATE 5 MG: 5 TABLET ORAL at 09:57

## 2019-07-23 NOTE — PROGRESS NOTES
Progress Note Mayra Late 1945, 72 y o  female MRN: [id number]    Unit/Bed#: [facility] 603-01 Encounter: [id number]    Primary Care Provider: Dale Perera MD   Date and time admitted to hospital: 7/17/2019  7:11 AM        Lumbar back pain with radiculopathy affecting right lower extremity  Assessment & Plan  POD#6 Left L4-5 hemilaminectomy and bilateral foraminotomy and Minimally invasive transforaminal lumbar interbody fusion with pedicle screw and myke fixation fusion L4-5, right-sided approach; minimally invasive right L5-S1 foraminotomy and diskectomy (N/A)  · Imaging reviewed personally and with attending, results are as follows:  · XR spine lumbar 7/18/2019:  Unremarkable appearance of posterior fusion L4-5  Grade 1 anterolisthesis at this level again noted  · Patient remains quite confused and encephalopathic  · Geriatrics recommends Zyprexa 2 5 mg q8h PRN as chemical restraint for last resort  · Requires guardianship  Court date for temporary guardianship next week   · Patient continues with confusion/agitation  Placed on 1:1    · Mobilize as tolerated with assistant, PT / OT recommend rehab  Ambulating well frequently with rolling walker  · DVT ppx: SCDs and heparin  · Neurosurgery will continue to follow as primary  Please call with questions or concerns  * Spinal stenosis of lumbar region with neurogenic claudication  Assessment & Plan  · See plan above    Cognitive impairment  Assessment & Plan  · Patient deemed to have no capacity back in January 2019  · Neuropsychology consulted, appreciate input  · At this time, patient continues to not have capacity to make informed medical decisions    Delirium  Assessment & Plan  · Geriatrics and psych consulted        Subjective/Objective   Chief Complaint: follow up on MIS L4-5 with interbody fusion     Subjective: patient is talking psychiatric residents  Speech is tangential  She admits to the surgery not working   She states she still has pain in her low back and sometimes she feels like her legs are going to "crumble" and she goes "right towards the wall"  Objective: curled up on the chair, NAD  I/O       07/21 0701 - 07/22 0700 07/22 0701 - 07/23 0700 07/23 0701 - 07/24 0700    P  O  342 1300     Total Intake(mL/kg) 342 (5 9) 1300 (22 4)     Net +342 +1300            Unmeasured Urine Occurrence 4 x 3 x     Unmeasured Stool Occurrence 2 x            Invasive Devices     None                 Physical Exam:  Vitals: Blood pressure 140/87, pulse 92, temperature 98 8 °F (37 1 °C), temperature source Oral, resp  rate 18, height 5' 1 5" (1 562 m), weight 58 1 kg (128 lb 1 4 oz), SpO2 98 %  ,Body mass index is 23 81 kg/m²  General appearance: alert, appears stated age, cooperative and no distress  Head: Normocephalic, without obvious abnormality, atraumatic  Back: lumbar incisions well approximated  On left incision steri-strips poorly in place and falling off  Lungs: non labored breathing  Heart: regular heart rate  Neurologic:   Mental status: Alert, oriented x self, location, thought content inappropriate  Speech is tangential  Cranial nerves: grossly intact (Cranial nerves II-XII)  Facial symmetry at rest and with expression  Sensory: normal to LT in bilateral upper and lower extremities   DST intact  Motor: moving all extremities with weakness in BLE HF secondary to pain jolting in her back (3/5), otherwise 5/5  Reflexes: 2+ and symmetric  negative yost, negative clonus  Coordination: 3/3 JPS intact in bilateral hallux      Lab Results:  Results from last 7 days   Lab Units 07/18/19  0500   WBC Thousand/uL 10 56*   HEMOGLOBIN g/dL 9 4*   HEMATOCRIT % 31 3*   PLATELETS Thousands/uL 337  337     Results from last 7 days   Lab Units 07/18/19  0500 07/17/19  1407   POTASSIUM mmol/L 3 8 4 6   CHLORIDE mmol/L 113* 114*   CO2 mmol/L 18* 19*   BUN mg/dL 19 20   CREATININE mg/dL 0 88 0 98   CALCIUM mg/dL 8 3 8 0*     Results from last 7 days Lab Units 07/17/19  1407   MAGNESIUM mg/dL 2 2             No results found for: [place]  ABG:No results found for: PHART, CNU3UBF, PO2ART, NDT5TUO, F5NAOJEB, BEART, SOURCE    Imaging Studies: I have personally reviewed pertinent reports  and I have personally reviewed pertinent films in PACS  XR spine lumbar 2 or 3 views injury   Final Result      Unremarkable appearance of posterior fusion L4-5  Grade 1 anterolisthesis at this level again noted  Workstation performed: [id number]               EKG, Pathology, and Other Studies: I have personally reviewed pertinent reports        VTE  Prophylaxis: Sequential compression device (Venodyne)  and Heparin

## 2019-07-23 NOTE — PLAN OF CARE
Problem: PHYSICAL THERAPY ADULT  Goal: Performs mobility at highest level of function for planned discharge setting  See evaluation for individualized goals  Description  Treatment/Interventions: Functional transfer training, LE strengthening/ROM, Elevations, Therapeutic exercise, Endurance training, Patient/family training, Equipment eval/education, Bed mobility, Gait training, Spoke to nursing, Spoke to case management, OT, Family  Equipment Recommended: Walker(RW)       See flowsheet documentation for full assessment, interventions and recommendations  Outcome: Progressing  Note:   Prognosis: Fair  Problem List: Decreased strength, Decreased endurance, Impaired balance, Decreased coordination, Decreased cognition, Impaired judgement, Decreased safety awareness, Pain  Assessment: Patient seated in bed side recliner, agreeable to participate in therapy  She was able to ambualte with use of rollator in hallways with redirection to task and cueing for obstacle avoidance while talking  She was able to increase ambulation distance with no seated rest breaks  She would continue to benefit from skilled PT to maximize functional independence  Barriers to Discharge: Inaccessible home environment     Recommendation: Short-term skilled PT     PT - OK to Discharge: Yes(to rehab when medically stable)    See flowsheet documentation for full assessment

## 2019-07-23 NOTE — SOCIAL WORK
Cm reviewed patient during care coordination rounds  Cm continues to work on d/c plan  Patient has scheduled guardianship hearing for next Tuesday, 7/30  Cm also awaiting [place] review for Options process  Capacity eval to be completed by Psych physician per request of [facility]

## 2019-07-23 NOTE — PLAN OF CARE
Problem: OCCUPATIONAL THERAPY ADULT  Goal: Performs self-care activities at highest level of function for planned discharge setting  See evaluation for individualized goals  Description  Treatment Interventions: ADL retraining, Functional transfer training, Endurance training, Cognitive reorientation, Patient/family training, Equipment evaluation/education, Compensatory technique education, Activityengagement, Energy conservation          See flowsheet documentation for full assessment, interventions and recommendations  Outcome: Progressing  Note:   Limitation: Decreased ADL status, Decreased Safe judgement during ADL, Decreased cognition, Decreased endurance, Decreased self-care trans, Decreased high-level ADLs, Mood limitation  Prognosis: Good, Guarded  Assessment: pt participated in pm ot session and was seen focusing on light homemaking  pt requried asst for rw management and safety  pt did not have darius lob or unsteadiness  pt did sit abruptly when her pain incrased needing to sit eob during laundry task  pt with poor safety and carryover of fairly new device rollator walker  plan to cont on goals from i e     Recommendation: ([address]?)  OT Discharge Recommendation: Short Term Rehab  OT - OK to Discharge:  Yes     April A JAIRO Steward
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
## 2019-07-23 NOTE — OCCUPATIONAL THERAPY NOTE
Occupational Therapy Treatment Note:       07/23/19 8621   Restrictions/Precautions   Other Precautions Cognitive;1:1;Fall Risk;Spinal precautions   Pain Assessment   Pain Assessment 0-10   Pain Score 4  (pain when standing for greater than 15 minutes)   Light Housekeeping   Light Housekeeping Level Walker   Light Housekeeping Level of Assistance Minimum assistance   Light Housekeeping pt was able to stand and apply pillowcases onto 2 pillows, fold 3 towels and a small bed sheet  pt was noted to take 1 self initaited rest break on the eob  pty is distractible and tangential in storytelling, re telling same storry to ot x 4 this session  thus requireing cues to progress through task requested of her and for safety with rollator  no darius lob with reaching into closet  incrased pain with b ue reach to top self in closet   Functional Standing Tolerance   Time f balance during dynamic laundry activity tolerating 20 min of standing   Transfers   Sit to Stand 5  Supervision   Stand to Sit 5  Supervision   Stand pivot 5  Supervision   Additional Comments vc's and asst to position rw in proper position during homemaking / closet negotiation   Functional Mobility   Functional Mobility 4  Minimal assistance   Additional items Rolling walker   Cognition   Overall Cognitive Status Impaired   Arousal/Participation Alert   Attention Difficulty attending to directions   Memory Decreased recall of precautions;Decreased recall of recent events;Decreased short term memory   Following Commands Follows one step commands with increased time or repetition   Activity Tolerance   Activity Tolerance Patient tolerated treatment well   Assessment   Assessment pt participated in pm ot session and was seen focusing on light homemaking  pt requried asst for rw management and safety  pt did not have darius lob or unsteadiness  pt did sit abruptly when her pain incrased needing to sit eob during laundry task   pt with poor safety and carryover of fairly new device rollator walker  plan to cont on goals from i e      Plan   Treatment Interventions ADL retraining;Functional transfer training; Activityengagement;Patient/family training;Equipment evaluation/education   Goal Expiration Date 08/01/19   Treatment Day 2   OT Frequency 3-5x/wk   Recommendation   OT Discharge Recommendation Short Term Rehab   Barthel Index   Feeding 5   Bathing 0   Grooming Score 0   Dressing Score 5   Bladder Score 10   Bowels Score 10   Toilet Use Score 5   Transfers (Bed/Chair) Score 10   Mobility (Level Surface) Score 10   Stairs Score 0   Barthel Index Score 55   April A JAIRO Steward

## 2019-07-23 NOTE — ASSESSMENT & PLAN NOTE
· Patient deemed to have no capacity back in January 2019  · Neuropsychology consulted, appreciate input  · At this time, patient continues to not have capacity to make informed medical decisions

## 2019-07-23 NOTE — ASSESSMENT & PLAN NOTE
POD#6 Left L4-5 hemilaminectomy and bilateral foraminotomy and Minimally invasive transforaminal lumbar interbody fusion with pedicle screw and myke fixation fusion L4-5, right-sided approach; minimally invasive right L5-S1 foraminotomy and diskectomy (N/A)  · Imaging reviewed personally and with attending, results are as follows:  · XR spine lumbar 7/18/2019:  Unremarkable appearance of posterior fusion L4-5  Grade 1 anterolisthesis at this level again noted  · Patient remains quite confused and encephalopathic  · Geriatrics recommends Zyprexa 2 5 mg q8h PRN as chemical restraint for last resort  · Requires guardianship  Court date for temporary guardianship next week   · Patient continues with confusion/agitation  Placed on 1:1    · Mobilize as tolerated with assistant, PT / OT recommend rehab  Ambulating well frequently with rolling walker  · DVT ppx: SCDs and heparin  · Neurosurgery will continue to follow as primary  Please call with questions or concerns

## 2019-07-23 NOTE — PHYSICAL THERAPY NOTE
Physical Therapy Progress Note        07/23/19 0827   Pain Assessment   Pain Assessment No/denies pain   Pain Score 4  (with mobility)   Hospital Pain Intervention(s) Ambulation/increased activity;Repositioned   Response to Interventions Tolerated   Restrictions/Precautions   Weight Bearing Precautions Per Order No   Other Precautions Cognitive; Chair Alarm;1:1;Fall Risk   General   Chart Reviewed Yes   Family/Caregiver Present No  (1:1 present)   Cognition   Overall Cognitive Status Impaired   Arousal/Participation Alert   Comments Patient agitated intermittently throughout session, but engages in conversation   Transfers   Sit to Stand 4  Minimal assistance   Additional items Increased time required   Stand to Sit 4  Minimal assistance   Additional items Increased time required   Ambulation/Elevation   Gait pattern Excessively slow; Inconsistent jeffrey; Forward Flexion   Gait Assistance 4  Minimal assist   Additional items Assist x 1   Assistive Device Rollabout   Distance 360 feet with standing rest breaks   Balance   Static Sitting Good   Dynamic Sitting Fair +   Static Standing Fair   Endurance Deficit   Endurance Deficit Yes   Activity Tolerance   Activity Tolerance Patient tolerated treatment well   Nurse Made Aware Appropriate to see per RN   Exercises   Hip Flexion Sitting;15 reps;AROM; Bilateral   Hip Abduction Sitting;15 reps;AROM; Bilateral   Knee AROM Long Arc Quad Sitting;15 reps;AROM; Bilateral   Ankle Pumps Sitting;15 reps;AROM; Bilateral   Marching Standing;10 reps;AROM; Bilateral   Assessment   Prognosis Fair   Problem List Decreased strength;Decreased endurance; Impaired balance;Decreased coordination;Decreased cognition; Impaired judgement;Decreased safety awareness;Pain   Assessment Patient seated in bed side recliner, agreeable to participate in therapy  She was able to ambualte with use of rollator in hallways with redirection to task and cueing for obstacle avoidance while talking   She was able to increase ambulation distance with no seated rest breaks  She would continue to benefit from skilled PT to maximize functional independence  Barriers to Discharge Inaccessible home environment   Goals   Patient Goals To go home   STG Expiration Date 07/28/19   Treatment Day 2   Plan   Treatment/Interventions Functional transfer training;LE strengthening/ROM; Therapeutic exercise; Endurance training;Gait training;Spoke to nursing   Progress Progressing toward goals   PT Frequency   (3-6x a week)   Recommendation   Recommendation Short-term skilled PT   Equipment Recommended Walker  (RW)   PT - OK to Discharge Yes  (to rehab when medically stable)     Xuan Paz, PTA

## 2019-07-24 PROCEDURE — 97110 THERAPEUTIC EXERCISES: CPT

## 2019-07-24 PROCEDURE — 99024 POSTOP FOLLOW-UP VISIT: CPT | Performed by: NURSE PRACTITIONER

## 2019-07-24 PROCEDURE — 97116 GAIT TRAINING THERAPY: CPT

## 2019-07-24 RX ADMIN — ACETAMINOPHEN 975 MG: 325 TABLET ORAL at 13:07

## 2019-07-24 RX ADMIN — SENNOSIDES 8.6 MG: 8.6 TABLET, FILM COATED ORAL at 21:08

## 2019-07-24 RX ADMIN — ACETAMINOPHEN 975 MG: 325 TABLET ORAL at 07:26

## 2019-07-24 RX ADMIN — ACETAMINOPHEN 975 MG: 325 TABLET ORAL at 21:08

## 2019-07-24 RX ADMIN — HEPARIN SODIUM 5000 UNITS: 5000 INJECTION INTRAVENOUS; SUBCUTANEOUS at 13:07

## 2019-07-24 RX ADMIN — OXYCODONE HYDROCHLORIDE 5 MG: 5 TABLET ORAL at 23:57

## 2019-07-24 RX ADMIN — HEPARIN SODIUM 5000 UNITS: 5000 INJECTION INTRAVENOUS; SUBCUTANEOUS at 07:25

## 2019-07-24 RX ADMIN — AMLODIPINE BESYLATE 5 MG: 5 TABLET ORAL at 08:30

## 2019-07-24 RX ADMIN — NICOTINE 14 MG: 14 PATCH TRANSDERMAL at 08:30

## 2019-07-24 RX ADMIN — DOCUSATE SODIUM 100 MG: 100 CAPSULE, LIQUID FILLED ORAL at 08:30

## 2019-07-24 RX ADMIN — DOCUSATE SODIUM 100 MG: 100 CAPSULE, LIQUID FILLED ORAL at 18:51

## 2019-07-24 RX ADMIN — HEPARIN SODIUM 5000 UNITS: 5000 INJECTION INTRAVENOUS; SUBCUTANEOUS at 21:08

## 2019-07-24 NOTE — PROGRESS NOTES
Progress Note Kelly Menon 1945, 72 y o  female MRN: [id number]    Unit/Bed#: [facility] 603-01 Encounter: [id number]    Primary Care Provider: Ailyn Mckee MD   Date and time admitted to hospital: 7/17/2019  7:11 AM        Delirium  Assessment & Plan  · Geriatrics, neuropsych, and psych consulted    Cognitive impairment  Assessment & Plan  · Patient deemed to have no capacity back in January 2019  · Neuropsychology consulted, appreciate input  · At this time, patient continues to not have capacity to make informed medical decisions  · Psychiatry consulted, determined that patient does not have decision-making capacity and needs guardianship  Lumbar back pain with radiculopathy affecting right lower extremity  Assessment & Plan  POD#7 Left L4-5 hemilaminectomy and bilateral foraminotomy and Minimally invasive transforaminal lumbar interbody fusion with pedicle screw and myke fixation fusion L4-5, right-sided approach; minimally invasive right L5-S1 foraminotomy and diskectomy (N/A)  · Imaging reviewed personally and with attending, results are as follows:  · XR spine lumbar 7/18/2019:  Unremarkable appearance of posterior fusion L4-5  Grade 1 anterolisthesis at this level again noted  · Patient remains quite confused and encephalopathic  · Geriatrics recommends Zyprexa 2 5 mg q8h PRN as chemical restraint for last resort  · Requires guardianship  Court date for temporary guardianship next week   · Patient continues with confusion/agitation  Placed on 1:1    · Mobilize as tolerated with assistant, PT / OT recommend rehab  Ambulating well frequently with rolling walker  · DVT ppx: SCDs and heparin  · Neurosurgery will continue to follow as primary  Please call with questions or concerns      * Spinal stenosis of lumbar region with neurogenic claudication  Assessment & Plan  · See plan above      Subjective/Objective   Chief Complaint: "I'm doing ok "    Subjective: Patient states her right leg feels "worse" than her left, states it is more painful/weak subjectively  Denies any other complaint of weakness or numbness  Objective: Patient with full strength to all extremities  Continues to be quite confused, encephalopathic with tangential thoughts  I/O       07/22 0701 - 07/23 0700 07/23 0701 - 07/24 0700 07/24 0701 - 07/25 0700    P  O  1300 740 360    Total Intake(mL/kg) 1300 (22 4) 740 (12 7) 360 (6 2)    Net +1300 +740 +360           Unmeasured Urine Occurrence 3 x 3 x           Invasive Devices     None                 Physical Exam:  Vitals: Blood pressure 131/74, pulse 97, temperature 97 5 °F (36 4 °C), resp  rate 16, height 5' 1 5" (1 562 m), weight 58 1 kg (128 lb 1 4 oz), SpO2 97 %  ,Body mass index is 23 81 kg/m²      General appearance: alert, appears stated age, cooperative and no distress  Head: Normocephalic, without obvious abnormality, atraumatic  Eyes: EOMI, PERRL  Neck: supple, symmetrical, trachea midline and NT  Back: no kyphosis present, tender to lower lumbar area  Lungs: non labored breathing  Heart: regular heart rate  Neurologic:   Mental status: Alert, oriented x1-2, thought content tangential  Cranial nerves: grossly intact (Cranial nerves II-XII)  Sensory: normal to light touch, JPS  Motor: moving all extremities without focal weakness, strength 5/5 throughout  Reflexes: 2+ and symmetric, no Wetzel's or clonus  Coordination: finger to nose normal bilaterally, no drift bilaterally      Lab Results:  Results from last 7 days   Lab Units 07/23/19  1123 07/18/19  0500   WBC Thousand/uL 5 36 10 56*   HEMOGLOBIN g/dL 10 5* 9 4*   HEMATOCRIT % 34 1* 31 3*   PLATELETS Thousands/uL 462* 337  337     Results from last 7 days   Lab Units 07/23/19  1123 07/18/19  0500 07/17/19  1407   POTASSIUM mmol/L 4 0 3 8 4 6   CHLORIDE mmol/L 106 113* 114*   CO2 mmol/L 26 18* 19*   BUN mg/dL 22 19 20   CREATININE mg/dL 0 74 0 88 0 98   CALCIUM mg/dL 9 2 8 3 8 0*   ALK PHOS U/L 74  --   --    ALT U/L 59 --   --    AST U/L 36  --   --      Results from last 7 days   Lab Units 07/17/19  1407   MAGNESIUM mg/dL 2 2             No results found for: TROPONINT  ABG:No results found for: PHART, XVV9CBF, PO2ART, OCO0DFA, V8QZPMKI, BEART, SOURCE    Imaging Studies: I have personally reviewed pertinent reports  and I have personally reviewed pertinent films in PACS    EKG, Pathology, and Other Studies: I have personally reviewed pertinent reports        VTE Pharmacologic Prophylaxis: Sequential compression device (Venodyne)  and Heparin    VTE Mechanical Prophylaxis: sequential compression device

## 2019-07-24 NOTE — PHYSICAL THERAPY NOTE
Physical Therapy Progress Note     19 1524   Pain Assessment   Pain Assessment 0-10   Pain Score 4   Pain Type Acute pain;Surgical pain   Pain Location Back   Pain Orientation Lower   Restrictions/Precautions   Weight Bearing Precautions Per Order No   Other Precautions 1:1;Cognitive; Fall Risk;Spinal precautions   General   Chart Reviewed Yes   Response to Previous Treatment Patient with no complaints from previous session  Family/Caregiver Present Yes   Cognition   Overall Cognitive Status Impaired   Arousal/Participation Alert   Attention Difficulty attending to directions   Orientation Level Oriented to person;Oriented to place; Disoriented to time;Oriented to situation   Memory Decreased recall of precautions;Decreased recall of recent events;Decreased short term memory   Following Commands Follows one step commands with increased time or repetition   Comments Pt was identified by name and , agreeable to treatment  Transfers   Sit to Stand 5  Supervision   Additional items Increased time required   Stand to Sit 5  Supervision   Additional items Increased time required   Stand pivot 5  Supervision   Additional items Increased time required   Ambulation/Elevation   Gait pattern Excessively slow; Inconsistent jeffrey; Forward Flexion   Gait Assistance 4  Minimal assist   Additional items Assist x 1   Assistive Device Rollabout   Distance 70'x1, 360'x1   Stair Management Assistance 4  Minimal assist   Additional items Assist x 1;Verbal cues; Tactile cues; Increased time required   Stair Management Technique One rail L;Alternating pattern; Foreward   Number of Stairs 21   Balance   Static Sitting Good   Dynamic Sitting Fair +   Static Standing Fair   Ambulatory Fair -   Endurance Deficit   Endurance Deficit Yes   Endurance Deficit Description fatigue and pain   Activity Tolerance   Activity Tolerance Patient tolerated treatment well   Nurse Made Aware yes, ok to see   Exercises   Hip Flexion Sitting;20 reps;AROM; Bilateral   Knee AROM Long Arc Quad Sitting;20 reps;AROM; Bilateral   Ankle Pumps Sitting;20 reps;AROM; Bilateral   Assessment   Prognosis Fair   Problem List Decreased strength;Decreased endurance; Impaired balance;Decreased coordination;Decreased cognition; Impaired judgement;Decreased safety awareness;Pain   Assessment Pt was found seated in bedside chair with family memeber present  She noted that she has no pain at rest but 4/10 pain with ambulation  Pt was able to perform all xfers this session with good tolerance  She was givin verbal cuing to stay on the task at hand since she tended to sidetrack at times  Pt was able to perform increased ambulation distances this session compared to LV  She was also instructed on and performed stair training with good tolerance needing minimal cuing for hand placement and to avoid pulling herself up  She and her family memeber felt comfortable with her doing this at home  Pt returned to the room where she performed all seated TE as charted  Pt had all needs met with muscular fatigue noted  She would benefit from contineud PT in order to promote safe and functional mobility  Barriers to Discharge Inaccessible home environment   Goals   Patient Goals To go home   STG Expiration Date 07/28/19   Short Term Goal #1 In 10 days pt will complete: 1) Bed mobility skills with S to increase safety and independence as well as decrease caregiver burden  2) Functional transfers with S to promote increased independence, safety, and QOL in the home environment  3) Ambulate 150' using least restrictive AD with S without LOB and stable vitals so that pt can negotiate home environment safely and promote independence with functional mobility and return to PLOF  4) Stair training up/ down 20 step/s using rail/s with S so that pt can enter/negotiate home environment safely and decrease fall risk   5) Improve balance grades to Good to increase safety with all mobility and decrease fall risk  6) Improve BLE strength by 1/2 grade to help increase overall functional mobility and decrease fall risk  7) PT for ongoing pt and family education; DME needs and D/C planning to promote highest level of function in least restrictive environment  Treatment Day 3   Plan   Treatment/Interventions Functional transfer training;LE strengthening/ROM; Elevations; Therapeutic exercise; Endurance training;Patient/family training;Equipment eval/education; Bed mobility;Gait training;Spoke to nursing;Family   Progress Progressing toward goals   PT Frequency Other (Comment)  (3-6x/wk)   Recommendation   Recommendation Short-term skilled PT   Equipment Recommended Walker  (RW)   PT - OK to Discharge Yes  (to rehab when medically stable)     Tristen Huitron, PTA

## 2019-07-24 NOTE — SOCIAL WORK
Cm reviewed patient during care coordination rounds  Cm continues to work on discharge planning  Guardianship hearing, Options letter are pending  Facilities continue to follow but will need Options letter and identity of Guardian

## 2019-07-24 NOTE — PLAN OF CARE
Problem: PAIN - ADULT  Goal: Verbalizes/displays adequate comfort level or baseline comfort level  Description  Interventions:  - Encourage patient to monitor pain and request assistance  - Assess pain using appropriate pain scale  - Administer analgesics based on type and severity of pain and evaluate response  - Implement non-pharmacological measures as appropriate and evaluate response  - Consider cultural and social influences on pain and pain management  - Notify physician/advanced practitioner if interventions unsuccessful or patient reports new pain  Outcome: Progressing     Problem: INFECTION - ADULT  Goal: Absence or prevention of progression during hospitalization  Description  INTERVENTIONS:  - Assess and monitor for signs and symptoms of infection  - Monitor lab/diagnostic results  - Monitor all insertion sites, i e  indwelling lines, tubes, and drains  - Monitor endotracheal (as able) and nasal secretions for changes in amount and color  - [place] appropriate cooling/warming therapies per order  - Administer medications as ordered  - Instruct and encourage patient and family to use good hand hygiene technique  - Identify and instruct in appropriate isolation precautions for identified infection/condition  Outcome: Progressing     Problem: SAFETY ADULT  Goal: Patient will remain free of falls  Description  INTERVENTIONS:  - Assess patient frequently for physical needs  -  Identify cognitive and physical deficits and behaviors that affect risk of falls    -  [place] fall precautions as indicated by assessment   - Educate patient/family on patient safety including physical limitations  - Instruct patient to call for assistance with activity based on assessment  - Modify environment to reduce risk of injury  - Consider OT/PT consult to assist with strengthening/mobility  Outcome: Progressing  Goal: Maintain or return to baseline ADL function  Description  INTERVENTIONS:  -  Assess patient's ability to carry out ADLs; assess patient's baseline for ADL function and identify physical deficits which impact ability to perform ADLs (bathing, care of mouth/teeth, toileting, grooming, dressing, etc )  - Assess/evaluate cause of self-care deficits   - Assess range of motion  - Assess patient's mobility; develop plan if impaired  - Assess patient's need for assistive devices and provide as appropriate  - Encourage maximum independence but intervene and supervise when necessary  ¯ Involve family in performance of ADLs  ¯ Assess for home care needs following discharge   ¯ Request OT consult to assist with ADL evaluation and planning for discharge  ¯ Provide patient education as appropriate  Outcome: Progressing  Goal: Maintain or return mobility status to optimal level  Description  INTERVENTIONS:  - Assess patient's baseline mobility status (ambulation, transfers, stairs, etc )    - Identify cognitive and physical deficits and behaviors that affect mobility  - Identify mobility aids required to assist with transfers and/or ambulation (gait belt, sit-to-stand, lift, walker, cane, etc )  - [place] fall precautions as indicated by assessment  - Record patient progress and toleration of activity level on Mobility SBAR; progress patient to next Phase/Stage  - Instruct patient to call for assistance with activity based on assessment  - Request Rehabilitation consult to assist with strengthening/weightbearing, etc   Outcome: Progressing     Problem: DISCHARGE PLANNING  Goal: Discharge to home or other facility with appropriate resources  Description  INTERVENTIONS:  - Identify barriers to discharge w/patient and caregiver  - Arrange for needed discharge resources and transportation as appropriate  - Identify discharge learning needs (meds, wound care, etc )  - Arrange for interpretive services to assist at discharge as needed  - Refer to Case Management Department for coordinating discharge planning if the patient needs post-hospital services based on physician/advanced practitioner order or complex needs related to functional status, cognitive ability, or social support system  Outcome: Progressing     Problem: Knowledge Deficit  Goal: Patient/family/caregiver demonstrates understanding of disease process, treatment plan, medications, and discharge instructions  Description  Complete learning assessment and assess knowledge base  Interventions:  - Provide teaching at level of understanding  - Provide teaching via preferred learning methods  Outcome: Progressing     Problem: Potential for Falls  Goal: Patient will remain free of falls  Description  INTERVENTIONS:  - Assess patient frequently for physical needs  -  Identify cognitive and physical deficits and behaviors that affect risk of falls  -  [place] fall precautions as indicated by assessment   - Educate patient/family on patient safety including physical limitations  - Instruct patient to call for assistance with activity based on assessment  - Modify environment to reduce risk of injury  - Consider OT/PT consult to assist with strengthening/mobility  Outcome: Progressing     Problem: Nutrition/Hydration-ADULT  Goal: Nutrient/Hydration intake appropriate for improving, restoring or maintaining nutritional needs  Description  Monitor and assess patient's nutrition/hydration status for malnutrition (ex- brittle hair, bruises, dry skin, pale skin and conjunctiva, muscle wasting, smooth red tongue, and disorientation)  Collaborate with interdisciplinary team and initiate plan and interventions as ordered  Monitor patient's weight and dietary intake as ordered or per policy  Utilize nutrition screening tool and intervene per policy  Determine patient's food preferences and provide high-protein, high-caloric foods as appropriate       INTERVENTIONS:  - Monitor oral intake, urinary output, labs, and treatment plans  - Assess nutrition and hydration status and recommend course of action  - Evaluate amount of meals eaten  - Assist patient with eating if necessary   - Allow adequate time for meals  - Recommend/ encourage appropriate diets, oral nutritional supplements, and vitamin/mineral supplements  - Order, calculate, and assess calorie counts as needed  - Recommend, monitor, and adjust tube feedings and TPN/PPN based on assessed needs  - Assess need for intravenous fluids  - Provide specific nutrition/hydration education as appropriate  - Include patient/family/caregiver in decisions related to nutrition  Outcome: Progressing     Problem: BEHAVIOR  Goal: Pt/Family maintain appropriate behavior and adhere to behavioral management agreement, if implemented  Description  INTERVENTIONS:  - Assess the family dynamic   - Encourage verbalization of thoughts and concerns in a socially appropriate manner  - Assess patient/family's coping skills and non-compliant behavior (including use of illegal substances)  - Utilize positive, consistent limit setting strategies supporting safety of patient, staff and others  - Initiate consult with Case Management, Spiritual Care or other ancillary services as appropriate  - If a patient's/visitor's behavior jeopardizes the safety of the patient, staff, or others, refer to organization procedure     - Notify Security of behavior or suspected illegal substances which indicate the need for search of the patient and/or belongings  - Encourage participation in the decision making process about a behavioral management agreement; implement if patient meets criteria  Outcome: Progressing     Problem: Prexisting or High Potential for Compromised Skin Integrity  Goal: Skin integrity is maintained or improved  Description  INTERVENTIONS:  - Identify patients at risk for skin breakdown  - Assess and monitor skin integrity  - Assess and monitor nutrition and hydration status  - Monitor labs (i e  albumin)  - Assess for incontinence   - Turn and reposition patient  - Assist with mobility/ambulation  - Relieve pressure over bony prominences  - Avoid friction and shearing  - Provide appropriate hygiene as needed including keeping skin clean and dry  - Evaluate need for skin moisturizer/barrier cream  - Collaborate with interdisciplinary team (i e  Nutrition, Rehabilitation, etc )   - Patient/family teaching  Outcome: Progressing

## 2019-07-24 NOTE — PLAN OF CARE
Problem: PHYSICAL THERAPY ADULT  Goal: Performs mobility at highest level of function for planned discharge setting  See evaluation for individualized goals  Description  Treatment/Interventions: Functional transfer training, LE strengthening/ROM, Elevations, Therapeutic exercise, Endurance training, Patient/family training, Equipment eval/education, Bed mobility, Gait training, Spoke to nursing, Spoke to case management, OT, Family  Equipment Recommended: Walker(RW)       See flowsheet documentation for full assessment, interventions and recommendations  Outcome: Progressing  Note:   Prognosis: Fair  Problem List: Decreased strength, Decreased endurance, Impaired balance, Decreased coordination, Decreased cognition, Impaired judgement, Decreased safety awareness, Pain  Assessment: Pt was found seated in bedside chair with family memeber present  She noted that she has no pain at rest but 4/10 pain with ambulation  Pt was able to perform all xfers this session with good tolerance  She was givin verbal cuing to stay on the task at hand since she tended to sidetrack at times  Pt was able to perform increased ambulation distances this session compared to LV  She was also instructed on and performed stair training with good tolerance needing minimal cuing for hand placement and to avoid pulling herself up  She and her family memeber felt comfortable with her doing this at home  Pt returned to the room where she performed all seated TE as charted  Pt had all needs met with muscular fatigue noted  She would benefit from contineud PT in order to promote safe and functional mobility  Barriers to Discharge: Inaccessible home environment     Recommendation: Short-term skilled PT     PT - OK to Discharge: Yes(to rehab when medically stable)    See flowsheet documentation for full assessment

## 2019-07-24 NOTE — ASSESSMENT & PLAN NOTE
POD#7 Left L4-5 hemilaminectomy and bilateral foraminotomy and Minimally invasive transforaminal lumbar interbody fusion with pedicle screw and myke fixation fusion L4-5, right-sided approach; minimally invasive right L5-S1 foraminotomy and diskectomy (N/A)  · Imaging reviewed personally and with attending, results are as follows:  · XR spine lumbar 7/18/2019:  Unremarkable appearance of posterior fusion L4-5  Grade 1 anterolisthesis at this level again noted  · Patient remains quite confused and encephalopathic  · Geriatrics recommends Zyprexa 2 5 mg q8h PRN as chemical restraint for last resort  · Requires guardianship  Court date for temporary guardianship next week   · Patient continues with confusion/agitation  Placed on 1:1    · Mobilize as tolerated with assistant, PT / OT recommend rehab  Ambulating well frequently with rolling walker  · DVT ppx: SCDs and heparin  · Neurosurgery will continue to follow as primary  Please call with questions or concerns

## 2019-07-24 NOTE — ASSESSMENT & PLAN NOTE
· Patient deemed to have no capacity back in January 2019  · Neuropsychology consulted, appreciate input  · At this time, patient continues to not have capacity to make informed medical decisions  · Psychiatry consulted, determined that patient does not have decision-making capacity and needs guardianship

## 2019-07-25 PROCEDURE — 99024 POSTOP FOLLOW-UP VISIT: CPT | Performed by: NURSE PRACTITIONER

## 2019-07-25 PROCEDURE — 99233 SBSQ HOSP IP/OBS HIGH 50: CPT | Performed by: INTERNAL MEDICINE

## 2019-07-25 RX ADMIN — OXYCODONE HYDROCHLORIDE 2.5 MG: 5 TABLET ORAL at 20:16

## 2019-07-25 RX ADMIN — DOCUSATE SODIUM 100 MG: 100 CAPSULE, LIQUID FILLED ORAL at 17:27

## 2019-07-25 RX ADMIN — SENNOSIDES 8.6 MG: 8.6 TABLET, FILM COATED ORAL at 22:34

## 2019-07-25 RX ADMIN — HEPARIN SODIUM 5000 UNITS: 5000 INJECTION INTRAVENOUS; SUBCUTANEOUS at 07:22

## 2019-07-25 RX ADMIN — HEPARIN SODIUM 5000 UNITS: 5000 INJECTION INTRAVENOUS; SUBCUTANEOUS at 13:53

## 2019-07-25 RX ADMIN — DOCUSATE SODIUM 100 MG: 100 CAPSULE, LIQUID FILLED ORAL at 08:29

## 2019-07-25 RX ADMIN — NICOTINE 14 MG: 14 PATCH TRANSDERMAL at 08:30

## 2019-07-25 RX ADMIN — OXYCODONE HYDROCHLORIDE 5 MG: 5 TABLET ORAL at 11:49

## 2019-07-25 RX ADMIN — ACETAMINOPHEN 975 MG: 325 TABLET ORAL at 07:21

## 2019-07-25 RX ADMIN — HEPARIN SODIUM 5000 UNITS: 5000 INJECTION INTRAVENOUS; SUBCUTANEOUS at 22:34

## 2019-07-25 RX ADMIN — AMLODIPINE BESYLATE 5 MG: 5 TABLET ORAL at 08:29

## 2019-07-25 RX ADMIN — ACETAMINOPHEN 975 MG: 325 TABLET ORAL at 13:53

## 2019-07-25 RX ADMIN — ACETAMINOPHEN 975 MG: 325 TABLET ORAL at 22:34

## 2019-07-25 NOTE — PROGRESS NOTES
Progress Note Renetta Meals 1945, 72 y o  female MRN: [id number]    Unit/Bed#: [facility] 603-01 Encounter: [id number]    Primary Care Provider: Marita Carrel, MD   Date and time admitted to hospital: 7/17/2019  7:11 AM        Delirium  Assessment & Plan  · Geriatrics, neuropsych, and psych consulted    Cognitive impairment  Assessment & Plan  · Patient deemed to have no capacity back in January 2019  · Neuropsychology consulted, appreciate input  · At this time, patient continues to not have capacity to make informed medical decisions  · Psychiatry consulted, determined that patient does not have decision-making capacity and needs guardianship  Lumbar back pain with radiculopathy affecting right lower extremity  Assessment & Plan  POD#8 Left L4-5 hemilaminectomy and bilateral foraminotomy and Minimally invasive transforaminal lumbar interbody fusion with pedicle screw and myke fixation fusion L4-5, right-sided approach; minimally invasive right L5-S1 foraminotomy and diskectomy (N/A)  · Imaging reviewed personally and with attending, results are as follows:  · XR spine lumbar 7/18/2019:  Unremarkable appearance of posterior fusion L4-5  Grade 1 anterolisthesis at this level again noted  · Patient remains quite confused and encephalopathic  · Geriatrics recommends Zyprexa 2 5 mg q8h PRN as chemical restraint for last resort  · Requires guardianship  Court date for temporary guardianship next Tuesday 7/30  · Patient continues with confusion/agitation  Placed on 1:1    · Mobilize as tolerated with assistant, PT / OT recommend rehab  Ambulating well frequently with rolling walker  · DVT ppx: SCDs and heparin  · Neurosurgery will continue to follow as primary  Please call with questions or concerns      * Spinal stenosis of lumbar region with neurogenic claudication  Assessment & Plan  · See plan above      Subjective/Objective   Chief Complaint: "I'm feeling ok "    Subjective: Patient resting comfortably sitting in bed  Continues to endorse some low back pain that occasionally travels down her legs  Denies weakness or numbness  Objective: Full strength in all extremities, 5/5 throughout  Remains confused as to why she is in the hospital     I/O       07/23 0701 - 07/24 0700 07/24 0701 - 07/25 0700 07/25 0701 - 07/26 0700    P  O  740 1200 360    Total Intake(mL/kg) 740 (12 7) 1200 (20 7) 360 (6 2)    Net +740 +1200 +360           Unmeasured Urine Occurrence 3 x 4 x           Invasive Devices     None                 Physical Exam:  Vitals: Blood pressure 131/76, pulse 88, temperature 98 3 °F (36 8 °C), temperature source Oral, resp  rate 17, height 5' 1 5" (1 562 m), weight 57 9 kg (127 lb 9 6 oz), SpO2 97 %  ,Body mass index is 23 72 kg/m²  General appearance: alert, appears stated age, cooperative and no distress  Head: Normocephalic, without obvious abnormality, atraumatic  Eyes: EOMI, PERRL, pupils 3 mm bilaterally  Neck: supple, symmetrical, trachea midline and NT  Back: no kyphosis present, mild tenderness to L2 - L3  Incision clean dry and intact    Lungs: non labored breathing  Heart: regular heart rate  Neurologic:   Mental status: Alert, oriented x1-2, thought content very tangential  Cranial nerves: grossly intact (Cranial nerves II-XII)  Sensory: normal to light touch  Motor: moving all extremities without focal weakness, strength 5/5  Reflexes: 2+ and symmetric, no Wetzel's or clonus  Coordination: finger to nose normal bilaterally, no drift bilaterally      Lab Results:  Results from last 7 days   Lab Units 07/23/19  1123   WBC Thousand/uL 5 36   HEMOGLOBIN g/dL 10 5*   HEMATOCRIT % 34 1*   PLATELETS Thousands/uL 462*     Results from last 7 days   Lab Units 07/23/19  1123   POTASSIUM mmol/L 4 0   CHLORIDE mmol/L 106   CO2 mmol/L 26   BUN mg/dL 22   CREATININE mg/dL 0 74   CALCIUM mg/dL 9 2   ALK PHOS U/L 74   ALT U/L 59   AST U/L 36                 No results found for: [address]   ABG:No results found for: PHART, GAG6YOO, PO2ART, XYJ5FZJ, V1VJPSBH, BEART, SOURCE    Imaging Studies: I have personally reviewed pertinent reports  and I have personally reviewed pertinent films in PACS    EKG, Pathology, and Other Studies: I have personally reviewed pertinent reports        VTE Pharmacologic Prophylaxis: Sequential compression device (Venodyne)  and Heparin    VTE Mechanical Prophylaxis: sequential compression device

## 2019-07-25 NOTE — SOCIAL WORK
Cm reviewed patient during care coordination rounds  Cm continues to work on discharge plan  Patient's guardianship hearing will be next Tuesday 7/30  Patient's Options assessment at hospital was completed yesterday  [place] awaiting finale Options letter  Cm awaiting assigned guardian contact

## 2019-07-25 NOTE — ASSESSMENT & PLAN NOTE
POD#8 Left L4-5 hemilaminectomy and bilateral foraminotomy and Minimally invasive transforaminal lumbar interbody fusion with pedicle screw and myke fixation fusion L4-5, right-sided approach; minimally invasive right L5-S1 foraminotomy and diskectomy (N/A)  · Imaging reviewed personally and with attending, results are as follows:  · XR spine lumbar 7/18/2019:  Unremarkable appearance of posterior fusion L4-5  Grade 1 anterolisthesis at this level again noted  · Patient remains quite confused and encephalopathic  · Geriatrics recommends Zyprexa 2 5 mg q8h PRN as chemical restraint for last resort  · Requires guardianship  Court date for temporary guardianship next Tuesday 7/30  · Patient continues with confusion/agitation  Placed on 1:1    · Mobilize as tolerated with assistant, PT / OT recommend rehab  Ambulating well frequently with rolling walker  · DVT ppx: SCDs and heparin  · Neurosurgery will continue to follow as primary  Please call with questions or concerns

## 2019-07-25 NOTE — UTILIZATION REVIEW
Elective Surgical Continued Stay Review    Date: 7/25   POD#: 8    Current Patient Class: Inpatient Current Level of Care: Med Surg    HPI:73 y o  female initially admitted on 7/19 elective surgery - Left L4-5 hemilaminectomy and bilateral foraminotomy and Minimally invasive transforaminal lumbar interbody fusion with pedicle screw and myke fixation fusion L4-5, right-sided approach; minimally invasive right L5-S1 foraminotomy and diskectomy (N/A)    Assessment/Plan: 7/25 Progress notes;  Delirium, Cognitive impairment, Lumbar back pain with radiculopathy affective right lower extremity  Geriatrics, neuropsych and psych consult, he was deemed incompetent in Jan/2019  And continues to not have capacity and needs guardianship per Psychiatrist  Court date for temporary guardianship next Tuesday 7/30  Continues with confusion/agitation, placed on 1:1    Pertinent Labs/Diagnostic Results:   No new    Vital Signs:   Date/Time  Temp  Pulse  Resp  BP  SpO2  O2 Device    07/25/19 0733  98 3 °F (36 8 °C)              07/25/19 0717      17  131/76        07/24/19 2240      18  132/74        07/24/19 1500  98 °F (36 7 °C)  88  18  133/70  97 %  None (Room air)    07/24/19 0720  97 5 °F (36 4 °C)    16  131/74          Medications:   Scheduled Meds:   Current Facility-Administered Medications:  acetaminophen 975 mg Oral Q8H [facility]   albuterol 1 puff Inhalation Q4H PRN   amLODIPine 5 mg Oral Daily   bisacodyl 10 mg Rectal Daily PRN   docusate sodium 100 mg Oral BID   heparin (porcine) 5,000 Units Subcutaneous Q8H [facility]   nicotine 14 mg Transdermal Daily   ondansetron 4 mg Intravenous Q6H PRN   oxyCODONE 2 5 mg Oral Q4H PRN   oxyCODONE 5 mg Oral Q4H PRN  7/25 po x1   senna 1 tablet Oral HS     Discharge Plan: TBD    Network Utilization Review Department  Phone: [phone]; Fax [phone]  [email or web address]  ATTENTION: Please call with any questions or concerns to [phone]  and carefully listen to the prompts so that you are directed to the right person  Send all requests for admission clinical reviews, approved or denied determinations and any other requests to fax [phone]   All voicemails are confidential

## 2019-07-25 NOTE — PLAN OF CARE
Problem: PAIN - ADULT  Goal: Verbalizes/displays adequate comfort level or baseline comfort level  Description  Interventions:  - Encourage patient to monitor pain and request assistance  - Assess pain using appropriate pain scale  - Administer analgesics based on type and severity of pain and evaluate response  - Implement non-pharmacological measures as appropriate and evaluate response  - Consider cultural and social influences on pain and pain management  - Notify physician/advanced practitioner if interventions unsuccessful or patient reports new pain  Outcome: Progressing     Problem: INFECTION - ADULT  Goal: Absence or prevention of progression during hospitalization  Description  INTERVENTIONS:  - Assess and monitor for signs and symptoms of infection  - Monitor lab/diagnostic results  - Monitor all insertion sites, i e  indwelling lines, tubes, and drains  - Monitor endotracheal (as able) and nasal secretions for changes in amount and color  - [place] appropriate cooling/warming therapies per order  - Administer medications as ordered  - Instruct and encourage patient and family to use good hand hygiene technique  - Identify and instruct in appropriate isolation precautions for identified infection/condition  Outcome: Progressing     Problem: SAFETY ADULT  Goal: Patient will remain free of falls  Description  INTERVENTIONS:  - Assess patient frequently for physical needs  -  Identify cognitive and physical deficits and behaviors that affect risk of falls    -  [place] fall precautions as indicated by assessment   - Educate patient/family on patient safety including physical limitations  - Instruct patient to call for assistance with activity based on assessment  - Modify environment to reduce risk of injury  - Consider OT/PT consult to assist with strengthening/mobility  Outcome: Progressing  Goal: Maintain or return to baseline ADL function  Description  INTERVENTIONS:  -  Assess patient's ability to carry out ADLs; assess patient's baseline for ADL function and identify physical deficits which impact ability to perform ADLs (bathing, care of mouth/teeth, toileting, grooming, dressing, etc )  - Assess/evaluate cause of self-care deficits   - Assess range of motion  - Assess patient's mobility; develop plan if impaired  - Assess patient's need for assistive devices and provide as appropriate  - Encourage maximum independence but intervene and supervise when necessary  ¯ Involve family in performance of ADLs  ¯ Assess for home care needs following discharge   ¯ Request OT consult to assist with ADL evaluation and planning for discharge  ¯ Provide patient education as appropriate  Outcome: Progressing  Goal: Maintain or return mobility status to optimal level  Description  INTERVENTIONS:  - Assess patient's baseline mobility status (ambulation, transfers, stairs, etc )    - Identify cognitive and physical deficits and behaviors that affect mobility  - Identify mobility aids required to assist with transfers and/or ambulation (gait belt, sit-to-stand, lift, walker, cane, etc )  - [place] fall precautions as indicated by assessment  - Record patient progress and toleration of activity level on Mobility SBAR; progress patient to next Phase/Stage  - Instruct patient to call for assistance with activity based on assessment  - Request Rehabilitation consult to assist with strengthening/weightbearing, etc   Outcome: Progressing     Problem: DISCHARGE PLANNING  Goal: Discharge to home or other facility with appropriate resources  Description  INTERVENTIONS:  - Identify barriers to discharge w/patient and caregiver  - Arrange for needed discharge resources and transportation as appropriate  - Identify discharge learning needs (meds, wound care, etc )  - Arrange for interpretive services to assist at discharge as needed  - Refer to Case Management Department for coordinating discharge planning if the patient needs post-hospital services based on physician/advanced practitioner order or complex needs related to functional status, cognitive ability, or social support system  Outcome: Progressing     Problem: Knowledge Deficit  Goal: Patient/family/caregiver demonstrates understanding of disease process, treatment plan, medications, and discharge instructions  Description  Complete learning assessment and assess knowledge base  Interventions:  - Provide teaching at level of understanding  - Provide teaching via preferred learning methods  Outcome: Progressing     Problem: Potential for Falls  Goal: Patient will remain free of falls  Description  INTERVENTIONS:  - Assess patient frequently for physical needs  -  Identify cognitive and physical deficits and behaviors that affect risk of falls  -  [place] fall precautions as indicated by assessment   - Educate patient/family on patient safety including physical limitations  - Instruct patient to call for assistance with activity based on assessment  - Modify environment to reduce risk of injury  - Consider OT/PT consult to assist with strengthening/mobility  Outcome: Progressing     Problem: Nutrition/Hydration-ADULT  Goal: Nutrient/Hydration intake appropriate for improving, restoring or maintaining nutritional needs  Description  Monitor and assess patient's nutrition/hydration status for malnutrition (ex- brittle hair, bruises, dry skin, pale skin and conjunctiva, muscle wasting, smooth red tongue, and disorientation)  Collaborate with interdisciplinary team and initiate plan and interventions as ordered  Monitor patient's weight and dietary intake as ordered or per policy  Utilize nutrition screening tool and intervene per policy  Determine patient's food preferences and provide high-protein, high-caloric foods as appropriate       INTERVENTIONS:  - Monitor oral intake, urinary output, labs, and treatment plans  - Assess nutrition and hydration status and recommend course of action  - Evaluate amount of meals eaten  - Assist patient with eating if necessary   - Allow adequate time for meals  - Recommend/ encourage appropriate diets, oral nutritional supplements, and vitamin/mineral supplements  - Order, calculate, and assess calorie counts as needed  - Recommend, monitor, and adjust tube feedings and TPN/PPN based on assessed needs  - Assess need for intravenous fluids  - Provide specific nutrition/hydration education as appropriate  - Include patient/family/caregiver in decisions related to nutrition  Outcome: Progressing     Problem: BEHAVIOR  Goal: Pt/Family maintain appropriate behavior and adhere to behavioral management agreement, if implemented  Description  INTERVENTIONS:  - Assess the family dynamic   - Encourage verbalization of thoughts and concerns in a socially appropriate manner  - Assess patient/family's coping skills and non-compliant behavior (including use of illegal substances)  - Utilize positive, consistent limit setting strategies supporting safety of patient, staff and others  - Initiate consult with Case Management, Spiritual Care or other ancillary services as appropriate  - If a patient's/visitor's behavior jeopardizes the safety of the patient, staff, or others, refer to organization procedure     - Notify Security of behavior or suspected illegal substances which indicate the need for search of the patient and/or belongings  - Encourage participation in the decision making process about a behavioral management agreement; implement if patient meets criteria  Outcome: Progressing     Problem: Prexisting or High Potential for Compromised Skin Integrity  Goal: Skin integrity is maintained or improved  Description  INTERVENTIONS:  - Identify patients at risk for skin breakdown  - Assess and monitor skin integrity  - Assess and monitor nutrition and hydration status  - Monitor labs (i e  albumin)  - Assess for incontinence   - Turn and reposition patient  - Assist with mobility/ambulation  - Relieve pressure over bony prominences  - Avoid friction and shearing  - Provide appropriate hygiene as needed including keeping skin clean and dry  - Evaluate need for skin moisturizer/barrier cream  - Collaborate with interdisciplinary team (i e  Nutrition, Rehabilitation, etc )   - Patient/family teaching  Outcome: Progressing

## 2019-07-25 NOTE — PROGRESS NOTES
Progress Note - Ricardo Ornelas 72 y o  female MRN: [id number]    [facility] Encounter: [id number]      Assessment/Plan:    Delirium  -patient remains pleasantly confused and is now more easily redirectable and is off restraints  -continue distraction techniques and frequent ambulation  -continue engagement during day to prevent daytime naps  -continue normal sleep/wake cycle  -continue to treat constipation as fecal retention may precipitate delirium  -continue treatment of pain control    Cognitive impairment  -multifactorial, recent Neuropsych evaluation by Dr Cj Desai has revealed that the patient does not have capacity to make informed medical decisions and she is awaiting guardianship hearing    Constipation  -consider addition of Miralax PRN for no BM in 3 days  -cont frequent ambulation with assistance   -encourage fluid intake, encourage well balanced diet with increased fiber    Lack of capacity to make medical decisions  -patient is awaiting guardianship hearing prior to placement  -continue supportive care of patient     Acute pain following left L4-5 hemilaminectomy in bilateral foraminotomy  -pain seems significantly improved with patient ambulating the hallways with PCA  -continue scheduled Tylenol 975 mg q 8 hours, as acute pain in the post op period is improving consider slow taper off of Roxicodone from 5mg PO Q4H PRN to Q6H PRN and continue adjunctive pain control such as relaxation techniques, ambulation, distraction, and topical analgesia, continue to monitor for underlying depression which may also contribute to chronic pain     Subjective:     Darvin Rausch is ambulating the halls with PCA  She reports that she still has some lower back pain but it is well controlled, she does not wish to stop for an assessment and is focused on ambulating the halls  Her only complaint is that she wants to ride a slip and slide down the hallway and wants a motor on her walker   She reports only one BM since admission and reports that it was painful due to straining and she is open to a more aggressive bowel regimen  PCAs report that she is much more friendly, has not been aggressive or agitated and is pleasantly confused but requires close monitoring for impulsiveness  She denies any other complaints  Review of Systems   Constitutional: Negative for appetite change, chills, fever and unexpected weight change  HENT: Negative for trouble swallowing  Eyes: Negative for visual disturbance  Respiratory: Negative for shortness of breath  Cardiovascular: Negative  Gastrointestinal: Positive for constipation  Genitourinary: Negative  Musculoskeletal: Positive for back pain and gait problem  Skin: Negative  Psychiatric/Behavioral: Positive for confusion and decreased concentration  The patient is nervous/anxious  All other systems reviewed and are negative  Objective:     Vitals: Blood pressure 131/76, pulse 88, temperature 98 3 °F (36 8 °C), temperature source Oral, resp  rate 17, height 5' 1 5" (1 562 m), weight 57 9 kg (127 lb 9 6 oz), SpO2 97 %  ,Body mass index is 23 72 kg/m²  Intake/Output Summary (Last 24 hours) at 7/25/2019 1220  Last data filed at 7/25/2019 1415  Gross per 24 hour   Intake 1200 ml   Output    Net 1200 ml     Physical Exam:   Physical Exam   Constitutional: She appears well-developed and well-nourished  No distress  HENT:   Head: Normocephalic and atraumatic  Right Ear: Hearing normal    Left Ear: Hearing normal    Mouth/Throat: Mucous membranes are normal  Abnormal dentition  Poor dentition   Eyes: Conjunctivae and EOM are normal    Pulmonary/Chest: Effort normal    Musculoskeletal:   Ambulating with rolling walker without difficulty   Neurological: Coordination abnormal    Skin: She is not diaphoretic  No pallor  Psychiatric: Cognition and memory are impaired  She expresses impulsivity  She exhibits abnormal recent memory and abnormal remote memory  Pleasantly confused, A/O to self, impulsive, easily redirected, no longer aggressive  She is inattentive  Invasive Devices     None               Lab, Imaging and other studies: I have personally reviewed pertinent reports

## 2019-07-26 PROCEDURE — 99024 POSTOP FOLLOW-UP VISIT: CPT | Performed by: NURSE PRACTITIONER

## 2019-07-26 RX ADMIN — OXYCODONE HYDROCHLORIDE 2.5 MG: 5 TABLET ORAL at 00:28

## 2019-07-26 RX ADMIN — NICOTINE 14 MG: 14 PATCH TRANSDERMAL at 08:08

## 2019-07-26 RX ADMIN — OXYCODONE HYDROCHLORIDE 5 MG: 5 TABLET ORAL at 06:30

## 2019-07-26 RX ADMIN — ACETAMINOPHEN 975 MG: 325 TABLET ORAL at 13:22

## 2019-07-26 RX ADMIN — HEPARIN SODIUM 5000 UNITS: 5000 INJECTION INTRAVENOUS; SUBCUTANEOUS at 21:58

## 2019-07-26 RX ADMIN — DOCUSATE SODIUM 100 MG: 100 CAPSULE, LIQUID FILLED ORAL at 08:08

## 2019-07-26 RX ADMIN — ACETAMINOPHEN 650 MG: 325 TABLET ORAL at 06:30

## 2019-07-26 RX ADMIN — DOCUSATE SODIUM 100 MG: 100 CAPSULE, LIQUID FILLED ORAL at 17:08

## 2019-07-26 RX ADMIN — SENNOSIDES 8.6 MG: 8.6 TABLET, FILM COATED ORAL at 21:58

## 2019-07-26 RX ADMIN — ACETAMINOPHEN 975 MG: 325 TABLET ORAL at 21:58

## 2019-07-26 RX ADMIN — AMLODIPINE BESYLATE 5 MG: 5 TABLET ORAL at 08:08

## 2019-07-26 RX ADMIN — OXYCODONE HYDROCHLORIDE 5 MG: 5 TABLET ORAL at 23:48

## 2019-07-26 RX ADMIN — HEPARIN SODIUM 5000 UNITS: 5000 INJECTION INTRAVENOUS; SUBCUTANEOUS at 13:22

## 2019-07-26 RX ADMIN — HEPARIN SODIUM 5000 UNITS: 5000 INJECTION INTRAVENOUS; SUBCUTANEOUS at 06:30

## 2019-07-26 RX ADMIN — BISACODYL 10 MG: 10 SUPPOSITORY RECTAL at 17:41

## 2019-07-26 NOTE — ASSESSMENT & PLAN NOTE
POD#9 Left L4-5 hemilaminectomy and bilateral foraminotomy and Minimally invasive transforaminal lumbar interbody fusion with pedicle screw and myke fixation fusion L4-5, right-sided approach; minimally invasive right L5-S1 foraminotomy and diskectomy (N/A)  · Imaging reviewed personally and with attending, results are as follows:  · XR spine lumbar 7/18/2019:  Unremarkable appearance of posterior fusion L4-5  Grade 1 anterolisthesis at this level again noted  · Patient remains quite confused and encephalopathic with tangential thoughts  · Geriatrics recommends Zyprexa 2 5 mg q8h PRN as chemical restraint for last resort  · Requires guardianship  Court date for temporary guardianship next Tuesday 7/30  · Patient continues with confusion/agitation  Placed on 1:1    · Mobilize as tolerated with assistant, PT / OT recommend rehab  Ambulating well frequently with rolling walker  · DVT ppx: SCDs and heparin  · Neurosurgery will continue to follow as primary  Please call with questions or concerns

## 2019-07-26 NOTE — PROGRESS NOTES
Progress Note Cristopher Epps 1945, 72 y o  female MRN: [id number]    Unit/Bed#: [facility] 603-01 Encounter: [id number]    Primary Care Provider: Promise Gutiérrez MD   Date and time admitted to hospital: 7/17/2019  7:11 AM        Delirium  Assessment & Plan  · Geriatrics, neuropsych, and psych consulted    Cognitive impairment  Assessment & Plan  · Patient deemed to have no capacity back in January 2019  · Neuropsychology consulted, appreciate input  · At this time, patient continues to not have capacity to make informed medical decisions  · Psychiatry consulted, determined that patient does not have decision-making capacity and needs guardianship  Lumbar back pain with radiculopathy affecting right lower extremity  Assessment & Plan  POD#9 Left L4-5 hemilaminectomy and bilateral foraminotomy and Minimally invasive transforaminal lumbar interbody fusion with pedicle screw and myke fixation fusion L4-5, right-sided approach; minimally invasive right L5-S1 foraminotomy and diskectomy (N/A)  · Imaging reviewed personally and with attending, results are as follows:  · XR spine lumbar 7/18/2019:  Unremarkable appearance of posterior fusion L4-5  Grade 1 anterolisthesis at this level again noted  · Patient remains quite confused and encephalopathic with tangential thoughts  · Geriatrics recommends Zyprexa 2 5 mg q8h PRN as chemical restraint for last resort  · Requires guardianship  Court date for temporary guardianship next Tuesday 7/30  · Patient continues with confusion/agitation  Placed on 1:1    · Mobilize as tolerated with assistant, PT / OT recommend rehab  Ambulating well frequently with rolling walker  · DVT ppx: SCDs and heparin  · Neurosurgery will continue to follow as primary  Please call with questions or concerns      * Spinal stenosis of lumbar region with neurogenic claudication  Assessment & Plan  · See plan above      Subjective/Objective   Chief Complaint: "When is my back gonna stop hurting?"    Subjective: Patient continues to complain of lower back pain  States she thought surgery would've "fixed" it by now  Denies any numbness or weakness, states pain is worse to right side  States she is walking well with her walker  Objective: No neurological deficits  AOx1, knows she's in the hospital but not where  Moving all extremities with full strength  I/O       07/24 0701 - 07/25 0700 07/25 0701 - 07/26 0700 07/26 0701 - 07/27 0700    P  O  1200 1420     Total Intake(mL/kg) 1200 (20 7) 1420 (24 7)     Net +1200 +1420            Unmeasured Urine Occurrence 4 x 4 x           Invasive Devices     None                 Physical Exam:  Vitals: Blood pressure 118/66, pulse 86, temperature 98 7 °F (37 1 °C), temperature source Oral, resp  rate 19, height 5' 1 5" (1 562 m), weight 57 5 kg (126 lb 11 2 oz), SpO2 97 %  ,Body mass index is 23 55 kg/m²      General appearance: alert, appears stated age, cooperative and no distress  Head: Normocephalic, without obvious abnormality, atraumatic  Eyes: EOMI, PERRL  Neck: supple, symmetrical, trachea midline and NT  Back: no kyphosis present, no tenderness to percussion or palpation  Lungs: non labored breathing  Heart: regular heart rate  Neurologic:   Mental status: Alert, oriented 1-2, thought content very tangential  Cranial nerves: grossly intact (Cranial nerves II-XII)  Sensory: normal to light touch, JPS, DST  Motor: moving all extremities without focal weakness, strength grossly 5/5  Reflexes: 2+ and symmetric, no Wetzel's or clonus  Coordination: finger to nose normal bilaterally, no drift bilaterally      Lab Results:  Results from last 7 days   Lab Units 07/23/19  1123   WBC Thousand/uL 5 36   HEMOGLOBIN g/dL 10 5*   HEMATOCRIT % 34 1*   PLATELETS Thousands/uL 462*     Results from last 7 days   Lab Units 07/23/19  1123   POTASSIUM mmol/L 4 0   CHLORIDE mmol/L 106   CO2 mmol/L 26   BUN mg/dL 22   CREATININE mg/dL 0 74   CALCIUM mg/dL 9 2   ALK PHOS U/L 74   ALT U/L 59   AST U/L 36                 No results found for: TROPONINT  ABG:No results found for: PHART, GUK6GRW, PO2ART, ZPN4CMY, P2OPRQUK, BEART, SOURCE    Imaging Studies: I have personally reviewed pertinent reports  and I have personally reviewed pertinent films in PACS    EKG, Pathology, and Other Studies: I have personally reviewed pertinent reports        VTE Pharmacologic Prophylaxis: Sequential compression device (Venodyne)  and Heparin    VTE Mechanical Prophylaxis: sequential compression device

## 2019-07-26 NOTE — PLAN OF CARE
Problem: PAIN - ADULT  Goal: Verbalizes/displays adequate comfort level or baseline comfort level  Description  Interventions:  - Encourage patient to monitor pain and request assistance  - Assess pain using appropriate pain scale  - Administer analgesics based on type and severity of pain and evaluate response  - Implement non-pharmacological measures as appropriate and evaluate response  - Consider cultural and social influences on pain and pain management  - Notify physician/advanced practitioner if interventions unsuccessful or patient reports new pain  Outcome: Progressing     Problem: INFECTION - ADULT  Goal: Absence or prevention of progression during hospitalization  Description  INTERVENTIONS:  - Assess and monitor for signs and symptoms of infection  - Monitor lab/diagnostic results  - Monitor all insertion sites, i e  indwelling lines, tubes, and drains  - Monitor endotracheal (as able) and nasal secretions for changes in amount and color  - [place] appropriate cooling/warming therapies per order  - Administer medications as ordered  - Instruct and encourage patient and family to use good hand hygiene technique  - Identify and instruct in appropriate isolation precautions for identified infection/condition  Outcome: Progressing     Problem: SAFETY ADULT  Goal: Patient will remain free of falls  Description  INTERVENTIONS:  - Assess patient frequently for physical needs  -  Identify cognitive and physical deficits and behaviors that affect risk of falls    -  [place] fall precautions as indicated by assessment   - Educate patient/family on patient safety including physical limitations  - Instruct patient to call for assistance with activity based on assessment  - Modify environment to reduce risk of injury  - Consider OT/PT consult to assist with strengthening/mobility  Outcome: Progressing  Goal: Maintain or return to baseline ADL function  Description  INTERVENTIONS:  -  Assess patient's ability to carry out ADLs; assess patient's baseline for ADL function and identify physical deficits which impact ability to perform ADLs (bathing, care of mouth/teeth, toileting, grooming, dressing, etc )  - Assess/evaluate cause of self-care deficits   - Assess range of motion  - Assess patient's mobility; develop plan if impaired  - Assess patient's need for assistive devices and provide as appropriate  - Encourage maximum independence but intervene and supervise when necessary  ¯ Involve family in performance of ADLs  ¯ Assess for home care needs following discharge   ¯ Request OT consult to assist with ADL evaluation and planning for discharge  ¯ Provide patient education as appropriate  Outcome: Progressing  Goal: Maintain or return mobility status to optimal level  Description  INTERVENTIONS:  - Assess patient's baseline mobility status (ambulation, transfers, stairs, etc )    - Identify cognitive and physical deficits and behaviors that affect mobility  - Identify mobility aids required to assist with transfers and/or ambulation (gait belt, sit-to-stand, lift, walker, cane, etc )  - [place] fall precautions as indicated by assessment  - Record patient progress and toleration of activity level on Mobility SBAR; progress patient to next Phase/Stage  - Instruct patient to call for assistance with activity based on assessment  - Request Rehabilitation consult to assist with strengthening/weightbearing, etc   Outcome: Progressing     Problem: DISCHARGE PLANNING  Goal: Discharge to home or other facility with appropriate resources  Description  INTERVENTIONS:  - Identify barriers to discharge w/patient and caregiver  - Arrange for needed discharge resources and transportation as appropriate  - Identify discharge learning needs (meds, wound care, etc )  - Arrange for interpretive services to assist at discharge as needed  - Refer to Case Management Department for coordinating discharge planning if the patient needs post-hospital services based on physician/advanced practitioner order or complex needs related to functional status, cognitive ability, or social support system  Outcome: Progressing     Problem: Knowledge Deficit  Goal: Patient/family/caregiver demonstrates understanding of disease process, treatment plan, medications, and discharge instructions  Description  Complete learning assessment and assess knowledge base  Interventions:  - Provide teaching at level of understanding  - Provide teaching via preferred learning methods  Outcome: Progressing     Problem: Potential for Falls  Goal: Patient will remain free of falls  Description  INTERVENTIONS:  - Assess patient frequently for physical needs  -  Identify cognitive and physical deficits and behaviors that affect risk of falls  -  [place] fall precautions as indicated by assessment   - Educate patient/family on patient safety including physical limitations  - Instruct patient to call for assistance with activity based on assessment  - Modify environment to reduce risk of injury  - Consider OT/PT consult to assist with strengthening/mobility  Outcome: Progressing     Problem: Nutrition/Hydration-ADULT  Goal: Nutrient/Hydration intake appropriate for improving, restoring or maintaining nutritional needs  Description  Monitor and assess patient's nutrition/hydration status for malnutrition (ex- brittle hair, bruises, dry skin, pale skin and conjunctiva, muscle wasting, smooth red tongue, and disorientation)  Collaborate with interdisciplinary team and initiate plan and interventions as ordered  Monitor patient's weight and dietary intake as ordered or per policy  Utilize nutrition screening tool and intervene per policy  Determine patient's food preferences and provide high-protein, high-caloric foods as appropriate       INTERVENTIONS:  - Monitor oral intake, urinary output, labs, and treatment plans  - Assess nutrition and hydration status and recommend course of action  - Evaluate amount of meals eaten  - Assist patient with eating if necessary   - Allow adequate time for meals  - Recommend/ encourage appropriate diets, oral nutritional supplements, and vitamin/mineral supplements  - Order, calculate, and assess calorie counts as needed  - Recommend, monitor, and adjust tube feedings and TPN/PPN based on assessed needs  - Assess need for intravenous fluids  - Provide specific nutrition/hydration education as appropriate  - Include patient/family/caregiver in decisions related to nutrition  Outcome: Progressing     Problem: BEHAVIOR  Goal: Pt/Family maintain appropriate behavior and adhere to behavioral management agreement, if implemented  Description  INTERVENTIONS:  - Assess the family dynamic   - Encourage verbalization of thoughts and concerns in a socially appropriate manner  - Assess patient/family's coping skills and non-compliant behavior (including use of illegal substances)  - Utilize positive, consistent limit setting strategies supporting safety of patient, staff and others  - Initiate consult with Case Management, Spiritual Care or other ancillary services as appropriate  - If a patient's/visitor's behavior jeopardizes the safety of the patient, staff, or others, refer to organization procedure     - Notify Security of behavior or suspected illegal substances which indicate the need for search of the patient and/or belongings  - Encourage participation in the decision making process about a behavioral management agreement; implement if patient meets criteria  Outcome: Progressing     Problem: Prexisting or High Potential for Compromised Skin Integrity  Goal: Skin integrity is maintained or improved  Description  INTERVENTIONS:  - Identify patients at risk for skin breakdown  - Assess and monitor skin integrity  - Assess and monitor nutrition and hydration status  - Monitor labs (i e  albumin)  - Assess for incontinence   - Turn and reposition patient  - Assist with mobility/ambulation  - Relieve pressure over bony prominences  - Avoid friction and shearing  - Provide appropriate hygiene as needed including keeping skin clean and dry  - Evaluate need for skin moisturizer/barrier cream  - Collaborate with interdisciplinary team (i e  Nutrition, Rehabilitation, etc )   - Patient/family teaching  Outcome: Progressing

## 2019-07-27 PROCEDURE — 99024 POSTOP FOLLOW-UP VISIT: CPT | Performed by: PHYSICIAN ASSISTANT

## 2019-07-27 RX ADMIN — SENNOSIDES 8.6 MG: 8.6 TABLET, FILM COATED ORAL at 21:56

## 2019-07-27 RX ADMIN — ACETAMINOPHEN 975 MG: 325 TABLET ORAL at 21:56

## 2019-07-27 RX ADMIN — NICOTINE 14 MG: 14 PATCH TRANSDERMAL at 08:42

## 2019-07-27 RX ADMIN — DOCUSATE SODIUM 100 MG: 100 CAPSULE, LIQUID FILLED ORAL at 18:01

## 2019-07-27 RX ADMIN — DOCUSATE SODIUM 100 MG: 100 CAPSULE, LIQUID FILLED ORAL at 08:42

## 2019-07-27 RX ADMIN — AMLODIPINE BESYLATE 5 MG: 5 TABLET ORAL at 08:42

## 2019-07-27 RX ADMIN — HEPARIN SODIUM 5000 UNITS: 5000 INJECTION INTRAVENOUS; SUBCUTANEOUS at 13:32

## 2019-07-27 RX ADMIN — ACETAMINOPHEN 975 MG: 325 TABLET ORAL at 06:32

## 2019-07-27 RX ADMIN — HEPARIN SODIUM 5000 UNITS: 5000 INJECTION INTRAVENOUS; SUBCUTANEOUS at 06:32

## 2019-07-27 RX ADMIN — ACETAMINOPHEN 975 MG: 325 TABLET ORAL at 13:32

## 2019-07-27 RX ADMIN — HEPARIN SODIUM 5000 UNITS: 5000 INJECTION INTRAVENOUS; SUBCUTANEOUS at 21:56

## 2019-07-27 NOTE — PROGRESS NOTES
Progress Note - Neurosurgery   Latha Ornelas 72 y o  female MRN: [id number]  Unit/Bed#: [facility] 603-01 Encounter: [id number]    Assessment:  1  POD# 10 L4-5 lumbar laminectomy with TLIF L4-L5 and minimally invasive diskectomy L5-S1   2  Spinal stenosis of the lumbar region  3  Delirium and cognitive impairment    Plan:  -guardianship process underway  -mobilized with PT and OT  -continue one-to-one monitoring    Subjective/Objective      New new problems or complaints reported      Intake/Output       07/27/19 0701 - 07/28/19 0700      [id number] Total       Intake    P O   200  -- 200    Total Intake 200 -- 200       Output    Total Output -- -- --       Net I/O     200 -- 200          Invasive Devices     None                 Physical Exam:    Vitals: Blood pressure 136/72, pulse 78, temperature 98 2 °F (36 8 °C), resp  rate 16, height 5' 1 5" (1 562 m), weight 57 5 kg (126 lb 11 2 oz), SpO2 97 %  ,Body mass index is 23 55 kg/m²  Awake and alert  Moves all extremities  Incision dry and intact  No focal weakness  Strength and sensation full  Lungs clear bilateral  Heart regular rate  Abdomen soft      Lab Results: I have personally reviewed pertinent results  Imaging Studies: I have personally reviewed pertinent reports          VTE Pharmacologic Prophylaxis: Heparin    VTE Mechanical Prophylaxis: sequential compression device

## 2019-07-27 NOTE — PLAN OF CARE
Problem: PAIN - ADULT  Goal: Verbalizes/displays adequate comfort level or baseline comfort level  Description  Interventions:  - Encourage patient to monitor pain and request assistance  - Assess pain using appropriate pain scale  - Administer analgesics based on type and severity of pain and evaluate response  - Implement non-pharmacological measures as appropriate and evaluate response  - Consider cultural and social influences on pain and pain management  - Notify physician/advanced practitioner if interventions unsuccessful or patient reports new pain  Outcome: Progressing     Problem: INFECTION - ADULT  Goal: Absence or prevention of progression during hospitalization  Description  INTERVENTIONS:  - Assess and monitor for signs and symptoms of infection  - Monitor lab/diagnostic results  - Monitor all insertion sites, i e  indwelling lines, tubes, and drains  - Monitor endotracheal (as able) and nasal secretions for changes in amount and color  - [place] appropriate cooling/warming therapies per order  - Administer medications as ordered  - Instruct and encourage patient and family to use good hand hygiene technique  - Identify and instruct in appropriate isolation precautions for identified infection/condition  Outcome: Progressing     Problem: SAFETY ADULT  Goal: Patient will remain free of falls  Description  INTERVENTIONS:  - Assess patient frequently for physical needs  -  Identify cognitive and physical deficits and behaviors that affect risk of falls    -  [place] fall precautions as indicated by assessment   - Educate patient/family on patient safety including physical limitations  - Instruct patient to call for assistance with activity based on assessment  - Modify environment to reduce risk of injury  - Consider OT/PT consult to assist with strengthening/mobility  Outcome: Progressing     Problem: Potential for Falls  Goal: Patient will remain free of falls  Description  INTERVENTIONS:  - Assess patient frequently for physical needs  -  Identify cognitive and physical deficits and behaviors that affect risk of falls  -  [place] fall precautions as indicated by assessment   - Educate patient/family on patient safety including physical limitations  - Instruct patient to call for assistance with activity based on assessment  - Modify environment to reduce risk of injury  - Consider OT/PT consult to assist with strengthening/mobility  Outcome: Progressing     Problem: Nutrition/Hydration-ADULT  Goal: Nutrient/Hydration intake appropriate for improving, restoring or maintaining nutritional needs  Description  Monitor and assess patient's nutrition/hydration status for malnutrition (ex- brittle hair, bruises, dry skin, pale skin and conjunctiva, muscle wasting, smooth red tongue, and disorientation)  Collaborate with interdisciplinary team and initiate plan and interventions as ordered  Monitor patient's weight and dietary intake as ordered or per policy  Utilize nutrition screening tool and intervene per policy  Determine patient's food preferences and provide high-protein, high-caloric foods as appropriate       INTERVENTIONS:  - Monitor oral intake, urinary output, labs, and treatment plans  - Assess nutrition and hydration status and recommend course of action  - Evaluate amount of meals eaten  - Assist patient with eating if necessary   - Allow adequate time for meals  - Recommend/ encourage appropriate diets, oral nutritional supplements, and vitamin/mineral supplements  - Order, calculate, and assess calorie counts as needed  - Recommend, monitor, and adjust tube feedings and TPN/PPN based on assessed needs  - Assess need for intravenous fluids  - Provide specific nutrition/hydration education as appropriate  - Include patient/family/caregiver in decisions related to nutrition  Outcome: Progressing     Problem: Prexisting or High Potential for Compromised Skin Integrity  Goal: Skin integrity is maintained or improved  Description  INTERVENTIONS:  - Identify patients at risk for skin breakdown  - Assess and monitor skin integrity  - Assess and monitor nutrition and hydration status  - Monitor labs (i e  albumin)  - Assess for incontinence   - Turn and reposition patient  - Assist with mobility/ambulation  - Relieve pressure over bony prominences  - Avoid friction and shearing  - Provide appropriate hygiene as needed including keeping skin clean and dry  - Evaluate need for skin moisturizer/barrier cream  - Collaborate with interdisciplinary team (i e  Nutrition, Rehabilitation, etc )   - Patient/family teaching  Outcome: Progressing

## 2019-07-28 PROCEDURE — 99024 POSTOP FOLLOW-UP VISIT: CPT | Performed by: PHYSICIAN ASSISTANT

## 2019-07-28 RX ADMIN — ACETAMINOPHEN 975 MG: 325 TABLET ORAL at 21:35

## 2019-07-28 RX ADMIN — HEPARIN SODIUM 5000 UNITS: 5000 INJECTION INTRAVENOUS; SUBCUTANEOUS at 21:36

## 2019-07-28 RX ADMIN — DOCUSATE SODIUM 100 MG: 100 CAPSULE, LIQUID FILLED ORAL at 08:21

## 2019-07-28 RX ADMIN — SENNOSIDES 8.6 MG: 8.6 TABLET, FILM COATED ORAL at 21:34

## 2019-07-28 RX ADMIN — ACETAMINOPHEN 975 MG: 325 TABLET ORAL at 05:46

## 2019-07-28 RX ADMIN — AMLODIPINE BESYLATE 5 MG: 5 TABLET ORAL at 08:21

## 2019-07-28 RX ADMIN — ACETAMINOPHEN 975 MG: 325 TABLET ORAL at 13:39

## 2019-07-28 RX ADMIN — HEPARIN SODIUM 5000 UNITS: 5000 INJECTION INTRAVENOUS; SUBCUTANEOUS at 05:46

## 2019-07-28 RX ADMIN — HEPARIN SODIUM 5000 UNITS: 5000 INJECTION INTRAVENOUS; SUBCUTANEOUS at 13:39

## 2019-07-28 RX ADMIN — OXYCODONE HYDROCHLORIDE 5 MG: 5 TABLET ORAL at 18:19

## 2019-07-28 RX ADMIN — DOCUSATE SODIUM 100 MG: 100 CAPSULE, LIQUID FILLED ORAL at 18:04

## 2019-07-28 RX ADMIN — NICOTINE 14 MG: 14 PATCH TRANSDERMAL at 08:21

## 2019-07-28 NOTE — PLAN OF CARE
Problem: PAIN - ADULT  Goal: Verbalizes/displays adequate comfort level or baseline comfort level  Description  Interventions:  - Encourage patient to monitor pain and request assistance  - Assess pain using appropriate pain scale  - Administer analgesics based on type and severity of pain and evaluate response  - Implement non-pharmacological measures as appropriate and evaluate response  - Consider cultural and social influences on pain and pain management  - Notify physician/advanced practitioner if interventions unsuccessful or patient reports new pain  Outcome: Progressing     Problem: INFECTION - ADULT  Goal: Absence or prevention of progression during hospitalization  Description  INTERVENTIONS:  - Assess and monitor for signs and symptoms of infection  - Monitor lab/diagnostic results  - Monitor all insertion sites, i e  indwelling lines, tubes, and drains  - Monitor endotracheal (as able) and nasal secretions for changes in amount and color  - [place] appropriate cooling/warming therapies per order  - Administer medications as ordered  - Instruct and encourage patient and family to use good hand hygiene technique  - Identify and instruct in appropriate isolation precautions for identified infection/condition  Outcome: Progressing     Problem: SAFETY ADULT  Goal: Patient will remain free of falls  Description  INTERVENTIONS:  - Assess patient frequently for physical needs  -  Identify cognitive and physical deficits and behaviors that affect risk of falls    -  [place] fall precautions as indicated by assessment   - Educate patient/family on patient safety including physical limitations  - Instruct patient to call for assistance with activity based on assessment  - Modify environment to reduce risk of injury  - Consider OT/PT consult to assist with strengthening/mobility  Outcome: Progressing  Goal: Maintain or return to baseline ADL function  Description  INTERVENTIONS:  -  Assess patient's ability to carry out ADLs; assess patient's baseline for ADL function and identify physical deficits which impact ability to perform ADLs (bathing, care of mouth/teeth, toileting, grooming, dressing, etc )  - Assess/evaluate cause of self-care deficits   - Assess range of motion  - Assess patient's mobility; develop plan if impaired  - Assess patient's need for assistive devices and provide as appropriate  - Encourage maximum independence but intervene and supervise when necessary  ¯ Involve family in performance of ADLs  ¯ Assess for home care needs following discharge   ¯ Request OT consult to assist with ADL evaluation and planning for discharge  ¯ Provide patient education as appropriate  Outcome: Progressing  Goal: Maintain or return mobility status to optimal level  Description  INTERVENTIONS:  - Assess patient's baseline mobility status (ambulation, transfers, stairs, etc )    - Identify cognitive and physical deficits and behaviors that affect mobility  - Identify mobility aids required to assist with transfers and/or ambulation (gait belt, sit-to-stand, lift, walker, cane, etc )  - [place] fall precautions as indicated by assessment  - Record patient progress and toleration of activity level on Mobility SBAR; progress patient to next Phase/Stage  - Instruct patient to call for assistance with activity based on assessment  - Request Rehabilitation consult to assist with strengthening/weightbearing, etc   Outcome: Progressing     Problem: DISCHARGE PLANNING  Goal: Discharge to home or other facility with appropriate resources  Description  INTERVENTIONS:  - Identify barriers to discharge w/patient and caregiver  - Arrange for needed discharge resources and transportation as appropriate  - Identify discharge learning needs (meds, wound care, etc )  - Arrange for interpretive services to assist at discharge as needed  - Refer to Case Management Department for coordinating discharge planning if the patient needs post-hospital services based on physician/advanced practitioner order or complex needs related to functional status, cognitive ability, or social support system  Outcome: Progressing     Problem: Knowledge Deficit  Goal: Patient/family/caregiver demonstrates understanding of disease process, treatment plan, medications, and discharge instructions  Description  Complete learning assessment and assess knowledge base  Interventions:  - Provide teaching at level of understanding  - Provide teaching via preferred learning methods  Outcome: Progressing     Problem: Potential for Falls  Goal: Patient will remain free of falls  Description  INTERVENTIONS:  - Assess patient frequently for physical needs  -  Identify cognitive and physical deficits and behaviors that affect risk of falls  -  [place] fall precautions as indicated by assessment   - Educate patient/family on patient safety including physical limitations  - Instruct patient to call for assistance with activity based on assessment  - Modify environment to reduce risk of injury  - Consider OT/PT consult to assist with strengthening/mobility  Outcome: Progressing     Problem: Nutrition/Hydration-ADULT  Goal: Nutrient/Hydration intake appropriate for improving, restoring or maintaining nutritional needs  Description  Monitor and assess patient's nutrition/hydration status for malnutrition (ex- brittle hair, bruises, dry skin, pale skin and conjunctiva, muscle wasting, smooth red tongue, and disorientation)  Collaborate with interdisciplinary team and initiate plan and interventions as ordered  Monitor patient's weight and dietary intake as ordered or per policy  Utilize nutrition screening tool and intervene per policy  Determine patient's food preferences and provide high-protein, high-caloric foods as appropriate       INTERVENTIONS:  - Monitor oral intake, urinary output, labs, and treatment plans  - Assess nutrition and hydration status and recommend course of action  - Evaluate amount of meals eaten  - Assist patient with eating if necessary   - Allow adequate time for meals  - Recommend/ encourage appropriate diets, oral nutritional supplements, and vitamin/mineral supplements  - Order, calculate, and assess calorie counts as needed  - Recommend, monitor, and adjust tube feedings and TPN/PPN based on assessed needs  - Assess need for intravenous fluids  - Provide specific nutrition/hydration education as appropriate  - Include patient/family/caregiver in decisions related to nutrition  Outcome: Progressing     Problem: BEHAVIOR  Goal: Pt/Family maintain appropriate behavior and adhere to behavioral management agreement, if implemented  Description  INTERVENTIONS:  - Assess the family dynamic   - Encourage verbalization of thoughts and concerns in a socially appropriate manner  - Assess patient/family's coping skills and non-compliant behavior (including use of illegal substances)  - Utilize positive, consistent limit setting strategies supporting safety of patient, staff and others  - Initiate consult with Case Management, Spiritual Care or other ancillary services as appropriate  - If a patient's/visitor's behavior jeopardizes the safety of the patient, staff, or others, refer to organization procedure     - Notify Security of behavior or suspected illegal substances which indicate the need for search of the patient and/or belongings  - Encourage participation in the decision making process about a behavioral management agreement; implement if patient meets criteria  Outcome: Progressing     Problem: Prexisting or High Potential for Compromised Skin Integrity  Goal: Skin integrity is maintained or improved  Description  INTERVENTIONS:  - Identify patients at risk for skin breakdown  - Assess and monitor skin integrity  - Assess and monitor nutrition and hydration status  - Monitor labs (i e  albumin)  - Assess for incontinence   - Turn and reposition patient  - Assist with mobility/ambulation  - Relieve pressure over bony prominences  - Avoid friction and shearing  - Provide appropriate hygiene as needed including keeping skin clean and dry  - Evaluate need for skin moisturizer/barrier cream  - Collaborate with interdisciplinary team (i e  Nutrition, Rehabilitation, etc )   - Patient/family teaching  Outcome: Progressing

## 2019-07-28 NOTE — PROGRESS NOTES
Progress Note - Neurosurgery   Tomeka Ornelas 72 y o  female MRN: [id number]  [facility] Encounter: [id number]    Assessment:  1  POD# 11 L4-5 lumbar laminectomy with TLIF L4-L5 and minimally invasive diskectomy L5-S1   2  Spinal stenosis of the lumbar region  3  Delirium and cognitive impairment      Plan:  -guardianship process underway  -continue to mobilized with PT and OT  -continue 1-1 monitoring      Subjective/Objective     No new reported problems overnight      Intake/Output       07/28/19 0701 - 07/29/19 0700      [id number] Total       Intake    P O   120  -- 120    Total Intake 120 -- 120       Output    Urine  --  -- --    Unmeasured Urine Occurrence 2 x -- 2 x    Total Output -- -- --       Net I/O     120 -- 120          Invasive Devices     None                 Physical Exam:    Vitals: Blood pressure (!) 147/115, pulse 80, temperature 98 6 °F (37 °C), resp  rate 18, height 5' 1 5" (1 562 m), weight 59 4 kg (131 lb), SpO2 97 %  ,Body mass index is 24 35 kg/m²  Awake and alert  Moves all extremities  No focal weakness  Lungs clear bilateral  Heart regular rate  Abdomen soft    Lab Results: I have personally reviewed pertinent results  Imaging Studies: I have personally reviewed pertinent reports          VTE Pharmacologic Prophylaxis: Heparin    VTE Mechanical Prophylaxis: sequential compression device

## 2019-07-29 PROCEDURE — 97164 PT RE-EVAL EST PLAN CARE: CPT

## 2019-07-29 PROCEDURE — 99024 POSTOP FOLLOW-UP VISIT: CPT | Performed by: PHYSICIAN ASSISTANT

## 2019-07-29 PROCEDURE — G8979 MOBILITY GOAL STATUS: HCPCS

## 2019-07-29 PROCEDURE — G8978 MOBILITY CURRENT STATUS: HCPCS

## 2019-07-29 PROCEDURE — G8980 MOBILITY D/C STATUS: HCPCS

## 2019-07-29 RX ORDER — POLYETHYLENE GLYCOL 3350 17 G/17G
17 POWDER, FOR SOLUTION ORAL DAILY
Status: DISCONTINUED | OUTPATIENT
Start: 2019-07-29 | End: 2019-08-02 | Stop reason: HOSPADM

## 2019-07-29 RX ADMIN — SENNOSIDES 8.6 MG: 8.6 TABLET, FILM COATED ORAL at 21:19

## 2019-07-29 RX ADMIN — AMLODIPINE BESYLATE 5 MG: 5 TABLET ORAL at 08:45

## 2019-07-29 RX ADMIN — DOCUSATE SODIUM 100 MG: 100 CAPSULE, LIQUID FILLED ORAL at 08:45

## 2019-07-29 RX ADMIN — HEPARIN SODIUM 5000 UNITS: 5000 INJECTION INTRAVENOUS; SUBCUTANEOUS at 06:07

## 2019-07-29 RX ADMIN — HEPARIN SODIUM 5000 UNITS: 5000 INJECTION INTRAVENOUS; SUBCUTANEOUS at 14:18

## 2019-07-29 RX ADMIN — POLYETHYLENE GLYCOL 3350 17 G: 17 POWDER, FOR SOLUTION ORAL at 12:36

## 2019-07-29 RX ADMIN — HEPARIN SODIUM 5000 UNITS: 5000 INJECTION INTRAVENOUS; SUBCUTANEOUS at 21:18

## 2019-07-29 RX ADMIN — ACETAMINOPHEN 975 MG: 325 TABLET ORAL at 14:18

## 2019-07-29 RX ADMIN — ACETAMINOPHEN 975 MG: 325 TABLET ORAL at 06:06

## 2019-07-29 RX ADMIN — OXYCODONE HYDROCHLORIDE 5 MG: 5 TABLET ORAL at 16:50

## 2019-07-29 RX ADMIN — NICOTINE 14 MG: 14 PATCH TRANSDERMAL at 08:47

## 2019-07-29 RX ADMIN — OXYCODONE HYDROCHLORIDE 5 MG: 5 TABLET ORAL at 12:35

## 2019-07-29 RX ADMIN — ACETAMINOPHEN 975 MG: 325 TABLET ORAL at 21:19

## 2019-07-29 NOTE — PLAN OF CARE
Problem: PAIN - ADULT  Goal: Verbalizes/displays adequate comfort level or baseline comfort level  Description  Interventions:  - Encourage patient to monitor pain and request assistance  - Assess pain using appropriate pain scale  - Administer analgesics based on type and severity of pain and evaluate response  - Implement non-pharmacological measures as appropriate and evaluate response  - Consider cultural and social influences on pain and pain management  - Notify physician/advanced practitioner if interventions unsuccessful or patient reports new pain  Outcome: Progressing     Problem: INFECTION - ADULT  Goal: Absence or prevention of progression during hospitalization  Description  INTERVENTIONS:  - Assess and monitor for signs and symptoms of infection  - Monitor lab/diagnostic results  - Monitor all insertion sites, i e  indwelling lines, tubes, and drains  - Monitor endotracheal (as able) and nasal secretions for changes in amount and color  - [place] appropriate cooling/warming therapies per order  - Administer medications as ordered  - Instruct and encourage patient and family to use good hand hygiene technique  - Identify and instruct in appropriate isolation precautions for identified infection/condition  Outcome: Progressing     Problem: SAFETY ADULT  Goal: Patient will remain free of falls  Description  INTERVENTIONS:  - Assess patient frequently for physical needs  -  Identify cognitive and physical deficits and behaviors that affect risk of falls    -  [place] fall precautions as indicated by assessment   - Educate patient/family on patient safety including physical limitations  - Instruct patient to call for assistance with activity based on assessment  - Modify environment to reduce risk of injury  - Consider OT/PT consult to assist with strengthening/mobility  Outcome: Progressing  Goal: Maintain or return to baseline ADL function  Description  INTERVENTIONS:  -  Assess patient's ability to carry out ADLs; assess patient's baseline for ADL function and identify physical deficits which impact ability to perform ADLs (bathing, care of mouth/teeth, toileting, grooming, dressing, etc )  - Assess/evaluate cause of self-care deficits   - Assess range of motion  - Assess patient's mobility; develop plan if impaired  - Assess patient's need for assistive devices and provide as appropriate  - Encourage maximum independence but intervene and supervise when necessary  ¯ Involve family in performance of ADLs  ¯ Assess for home care needs following discharge   ¯ Request OT consult to assist with ADL evaluation and planning for discharge  ¯ Provide patient education as appropriate  Outcome: Progressing  Goal: Maintain or return mobility status to optimal level  Description  INTERVENTIONS:  - Assess patient's baseline mobility status (ambulation, transfers, stairs, etc )    - Identify cognitive and physical deficits and behaviors that affect mobility  - Identify mobility aids required to assist with transfers and/or ambulation (gait belt, sit-to-stand, lift, walker, cane, etc )  - [place] fall precautions as indicated by assessment  - Record patient progress and toleration of activity level on Mobility SBAR; progress patient to next Phase/Stage  - Instruct patient to call for assistance with activity based on assessment  - Request Rehabilitation consult to assist with strengthening/weightbearing, etc   Outcome: Progressing     Problem: DISCHARGE PLANNING  Goal: Discharge to home or other facility with appropriate resources  Description  INTERVENTIONS:  - Identify barriers to discharge w/patient and caregiver  - Arrange for needed discharge resources and transportation as appropriate  - Identify discharge learning needs (meds, wound care, etc )  - Arrange for interpretive services to assist at discharge as needed  - Refer to Case Management Department for coordinating discharge planning if the patient needs post-hospital services based on physician/advanced practitioner order or complex needs related to functional status, cognitive ability, or social support system  Outcome: Progressing     Problem: Knowledge Deficit  Goal: Patient/family/caregiver demonstrates understanding of disease process, treatment plan, medications, and discharge instructions  Description  Complete learning assessment and assess knowledge base  Interventions:  - Provide teaching at level of understanding  - Provide teaching via preferred learning methods  Outcome: Progressing     Problem: Potential for Falls  Goal: Patient will remain free of falls  Description  INTERVENTIONS:  - Assess patient frequently for physical needs  -  Identify cognitive and physical deficits and behaviors that affect risk of falls  -  [place] fall precautions as indicated by assessment   - Educate patient/family on patient safety including physical limitations  - Instruct patient to call for assistance with activity based on assessment  - Modify environment to reduce risk of injury  - Consider OT/PT consult to assist with strengthening/mobility  Outcome: Progressing     Problem: Nutrition/Hydration-ADULT  Goal: Nutrient/Hydration intake appropriate for improving, restoring or maintaining nutritional needs  Description  Monitor and assess patient's nutrition/hydration status for malnutrition (ex- brittle hair, bruises, dry skin, pale skin and conjunctiva, muscle wasting, smooth red tongue, and disorientation)  Collaborate with interdisciplinary team and initiate plan and interventions as ordered  Monitor patient's weight and dietary intake as ordered or per policy  Utilize nutrition screening tool and intervene per policy  Determine patient's food preferences and provide high-protein, high-caloric foods as appropriate       INTERVENTIONS:  - Monitor oral intake, urinary output, labs, and treatment plans  - Assess nutrition and hydration status and recommend course of action  - Evaluate amount of meals eaten  - Assist patient with eating if necessary   - Allow adequate time for meals  - Recommend/ encourage appropriate diets, oral nutritional supplements, and vitamin/mineral supplements  - Order, calculate, and assess calorie counts as needed  - Recommend, monitor, and adjust tube feedings and TPN/PPN based on assessed needs  - Assess need for intravenous fluids  - Provide specific nutrition/hydration education as appropriate  - Include patient/family/caregiver in decisions related to nutrition  Outcome: Progressing     Problem: BEHAVIOR  Goal: Pt/Family maintain appropriate behavior and adhere to behavioral management agreement, if implemented  Description  INTERVENTIONS:  - Assess the family dynamic   - Encourage verbalization of thoughts and concerns in a socially appropriate manner  - Assess patient/family's coping skills and non-compliant behavior (including use of illegal substances)  - Utilize positive, consistent limit setting strategies supporting safety of patient, staff and others  - Initiate consult with Case Management, Spiritual Care or other ancillary services as appropriate  - If a patient's/visitor's behavior jeopardizes the safety of the patient, staff, or others, refer to organization procedure     - Notify Security of behavior or suspected illegal substances which indicate the need for search of the patient and/or belongings  - Encourage participation in the decision making process about a behavioral management agreement; implement if patient meets criteria  Outcome: Progressing     Problem: Prexisting or High Potential for Compromised Skin Integrity  Goal: Skin integrity is maintained or improved  Description  INTERVENTIONS:  - Identify patients at risk for skin breakdown  - Assess and monitor skin integrity  - Assess and monitor nutrition and hydration status  - Monitor labs (i e  albumin)  - Assess for incontinence   - Turn and reposition patient  - Assist with mobility/ambulation  - Relieve pressure over bony prominences  - Avoid friction and shearing  - Provide appropriate hygiene as needed including keeping skin clean and dry  - Evaluate need for skin moisturizer/barrier cream  - Collaborate with interdisciplinary team (i e  Nutrition, Rehabilitation, etc )   - Patient/family teaching  Outcome: Progressing

## 2019-07-29 NOTE — PHYSICAL THERAPY NOTE
PHYSICAL THERAPY RE-EVALUATION          Patient Name: Dada Ornelas  [id number] Date: 7/29/2019 07/29/19 1763   Note Type   Note type Re-eval   Pain Assessment   Pain Assessment No/denies pain   Home Living   Additional Comments please see initial eval for home setup     Prior Function   Comments please see initial eval for PLOF    Restrictions/Precautions   Weight Bearing Precautions Per Order No   Braces or Orthoses   (none as per neuro sx )   Other Precautions 1:1;Cognitive; Chair Alarm; Bed Alarm;Pain   General   Additional Pertinent History Pt noted to be ambulating the halls with nsg without assistance prior to PT treatment session   Family/Caregiver Present Yes   Cognition   Overall Cognitive Status Impaired   Attention Difficulty attending to directions   Orientation Level Oriented to person;Oriented to place   Memory Decreased recall of recent events;Decreased recall of precautions   Following Commands Follows one step commands with increased time or repetition   RUE Assessment   RUE Assessment WFL   LUE Assessment   LUE Assessment WFL   RLE Assessment   RLE Assessment WFL   LLE Assessment   LLE Assessment WFL   Bed Mobility   Supine to Sit 6  Modified independent   Sit to Supine 6  Modified independent   Transfers   Sit to Stand 6  Modified independent   Stand to Sit 6  Modified independent   Ambulation/Elevation   Gait pattern Short stride; Foward flexed   Gait Assistance 5  Supervision   Assistive Device Other (Comment)  (rollator )   Distance 400ft    Stair Management Assistance 5  Supervision   Stair Management Technique One rail L;Reciprocal   Number of Stairs 14   Balance   Static Sitting Good   Static Standing Fair +   Ambulatory Fair   Endurance Deficit   Endurance Deficit No   Activity Tolerance   Activity Tolerance Patient tolerated treatment well   Nurse Made Aware Pt appropriate to be seen and mobilize per nsg Assessment   Prognosis Good   Problem List Pain;Orthopedic restrictions;Decreased safety awareness; Impaired judgement;Decreased cognition   Assessment Pt [facility]17/[facility]  Pt underwent Left L4-5 hemilaminectomy and bilateral foraminotomy and Minimally invasive transforaminal lumbar interbody fusion with pedicle screw and myke fixation fusion L4-5, right-sided approach; minimally invasive right L5-S1 foraminotomy and diskectomy (N/A) which was performed on 7/17/2019  Pt seen for reeval today to assess current functional mobility status since initial eval on 7/18/2019   Pt resting in bed at time of PT treatment session  Pt reports feeling " good" and is willing to participate in PT treatment session  Pt able to perform all bed mobility and transfers this session with mod I which is much improved compared to previous session  Pt continues to cues for safety secondary to pts cog status  Pt able to tolerate increased ambulation distances this session with S and the use of a rollator  No LOB noted with ambulation  Pt able to negotiate 14 steps with S and the use of a hand rail on L  No LOB noted with stair training  Pt left back in bed at conclusion of PT treatment session  Pts 1:1 present at conclusion of PT session  D/C acute care PT due to pt having no skilled PTs needs at this time  Pt noted to be walking hospital floor with nsg staff multiple times a day and is encouraged to continue to ambulate with nsg and restorative tech during hospital stay  DC recommendation when medically cleared is to more supervised environment with increased assistance due to cog status  PT to sign off please reconsult if needed      Barriers to Discharge Other (Comment)  (cog status )   Goals   Patient Goals " to go home"   Recommendation   Recommendation Other (Comment)  (more supervised environment )   Equipment Recommended Other (Comment)  (rollator)   PT - OK to Discharge Yes  (when medically cleared ) Modified [place] Scale   Modified [place] Scale 3   Barthel Index   Feeding 10   Bathing 0   Grooming Score 5   Dressing Score 10   Bladder Score 10   Bowels Score 10   Toilet Use Score 10   Transfers (Bed/Chair) Score 15   Mobility (Level Surface) Score 10   Stairs Score 5   Barthel Index Score 85   Marietta Martin, PT

## 2019-07-29 NOTE — SOCIAL WORK
CM reviewed pt in care coordination rounds  Pt is pending guardianship at this time  Court hearing is scheduled for 07/30  CM will continue to follow for final discharge destination

## 2019-07-29 NOTE — PROGRESS NOTES
Progress Note Binu Nicolas 1945, 72 y o  female MRN: [id number]    Unit/Bed#: [facility] 603-01 Encounter: [id number]    Primary Care Provider: Seferino Montejo MD   Date and time admitted to hospital: 7/17/2019  7:11 AM        Lumbar back pain with radiculopathy affecting right lower extremity  Assessment & Plan  POD#12 Left L4-5 hemilaminectomy and bilateral foraminotomy and Minimally invasive transforaminal lumbar interbody fusion with pedicle screw and myke fixation fusion L4-5, right-sided approach; minimally invasive right L5-S1 foraminotomy and diskectomy (N/A)  · Imaging reviewed personally and with attending, results are as follows:  · XR spine lumbar 7/18/2019:  Unremarkable appearance of posterior fusion L4-5  Grade 1 anterolisthesis at this level again noted  · Patient remains quite confused and encephalopathic with tangential thoughts  · Geriatrics recommends Zyprexa 2 5 mg q8h PRN as chemical restraint for last resort  · Requires guardianship  Court date for temporary guardianship 7/30  · Patient continues with confusion/agitation  Placed on 1:1 - order continued  · Pain control - pain well controlled on current regimen  · Continue scheduled Tylenol  · Continue oxycodone 2 5 mg and 5 mg as needed for moderate severe pain  · Mobilize as tolerated with assistant, PT / OT recommend rehab  Ambulating well frequently with rolling walker  · DVT ppx: SCDs and heparin  · Bowel regimen - currently on senna, colace, and PRN suppository  Per patient has not had a BM since Friday, will add daily miralax and recommend use of suppository later today  · Neurosurgery will continue to follow as primary  Please call with questions or concerns      * Spinal stenosis of lumbar region with neurogenic claudication  Assessment & Plan  · See plan above    Cognitive impairment  Assessment & Plan  · Patient deemed to have no capacity back in January 2019  · Neuropsychology consulted, appreciate input  · At this time, patient continues to not have capacity to make informed medical decisions  · Psychiatry consulted, determined that patient does not have decision-making capacity and needs guardianship  · Court date 7/30 for temporary guardianship    Delirium  Assessment & Plan  · Geriatrics, neuropsych, and psych consulted      Subjective/Objective   Chief Complaint:  "I am doing okay "    Subjective:  Patient complains of some mild back pain stating she feels like she can feel her sutures  Urinating per baseline, has not had a bowel movement since last Friday  Eating okay  Denies headaches, dizziness, chest pain, shortness of breath, abdominal pain, nausea or vomiting, numbness/tingling/weakness, saddle anesthesia, bowel or bladder issues  Nursing rounds completed with Geovany King - no significant overnight events, patient continues to remain at her baseline of GCS 14, continues to 1:1 2/2 wandering the halls and impulsive actions  Objective:  Sitting up in bed, alert, NAD    I/O       07/27 0701 - 07/28 0700 07/28 0701 - 07/29 0700 07/29 0701 - 07/30 0700    P  O  1025 975 120    Total Intake(mL/kg) 1025 (17 3) 975 (16 4) 120 (2)    Urine (mL/kg/hr)       Total Output       Net +1025 +975 +120           Unmeasured Urine Occurrence 7 x 5 x 1 x          Invasive Devices     None                 Physical Exam:  Vitals: Blood pressure 137/73, pulse 88, temperature 98 3 °F (36 8 °C), temperature source Oral, resp  rate 18, height 5' 1 5" (1 562 m), weight 59 4 kg (131 lb), SpO2 97 %  ,Body mass index is 24 35 kg/m²        General appearance: alert, appears stated age, cooperative and no distress  Head: Normocephalic, without obvious abnormality, atraumatic  Eyes:  Conjugate gaze  Neck: supple, symmetrical, trachea midline and NT  Back: no kyphosis present, no tenderness to percussion or palpation, incision clean dry intact without dressing over top  Lungs: non labored breathing  Heart: regular heart rate  Neurologic:   Mental status: GCS 14 (-1 confusion), Alert, not oriented to date or the president, tangential thoughts  Cranial nerves: grossly intact (Cranial nerves II-XII)  Sensory: normal to LT and PP BLE, JPS 3/3 bilateral hallux, DST intact  Motor: moving all extremities without focal weakness, strength 5/5 throughout  Reflexes:  3+ and brisk with bilateral Miriam, no clonus noted  Coordination: finger to nose normal bilaterally, no drift bilaterally      Lab Results:  Results from last 7 days   Lab Units 07/23/19  1123   WBC Thousand/uL 5 36   HEMOGLOBIN g/dL 10 5*   HEMATOCRIT % 34 1*   PLATELETS Thousands/uL 462*     Results from last 7 days   Lab Units 07/23/19  1123   POTASSIUM mmol/L 4 0   CHLORIDE mmol/L 106   CO2 mmol/L 26   BUN mg/dL 22   CREATININE mg/dL 0 74   CALCIUM mg/dL 9 2   ALK PHOS U/L 74   ALT U/L 59   AST U/L 36       Imaging Studies: I have personally reviewed pertinent reports  and I have personally reviewed pertinent films in PACS    Xr Spine Lumbar 2 Or 3 Views Injury    Result Date: 7/18/2019  Impression: Unremarkable appearance of posterior fusion L4-5  Grade 1 anterolisthesis at this level again noted  Workstation performed: [id number]     Xr Spine Lumbar 2 Or 3 Views Injury    Result Date: 7/18/2019  Impression: Fluoroscopic guidance provided for surgical procedure  Please refer to the separate procedure notes for additional details  Workstation performed: [id number]     EKG, Pathology, and Other Studies: I have personally reviewed pertinent reports        VTE Pharmacologic Prophylaxis: Heparin    VTE Mechanical Prophylaxis: sequential compression device

## 2019-07-29 NOTE — ASSESSMENT & PLAN NOTE
· Patient deemed to have no capacity back in January 2019  · Neuropsychology consulted, appreciate input  · At this time, patient continues to not have capacity to make informed medical decisions  · Psychiatry consulted, determined that patient does not have decision-making capacity and needs guardianship    · Court date 7/30 for temporary guardianship

## 2019-07-29 NOTE — UTILIZATION REVIEW
Elective Surgical Continued Stay Review    Date:     7/29/2019        POD#:    #  12      Current Patient Class:    IP      Current Level of Care:   Acute    Assessment/Plan: 72 y o  female, initial surgery date    7/29/2019, elective  Surgery  Left L4-5 hemilaminectomy and bilateral foraminotomy and Minimally invasive transforaminal lumbar interbody fusion with pedicle screw and myke fixation fusion L4-5, right-sided approach; minimally invasive right L5-S1 foraminotomy and diskectomy (N/A)    Pertinent Labs/Diagnostic Results:   PROGRESS    NOTE   7/29/2019  Pt  Cont  With  Confusion/agitation, placed on a  1:1  And  Continued  OOB and ambulating  With RW  Remains  Quite confused and  Encephalopathic with tangenital thoughts  Waiting  [facility]  Date    Of  7/30  For  Temporary  Guardianship  Post  Op pain  Controlled  Vital Signs:   07/29/19 0931  98 3 °F (36 8 °C)  88  18  137/73  97 %  None (Room air)  Sitting         Medications:   Scheduled Meds:   Current Facility-Administered Medications:  acetaminophen 975 mg Oral Q8H [facility]   albuterol 1 puff Inhalation Q4H PRN   amLODIPine 5 mg Oral Daily   bisacodyl 10 mg Rectal Daily PRN   docusate sodium 100 mg Oral BID   heparin (porcine) 5,000 Units Subcutaneous Q8H [facility]   nicotine 14 mg Transdermal Daily   ondansetron 4 mg Intravenous Q6H PRN   oxyCODONE 2 5 mg Oral Q4H PRN   oxyCODONE 5 mg Oral Q4H PRN   polyethylene glycol 17 g Oral Daily   senna 1 tablet Oral HS       Discharge Plan:    TBD    Network Utilization Review Department  Phone: [phone]; Fax [phone]  [email or web address]  ATTENTION: Please call with any questions or concerns to [phone]  and carefully listen to the prompts so that you are directed to the right person  Send all requests for admission clinical reviews, approved or denied determinations and any other requests to fax [phone]   All voicemails are confidential

## 2019-07-29 NOTE — PHYSICAL THERAPY NOTE
PHYSICAL THERAPY RE EVALUATION          Patient Name: Efren Ornelas  [id number] Date: 7/29/2019 07/29/19 8640   Note Type   Note type Re-eval   Pain Assessment   Pain Assessment No/denies pain   Home Living   Additional Comments please see initial eval for home setup     Prior Function   Comments please see initial eval for PLOF    Restrictions/Precautions   Weight Bearing Precautions Per Order No   Braces or Orthoses   (none as per neuro sx )   Other Precautions 1:1;Cognitive; Chair Alarm; Bed Alarm;Pain   General   Additional Pertinent History Pt noted to be ambulating the halls with nsg without assistance prior to PT treatment session   Family/Caregiver Present Yes   Cognition   Overall Cognitive Status Impaired   Attention Difficulty attending to directions   Orientation Level Oriented to person;Oriented to place   Memory Decreased recall of recent events;Decreased recall of precautions   Following Commands Follows one step commands with increased time or repetition   RUE Assessment   RUE Assessment WFL   LUE Assessment   LUE Assessment WFL   RLE Assessment   RLE Assessment WFL   LLE Assessment   LLE Assessment WFL   Bed Mobility   Supine to Sit 6  Modified independent   Sit to Supine 6  Modified independent   Transfers   Sit to Stand 6  Modified independent   Stand to Sit 6  Modified independent   Ambulation/Elevation   Gait pattern Short stride; Foward flexed   Gait Assistance 5  Supervision   Assistive Device Other (Comment)  (rollator )   Distance 400ft    Stair Management Assistance 5  Supervision   Stair Management Technique One rail L;Reciprocal   Number of Stairs 14   Balance   Static Sitting Good   Static Standing Fair +   Ambulatory Fair   Endurance Deficit   Endurance Deficit No   Activity Tolerance   Activity Tolerance Patient tolerated treatment well   Nurse Made Aware Pt appropriate to be seen and mobilize per nsg Assessment   Prognosis Good   Problem List Pain;Orthopedic restrictions;Decreased safety awareness; Impaired judgement;Decreased cognition   Assessment Pt resting in bed at time of PT treatment session  Pt reports feeling " good" and is willing to participate in PT treatment session  Pt able to perform all bed mobility and transfers this session with mod I which is much improved compared to previous session  Pt continues to cues for safety secondary to pts cog status  Pt able to tolerate increased ambulation distances this session with S and the use of a rollator  No LOB noted with ambulation  Pt able to negotiate 14 steps with S and the use of a hand rail on L  No LOB noted with stair training  Pt left back in bed at conclusion of PT treatment session  Pts 1:1 present at conclusion of PT session  D/C acute care PT at this time due to pt having no skilled PTs needs at this time  Pt noted to be walking hospital floor with nsg multiple times a day and is encouraged to continue to ambulate with nsg and restorative tech during hospital stay  DC recommendation when medically cleared is to more supervised environment with increased assistance due to cog status  PT to sign off please reconsult if needed      Barriers to Discharge Other (Comment)  (cog status )   Goals   Patient Goals " to go home"   Recommendation   Recommendation Other (Comment)  (more supervised environment )   Equipment Recommended Other (Comment)  (rollator)   PT - OK to Discharge Yes  (when medically cleared )   Modified Greer Scale   Modified Shawn Scale 3   Barthel Index   Feeding 10   Bathing 0   Grooming Score 5   Dressing Score 10   Bladder Score 10   Bowels Score 10   Toilet Use Score 10   Transfers (Bed/Chair) Score 15   Mobility (Level Surface) Score 10   Stairs Score 5   Barthel Index Score 85   Jass Richards, PT

## 2019-07-29 NOTE — ASSESSMENT & PLAN NOTE
POD#12 Left L4-5 hemilaminectomy and bilateral foraminotomy and Minimally invasive transforaminal lumbar interbody fusion with pedicle screw and myke fixation fusion L4-5, right-sided approach; minimally invasive right L5-S1 foraminotomy and diskectomy (N/A)  · Imaging reviewed personally and with attending, results are as follows:  · XR spine lumbar 7/18/2019:  Unremarkable appearance of posterior fusion L4-5  Grade 1 anterolisthesis at this level again noted  · Patient remains quite confused and encephalopathic with tangential thoughts  · Geriatrics recommends Zyprexa 2 5 mg q8h PRN as chemical restraint for last resort  · Requires guardianship  Court date for temporary guardianship 7/30  · Patient continues with confusion/agitation  Placed on 1:1 - order continued  · Pain control - pain well controlled on current regimen  · Continue scheduled Tylenol  · Continue oxycodone 2 5 mg and 5 mg as needed for moderate severe pain  · Mobilize as tolerated with assistant, PT / OT recommend rehab  Ambulating well frequently with rolling walker  · DVT ppx: SCDs and heparin  · Bowel regimen - currently on senna, colace, and PRN suppository  Per patient has not had a BM since Friday, will add daily miralax and recommend use of suppository later today  · Neurosurgery will continue to follow as primary  Please call with questions or concerns

## 2019-07-30 PROCEDURE — 99024 POSTOP FOLLOW-UP VISIT: CPT | Performed by: NURSE PRACTITIONER

## 2019-07-30 PROCEDURE — 99232 SBSQ HOSP IP/OBS MODERATE 35: CPT | Performed by: INTERNAL MEDICINE

## 2019-07-30 PROCEDURE — 97535 SELF CARE MNGMENT TRAINING: CPT

## 2019-07-30 RX ORDER — LORAZEPAM 0.5 MG/1
0.5 TABLET ORAL EVERY 8 HOURS PRN
Status: COMPLETED | OUTPATIENT
Start: 2019-07-30 | End: 2019-07-30

## 2019-07-30 RX ADMIN — ACETAMINOPHEN 975 MG: 325 TABLET ORAL at 14:45

## 2019-07-30 RX ADMIN — LORAZEPAM 0.5 MG: 0.5 TABLET ORAL at 14:45

## 2019-07-30 RX ADMIN — HEPARIN SODIUM 5000 UNITS: 5000 INJECTION INTRAVENOUS; SUBCUTANEOUS at 05:39

## 2019-07-30 RX ADMIN — NICOTINE 14 MG: 14 PATCH TRANSDERMAL at 08:14

## 2019-07-30 RX ADMIN — DOCUSATE SODIUM 100 MG: 100 CAPSULE, LIQUID FILLED ORAL at 08:14

## 2019-07-30 RX ADMIN — POLYETHYLENE GLYCOL 3350 17 G: 17 POWDER, FOR SOLUTION ORAL at 08:14

## 2019-07-30 RX ADMIN — HEPARIN SODIUM 5000 UNITS: 5000 INJECTION INTRAVENOUS; SUBCUTANEOUS at 14:45

## 2019-07-30 RX ADMIN — AMLODIPINE BESYLATE 5 MG: 5 TABLET ORAL at 08:15

## 2019-07-30 RX ADMIN — ACETAMINOPHEN 975 MG: 325 TABLET ORAL at 05:39

## 2019-07-30 NOTE — PLAN OF CARE
Problem: PAIN - ADULT  Goal: Verbalizes/displays adequate comfort level or baseline comfort level  Description  Interventions:  - Encourage patient to monitor pain and request assistance  - Assess pain using appropriate pain scale  - Administer analgesics based on type and severity of pain and evaluate response  - Implement non-pharmacological measures as appropriate and evaluate response  - Consider cultural and social influences on pain and pain management  - Notify physician/advanced practitioner if interventions unsuccessful or patient reports new pain  Outcome: Progressing     Problem: INFECTION - ADULT  Goal: Absence or prevention of progression during hospitalization  Description  INTERVENTIONS:  - Assess and monitor for signs and symptoms of infection  - Monitor lab/diagnostic results  - Monitor all insertion sites, i e  indwelling lines, tubes, and drains  - Monitor endotracheal (as able) and nasal secretions for changes in amount and color  - [place] appropriate cooling/warming therapies per order  - Administer medications as ordered  - Instruct and encourage patient and family to use good hand hygiene technique  - Identify and instruct in appropriate isolation precautions for identified infection/condition  Outcome: Progressing     Problem: SAFETY ADULT  Goal: Patient will remain free of falls  Description  INTERVENTIONS:  - Assess patient frequently for physical needs  -  Identify cognitive and physical deficits and behaviors that affect risk of falls    -  [place] fall precautions as indicated by assessment   - Educate patient/family on patient safety including physical limitations  - Instruct patient to call for assistance with activity based on assessment  - Modify environment to reduce risk of injury  - Consider OT/PT consult to assist with strengthening/mobility  Outcome: Progressing  Goal: Maintain or return to baseline ADL function  Description  INTERVENTIONS:  -  Assess patient's ability to carry out ADLs; assess patient's baseline for ADL function and identify physical deficits which impact ability to perform ADLs (bathing, care of mouth/teeth, toileting, grooming, dressing, etc )  - Assess/evaluate cause of self-care deficits   - Assess range of motion  - Assess patient's mobility; develop plan if impaired  - Assess patient's need for assistive devices and provide as appropriate  - Encourage maximum independence but intervene and supervise when necessary  ¯ Involve family in performance of ADLs  ¯ Assess for home care needs following discharge   ¯ Request OT consult to assist with ADL evaluation and planning for discharge  ¯ Provide patient education as appropriate  Outcome: Progressing  Goal: Maintain or return mobility status to optimal level  Description  INTERVENTIONS:  - Assess patient's baseline mobility status (ambulation, transfers, stairs, etc )    - Identify cognitive and physical deficits and behaviors that affect mobility  - Identify mobility aids required to assist with transfers and/or ambulation (gait belt, sit-to-stand, lift, walker, cane, etc )  - [place] fall precautions as indicated by assessment  - Record patient progress and toleration of activity level on Mobility SBAR; progress patient to next Phase/Stage  - Instruct patient to call for assistance with activity based on assessment  - Request Rehabilitation consult to assist with strengthening/weightbearing, etc   Outcome: Progressing     Problem: DISCHARGE PLANNING  Goal: Discharge to home or other facility with appropriate resources  Description  INTERVENTIONS:  - Identify barriers to discharge w/patient and caregiver  - Arrange for needed discharge resources and transportation as appropriate  - Identify discharge learning needs (meds, wound care, etc )  - Arrange for interpretive services to assist at discharge as needed  - Refer to Case Management Department for coordinating discharge planning if the patient needs post-hospital services based on physician/advanced practitioner order or complex needs related to functional status, cognitive ability, or social support system  Outcome: Progressing     Problem: Knowledge Deficit  Goal: Patient/family/caregiver demonstrates understanding of disease process, treatment plan, medications, and discharge instructions  Description  Complete learning assessment and assess knowledge base  Interventions:  - Provide teaching at level of understanding  - Provide teaching via preferred learning methods  Outcome: Progressing     Problem: Potential for Falls  Goal: Patient will remain free of falls  Description  INTERVENTIONS:  - Assess patient frequently for physical needs  -  Identify cognitive and physical deficits and behaviors that affect risk of falls  -  [place] fall precautions as indicated by assessment   - Educate patient/family on patient safety including physical limitations  - Instruct patient to call for assistance with activity based on assessment  - Modify environment to reduce risk of injury  - Consider OT/PT consult to assist with strengthening/mobility  Outcome: Progressing     Problem: Nutrition/Hydration-ADULT  Goal: Nutrient/Hydration intake appropriate for improving, restoring or maintaining nutritional needs  Description  Monitor and assess patient's nutrition/hydration status for malnutrition (ex- brittle hair, bruises, dry skin, pale skin and conjunctiva, muscle wasting, smooth red tongue, and disorientation)  Collaborate with interdisciplinary team and initiate plan and interventions as ordered  Monitor patient's weight and dietary intake as ordered or per policy  Utilize nutrition screening tool and intervene per policy  Determine patient's food preferences and provide high-protein, high-caloric foods as appropriate       INTERVENTIONS:  - Monitor oral intake, urinary output, labs, and treatment plans  - Assess nutrition and hydration status and recommend course of action  - Evaluate amount of meals eaten  - Assist patient with eating if necessary   - Allow adequate time for meals  - Recommend/ encourage appropriate diets, oral nutritional supplements, and vitamin/mineral supplements  - Order, calculate, and assess calorie counts as needed  - Recommend, monitor, and adjust tube feedings and TPN/PPN based on assessed needs  - Assess need for intravenous fluids  - Provide specific nutrition/hydration education as appropriate  - Include patient/family/caregiver in decisions related to nutrition  Outcome: Progressing     Problem: BEHAVIOR  Goal: Pt/Family maintain appropriate behavior and adhere to behavioral management agreement, if implemented  Description  INTERVENTIONS:  - Assess the family dynamic   - Encourage verbalization of thoughts and concerns in a socially appropriate manner  - Assess patient/family's coping skills and non-compliant behavior (including use of illegal substances)  - Utilize positive, consistent limit setting strategies supporting safety of patient, staff and others  - Initiate consult with Case Management, Spiritual Care or other ancillary services as appropriate  - If a patient's/visitor's behavior jeopardizes the safety of the patient, staff, or others, refer to organization procedure     - Notify Security of behavior or suspected illegal substances which indicate the need for search of the patient and/or belongings  - Encourage participation in the decision making process about a behavioral management agreement; implement if patient meets criteria  Outcome: Progressing     Problem: Prexisting or High Potential for Compromised Skin Integrity  Goal: Skin integrity is maintained or improved  Description  INTERVENTIONS:  - Identify patients at risk for skin breakdown  - Assess and monitor skin integrity  - Assess and monitor nutrition and hydration status  - Monitor labs (i e  albumin)  - Assess for incontinence   - Turn and reposition patient  - Assist with mobility/ambulation  - Relieve pressure over bony prominences  - Avoid friction and shearing  - Provide appropriate hygiene as needed including keeping skin clean and dry  - Evaluate need for skin moisturizer/barrier cream  - Collaborate with interdisciplinary team (i e  Nutrition, Rehabilitation, etc )   - Patient/family teaching  Outcome: Progressing

## 2019-07-30 NOTE — SOCIAL WORK
Patient's emergency guardianship hearing was scheduled for today with CAITY JOSEPH [facility]  Email sent to [employer] ([email or web address]) with A[employer] requesting an update regarding guardianship hearing, requesting contact information if a guardian was appointed and a copy of the court order  Criss TURPIN CM updated  Update: [employer] updated this writer that she is "stuck in court" and will be able to provide the guardianship paperwork and contact information tomorrow  DYANA Lara updated via TT  Talisha Blanchard (Robin) updated via TT  Updates provided to [facility] and [place] who have accepted and are following      Updates faxed to [facility] as requested ([phone])

## 2019-07-30 NOTE — SOCIAL WORK
Cm reviewed patient during care coordination rounds  Patient's guardianship case is for court today  Per conversation with CM specialist, guardian might pursue bed at [address] of [facility] dementia unit  Cm awaiting confirmation of guardian and bed availability

## 2019-07-30 NOTE — ASSESSMENT & PLAN NOTE
POD#13 Left L4-5 hemilaminectomy and bilateral foraminotomy and Minimally invasive transforaminal lumbar interbody fusion with pedicle screw and myke fixation fusion L4-5, right-sided approach; minimally invasive right L5-S1 foraminotomy and diskectomy (N/A)  · Imaging reviewed personally and with attending, results are as follows:  · XR spine lumbar 7/18/2019:  Unremarkable appearance of posterior fusion L4-5  Grade 1 anterolisthesis at this level again noted  · Patient remains quite confused and encephalopathic with tangential thoughts  · Geriatrics recommends Zyprexa 2 5 mg q8h PRN as chemical restraint for last resort  · Requires guardianship  Court date for temporary guardianship 7/30  · Patient continues with confusion/agitation  Placed on 1:1 - order continued  · Pain control - pain well controlled on current regimen  · Continue scheduled Tylenol  · Continue oxycodone 2 5 mg and 5 mg as needed for moderate severe pain  · Mobilize as tolerated with assistant, PT / OT recommend rehab  Ambulating well frequently with rolling walker  · DVT ppx: SCDs and heparin  · Neurosurgery will continue to follow as primary  Please call with questions or concerns

## 2019-07-30 NOTE — PROGRESS NOTES
Progress Note Zayda Noel 1945, 72 y o  female MRN: [id number]    Unit/Bed#: [facility] 603-01 Encounter: [id number]    Primary Care Provider: Jeremy Casey MD   Date and time admitted to hospital: 7/17/2019  7:11 AM        Delirium  Assessment & Plan  · Geriatrics, neuropsych, and psych consulted    Cognitive impairment  Assessment & Plan  · Patient deemed to have no capacity back in January 2019  · Neuropsychology consulted, appreciate input  · At this time, patient continues to not have capacity to make informed medical decisions  · Psychiatry consulted, determined that patient does not have decision-making capacity and needs guardianship  · Court date 7/30 for temporary guardianship    Lumbar back pain with radiculopathy affecting right lower extremity  Assessment & Plan  POD#13 Left L4-5 hemilaminectomy and bilateral foraminotomy and Minimally invasive transforaminal lumbar interbody fusion with pedicle screw and myke fixation fusion L4-5, right-sided approach; minimally invasive right L5-S1 foraminotomy and diskectomy (N/A)  · Imaging reviewed personally and with attending, results are as follows:  · XR spine lumbar 7/18/2019:  Unremarkable appearance of posterior fusion L4-5  Grade 1 anterolisthesis at this level again noted  · Patient remains quite confused and encephalopathic with tangential thoughts  · Geriatrics recommends Zyprexa 2 5 mg q8h PRN as chemical restraint for last resort  · Requires guardianship  Court date for temporary guardianship 7/30  · Patient continues with confusion/agitation  Placed on 1:1 - order continued  · Pain control - pain well controlled on current regimen  · Continue scheduled Tylenol  · Continue oxycodone 2 5 mg and 5 mg as needed for moderate severe pain  · Mobilize as tolerated with assistant, PT / OT recommend rehab  Ambulating well frequently with rolling walker  · DVT ppx: SCDs and heparin  · Neurosurgery will continue to follow as primary    Please call with questions or concerns  * Spinal stenosis of lumbar region with neurogenic claudication  Assessment & Plan  · See plan above      Subjective/Objective   Chief Complaint: "When can I leave?"    Subjective: Patient continues to complain of back pain and "itching" to area of surgical incisions  Denies any numbness or weakness, denies nausea or vomiting  Objective: Patient is moving all extremities, no focal neurological deficits  Bilateral surgical incisions are clean, dry and intact  No oozing or erythema  I/O       07/28 0701 - 07/29 0700 07/29 0701 - 07/30 0700 07/30 0701 - 07/31 0700    P  O  975 840 500    Total Intake(mL/kg) 975 (16 4) 840 (14) 500 (8 3)    Net +975 +840 +500           Unmeasured Urine Occurrence 5 x 3 x           Invasive Devices     None                 Physical Exam:  Vitals: Blood pressure 138/69, pulse 82, temperature 98 4 °F (36 9 °C), resp  rate 16, height 5' 1 5" (1 562 m), weight 59 9 kg (132 lb), SpO2 94 %  ,Body mass index is 24 54 kg/m²      General appearance: alert, appears stated age, cooperative and no distress  Head: Normocephalic, without obvious abnormality, atraumatic  Eyes: EOMI, PERRL, pupils 3 mm bilaterally  Neck: supple, symmetrical, trachea midline and NT  Back: no kyphosis present, mild tenderness to lumbar area, incisions clean, dry and intact  Lungs: non labored breathing  Heart: regular heart rate  Neurologic:   Mental status: Alert, oriented 1-2, thought content tangential  Cranial nerves: grossly intact (Cranial nerves II-XII)  Sensory: normal to light touch, JPS, DST  Motor: moving all extremities without focal weakness, strength 5/5  Reflexes: 2+ and symmetric, no Wetzel's or clonus  Coordination: finger to nose normal bilaterally, no drift bilaterally      Lab Results:  Results from last 7 days   Lab Units 07/23/19  1123   WBC Thousand/uL 5 36   HEMOGLOBIN g/dL 10 5*   HEMATOCRIT % 34 1*   PLATELETS Thousands/uL 462*     Results from last 7 days Lab Units 07/23/19  1123   POTASSIUM mmol/L 4 0   CHLORIDE mmol/L 106   CO2 mmol/L 26   BUN mg/dL 22   CREATININE mg/dL 0 74   CALCIUM mg/dL 9 2   ALK PHOS U/L 74   ALT U/L 59   AST U/L 36                 No results found for: TROPONINT  ABG:No results found for: PHART, OVE6KCE, PO2ART, TSZ7WEK, S1ZYMYWK, BEART, SOURCE    Imaging Studies: I have personally reviewed pertinent reports  and I have personally reviewed pertinent films in PACS    EKG, Pathology, and Other Studies: I have personally reviewed pertinent reports        VTE Pharmacologic Prophylaxis: Sequential compression device (Venodyne)  and Heparin    VTE Mechanical Prophylaxis: sequential compression device

## 2019-07-30 NOTE — PLAN OF CARE
Problem: OCCUPATIONAL THERAPY ADULT  Goal: Performs self-care activities at highest level of function for planned discharge setting  See evaluation for individualized goals  Description  Treatment Interventions: ADL retraining, Functional transfer training, Endurance training, Cognitive reorientation, Patient/family training, Equipment evaluation/education, Compensatory technique education, Activityengagement, Energy conservation          See flowsheet documentation for full assessment, interventions and recommendations  Outcome: Completed  Note:   Limitation: Decreased ADL status, Decreased Safe judgement during ADL, Decreased cognition, Decreased endurance, Decreased self-care trans, Decreased high-level ADLs, Mood limitation  Prognosis: Good, Guarded  Assessment: Pt participated in occupational therapy with focus on activity tolerance, and formal cognitive screening/Jeff Cognitive Level Screen ACLS  Pt cleared by RN/Karen for pt participation in occupational therapy  Pt received sitting edge of pt bed/fully dressed for home and agreeable to therapy following pt Identifiers confirmed  Pt reported her goal to go home with her SO  Pt able to complete her ADLS/self-care tasks Independent at this time  Pt able to complete form cognitive screening ACLS and based on pt score pt requires 24hour supervision to remove dangerous objects from outside visual field and to solve problems arising from minor changes in environment  Pt now meeting initial eval goals  No further OT needs identified at this time  Plan to d/c pt from OT services  Recommendation: (NEUROPSYCH- CAPACITY EVAL?)  OT Discharge Recommendation: 24 hour supervision/assist  OT - OK to Discharge:  Yes

## 2019-07-31 PROCEDURE — 99232 SBSQ HOSP IP/OBS MODERATE 35: CPT | Performed by: INTERNAL MEDICINE

## 2019-07-31 PROCEDURE — 99024 POSTOP FOLLOW-UP VISIT: CPT | Performed by: NURSE PRACTITIONER

## 2019-07-31 RX ORDER — LORAZEPAM 0.5 MG/1
0.5 TABLET ORAL EVERY 8 HOURS PRN
Status: DISCONTINUED | OUTPATIENT
Start: 2019-07-31 | End: 2019-08-02 | Stop reason: HOSPADM

## 2019-07-31 RX ADMIN — ACETAMINOPHEN 975 MG: 325 TABLET ORAL at 22:00

## 2019-07-31 RX ADMIN — HEPARIN SODIUM 5000 UNITS: 5000 INJECTION INTRAVENOUS; SUBCUTANEOUS at 22:00

## 2019-07-31 RX ADMIN — LORAZEPAM 0.5 MG: 0.5 TABLET ORAL at 08:41

## 2019-07-31 RX ADMIN — AMLODIPINE BESYLATE 5 MG: 5 TABLET ORAL at 08:41

## 2019-07-31 RX ADMIN — ACETAMINOPHEN 975 MG: 325 TABLET ORAL at 14:09

## 2019-07-31 RX ADMIN — ACETAMINOPHEN 975 MG: 325 TABLET ORAL at 06:24

## 2019-07-31 RX ADMIN — SENNOSIDES 8.6 MG: 8.6 TABLET, FILM COATED ORAL at 22:00

## 2019-07-31 NOTE — PROGRESS NOTES
Progress Note - Geriatric Medicine   Miguelina Ornelas 72 y o  female MRN: [id number]  Unit/Bed#: [facility] 603-01 Encounter: [id number]      Assessment/Plan:    High risk of delirium  -multifactorial including recent surgical procedure, underlying cognitive impairment, multiple medical comorbidities, and prolonged hospital stay  -continue to encourage normal sleep-wake cycle and pain control as well as nutritional support and monitoring for urinary and fecal retention    Agitation  -multifactorial, some component of delirium likely contributing as well as personal circumstances including lack of ability to make medical decisions and poor relationship with significant other which has been on and off and about how during hospital stay  -encourage patient to refrain from speaking to individuals which cause her agitation and to discuss her goals and plans of placement with her appointed guardian tomorrow  -continue one-to-one for aggression and risk of elopement, continue to rotate 1:1 to prevent staff burnout    Cognitive impairment  -Guardian has been assigned, awaiting placement    Lack of capability to make medical decisions  -the patient remains guarded tomorrow and discuss placement options    Ambulatory dysfunction  -continue use of a rolling walker, encourage assistance with ambulation  -continue fall precautions    Subjective:   Viji Ellison is seen ambulating in the hallways with assistance from her PCA as she can not agitated when she goes back to her room and she is difficult to reorient or calmed down and she was amenable to speaking in a more quiet and private area of the hallway with her PCA present  She reports that she is going home tomorrow and she is feeling good today especially about being able to ambulate the hallways without any discomfort  Review of Systems   Constitutional: Negative for chills and fever  HENT: Positive for hearing loss  Eyes: Negative for visual disturbance     Respiratory: Negative for cough and shortness of breath  Cardiovascular: Negative for chest pain  Gastrointestinal: Negative for diarrhea, nausea and vomiting  Genitourinary: Negative  Musculoskeletal: Positive for back pain and gait problem  Neurological: Negative for dizziness, light-headedness and headaches  Psychiatric/Behavioral: Positive for agitation (Things improved today as she has not spoken to her significant other)  All other systems reviewed and are negative  Objective:     Vitals: Blood pressure 131/79, pulse 92, temperature 98 2 °F (36 8 °C), temperature source Oral, resp  rate 20, height 5' 1 5" (1 562 m), weight 57 9 kg (127 lb 10 3 oz), SpO2 99 %  ,Body mass index is 23 73 kg/m²  Intake/Output Summary (Last 24 hours) at 7/31/2019 1801  Last data filed at 7/31/2019 1409  Gross per 24 hour   Intake 1200 ml   Output    Net 1200 ml       Current Medications: Reviewed    Physical Exam:   Physical Exam   Constitutional: She appears well-developed and well-nourished  No distress  HENT:   Head: Normocephalic and atraumatic  Poor dentition, mucous membranes dry and tacky   Eyes: Conjunctivae and EOM are normal  No scleral icterus  Neck: Normal range of motion  Neck supple  No tracheal deviation present  Pulmonary/Chest: Effort normal    Musculoskeletal:   ambulates with rolling walker in hallway, but attempts to step away from walker somewhat unsteady on her feet with wide-based gait   Neurological:   Awake and alert to person, place and situation   Skin: She is not diaphoretic  Psychiatric:   Mood remains slightly volatile dependent on situation    Nursing note and vitals reviewed  Lab, Imaging and other studies:  No new labs or imaging for review

## 2019-07-31 NOTE — ASSESSMENT & PLAN NOTE
POD#14 Left L4-5 hemilaminectomy and bilateral foraminotomy and Minimally invasive transforaminal lumbar interbody fusion with pedicle screw and myke fixation fusion L4-5, right-sided approach; minimally invasive right L5-S1 foraminotomy and diskectomy (N/A)  · Imaging reviewed personally and with attending, results are as follows:  · XR spine lumbar 7/18/2019:  Unremarkable appearance of posterior fusion L4-5  Grade 1 anterolisthesis at this level again noted  · Patient remains quite confused and encephalopathic with tangential thoughts  · Geriatrics recommends Zyprexa 2 5 mg q8h PRN as chemical restraint for last resort  · Requires guardianship  Court date for temporary guardianship 7/30  Guardian unable to make court date yesterday, waiting for update  · Patient continues with confusion/agitation  Placed on 1:1 - order continued  · Pain control - pain well controlled on current regimen  · Continue scheduled Tylenol  · Continue oxycodone 2 5 mg and 5 mg as needed for moderate severe pain  · Mobilize as tolerated with assistant, PT / OT recommend rehab  Ambulating well frequently with rolling walker  · DVT ppx: SCDs and heparin  · Neurosurgery will continue to follow as primary  Please call with questions or concerns

## 2019-07-31 NOTE — PROGRESS NOTES
Progress Note - Geriatric Medicine   Mecca Negar Ornelas 72 y o  female MRN: [id number]  Unit/Bed#: [facility] 603-01 Encounter: [id number]      Assessment/Plan:    High risk of Delirium  -patient is at high risk of delirium due to age, history of delirium earlier this hospital admission and chronic medical conditions  -currently on Tylenol scheduled and Roxicodone PRN which she has not required in the past 24H  -pain less likely contributing to agitation and more likely related to awaiting guardianship hearing which was delayed due to patients assigned guardian unable to attend hearing today    Agitation  -multifactorial including cognitive impairment, possible delirium contributing, as well ?boyfriend presenting to hospital earlier in day and arguing with patient  -continue to avoid confrontation as possible, rotate 1:1 as able to prevent caregiver burnout   -Je Castellanos enjoys art and writing poetry, attempt to redirect if able prior to escalation of agitation by asking her about poems she wrote or give notebook for her to start writing them   -if patient unable to be redirected and becomes dangerous to self or others would consider low dose Zyprexa IM    Cognitive impairment  -awaiting guardian hearing, possible assignment tomorrow    Ambulatory dysfunction  -continue use of rolling walker, encourage supervision to help monitor patient safety    Lack of capability to make medical decisions  -awaiting rescheduled guardianship hearing for tomorrow to determine dispo    Subjective:   Je Castellanos was heard yelling at staff and screaming profanities that she was supposed to be discharged today and is unable to be until tomorrow due to her guardianship hearing being delayed  Also nursing reports that her ?dru who had reported that he does not want to be contacted regarding the patient earlier in her stay came to visit and the patient almost immediately became agitated   Upon approaching her at the  she was amenable to speaking to me, she explains that she is upset because her ?boyfriend came to visit and she was unable to be alone with him due to her being on continual observation and they got into an argument and she reports that he stormed off  She was able to be redirected to discuss things that are enjoyable for her including poetry and art  I have discussed this with nursing staff so that it can be used as a distraction technique to prevent escalation of her agitation  Review of Systems   Reason unable to perform ROS: Patient declines to participate in ROS, reports that she is upset and stressed about her boydfriend and being able ot go home and thats it  Objective:     Vitals: Blood pressure 148/70, pulse 92, temperature 98 4 °F (36 9 °C), temperature source Oral, resp  rate 16, height 5' 1 5" (1 562 m), weight 59 9 kg (132 lb), SpO2 99 %  ,Body mass index is 24 54 kg/m²  Intake/Output Summary (Last 24 hours) at 7/30/2019 2035  Last data filed at 7/30/2019 1300  Gross per 24 hour   Intake 980 ml   Output    Net 980 ml     Current Medications: Reviewed    Physical Exam:   Physical Exam   Constitutional: She appears well-developed and well-nourished  HENT:   Head: Atraumatic  Mouth/Throat: Oropharynx is clear and moist    Eyes: Conjunctivae and EOM are normal  No scleral icterus  Neck: No JVD present  No tracheal deviation present  Cardiovascular:   Unable to auscultate due to patient agitation   Pulmonary/Chest: Effort normal    Musculoskeletal: Normal range of motion  Ambulates with rolling walker without difficulty   Neurological: She is alert  Skin: She is not diaphoretic  Psychiatric: Her mood appears anxious  Her speech is rapid and/or pressured  She is agitated  She is inattentive  Nursing note and vitals reviewed  Lab, Imaging and other studies: No new labs or imaging for review

## 2019-07-31 NOTE — PLAN OF CARE
Problem: PAIN - ADULT  Goal: Verbalizes/displays adequate comfort level or baseline comfort level  Description  Interventions:  - Encourage patient to monitor pain and request assistance  - Assess pain using appropriate pain scale  - Administer analgesics based on type and severity of pain and evaluate response  - Implement non-pharmacological measures as appropriate and evaluate response  - Consider cultural and social influences on pain and pain management  - Notify physician/advanced practitioner if interventions unsuccessful or patient reports new pain  Outcome: Progressing     Problem: INFECTION - ADULT  Goal: Absence or prevention of progression during hospitalization  Description  INTERVENTIONS:  - Assess and monitor for signs and symptoms of infection  - Monitor lab/diagnostic results  - Monitor all insertion sites, i e  indwelling lines, tubes, and drains  - Monitor endotracheal (as able) and nasal secretions for changes in amount and color  - [place] appropriate cooling/warming therapies per order  - Administer medications as ordered  - Instruct and encourage patient and family to use good hand hygiene technique  - Identify and instruct in appropriate isolation precautions for identified infection/condition  Outcome: Progressing     Problem: SAFETY ADULT  Goal: Patient will remain free of falls  Description  INTERVENTIONS:  - Assess patient frequently for physical needs  -  Identify cognitive and physical deficits and behaviors that affect risk of falls    -  [place] fall precautions as indicated by assessment   - Educate patient/family on patient safety including physical limitations  - Instruct patient to call for assistance with activity based on assessment  - Modify environment to reduce risk of injury  - Consider OT/PT consult to assist with strengthening/mobility  Outcome: Progressing  Goal: Maintain or return to baseline ADL function  Description  INTERVENTIONS:  -  Assess patient's ability to carry out ADLs; assess patient's baseline for ADL function and identify physical deficits which impact ability to perform ADLs (bathing, care of mouth/teeth, toileting, grooming, dressing, etc )  - Assess/evaluate cause of self-care deficits   - Assess range of motion  - Assess patient's mobility; develop plan if impaired  - Assess patient's need for assistive devices and provide as appropriate  - Encourage maximum independence but intervene and supervise when necessary  ¯ Involve family in performance of ADLs  ¯ Assess for home care needs following discharge   ¯ Request OT consult to assist with ADL evaluation and planning for discharge  ¯ Provide patient education as appropriate  Outcome: Progressing  Goal: Maintain or return mobility status to optimal level  Description  INTERVENTIONS:  - Assess patient's baseline mobility status (ambulation, transfers, stairs, etc )    - Identify cognitive and physical deficits and behaviors that affect mobility  - Identify mobility aids required to assist with transfers and/or ambulation (gait belt, sit-to-stand, lift, walker, cane, etc )  - [place] fall precautions as indicated by assessment  - Record patient progress and toleration of activity level on Mobility SBAR; progress patient to next Phase/Stage  - Instruct patient to call for assistance with activity based on assessment  - Request Rehabilitation consult to assist with strengthening/weightbearing, etc   Outcome: Progressing     Problem: DISCHARGE PLANNING  Goal: Discharge to home or other facility with appropriate resources  Description  INTERVENTIONS:  - Identify barriers to discharge w/patient and caregiver  - Arrange for needed discharge resources and transportation as appropriate  - Identify discharge learning needs (meds, wound care, etc )  - Arrange for interpretive services to assist at discharge as needed  - Refer to Case Management Department for coordinating discharge planning if the patient needs post-hospital services based on physician/advanced practitioner order or complex needs related to functional status, cognitive ability, or social support system  Outcome: Progressing     Problem: Knowledge Deficit  Goal: Patient/family/caregiver demonstrates understanding of disease process, treatment plan, medications, and discharge instructions  Description  Complete learning assessment and assess knowledge base  Interventions:  - Provide teaching at level of understanding  - Provide teaching via preferred learning methods  Outcome: Progressing     Problem: Potential for Falls  Goal: Patient will remain free of falls  Description  INTERVENTIONS:  - Assess patient frequently for physical needs  -  Identify cognitive and physical deficits and behaviors that affect risk of falls  -  [place] fall precautions as indicated by assessment   - Educate patient/family on patient safety including physical limitations  - Instruct patient to call for assistance with activity based on assessment  - Modify environment to reduce risk of injury  - Consider OT/PT consult to assist with strengthening/mobility  Outcome: Progressing     Problem: Nutrition/Hydration-ADULT  Goal: Nutrient/Hydration intake appropriate for improving, restoring or maintaining nutritional needs  Description  Monitor and assess patient's nutrition/hydration status for malnutrition (ex- brittle hair, bruises, dry skin, pale skin and conjunctiva, muscle wasting, smooth red tongue, and disorientation)  Collaborate with interdisciplinary team and initiate plan and interventions as ordered  Monitor patient's weight and dietary intake as ordered or per policy  Utilize nutrition screening tool and intervene per policy  Determine patient's food preferences and provide high-protein, high-caloric foods as appropriate       INTERVENTIONS:  - Monitor oral intake, urinary output, labs, and treatment plans  - Assess nutrition and hydration status and recommend course of action  - Evaluate amount of meals eaten  - Assist patient with eating if necessary   - Allow adequate time for meals  - Recommend/ encourage appropriate diets, oral nutritional supplements, and vitamin/mineral supplements  - Order, calculate, and assess calorie counts as needed  - Recommend, monitor, and adjust tube feedings and TPN/PPN based on assessed needs  - Assess need for intravenous fluids  - Provide specific nutrition/hydration education as appropriate  - Include patient/family/caregiver in decisions related to nutrition  Outcome: Progressing     Problem: BEHAVIOR  Goal: Pt/Family maintain appropriate behavior and adhere to behavioral management agreement, if implemented  Description  INTERVENTIONS:  - Assess the family dynamic   - Encourage verbalization of thoughts and concerns in a socially appropriate manner  - Assess patient/family's coping skills and non-compliant behavior (including use of illegal substances)  - Utilize positive, consistent limit setting strategies supporting safety of patient, staff and others  - Initiate consult with Case Management, Spiritual Care or other ancillary services as appropriate  - If a patient's/visitor's behavior jeopardizes the safety of the patient, staff, or others, refer to organization procedure     - Notify Security of behavior or suspected illegal substances which indicate the need for search of the patient and/or belongings  - Encourage participation in the decision making process about a behavioral management agreement; implement if patient meets criteria  Outcome: Progressing     Problem: Prexisting or High Potential for Compromised Skin Integrity  Goal: Skin integrity is maintained or improved  Description  INTERVENTIONS:  - Identify patients at risk for skin breakdown  - Assess and monitor skin integrity  - Assess and monitor nutrition and hydration status  - Monitor labs (i e  albumin)  - Assess for incontinence   - Turn and reposition patient  - Assist with mobility/ambulation  - Relieve pressure over bony prominences  - Avoid friction and shearing  - Provide appropriate hygiene as needed including keeping skin clean and dry  - Evaluate need for skin moisturizer/barrier cream  - Collaborate with interdisciplinary team (i e  Nutrition, Rehabilitation, etc )   - Patient/family teaching  Outcome: Progressing

## 2019-07-31 NOTE — SOCIAL WORK
This writer received an update from Ghazal Gregg with Aging of patient's emergency guardian's contact information:    Jv ANDERSON  [address], [address]  Phone: [phone]  Fax: [phone]  Email: [email or web address]    Voicemail and email sent to guardian to discuss discharge planning  The following information provided: patient has been accepted at [facility] at [place]  Patient will require BLS transport at discharge  Currently a 1:1 due to elopement risk and pt requires a locked SNF unit  Patient is requesting discharge home, however this is an unsafe discharge as patient requires 24/7 supervision per OT evaluation  DYANA Lara updated  Declaration page signed by Dr Rajesh Paz as requested by [facility] at [facility]   Copy of court order and Declaration page placed in Medical Records bin

## 2019-07-31 NOTE — SOCIAL WORK
This writer met with patient to discuss the guardianship process and recommendation for  care  Discussed referrals to [address] [place] [address] at [place]  Per patient family members have  at [place] and prefers [facility] at [place]  However when questioned about DYANA Lara's earlier interaction with her today she could not recall this conversation  Per patient "when I am upset I forget things " Throughout conversation patient stated that she feels she could go home because she can cook and manage her finances  Reiterated the recommendation of  care and capacity evaluation  Discussed patient's guardian would assist with discharge planning and managing financial concerns  Patient had no additional questions at this time

## 2019-07-31 NOTE — SOCIAL WORK
This writer requested an update from [employer] with Aging ([email or web address]) of the name of the guardian so CM can follow up with guardian to discuss discharge planning as patient remains medically stable for discharge

## 2019-07-31 NOTE — PROGRESS NOTES
Progress Note Nanda Ornelas 1945, 72 y o  female MRN: [id number]    Unit/Bed#: [facility] 603-01 Encounter: [id number]    Primary Care Provider: Felisa Nelson MD   Date and time admitted to hospital: 7/17/2019  7:11 AM        Cognitive impairment  Assessment & Plan  · Patient deemed to have no capacity back in January 2019  · Neuropsychology consulted, appreciate input  · At this time, patient continues to not have capacity to make informed medical decisions  · Psychiatry consulted, determined that patient does not have decision-making capacity and needs guardianship  · Court date 7/30 for temporary guardianship    Lumbar back pain with radiculopathy affecting right lower extremity  Assessment & Plan  POD#14 Left L4-5 hemilaminectomy and bilateral foraminotomy and Minimally invasive transforaminal lumbar interbody fusion with pedicle screw and myke fixation fusion L4-5, right-sided approach; minimally invasive right L5-S1 foraminotomy and diskectomy (N/A)  · Imaging reviewed personally and with attending, results are as follows:  · XR spine lumbar 7/18/2019:  Unremarkable appearance of posterior fusion L4-5  Grade 1 anterolisthesis at this level again noted  · Patient remains quite confused and encephalopathic with tangential thoughts  · Geriatrics recommends Zyprexa 2 5 mg q8h PRN as chemical restraint for last resort  · Requires guardianship  Court date for temporary guardianship 7/30  Guardian unable to make court date yesterday, waiting for update  · Patient continues with confusion/agitation  Placed on 1:1 - order continued  · Pain control - pain well controlled on current regimen  · Continue scheduled Tylenol  · Continue oxycodone 2 5 mg and 5 mg as needed for moderate severe pain  · Mobilize as tolerated with assistant, PT / OT recommend rehab  Ambulating well frequently with rolling walker  · DVT ppx: SCDs and heparin  · Neurosurgery will continue to follow as primary    Please call with questions or concerns  * Spinal stenosis of lumbar region with neurogenic claudication  Assessment & Plan  · See plan above      Subjective/Objective   Chief Complaint: "I want to get out of here "    Subjective: Patient states her back continues to hurt, but mostly complaining that she wants to get out of here and wants "this lady to stop following me " Denies any numbness or weakness  Objective: No focal deficits  Moving all extremities with good strength  Ambulating well with rolling walker  I/O       07/29 0701 - 07/30 0700 07/30 0701 - 07/31 0700 07/31 0701 - 08/01 0700    P  O  840 1220 480    Total Intake(mL/kg) 840 (14) 1220 (21 1) 480 (8 3)    Net +840 +1220 +480           Unmeasured Urine Occurrence 3 x 3 x           Invasive Devices     None                 Physical Exam:  Vitals: Blood pressure 131/79, pulse 92, temperature 98 2 °F (36 8 °C), temperature source Oral, resp  rate 20, height 5' 1 5" (1 562 m), weight 57 9 kg (127 lb 10 3 oz), SpO2 99 %  ,Body mass index is 23 73 kg/m²        General appearance: alert, appears stated age, cooperative and mildly distressed/agitated  Head: Normocephalic, without obvious abnormality, atraumatic  Eyes: EOMI, PERRL  Neck: supple, symmetrical, trachea midline and NT  Back: no kyphosis present, tender to lumbar spine  Lungs: non labored breathing  Heart: regular heart rate  Neurologic:   Mental status: Alert, oriented x1-2, thought content tangential  Cranial nerves: grossly intact (Cranial nerves II-XII)  Sensory: normal to light touch, JPS, DST  Motor: moving all extremities without focal weakness, strength 5/5 throughout  Reflexes: 2+ and symmetric, no Wetzel's or clonus  Coordination: finger to nose normal bilaterally, no drift bilaterally      Lab Results:        Invalid input(s):  EOSPCT        Invalid input(s): LABALBU              No results found for: TROPONINT  ABG:No results found for: PHART, JWU7OVL, PO2ART, JYY9ZPD, E5QVRLKK, BEART, SOURCE    Imaging Studies: I have personally reviewed pertinent reports  and I have personally reviewed pertinent films in PACS    EKG, Pathology, and Other Studies: I have personally reviewed pertinent reports        VTE Pharmacologic Prophylaxis: Sequential compression device (Venodyne)  and Heparin    VTE Mechanical Prophylaxis: sequential compression device

## 2019-08-01 PROCEDURE — 99232 SBSQ HOSP IP/OBS MODERATE 35: CPT | Performed by: INTERNAL MEDICINE

## 2019-08-01 PROCEDURE — 99024 POSTOP FOLLOW-UP VISIT: CPT | Performed by: NURSE PRACTITIONER

## 2019-08-01 RX ORDER — OLANZAPINE 10 MG/1
5 INJECTION, POWDER, LYOPHILIZED, FOR SOLUTION INTRAMUSCULAR EVERY 4 HOURS PRN
Status: DISCONTINUED | OUTPATIENT
Start: 2019-08-01 | End: 2019-08-02 | Stop reason: HOSPADM

## 2019-08-01 RX ADMIN — SENNOSIDES 8.6 MG: 8.6 TABLET, FILM COATED ORAL at 21:49

## 2019-08-01 RX ADMIN — ACETAMINOPHEN 975 MG: 325 TABLET ORAL at 05:10

## 2019-08-01 RX ADMIN — HEPARIN SODIUM 5000 UNITS: 5000 INJECTION INTRAVENOUS; SUBCUTANEOUS at 14:14

## 2019-08-01 RX ADMIN — DOCUSATE SODIUM 100 MG: 100 CAPSULE, LIQUID FILLED ORAL at 09:48

## 2019-08-01 RX ADMIN — HEPARIN SODIUM 5000 UNITS: 5000 INJECTION INTRAVENOUS; SUBCUTANEOUS at 21:49

## 2019-08-01 RX ADMIN — AMLODIPINE BESYLATE 5 MG: 5 TABLET ORAL at 09:48

## 2019-08-01 RX ADMIN — ACETAMINOPHEN 975 MG: 325 TABLET ORAL at 21:49

## 2019-08-01 RX ADMIN — OXYCODONE HYDROCHLORIDE 5 MG: 5 TABLET ORAL at 04:08

## 2019-08-01 RX ADMIN — HEPARIN SODIUM 5000 UNITS: 5000 INJECTION INTRAVENOUS; SUBCUTANEOUS at 05:10

## 2019-08-01 RX ADMIN — WATER: 1 INJECTION INTRAMUSCULAR; INTRAVENOUS; SUBCUTANEOUS at 20:35

## 2019-08-01 RX ADMIN — LORAZEPAM 0.5 MG: 0.5 TABLET ORAL at 17:09

## 2019-08-01 RX ADMIN — OLANZAPINE 5 MG: 10 INJECTION, POWDER, FOR SOLUTION INTRAMUSCULAR at 20:35

## 2019-08-01 RX ADMIN — ACETAMINOPHEN 975 MG: 325 TABLET ORAL at 14:09

## 2019-08-01 NOTE — SOCIAL WORK
This writer left another voicemail for patient's emergency guardian, Laney Alvarez Phone: [phone] and requested a call back to discuss discharge planning as patient is medically stable for discharge

## 2019-08-01 NOTE — SOCIAL WORK
This writer received spoke with [facility] at [facility] [place]) with concerns that facility will need to pay $5,000 for the permanent guardianship hearing  Discussed this with Jong Lopez from Aging per Jong Lopez, if requested the [place] will petition, [facility] will just need to call her  Information given to Alyssa Bryant confirms they can accept

## 2019-08-01 NOTE — ASSESSMENT & PLAN NOTE
POD#15 Left L4-5 hemilaminectomy and bilateral foraminotomy and Minimally invasive transforaminal lumbar interbody fusion with pedicle screw and myke fixation fusion L4-5, right-sided approach; minimally invasive right L5-S1 foraminotomy and diskectomy (N/A)  · Imaging reviewed personally and with attending, results are as follows:  · XR spine lumbar 7/18/2019:  Unremarkable appearance of posterior fusion L4-5  Grade 1 anterolisthesis at this level again noted  · Patient continues with confusion/agitation/tangential thoughts  Placed on 1:1 - order continued  · Mobilize as tolerated with assistant, PT / OT recommend rehab  Ambulating well frequently with rolling walker  · DVT ppx: SCDs and heparin  · Guardian appointed - awaiting placement at short term rehab facility  · Neurosurgery will continue to follow as primary  Please call with questions or concerns

## 2019-08-01 NOTE — SOCIAL WORK
Cm reviewed patient during care coordination rounds  Cm continues to work on discharge plan  Cm confirmed that emergent guardian contacted , who confirmed that the emergency guardian is okay with patient discharge to [facility]  Cm contacted patient's insurance company to confirm coverage for BLS transport  Per discussion with Felicia, reference #[id number], that patient has benefit for BLS transport, there is no co-pay cost , and no insurance authorization required  Cm informed that Sarah Roman is to handle arrangements for BLS transport  Cm informed by SW specialist, that Guardians at [facility] are okay with admission, and that the facility will pursue insurance authorization  Cm awaiting finale confirmation of insurance company denial for transfer  Cm later informed by SW specialist, that [facility] [place] will no longer accept patient due to expense needed for permanent guardian despite [facility] involvement  Cm received confirmation that [facility] will accept and after explaining to admission coordinator, the facility will pursue insurance authorization

## 2019-08-01 NOTE — PROGRESS NOTES
Progress Note Claudio Ornelas 1945, 72 y o  female MRN: [id number]    Unit/Bed#: [facility] 603-01 Encounter: [id number]    Primary Care Provider: Ailyn Mckee MD   Date and time admitted to hospital: 7/17/2019  7:11 AM        Cognitive impairment  Assessment & Plan  · Patient deemed to have no capacity back in January 2019  · Neuropsychology consulted, appreciate input  · At this time, patient continues to not have capacity to make informed medical decisions  · Psychiatry consulted, determined that patient does not have decision-making capacity and needs guardianship  · Guardian appointed yesterday  Lumbar back pain with radiculopathy affecting right lower extremity  Assessment & Plan  POD#15 Left L4-5 hemilaminectomy and bilateral foraminotomy and Minimally invasive transforaminal lumbar interbody fusion with pedicle screw and myke fixation fusion L4-5, right-sided approach; minimally invasive right L5-S1 foraminotomy and diskectomy (N/A)  · Imaging reviewed personally and with attending, results are as follows:  · XR spine lumbar 7/18/2019:  Unremarkable appearance of posterior fusion L4-5  Grade 1 anterolisthesis at this level again noted  · Patient continues with confusion/agitation/tangential thoughts  Placed on 1:1 - order continued  · Mobilize as tolerated with assistant, PT / OT recommend rehab  Ambulating well frequently with rolling walker  · DVT ppx: SCDs and heparin  · Guardian appointed - awaiting placement at short term rehab facility  · Neurosurgery will continue to follow as primary  Please call with questions or concerns  * Spinal stenosis of lumbar region with neurogenic claudication  Assessment & Plan  · See plan above      Subjective/Objective   Chief Complaint: "When am I getting out of here?"    Subjective: Patient continues to complain of some lumbar pain, denies numbness or weakness  Objective: Ambulating frequently in hallways with rolling walker   No neurofocal deficits  Talkative and agitated with tangential thoughts  I/O       07/30 0701 - 07/31 0700 07/31 0701 - 08/01 0700 08/01 0701 - 08/02 0700    P  O  1220 1370 480    Total Intake(mL/kg) 1220 (21 1) 1370 (23 7) 480 (8 3)    Net +1220 +1370 +480           Unmeasured Urine Occurrence 3 x 4 x           Invasive Devices     None                 Physical Exam:  Vitals: Blood pressure 135/90, pulse 92, temperature 98 2 °F (36 8 °C), temperature source Oral, resp  rate 20, height 5' 1 5" (1 562 m), weight 57 9 kg (127 lb 10 3 oz), SpO2 99 %  ,Body mass index is 23 73 kg/m²  General appearance: alert, appears stated age, cooperative and no distress  Head: Normocephalic, without obvious abnormality, atraumatic  Eyes: EOMI, PERRL, pupils 3 mm bilaterally  Neck: supple, symmetrical, trachea midline and NT  Back: no kyphosis present, mild tenderness to L2-L3 area  Lungs: non labored breathing  Heart: regular heart rate  Neurologic:   Mental status: Alert, oriented x1-2, thought content tangential  Cranial nerves: grossly intact (Cranial nerves II-XII)  Sensory: normal to light touch, JPS, DST  Motor: moving all extremities without focal weakness, strength 5/5 throughout  Reflexes: 2+ and symmetric, no Wetzel's or clonus  Coordination: finger to nose normal bilaterally, no drift bilaterally      Lab Results:        Invalid input(s):  EOSPCT        Invalid input(s): LABALBU              No results found for: TROPONINT  ABG:No results found for: PHART, WFT3LXH, PO2ART, CBC3JEN, L6NSGVOE, BEART, SOURCE    Imaging Studies: I have personally reviewed pertinent reports  and I have personally reviewed pertinent films in PACS    EKG, Pathology, and Other Studies: I have personally reviewed pertinent reports        VTE Pharmacologic Prophylaxis: Sequential compression device (Venodyne)  and Heparin    VTE Mechanical Prophylaxis: sequential compression device

## 2019-08-01 NOTE — SOCIAL WORK
Received a call from [address]  [facility]) unable to accept due to patient not having a permanent guardian  CM Director, Bernabe Casanova aware  This writer received a call back from [employer] who gave consent for discharge to  [facility]  CM to follow up with FINESSE Bourgeois CM

## 2019-08-01 NOTE — TRANSPORTATION MEDICAL NECESSITY
Section I - General Information    Name of Patient: Sigrid Ornelas                 : 1945    Medicare #: [id number]  Transport Date: 19 (PCS is valid for round trips on this date and for all repetitive trips in the 60-day range as noted below )  Origin: [facility]                                                         Destination: [facility]  Is the pt's stay covered under Medicare Part A (PPS/DRG)   [x]     Closest appropriate facility? If no, why is transport to more distant facility required? Yes  If hospice pt, is this transport related to pt's terminal illness? No       Section II - Medical Necessity Questionnaire  Ambulance transportation is medically necessary only if other means of transport are contraindicated or would be potentially harmful to the patient  To meet this requirement, the patient must either be "bed confined" or suffer from a condition such that transport by means other than ambulance is contraindicated by the patient's condition  The following questions must be answered by the medical professional signing below for this form to be valid:    1)  Describe the MEDICAL CONDITION (physical and/or mental) of this patient AT [address] [place] [address] that requires the patient to be transported in an ambulance and why transport by other means is contraindicated by the patient's condition: spinal stenosis of lumbar region, cognitive impairment,  Encephalopathy, delirium, acute pain    2) Is the patient "bed confined" as defined below? No  To be "be confined" the patient must satisfy all three of the following conditions: (1) unable to get up from bed without Assistance; AND (2) unable to ambulate; AND (3) unable to sit in a chair or wheelchair  3) Can this patient safely be transported by car or wheelchair van (i e , seated during transport without a medical attendant or monitoring)?    No    4) In addition to completing questions 1-3 above, please check any of the following conditions that apply*:   *Note: supporting documentation for any boxes checked must be maintained in the patient's medical records  If hosp-hosp transfer, describe services needed at 2nd facility not available at 1st facility? Patient is confused  Other(specify) impulsive, confused, high fall risk, delirium      Section III - Signature of Physician or Healthcare Professional  I certify that the above information is true and correct based on my evaluation of this patient, and represent that the patient requires transport by ambulance and that other forms of transport are contraindicated  I understand that this information will be used by the Centers for Medicare and Medicaid Services (CMS) to support the determination of medical necessity for ambulance services, and I represent that I have personal knowledge of the patient's condition at time of transport  [x]  If this box is checked, I also certify that the patient is physically or mentally incapable of signing the ambulance service's claim and that the institution with which I am affiliated has furnished care, services, or assistance to the patient  My signature below is made on behalf of the patient pursuant to 42 CFR §424 36(b)(4)  In accordance with 42 CFR §424 37, the specific reason(s) that the patient is physically or mentally incapable of signing the claim form is as follows: confused  Signature of Physician* or Healthcare Professional______________________________________________________________  Signature Date 08/01/19 (For scheduled repetitive transports, this form is not valid for transports performed more than 60 days after this date)    Printed Name & Credentials of Physician or Healthcare Professional (MD, DO, RN, etc )________________________________  *Form must be signed by patient's attending physician for scheduled, repetitive transports   For non-repetitive, unscheduled ambulance transports, if unable to obtain the signature of the attending physician, any of the following may sign (choose appropriate option below)  [] Physician Assistant []  Clinical Nurse Specialist []  Registered Nurse  []  Nurse Practitioner  [x] Discharge Planner

## 2019-08-01 NOTE — PROGRESS NOTES
Progress Note - Geriatric Medicine   Ashley Ornelas 72 y o  female MRN: [id number]  [facility] Encounter: [id number]      Assessment/Plan:    Agitation  -multifactorial including history of cognitive impairment as well as lack of capacity to make medical decisions which caused prolonged hospital stay due to awaiting guardian assignment  -currently on 1:1, continue to rotate 1:1 to minimize staff burnout  -encourage staff to take patient on walks around floor as distraction which has been helpful  -she also enjoys writing poetry and has been good distraction technique at times    Cognitive impairment  -currently awaiting assignment of guardian  -continue delirium precautions the patient is high risk of delirium giving underlying cognitive impairment  -following guarding this time the patient will work with guardian for permanent placement    Ambulatory dysfunction  -current uses rolling walker for ambulation, and continues to falling on her all times, continue assistance with ambulating hallways S patient remains a high fall risk     Lack of capacity to make medical decisions  -patient continues to wait for guardianship hearing    Subjective:   Jace Irwin is seen and examined at the request of nursing issues or agitated and was getting hostile towards her 1:1 continue observation  On arrival to the room I asked the patient to come for a walk and ambulate on the unit for out 25 minutes which seems to be very black sitting for her and allowed her to go back to her room and was more relaxed  She denies any pain at this time, she reports that she is eating well, moving her bowels and bladder, and her only complaint is she wants to go home  Review of Systems   Constitutional: Negative for activity change, appetite change, chills and fever  HENT: Positive for dental problem  Negative for trouble swallowing  Eyes: Negative for visual disturbance  Respiratory: Negative for cough and shortness of breath  Cardiovascular: Negative for chest pain and palpitations  Gastrointestinal: Negative for diarrhea, nausea and vomiting  Endocrine: Negative  Genitourinary: Negative for difficulty urinating and vaginal pain  Musculoskeletal: Positive for arthralgias, back pain, gait problem and neck pain (improving)  Skin: Negative  Neurological: Negative for dizziness, light-headedness and headaches  Psychiatric/Behavioral: Positive for agitation (intermittnet, worse when 1:1 sitting in doorway ), confusion, decreased concentration and dysphoric mood  All other systems reviewed and are negative  Objective:     Vitals: Blood pressure 138/83, pulse 92, temperature 98 2 °F (36 8 °C), temperature source Oral, resp  rate 20, height 5' 1 5" (1 562 m), weight 57 9 kg (127 lb 10 3 oz), SpO2 99 %  ,Body mass index is 23 73 kg/m²  Intake/Output Summary (Last 24 hours) at 8/1/2019 1835  Last data filed at 8/1/2019 0900  Gross per 24 hour   Intake 890 ml   Output    Net 890 ml     Current Medications: Reviewed    Physical Exam:   Physical Exam   Constitutional: She appears well-developed and well-nourished  No distress  HENT:   Head: Normocephalic and atraumatic  Mouth/Throat: Oropharynx is clear and moist    Eyes: Conjunctivae and EOM are normal  No scleral icterus  Neck: Normal range of motion  Neck supple  No tracheal deviation present  Pulmonary/Chest: Effort normal    Musculoskeletal: Normal range of motion  Neurological: She is alert  Oriented to person and place, diverts question of time     Skin: She is not diaphoretic  Psychiatric:   Mood/affect rapidly changes throughout the day and encounter   Nursing note and vitals reviewed  Invasive Devices     None               Lab, Imaging and other studies: I have personally reviewed pertinent reports

## 2019-08-01 NOTE — ASSESSMENT & PLAN NOTE
· Patient deemed to have no capacity back in January 2019  · Neuropsychology consulted, appreciate input  · At this time, patient continues to not have capacity to make informed medical decisions  · Psychiatry consulted, determined that patient does not have decision-making capacity and needs guardianship  · Guardian appointed yesterday

## 2019-08-02 VITALS
SYSTOLIC BLOOD PRESSURE: 122 MMHG | OXYGEN SATURATION: 99 % | BODY MASS INDEX: 23.87 KG/M2 | HEIGHT: 62 IN | HEART RATE: 82 BPM | DIASTOLIC BLOOD PRESSURE: 68 MMHG | TEMPERATURE: 97.7 F | RESPIRATION RATE: 16 BRPM | WEIGHT: 129.7 LBS

## 2019-08-02 PROBLEM — R29.898 RIGHT LEG WEAKNESS: Status: RESOLVED | Noted: 2019-04-24 | Resolved: 2019-08-02

## 2019-08-02 LAB
BASOPHILS # BLD AUTO: 0.07 THOUSANDS/ΜL (ref 0–0.1)
BASOPHILS NFR BLD AUTO: 1 % (ref 0–1)
EOSINOPHIL # BLD AUTO: 0.17 THOUSAND/ΜL (ref 0–0.61)
EOSINOPHIL NFR BLD AUTO: 3 % (ref 0–6)
ERYTHROCYTE [DISTWIDTH] IN BLOOD BY AUTOMATED COUNT: 15.9 % (ref 11.6–15.1)
HCT VFR BLD AUTO: 35.7 % (ref 34.8–46.1)
HGB BLD-MCNC: 10.6 G/DL (ref 11.5–15.4)
IMM GRANULOCYTES # BLD AUTO: 0.02 THOUSAND/UL (ref 0–0.2)
IMM GRANULOCYTES NFR BLD AUTO: 0 % (ref 0–2)
LYMPHOCYTES # BLD AUTO: 1.9 THOUSANDS/ΜL (ref 0.6–4.47)
LYMPHOCYTES NFR BLD AUTO: 31 % (ref 14–44)
MCH RBC QN AUTO: 25.5 PG (ref 26.8–34.3)
MCHC RBC AUTO-ENTMCNC: 29.7 G/DL (ref 31.4–37.4)
MCV RBC AUTO: 86 FL (ref 82–98)
MONOCYTES # BLD AUTO: 0.64 THOUSAND/ΜL (ref 0.17–1.22)
MONOCYTES NFR BLD AUTO: 11 % (ref 4–12)
NEUTROPHILS # BLD AUTO: 3.3 THOUSANDS/ΜL (ref 1.85–7.62)
NEUTS SEG NFR BLD AUTO: 54 % (ref 43–75)
NRBC BLD AUTO-RTO: 0 /100 WBCS
PLATELET # BLD AUTO: 598 THOUSANDS/UL (ref 149–390)
PMV BLD AUTO: 9.3 FL (ref 8.9–12.7)
RBC # BLD AUTO: 4.15 MILLION/UL (ref 3.81–5.12)
WBC # BLD AUTO: 6.1 THOUSAND/UL (ref 4.31–10.16)

## 2019-08-02 PROCEDURE — 99024 POSTOP FOLLOW-UP VISIT: CPT | Performed by: PHYSICIAN ASSISTANT

## 2019-08-02 PROCEDURE — 85025 COMPLETE CBC W/AUTO DIFF WBC: CPT | Performed by: PHYSICIAN ASSISTANT

## 2019-08-02 PROCEDURE — NC001 PR NO CHARGE: Performed by: PHYSICIAN ASSISTANT

## 2019-08-02 RX ORDER — ACETAMINOPHEN 325 MG/1
975 TABLET ORAL EVERY 8 HOURS SCHEDULED
Qty: 30 TABLET | Refills: 0 | Status: SHIPPED | OUTPATIENT
Start: 2019-08-02 | End: 2019-12-10 | Stop reason: HOSPADM

## 2019-08-02 RX ORDER — LORAZEPAM 0.5 MG/1
0.5 TABLET ORAL EVERY 8 HOURS PRN
Qty: 30 TABLET | Refills: 0 | Status: SHIPPED | OUTPATIENT
Start: 2019-08-02 | End: 2019-09-03 | Stop reason: ALTCHOICE

## 2019-08-02 RX ORDER — SENNOSIDES 8.6 MG
1 TABLET ORAL
Qty: 120 EACH | Refills: 0 | Status: ON HOLD | OUTPATIENT
Start: 2019-08-02 | End: 2019-12-03 | Stop reason: CLARIF

## 2019-08-02 RX ADMIN — OXYCODONE HYDROCHLORIDE 5 MG: 5 TABLET ORAL at 07:48

## 2019-08-02 RX ADMIN — HEPARIN SODIUM 5000 UNITS: 5000 INJECTION INTRAVENOUS; SUBCUTANEOUS at 06:31

## 2019-08-02 RX ADMIN — AMLODIPINE BESYLATE 5 MG: 5 TABLET ORAL at 07:48

## 2019-08-02 RX ADMIN — HEPARIN SODIUM 5000 UNITS: 5000 INJECTION INTRAVENOUS; SUBCUTANEOUS at 13:32

## 2019-08-02 RX ADMIN — LORAZEPAM 0.5 MG: 0.5 TABLET ORAL at 07:47

## 2019-08-02 RX ADMIN — DOCUSATE SODIUM 100 MG: 100 CAPSULE, LIQUID FILLED ORAL at 07:48

## 2019-08-02 RX ADMIN — ACETAMINOPHEN 975 MG: 325 TABLET ORAL at 13:31

## 2019-08-02 RX ADMIN — ACETAMINOPHEN 975 MG: 325 TABLET ORAL at 06:31

## 2019-08-02 NOTE — PLAN OF CARE
Problem: PAIN - ADULT  Goal: Verbalizes/displays adequate comfort level or baseline comfort level  Description  Interventions:  - Encourage patient to monitor pain and request assistance  - Assess pain using appropriate pain scale  - Administer analgesics based on type and severity of pain and evaluate response  - Implement non-pharmacological measures as appropriate and evaluate response  - Consider cultural and social influences on pain and pain management  - Notify physician/advanced practitioner if interventions unsuccessful or patient reports new pain  Outcome: Progressing     Problem: INFECTION - ADULT  Goal: Absence or prevention of progression during hospitalization  Description  INTERVENTIONS:  - Assess and monitor for signs and symptoms of infection  - Monitor lab/diagnostic results  - Monitor all insertion sites, i e  indwelling lines, tubes, and drains  - Monitor endotracheal (as able) and nasal secretions for changes in amount and color  - [place] appropriate cooling/warming therapies per order  - Administer medications as ordered  - Instruct and encourage patient and family to use good hand hygiene technique  - Identify and instruct in appropriate isolation precautions for identified infection/condition  Outcome: Progressing     Problem: SAFETY ADULT  Goal: Patient will remain free of falls  Description  INTERVENTIONS:  - Assess patient frequently for physical needs  -  Identify cognitive and physical deficits and behaviors that affect risk of falls    -  [place] fall precautions as indicated by assessment   - Educate patient/family on patient safety including physical limitations  - Instruct patient to call for assistance with activity based on assessment  - Modify environment to reduce risk of injury  - Consider OT/PT consult to assist with strengthening/mobility  Outcome: Progressing  Goal: Maintain or return to baseline ADL function  Description  INTERVENTIONS:  -  Assess patient's ability to carry out ADLs; assess patient's baseline for ADL function and identify physical deficits which impact ability to perform ADLs (bathing, care of mouth/teeth, toileting, grooming, dressing, etc )  - Assess/evaluate cause of self-care deficits   - Assess range of motion  - Assess patient's mobility; develop plan if impaired  - Assess patient's need for assistive devices and provide as appropriate  - Encourage maximum independence but intervene and supervise when necessary  ¯ Involve family in performance of ADLs  ¯ Assess for home care needs following discharge   ¯ Request OT consult to assist with ADL evaluation and planning for discharge  ¯ Provide patient education as appropriate  Outcome: Progressing  Goal: Maintain or return mobility status to optimal level  Description  INTERVENTIONS:  - Assess patient's baseline mobility status (ambulation, transfers, stairs, etc )    - Identify cognitive and physical deficits and behaviors that affect mobility  - Identify mobility aids required to assist with transfers and/or ambulation (gait belt, sit-to-stand, lift, walker, cane, etc )  - [place] fall precautions as indicated by assessment  - Record patient progress and toleration of activity level on Mobility SBAR; progress patient to next Phase/Stage  - Instruct patient to call for assistance with activity based on assessment  - Request Rehabilitation consult to assist with strengthening/weightbearing, etc   Outcome: Progressing     Problem: DISCHARGE PLANNING  Goal: Discharge to home or other facility with appropriate resources  Description  INTERVENTIONS:  - Identify barriers to discharge w/patient and caregiver  - Arrange for needed discharge resources and transportation as appropriate  - Identify discharge learning needs (meds, wound care, etc )  - Arrange for interpretive services to assist at discharge as needed  - Refer to Case Management Department for coordinating discharge planning if the patient needs post-hospital services based on physician/advanced practitioner order or complex needs related to functional status, cognitive ability, or social support system  Outcome: Progressing     Problem: Knowledge Deficit  Goal: Patient/family/caregiver demonstrates understanding of disease process, treatment plan, medications, and discharge instructions  Description  Complete learning assessment and assess knowledge base  Interventions:  - Provide teaching at level of understanding  - Provide teaching via preferred learning methods  Outcome: Progressing     Problem: Potential for Falls  Goal: Patient will remain free of falls  Description  INTERVENTIONS:  - Assess patient frequently for physical needs  -  Identify cognitive and physical deficits and behaviors that affect risk of falls  -  [place] fall precautions as indicated by assessment   - Educate patient/family on patient safety including physical limitations  - Instruct patient to call for assistance with activity based on assessment  - Modify environment to reduce risk of injury  - Consider OT/PT consult to assist with strengthening/mobility  Outcome: Progressing     Problem: Nutrition/Hydration-ADULT  Goal: Nutrient/Hydration intake appropriate for improving, restoring or maintaining nutritional needs  Description  Monitor and assess patient's nutrition/hydration status for malnutrition (ex- brittle hair, bruises, dry skin, pale skin and conjunctiva, muscle wasting, smooth red tongue, and disorientation)  Collaborate with interdisciplinary team and initiate plan and interventions as ordered  Monitor patient's weight and dietary intake as ordered or per policy  Utilize nutrition screening tool and intervene per policy  Determine patient's food preferences and provide high-protein, high-caloric foods as appropriate       INTERVENTIONS:  - Monitor oral intake, urinary output, labs, and treatment plans  - Assess nutrition and hydration status and recommend course of action  - Evaluate amount of meals eaten  - Assist patient with eating if necessary   - Allow adequate time for meals  - Recommend/ encourage appropriate diets, oral nutritional supplements, and vitamin/mineral supplements  - Order, calculate, and assess calorie counts as needed  - Recommend, monitor, and adjust tube feedings and TPN/PPN based on assessed needs  - Assess need for intravenous fluids  - Provide specific nutrition/hydration education as appropriate  - Include patient/family/caregiver in decisions related to nutrition  Outcome: Progressing     Problem: BEHAVIOR  Goal: Pt/Family maintain appropriate behavior and adhere to behavioral management agreement, if implemented  Description  INTERVENTIONS:  - Assess the family dynamic   - Encourage verbalization of thoughts and concerns in a socially appropriate manner  - Assess patient/family's coping skills and non-compliant behavior (including use of illegal substances)  - Utilize positive, consistent limit setting strategies supporting safety of patient, staff and others  - Initiate consult with Case Management, Spiritual Care or other ancillary services as appropriate  - If a patient's/visitor's behavior jeopardizes the safety of the patient, staff, or others, refer to organization procedure     - Notify Security of behavior or suspected illegal substances which indicate the need for search of the patient and/or belongings  - Encourage participation in the decision making process about a behavioral management agreement; implement if patient meets criteria  Outcome: Progressing     Problem: Prexisting or High Potential for Compromised Skin Integrity  Goal: Skin integrity is maintained or improved  Description  INTERVENTIONS:  - Identify patients at risk for skin breakdown  - Assess and monitor skin integrity  - Assess and monitor nutrition and hydration status  - Monitor labs (i e  albumin)  - Assess for incontinence   - Turn and reposition patient  - Assist with mobility/ambulation  - Relieve pressure over bony prominences  - Avoid friction and shearing  - Provide appropriate hygiene as needed including keeping skin clean and dry  - Evaluate need for skin moisturizer/barrier cream  - Collaborate with interdisciplinary team (i e  Nutrition, Rehabilitation, etc )   - Patient/family teaching  Outcome: Progressing

## 2019-08-02 NOTE — DISCHARGE INSTRUCTIONS
Discharge Instructions  Posterior Thoracic/Lumbar Fusion/Fixation      Surgical incisional care:   Incision may be left open to air   Keep incision clean and dry  Avoid applying creams, lotion or antiseptic to incision area   Check the wound daily  If the incision becomes red, swollen, tender, warm, or has increased drainage please notify physician immediately   If steri-strips are in place, allow them to fall off  If still in place at two week postoperative visit, we will remove them   May shower 3 days after surgery, but do not soak in a tub and no swimming until cleared   o Incision may be cleaned with water and a mild antimicrobial soap using a clean washcloth  Incision is to be gently patted dry with a clean towel   Continue to change bed linens and pajamas more frequently  Wear clean clothes daily  Activity Restrictions:   No heavy lifting greater than 10lbs and no strenuous activities until cleared   No bending or twisting  No BLTs (bending, lifting, twisting)  Avoid pushing/pulling movements   May walk as tolerated  Encourage at least 4 short walks per day  No prolonged sitting   No driving for at least 2 weeks or until cleared by physician   If you were provided a brace, it is to be worn whenever you are out of bed until directed otherwise  It may be put on while sitting at bedside  Postoperative medication:   Take pain medications to relieve incision pain, and muscle relaxants to prevent spasms as directed  Please see after visit summary (AVS) for details   Do not operate heavy machinery or vehicles while taking sedating medications   Use a bowel regimen while on opioids as they induce constipation  Ie  Senokot-S, Miralax, Colace, etc  Increase fiber and water intake   Do not take ibuprofen, Naproxen/Aleve or any non steroidal anti-inflammatory medications, NSAIDs, until cleared by surgeon  May take Tylenol instead     If taking Coumadin, Aspirin, or Plavix, you may resume these medications when cleared by Neurosurgery  Follow-up as scheduled for 6 week postoperative visit with repeat Xrays       **Please notify MD if you experience a fever 101F, chills or have increased pain, numbness, tingling, or weakness in your legs, increased walking difficulties and/or bowel/bladder dysfunction/incontinence**

## 2019-08-02 NOTE — SOCIAL WORK
This writer sent an update to emergency guardian, Letha Jimenez ([email or web address]) to update about tentative discharge today at 4 pm via B[facility] transport [facility]

## 2019-08-02 NOTE — ASSESSMENT & PLAN NOTE
· Patient deemed to have no capacity back in January 2019  · Neuropsychology consulted, appreciate input  · At this time, patient continues to not have capacity to make informed medical decisions  · Psychiatry consulted, determined that patient does not have decision-making capacity and needs guardianship    · Guardian appointed 7/31/2019

## 2019-08-02 NOTE — DISCHARGE SUMMARY
Discharge Summary - Neurosurgery   Teresa Ornelas 72 y o  female MRN: [id number]  Unit/Bed#: [facility] 603-01 Encounter: [id number]    Admission Date:   Admission Orders (From admission, onward)     Ordered        07/17/19 1505  Inpatient Admission  Once                      Discharge Date:  08/02/2019    Admitting Diagnosis: Lumbar back pain with radiculopathy affecting right lower extremity [M54 16]  Spondylosis without myelopathy or radiculopathy, lumbar region [M47 816]    Discharge Diagnosis:  1  Status post left L4-5 hemilaminectomy and bilateral foraminotomy and minimally invasive transforaminal lumbar interbody fusion with pedicle screw and myke fixation fusion L4-5, right-sided approach, minimally invasive right L5-S1 foraminotomy and diskectomy  2  Spinal stenosis of the lumbar region with neurogenic claudication  3  Cognitive impairment  4  Delirium    Resolved Problems  Date Reviewed: 7/17/2019    None          Attending: Melissa Nino MD    Consulting Physician(s): Moni Andrews MD (behavioral health), Speedy Brown DO (geriatrics), Lord Brody, PhD (neuropsychology)    Procedures Performed: left L4-5 hemilaminectomy and bilateral foraminotomy and minimally invasive transforaminal lumbar interbody fusion with pedicle screw and myke fixation fusion L4-5, right-sided approach, minimally invasive right L5-S1 foraminotomy and diskectomy    Radiology:  XR spine lumbar 2 or 3 views injury   Final Result      Unremarkable appearance of posterior fusion L4-5  Grade 1 anterolisthesis at this level again noted  Workstation performed: [id number]             Hospital Course:  Eleonora Castillo is a 73 y/o female who presented to the outpatient office complaining of lumbar back pain, dysesthesias, right lower extremity radiculopathy and ambulatory dysfunction    Imaging revealed L4-5 degenerative disc disease and anterior spondylolisthesis and extensive facet hypertrophic change resulting in severe canal stenosis and moderate foraminal narrowing, broad-based central disc extrusion at L5-S1 with mass effect upon the S1 nerve root, severe canal stenosis at T10-11 and effacement of the ventral dorsal CSF space with slight flattening of the cord  Patient underwent left L4-5 hemilaminectomy and bilateral foraminotomy and minimally invasive transforaminal lumbar interbody fusion with pedicle screw and myke fixation fusion L4-5, right-sided approach, minimally invasive right L5-S1 foraminotomy and diskectomy under general anesthesia with minimal blood loss and no complications  Patient was kept in the PACU until stable and then moved to the floor  Patient was found to be with altered mental status postoperatively and was discovered she was deemed to have no capacity back in January  After her 2nd evaluation by neuropsychology, she was deemed to continue to have no capacity to make medical decisions on 7/19/2019  Unfortunately guardianship was unable to be obtained from a family member and therefore patient remained in the hospital pending guardianship which was acquired on 07/31/2019  On 08/02/2019 she got authorization for discharge to [facility] nursing facility  Patient received imaging postoperatively which revealed stable hardware placement  PT and OT were consulted and recommend discharge to skilled nursing facility with 24 hours supervision  Patient was cleared medically for discharge early on in her post operative course however her discharge was delayed secondary to guardianship and authorization for discharge to facility with 24 hours supervision  Patient will follow up outpatient in 6 weeks with repeat x-ray lumbar spine  Condition at Discharge: good     Discharge instructions/Information to patient and family:   See after visit summary for information provided to patient and family        Provisions for Follow-Up Care:  See after visit summary for information related to follow-up care and any pertinent home health orders  Disposition: Skilled nursing facility at [facility] Readmission: No    Discharge Statement   I spent 25 minutes discharging the patient  This time was spent on the day of discharge  I had direct contact with the patient on the day of discharge  Additional documentation is required if more than 30 minutes were spent on discharge  Discharge Medications:  See after visit summary for reconciled discharge medications provided to patient and family

## 2019-08-02 NOTE — SOCIAL WORK
Cm contacted [employer] to schedule BLS transport  Cm informed by Mirela Irwin with [employer] that patient does not have a non-emergent BLS transport benefit  Cm informed by representative to contact insurance company again to see if medical  Insurance to would contact [employer] to change determination  Cm spoke with Jennifer Rincon at [employer] in regards to transportation benefit  Per Jennifer Rincon, [employer] provides patient's Medicare benefits, which has the benefit for BLS transport  Cm informed by Jennifer Rincon, that the current plan under [employer] with [facility] for transportation  Cm informed after Jennifer Rincon reportedly spoke with several supervisors at [employer], that due to the type of Medicare plan patient has, [facility] does not provide the benefit so that obtaining an insurance authorization would not assistant with transport  Cm informed by Jennifer Rincon, that [place] medical assistance does have a benefit for transportation  Cm [facility] and [facility] clearance happens today  Cm awaiting to hear from [facility] admissions  Cm confirmed that patient will dc to [facility] at 4pm today  Cm informed by [facility] admissions, that patient is approved by insurance to transfer  Cm informed that transportation had a cancellation and arrived at 2pm to transport patient  Cm informed medical team who were able to complete discharge information  Cm needed several assists from staff members to assist with patient getting onto stretcher to be transported  At first transportation crew with [facility] did not want to transport due to patient's verbal aggressiveness  After sometime, they did agree to transport and left with patient, who willing got onto the stretcher

## 2019-08-02 NOTE — ASSESSMENT & PLAN NOTE
POD#16 Left L4-5 hemilaminectomy and bilateral foraminotomy and Minimally invasive transforaminal lumbar interbody fusion with pedicle screw and myke fixation fusion L4-5, right-sided approach; minimally invasive right L5-S1 foraminotomy and diskectomy (N/A)  · Imaging reviewed personally and with attending, results are as follows:  · XR spine lumbar 7/18/2019:  Unremarkable appearance of posterior fusion L4-5  Grade 1 anterolisthesis at this level again noted  · Patient continues with confusion/agitation/tangential thoughts  Placed on 1:1 - order continued  · Mobilize as tolerated with assistant, PT / OT recommend rehab  Ambulating well frequently with rolling walker  · DVT ppx: SCDs and heparin  · Guardian appointed - awaiting placement at short term rehab facility  · Neurosurgery will continue to follow as primary  Patient has been medically stable for discharge, just pending acceptance to rehab facility at this time  Please call with questions or concerns

## 2019-08-02 NOTE — PROGRESS NOTES
Progress Note Primus Ohm 1945, 72 y o  female MRN: [id number]    Unit/Bed#: [facility] 603-01 Encounter: [id number]    Primary Care Provider: Tiara Phelan MD   Date and time admitted to hospital: 7/17/2019  7:11 AM        Lumbar back pain with radiculopathy affecting right lower extremity  Assessment & Plan  POD#16 Left L4-5 hemilaminectomy and bilateral foraminotomy and Minimally invasive transforaminal lumbar interbody fusion with pedicle screw and myke fixation fusion L4-5, right-sided approach; minimally invasive right L5-S1 foraminotomy and diskectomy (N/A)  · Imaging reviewed personally and with attending, results are as follows:  · XR spine lumbar 7/18/2019:  Unremarkable appearance of posterior fusion L4-5  Grade 1 anterolisthesis at this level again noted  · Patient continues with confusion/agitation/tangential thoughts  Placed on 1:1 - order continued  · Mobilize as tolerated with assistant, PT / OT recommend rehab  Ambulating well frequently with rolling walker  · DVT ppx: SCDs and heparin  · Guardian appointed - awaiting placement at short term rehab facility  · Neurosurgery will continue to follow as primary  Patient has been medically stable for discharge, just pending acceptance to rehab facility at this time  Please call with questions or concerns  * Spinal stenosis of lumbar region with neurogenic claudication  Assessment & Plan  · See plan above    Cognitive impairment  Assessment & Plan  · Patient deemed to have no capacity back in January 2019  · Neuropsychology consulted, appreciate input  · At this time, patient continues to not have capacity to make informed medical decisions  · Psychiatry consulted, determined that patient does not have decision-making capacity and needs guardianship    · Guardian appointed 7/31/2019    Delirium  Assessment & Plan  · Geriatrics, neuropsych, and psych consulted      Subjective/Objective   Chief Complaint: "I am alive "    Subjective: Patient complains of some left CVA tenderness and states that she has a history of kidney stones and kidney infections in the past and she is concerned she may be getting one now  She does not endorse any issues with urination or having any fevers  Bowel movement 2 days ago  Wants to pick at her incision and she is informed not to do that  Denies headache, dizziness, chest pain, shortness of breath, abdominal pain, nausea or vomiting, bowel or bladder issues, saddle anesthesia, numbness/tingling/weakness  Nursing rounds completed with Sampson Lewis - patient very agitated overnight stating she wants to leave, given Ativan this morning, continually tries to hit the back of her incision secondary to pain  Objective:  Sitting up in bed, NAD    I/O       07/31 0701 - 08/01 0700 08/01 0701 - 08/02 0700 08/02 0701 - 08/03 0700    P  O  1370 720 480    Total Intake(mL/kg) 1370 (23 7) 720 (12 2) 480 (8 2)    Net +1370 +720 +480           Unmeasured Urine Occurrence 4 x            Invasive Devices     None                 Physical Exam:  Vitals: Blood pressure 122/68, pulse 82, temperature 97 7 °F (36 5 °C), temperature source Oral, resp  rate 16, height 5' 1 5" (1 562 m), weight 58 8 kg (129 lb 11 2 oz), SpO2 99 %  ,Body mass index is 24 11 kg/m²        General appearance: alert, appears stated age, cooperative and no distress  Head: Normocephalic, without obvious abnormality, atraumatic  Eyes:  Conjugate gaze  Neck: supple, symmetrical, trachea midline and NT  Back: no kyphosis present, mild tenderness to palpation around incision which is clean dry and intact, does have some left-sided CVA tenderness  Lungs: non labored breathing  Heart: regular heart rate  Neurologic:   Mental status:  GCS 14 (-1 confusion), continues with tangential speech, alert, follows commands  Cranial nerves: grossly intact (Cranial nerves II-XII)  Sensory: normal to light touch, JPS 3/3 bilateral hallux, DST intact  Motor: moving all extremities without focal weakness, strength 5/5  Reflexes:  3+ and brisk with bilateral Miriam, no clonus noted  Coordination: finger to nose normal bilaterally, no drift bilaterally      Imaging Studies: I have personally reviewed pertinent reports  and I have personally reviewed pertinent films in PACS    Xr Spine Lumbar 2 Or 3 Views Injury    Result Date: 7/18/2019  Impression: Unremarkable appearance of posterior fusion L4-5  Grade 1 anterolisthesis at this level again noted  Workstation performed: [id number]     Xr Spine Lumbar 2 Or 3 Views Injury    Result Date: 7/18/2019  Impression: Fluoroscopic guidance provided for surgical procedure  Please refer to the separate procedure notes for additional details  Workstation performed: [id number]     EKG, Pathology, and Other Studies: I have personally reviewed pertinent reports        VTE Pharmacologic Prophylaxis: Heparin    VTE Mechanical Prophylaxis: sequential compression device

## 2019-08-05 NOTE — UTILIZATION REVIEW
Notification of Discharge  This is a Notification of Discharge from our facility [address]  Please be advised that this patient has been discharge from our facility  Below you will find the admission and discharge date and time including the patients disposition  PRESENTATION DATE: 7/17/2019  7:11 AM  IP ADMISSION DATE: 7/17/19 1505   DISCHARGE DATE: 8/2/2019  3:40 PM  DISPOSITION: Non [facility] SN[facility]U/SNU Non [facility] SN[facility]   Admission Orders listed below:  Admission Orders (From admission, onward)     Ordered        07/17/19 1505  Inpatient Admission  Once                   Please contact the UR Department if additional information is required to close this patient's authorization/case  [address]  Utilization Review Department  Phone: [phone]; Fax [phone]  [email or web address]  ATTENTION: Please call with any questions or concerns to [phone]  and carefully listen to the prompts so that you are directed to the right person  Send all requests for admission clinical reviews, approved or denied determinations and any other requests to fax [phone]   All voicemails are confidential

## 2019-08-06 ENCOUNTER — TELEPHONE (OUTPATIENT)
Dept: NEUROSURGERY | Facility: CLINIC | Age: 74
End: 2019-08-06

## 2019-08-06 NOTE — TELEPHONE ENCOUNTER
Called [facility] and spoke with Francisco Dobbins that nurse of Ashley Ornelas to see how she is doing after surgery 7/17/2019 with hospital discharge of 8/2/2019  She reports that she is doing well overall and denies any incisional issues or fevers  Advised that at this time should be showering normally and gently washing the surgical site with soap and water  Use clean wash cloth, towels, and clothing  Do not submerge in water until cleared by the surgeon  Do not apply any creams, ointments, or lotions to the site  Verified date/time/location of her upcoming POV on 9/3/2019 and advised her to call the office with any further questions or concerns, or if any incisional issues or fevers would arise  Patient was appreciative for the call

## 2019-08-06 NOTE — TELEPHONE ENCOUNTER
08/06/2019-CY (08/02/2019) OUTPATIENT FOLLOW-UP ON September 3rd WITH REPEAT X-RAY LUMBAR SPINE  DISCHARGE TODAY  PT DISCHARGED TO [facility]    09/03/2019-6 WK POV   XRAY SCRIPT IN CHART

## 2019-08-30 ENCOUNTER — TRANSCRIBE ORDERS (OUTPATIENT)
Dept: RADIOLOGY | Facility: HOSPITAL | Age: 74
End: 2019-08-30

## 2019-08-30 ENCOUNTER — HOSPITAL ENCOUNTER (OUTPATIENT)
Dept: RADIOLOGY | Facility: HOSPITAL | Age: 74
Discharge: HOME | End: 2019-08-30
Payer: COMMERCIAL

## 2019-08-30 DIAGNOSIS — M54.16 LUMBAR BACK PAIN WITH RADICULOPATHY AFFECTING RIGHT LOWER EXTREMITY: ICD-10-CM

## 2019-08-30 DIAGNOSIS — Z98.890 POST-OPERATIVE STATE: ICD-10-CM

## 2019-08-30 DIAGNOSIS — M48.062 SPINAL STENOSIS OF LUMBAR REGION WITH NEUROGENIC CLAUDICATION: ICD-10-CM

## 2019-08-30 PROCEDURE — 72100 X-RAY EXAM L-S SPINE 2/3 VWS: CPT

## 2019-09-03 ENCOUNTER — OFFICE VISIT (OUTPATIENT)
Dept: NEUROSURGERY | Facility: CLINIC | Age: 74
End: 2019-09-03

## 2019-09-03 VITALS
DIASTOLIC BLOOD PRESSURE: 57 MMHG | BODY MASS INDEX: 25.65 KG/M2 | SYSTOLIC BLOOD PRESSURE: 137 MMHG | RESPIRATION RATE: 16 BRPM | HEIGHT: 62 IN | TEMPERATURE: 99.2 F | WEIGHT: 139.4 LBS | HEART RATE: 47 BPM

## 2019-09-03 DIAGNOSIS — Z09 POSTOPERATIVE EXAMINATION: Primary | ICD-10-CM

## 2019-09-03 PROCEDURE — 99024 POSTOP FOLLOW-UP VISIT: CPT | Performed by: NEUROLOGICAL SURGERY

## 2019-09-03 NOTE — PROGRESS NOTES
[facility] Office Note  Kanchan Michel is a 72 y o  female      Type of Visit: Post-op      Diagnoses and all orders for this visit:    Postoperative examination  -     Ambulatory referral to Physical Therapy; Future  -     XR spine lumbar complete w bending minimum 6 views; Future    Other orders  -     magnesium hydroxide (MILK OF MAGNESIA) 400 mg/5 mL oral suspension; Take by mouth daily as needed for constipation        DISCUSSION SUMMARY  72-year-old female who is slowly improving  She does continue to have back pain which radiates to her tailbone  Imaging studies of her back looks good  She will require physical therapy to improve her overall condition  If she is unimproved after 3 months then surgery on her thoracic canal stenosis would be indicated  Return in 3 months (on 12/3/2019)  CHIEF COMPLAINT  Patient presents for 6 week pov    NEUROSURGERY PROCEDURES  7/17/19 DKO: Left L4-5 hemilaminectomy and bilateral foraminotomy and Minimally invasive transforaminal lumbar interbody fusion with pedicle screw and myke fixation fusion L4-5, right-sided approach; minimally invasive right L5-S1 foraminotomy and diskectomy  HISTORY OF PRESENT ILLNESS  Patient presented to the office with complaints of low back pain and ambulatory dysfunction  MRI lumbar spine without contrast 06/06/2019:  L4-5 degenerative disc disease with anterior spondylolisthesis and extensive facet hypertrophic change resulting in severe canal stenosis and moderate foraminal narrowing, unchanged  Stable broad-based central disc extrusion at L5-S1 with mass effect upon the S1 nerve root similar to the prior exam   Severe canal stenosis at the T10-11 level due to annular bulging and posterior element hypertrophic change  Effacement of the ventral dorsal CSF space with slight flattening of the cord but no abnormal cord signal   This is not previously imaged on CT or MRI prior      Given MRI findings and patient's persistent pain and dysesthesias, recommend surgical intervention  There was detailed discussion of percutaneous transforaminal pedicle screw and myke fixation fusion L4-5 right approach and L5-S1 right foraminotomy  Risks and benefits of the surgery were explained  Patient and  are agreeable to move forward with surgical intervention  She said she started physical therapy on 1 occasion however got worse and stopped it on her own  I believe that she is ready for physical therapy at this point  She uses a walker for ambulation  She complains of pain in the midline at the thoracolumbar junction radiating down to the region of her tailbone  REVIEW OF SYSTEMS  Review of Systems   Constitutional: Negative  HENT: Negative  Eyes: Negative  Respiratory: Positive for shortness of breath and wheezing  Cardiovascular: Negative  Gastrointestinal: Positive for constipation  Endocrine: Positive for cold intolerance  Genitourinary: Negative  Musculoskeletal: Positive for back pain (low back pain and pain down right leg) and gait problem (using a walker)  She states that her right leg jessica up on her   Skin: Negative  Allergic/Immunologic: Negative  Neurological: Positive for weakness (generalized)  Tingling, right leg  Hematological: Negative  Psychiatric/Behavioral: Negative  I reviewed the ROS  I reviewed the ROS      MEDICAL HISTORY  Active Ambulatory Problems     Diagnosis Date Noted    Lumbar back pain with radiculopathy affecting right lower extremity 01/26/2019    Ambulatory dysfunction 01/26/2019    Essential hypertension 01/26/2019    Cognitive impairment 01/30/2019    Spinal stenosis of lumbar region with neurogenic claudication     Spondylosis without myelopathy or radiculopathy, lumbar region 06/13/2019    Spondylolisthesis of lumbar region 07/17/2019    Encephalopathy acute     Anemia     Delirium     Acute pain     Constipation Resolved Ambulatory Problems     Diagnosis Date Noted    Right leg weakness 04/24/2019     Past Medical History:   Diagnosis Date    ADHD     Asthma     [facility]     Cancer [facility]     Depression     History of fall     History of prediabetes     Hypertension     Iron deficiency anemia     Kidney stone     Myofascial pain     Osteoporosis     S/P lumbar spinal fusion        Past Surgical History:   Procedure Laterality Date    CERVICAL SPINE SURGERY  2013    Multilevel posterior cervical decompression / fusion    HYSTERECTOMY      MASTECTOMY Right     MD ARTHDSIS POST/POSTEROLATRL/POSTINTERBODY LUMBAR N/A 7/17/2019    Procedure: Left L4-5 hemilaminectomy and bilateral foraminotomy and Minimally invasive transforaminal lumbar interbody fusion with pedicle screw and myke fixation fusion L4-5, right-sided approach; minimally invasive right L5-S1 foraminotomy and diskectomy;  Surgeon: Rakan Dubois MD;  [facility];  Service: Neurosurgery       Social History     Tobacco Use   Smoking Status Current Every Day Smoker    Packs/day: 0 50    Types: Cigarettes   Smokeless Tobacco Never Used   Tobacco Comment    advised not to smoke prior to procedure declined education       Social History     Substance and Sexual Activity   Alcohol Use Not Currently       Social History     Substance and Sexual Activity   Drug Use No       Vitals:    09/03/19 1109   BP: 137/57   BP Location: Right arm   Patient Position: Sitting   Cuff Size: Adult   Pulse: (!) 47   Resp: 16   Temp: 99 2 °F (37 3 °C)   TempSrc: Tympanic   Weight: 63 2 kg (139 lb 6 4 oz)   Height: 5' 1 5" (1 562 m)         Current Outpatient Medications:     acetaminophen (TYLENOL) 325 mg tablet, Take 3 tablets (975 mg total) by mouth every 8 (eight) hours, Disp: 30 tablet, Rfl: 0    ADVAIR DISKUS 250-50 MCG/DOSE inhaler, , Disp: , Rfl: 0    amLODIPine (NORVASC) 5 mg tablet, Take 5 mg by mouth daily, Disp: , Rfl:    magnesium hydroxide (MILK OF MAGNESIA) 400 mg/5 mL oral suspension, Take by mouth daily as needed for constipation, Disp: , Rfl:     PROAIR  (90 Base) MCG/ACT inhaler, , Disp: , Rfl: 0    senna (SENOKOT) 8 6 mg, Take 1 tablet (8 6 mg total) by mouth daily at bedtime, Disp: 120 each, Rfl: 0     No Known Allergies     The following portions of the patient's history were updated by MA and reviewed by MD: allergies, current medications, past family history, past medical history, past social history, past surgical history and problem list       Physical Exam  Awake and alert  Incisions are healed well  Her power is 5/5 in the lower extremities  She is unsteady the gait but he has a safe 1 using a walker      RESULTS/DATA  X-rays of the LS spine demonstrate a transverse lumbar interbody fusion at the L4-5 level with a correction of her spondylolisthesis    The instrumentation appears to be in good position without signs of loosening or breakage

## 2019-09-10 ENCOUNTER — TELEPHONE (OUTPATIENT)
Dept: NEUROSURGERY | Facility: CLINIC | Age: 74
End: 2019-09-10
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
## 2019-09-16 ENCOUNTER — TELEPHONE (OUTPATIENT)
Dept: NEUROSURGERY | Facility: CLINIC | Age: 74
End: 2019-09-16

## 2019-09-16 NOTE — TELEPHONE ENCOUNTER
Received a call from Evelyn Montero) stating that to schedule patient's surgery/procedure we have to go through the [address] [place] [address]  They asked for call back  Phone: [phone]

## 2019-10-16 ENCOUNTER — HOSPITAL ENCOUNTER (EMERGENCY)
Facility: HOSPITAL | Age: 74
Discharge: NURSING, REHAB OR LONG-TERM CARE | End: 2019-10-16
Attending: EMERGENCY MEDICINE
Payer: COMMERCIAL

## 2019-10-16 VITALS
HEART RATE: 76 BPM | DIASTOLIC BLOOD PRESSURE: 89 MMHG | SYSTOLIC BLOOD PRESSURE: 169 MMHG | OXYGEN SATURATION: 96 % | TEMPERATURE: 98.9 F | RESPIRATION RATE: 17 BRPM

## 2019-10-16 DIAGNOSIS — Z00.8 ENCOUNTER FOR PSYCHOLOGICAL EVALUATION: Primary | ICD-10-CM

## 2019-10-16 PROCEDURE — 99283 EMERGENCY DEPT VISIT LOW MDM: CPT

## 2019-10-16 PROCEDURE — 99283 EMERGENCY DEPT VISIT LOW MDM: CPT | Performed by: EMERGENCY MEDICINE

## 2019-10-16 NOTE — ED NOTES
Rachna come at [address] to get  pt back to [facility]  10/16/19 4619 Jocelyne Henson  10/16/19 1036
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
## 2019-12-02 ENCOUNTER — TELEPHONE (OUTPATIENT)
Dept: NEUROSURGERY | Facility: CLINIC | Age: 74
End: 2019-12-02

## 2019-12-02 ENCOUNTER — HOSPITAL ENCOUNTER (EMERGENCY)
Facility: HOSPITAL | Age: 74
End: 2019-12-03
Attending: EMERGENCY MEDICINE
Payer: COMMERCIAL

## 2019-12-02 VITALS
SYSTOLIC BLOOD PRESSURE: 141 MMHG | BODY MASS INDEX: 25.09 KG/M2 | WEIGHT: 135 LBS | TEMPERATURE: 98.1 F | RESPIRATION RATE: 18 BRPM | OXYGEN SATURATION: 95 % | DIASTOLIC BLOOD PRESSURE: 65 MMHG | HEART RATE: 89 BPM

## 2019-12-02 DIAGNOSIS — R41.89 COGNITIVE IMPAIRMENT: ICD-10-CM

## 2019-12-02 DIAGNOSIS — R10.9 LEFT FLANK PAIN: Primary | ICD-10-CM

## 2019-12-02 DIAGNOSIS — M54.9 BACK PAIN: ICD-10-CM

## 2019-12-02 LAB
AMPHETAMINES SERPL QL SCN: NEGATIVE
ANION GAP SERPL CALCULATED.3IONS-SCNC: 4 MMOL/L (ref 4–13)
ATRIAL RATE: 82 BPM
BARBITURATES UR QL: NEGATIVE
BASOPHILS # BLD AUTO: 0.05 THOUSANDS/ΜL (ref 0–0.1)
BASOPHILS NFR BLD AUTO: 1 % (ref 0–1)
BENZODIAZ UR QL: NEGATIVE
BILIRUB UR QL STRIP: NEGATIVE
BUN SERPL-MCNC: 22 MG/DL (ref 5–25)
CALCIUM SERPL-MCNC: 8.8 MG/DL (ref 8.3–10.1)
CHLORIDE SERPL-SCNC: 109 MMOL/L (ref 100–108)
CLARITY UR: CLEAR
CO2 SERPL-SCNC: 27 MMOL/L (ref 21–32)
COCAINE UR QL: NEGATIVE
COLOR UR: YELLOW
COLOR, POC: NORMAL
CREAT SERPL-MCNC: 0.81 MG/DL (ref 0.6–1.3)
EOSINOPHIL # BLD AUTO: 0.07 THOUSAND/ΜL (ref 0–0.61)
EOSINOPHIL NFR BLD AUTO: 1 % (ref 0–6)
ERYTHROCYTE [DISTWIDTH] IN BLOOD BY AUTOMATED COUNT: 18.7 % (ref 11.6–15.1)
ETHANOL EXG-MCNC: 0 MG/DL
GFR SERPL CREATININE-BSD FRML MDRD: 73 ML/MIN/1.73SQ M
GLUCOSE SERPL-MCNC: 114 MG/DL (ref 65–140)
GLUCOSE UR STRIP-MCNC: NEGATIVE MG/DL
HCT VFR BLD AUTO: 31 % (ref 34.8–46.1)
HGB BLD-MCNC: 8.9 G/DL (ref 11.5–15.4)
HGB UR QL STRIP.AUTO: NEGATIVE
IMM GRANULOCYTES # BLD AUTO: 0.02 THOUSAND/UL (ref 0–0.2)
IMM GRANULOCYTES NFR BLD AUTO: 0 % (ref 0–2)
KETONES UR STRIP-MCNC: ABNORMAL MG/DL
LEUKOCYTE ESTERASE UR QL STRIP: NEGATIVE
LYMPHOCYTES # BLD AUTO: 1.55 THOUSANDS/ΜL (ref 0.6–4.47)
LYMPHOCYTES NFR BLD AUTO: 29 % (ref 14–44)
MCH RBC QN AUTO: 23.2 PG (ref 26.8–34.3)
MCHC RBC AUTO-ENTMCNC: 28.7 G/DL (ref 31.4–37.4)
MCV RBC AUTO: 81 FL (ref 82–98)
METHADONE UR QL: NEGATIVE
MONOCYTES # BLD AUTO: 0.66 THOUSAND/ΜL (ref 0.17–1.22)
MONOCYTES NFR BLD AUTO: 13 % (ref 4–12)
NEUTROPHILS # BLD AUTO: 2.95 THOUSANDS/ΜL (ref 1.85–7.62)
NEUTS SEG NFR BLD AUTO: 56 % (ref 43–75)
NITRITE UR QL STRIP: NEGATIVE
NRBC BLD AUTO-RTO: 0 /100 WBCS
OPIATES UR QL SCN: NEGATIVE
P AXIS: 70 DEGREES
PCP UR QL: NEGATIVE
PH UR STRIP.AUTO: 7 [PH] (ref 4.5–8)
PLATELET # BLD AUTO: 342 THOUSANDS/UL (ref 149–390)
PMV BLD AUTO: 9.2 FL (ref 8.9–12.7)
POTASSIUM SERPL-SCNC: 3.6 MMOL/L (ref 3.5–5.3)
PR INTERVAL: 168 MS
PROT UR STRIP-MCNC: NEGATIVE MG/DL
QRS AXIS: 34 DEGREES
QRSD INTERVAL: 104 MS
QT INTERVAL: 356 MS
QTC INTERVAL: 415 MS
RBC # BLD AUTO: 3.84 MILLION/UL (ref 3.81–5.12)
SODIUM SERPL-SCNC: 140 MMOL/L (ref 136–145)
SP GR UR STRIP.AUTO: 1.02 (ref 1–1.03)
T WAVE AXIS: 66 DEGREES
THC UR QL: NEGATIVE
TSH SERPL DL<=0.05 MIU/L-ACNC: 1.03 UIU/ML (ref 0.36–3.74)
UROBILINOGEN UR QL STRIP.AUTO: 0.2 E.U./DL
VENTRICULAR RATE: 82 BPM
WBC # BLD AUTO: 5.3 THOUSAND/UL (ref 4.31–10.16)

## 2019-12-02 PROCEDURE — 80164 ASSAY DIPROPYLACETIC ACD TOT: CPT | Performed by: PSYCHIATRY & NEUROLOGY

## 2019-12-02 PROCEDURE — 96360 HYDRATION IV INFUSION INIT: CPT

## 2019-12-02 PROCEDURE — 85025 COMPLETE CBC W/AUTO DIFF WBC: CPT | Performed by: EMERGENCY MEDICINE

## 2019-12-02 PROCEDURE — 93010 ELECTROCARDIOGRAM REPORT: CPT | Performed by: INTERNAL MEDICINE

## 2019-12-02 PROCEDURE — 81003 URINALYSIS AUTO W/O SCOPE: CPT

## 2019-12-02 PROCEDURE — 82075 ASSAY OF BREATH ETHANOL: CPT | Performed by: EMERGENCY MEDICINE

## 2019-12-02 PROCEDURE — 99283 EMERGENCY DEPT VISIT LOW MDM: CPT | Performed by: EMERGENCY MEDICINE

## 2019-12-02 PROCEDURE — 96361 HYDRATE IV INFUSION ADD-ON: CPT

## 2019-12-02 PROCEDURE — 36415 COLL VENOUS BLD VENIPUNCTURE: CPT | Performed by: EMERGENCY MEDICINE

## 2019-12-02 PROCEDURE — 93005 ELECTROCARDIOGRAM TRACING: CPT

## 2019-12-02 PROCEDURE — 82746 ASSAY OF FOLIC ACID SERUM: CPT | Performed by: PSYCHIATRY & NEUROLOGY

## 2019-12-02 PROCEDURE — 84443 ASSAY THYROID STIM HORMONE: CPT | Performed by: EMERGENCY MEDICINE

## 2019-12-02 PROCEDURE — 99285 EMERGENCY DEPT VISIT HI MDM: CPT

## 2019-12-02 PROCEDURE — 80307 DRUG TEST PRSMV CHEM ANLYZR: CPT | Performed by: EMERGENCY MEDICINE

## 2019-12-02 PROCEDURE — 80048 BASIC METABOLIC PNL TOTAL CA: CPT | Performed by: EMERGENCY MEDICINE

## 2019-12-02 RX ORDER — TRAZODONE HYDROCHLORIDE 50 MG/1
25 TABLET ORAL
Status: CANCELLED | OUTPATIENT
Start: 2019-12-02

## 2019-12-02 RX ORDER — MAGNESIUM HYDROXIDE/ALUMINUM HYDROXICE/SIMETHICONE 120; 1200; 1200 MG/30ML; MG/30ML; MG/30ML
30 SUSPENSION ORAL EVERY 4 HOURS PRN
Status: CANCELLED | OUTPATIENT
Start: 2019-12-02

## 2019-12-02 RX ORDER — ACETAMINOPHEN 325 MG/1
650 TABLET ORAL EVERY 6 HOURS PRN
Status: CANCELLED | OUTPATIENT
Start: 2019-12-02

## 2019-12-02 RX ORDER — ACETAMINOPHEN 325 MG/1
650 TABLET ORAL ONCE
Status: DISCONTINUED | OUTPATIENT
Start: 2019-12-02 | End: 2019-12-03 | Stop reason: HOSPADM

## 2019-12-02 RX ORDER — BENZTROPINE MESYLATE 1 MG/ML
1 INJECTION INTRAMUSCULAR; INTRAVENOUS EVERY 8 HOURS PRN
Status: CANCELLED | OUTPATIENT
Start: 2019-12-02

## 2019-12-02 RX ORDER — ASENAPINE 5 MG/1
5 TABLET SUBLINGUAL ONCE
Status: COMPLETED | OUTPATIENT
Start: 2019-12-02 | End: 2019-12-02

## 2019-12-02 RX ORDER — LORAZEPAM 2 MG/ML
0.5 INJECTION INTRAMUSCULAR EVERY 8 HOURS PRN
Status: CANCELLED | OUTPATIENT
Start: 2019-12-02

## 2019-12-02 RX ORDER — SENNOSIDES 8.6 MG
1 TABLET ORAL
Status: DISCONTINUED | OUTPATIENT
Start: 2019-12-02 | End: 2019-12-03 | Stop reason: HOSPADM

## 2019-12-02 RX ORDER — NICOTINE 21 MG/24HR
14 PATCH, TRANSDERMAL 24 HOURS TRANSDERMAL ONCE
Status: DISCONTINUED | OUTPATIENT
Start: 2019-12-02 | End: 2019-12-03 | Stop reason: HOSPADM

## 2019-12-02 RX ORDER — NICOTINE 21 MG/24HR
1 PATCH, TRANSDERMAL 24 HOURS TRANSDERMAL DAILY
Status: CANCELLED | OUTPATIENT
Start: 2019-12-03

## 2019-12-02 RX ORDER — RISPERIDONE 0.5 MG/1
0.5 TABLET, ORALLY DISINTEGRATING ORAL EVERY 8 HOURS PRN
Status: CANCELLED | OUTPATIENT
Start: 2019-12-02

## 2019-12-02 RX ORDER — HALOPERIDOL 5 MG/ML
2 INJECTION INTRAMUSCULAR EVERY 6 HOURS PRN
Status: CANCELLED | OUTPATIENT
Start: 2019-12-02

## 2019-12-02 RX ORDER — LORAZEPAM 0.5 MG/1
0.5 TABLET ORAL EVERY 8 HOURS PRN
Status: CANCELLED | OUTPATIENT
Start: 2019-12-02

## 2019-12-02 RX ORDER — HALOPERIDOL 2 MG/1
2 TABLET ORAL EVERY 8 HOURS PRN
Status: CANCELLED | OUTPATIENT
Start: 2019-12-02

## 2019-12-02 RX ORDER — ACETAMINOPHEN 325 MG/1
650 TABLET ORAL EVERY 4 HOURS PRN
Status: CANCELLED | OUTPATIENT
Start: 2019-12-02

## 2019-12-02 RX ORDER — BENZTROPINE MESYLATE 0.5 MG/1
0.5 TABLET ORAL EVERY 8 HOURS PRN
Status: CANCELLED | OUTPATIENT
Start: 2019-12-02

## 2019-12-02 RX ORDER — ALBUTEROL SULFATE 90 UG/1
1 AEROSOL, METERED RESPIRATORY (INHALATION) EVERY 6 HOURS PRN
Status: DISCONTINUED | OUTPATIENT
Start: 2019-12-02 | End: 2019-12-03 | Stop reason: HOSPADM

## 2019-12-02 RX ORDER — AMLODIPINE BESYLATE 5 MG/1
5 TABLET ORAL DAILY
Status: DISCONTINUED | OUTPATIENT
Start: 2019-12-03 | End: 2019-12-03 | Stop reason: HOSPADM

## 2019-12-02 RX ADMIN — SODIUM CHLORIDE 1000 ML: 0.9 INJECTION, SOLUTION INTRAVENOUS at 15:29

## 2019-12-02 RX ADMIN — ASENAPINE MALEATE 5 MG: 5 TABLET SUBLINGUAL at 20:03
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
## 2019-12-02 NOTE — LETTER
[address] [facility] EMERGENCY DEPARTMENT  [address] [place] [address]  Dept: [address]  TRANSFER CONSENT    NAME Claudio Ornelas                                         1945                              MRN [id number]    I have been informed of my rights regarding examination, treatment, and transfer   by Dr Zak De Leon MD    Benefits: Specialized equipment and/or services available at the receiving facility (Include comment)________________________(inpatient psychiatric hospitalization )    Risks:        {SL ED EMTALA TRANSFER CHOICES:[id number]}    I authorize the performance of emergency medical procedures and treatments upon me in both transit and upon arrival at the receiving facility  Additionally, I authorize the release of any and all medical records to the receiving facility and request they be transported with me, if possible  I understand that the safest mode of transportation during a medical emergency is an ambulance and that the Hospital advocates the use of this mode of transport  Risks of traveling to the receiving facility by car, including absence of medical control, life sustaining equipment, such as oxygen, and medical personnel has been explained to me and I fully understand them  (JASON CORRECT BOX BELOW)  [  ]  I consent to the stated transfer and to be transported by ambulance/helicopter  [  ]  I consent to the stated transfer, but refuse transportation by ambulance and accept full responsibility for my transportation by car    I understand the risks of non-ambulance transfers and I exonerate the Hospital and its staff from any deterioration in my condition that results from this refusal     X___________________________________________    DATE  19  TIME________  Signature of patient or legally responsible individual signing on patient behalf           RELATIONSHIP TO PATIENT_________________________          Provider Certification    NAME Manju Ornelas                                         1945                              MRN [id number]    A medical screening exam was performed on the above named patient  Based on the examination:    Condition Necessitating Transfer The primary encounter diagnosis was Left flank pain  Diagnoses of Back pain and Cognitive impairment were also pertinent to this visit  Patient Condition: The patient has been stabilized such that within reasonable medical probability, no material deterioration of the patient condition or the condition of the unborn child(skinny) is likely to result from the transfer    Reason for Transfer: Level of Care needed not available at this facility(inpatient psychiatric hospitalization )    Transfer Requirements: [address] [place] [address] Via [facility] [address] [facility] [address]   · Space available and qualified personnel available for treatment as acknowledged by Rocio Maceod [phone]  · Agreed to accept transfer and to provide appropriate medical treatment as acknowledged by       Dr Shelby Golden MD  · Appropriate medical records of the examination and treatment of the patient are provided at the time of transfer   [address] [place] [address] [address] _______  · Transfer will be performed by qualified personnel from [facility]   and appropriate transfer equipment as required, including the use of necessary and appropriate life support measures      Provider Certification: I have examined the patient and explained the following risks and benefits of being transferred/refusing transfer to the patient/family:  The patient is stable for psychiatric transfer because they are medically stable, and is protected from harming him/herself or others during transport      Based on these reasonable risks and benefits to the patient and/or the unborn child(skinny), and based upon the information available at the time of the patients examination, I certify that the medical benefits reasonably to be expected from the provision of appropriate medical treatments at another medical facility outweigh the increasing risks, if any, to the individuals medical condition, and in the case of labor to the unborn child, from effecting the transfer      X____________________________________________ DATE 12/02/19        TIME_______      ORIGINAL - SEND TO MEDICAL RECORDS   COPY - SEND WITH PATIENT DURING TRANSFER
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
## 2019-12-03 ENCOUNTER — HOSPITAL ENCOUNTER (INPATIENT)
Facility: HOSPITAL | Age: 74
LOS: 7 days | Discharge: NURSING, REHAB OR LONG-TERM CARE | DRG: 885 | End: 2019-12-10
Attending: PSYCHIATRY & NEUROLOGY | Admitting: PSYCHIATRY & NEUROLOGY
Payer: COMMERCIAL

## 2019-12-03 ENCOUNTER — APPOINTMENT (INPATIENT)
Dept: MRI IMAGING | Facility: HOSPITAL | Age: 74
DRG: 885 | End: 2019-12-03
Payer: COMMERCIAL

## 2019-12-03 DIAGNOSIS — M54.16 LUMBAR BACK PAIN WITH RADICULOPATHY AFFECTING RIGHT LOWER EXTREMITY: ICD-10-CM

## 2019-12-03 DIAGNOSIS — L03.90 CELLULITIS: Primary | ICD-10-CM

## 2019-12-03 DIAGNOSIS — R41.89 COGNITIVE IMPAIRMENT: ICD-10-CM

## 2019-12-03 DIAGNOSIS — F39 MOOD DISORDER (HCC): ICD-10-CM

## 2019-12-03 PROBLEM — Z85.3 HISTORY OF BREAST CANCER: Status: ACTIVE | Noted: 2019-12-03

## 2019-12-03 PROBLEM — J44.9 COPD (CHRONIC OBSTRUCTIVE PULMONARY DISEASE) (HCC): Chronic | Status: ACTIVE | Noted: 2019-12-03

## 2019-12-03 PROBLEM — F03.91: Chronic | Status: ACTIVE | Noted: 2019-01-30

## 2019-12-03 PROBLEM — D50.9 IRON DEFICIENCY ANEMIA: Status: ACTIVE | Noted: 2019-12-03

## 2019-12-03 PROCEDURE — A9585 GADOBUTROL INJECTION: HCPCS | Performed by: FAMILY MEDICINE

## 2019-12-03 PROCEDURE — 70553 MRI BRAIN STEM W/O & W/DYE: CPT

## 2019-12-03 PROCEDURE — 99253 IP/OBS CNSLTJ NEW/EST LOW 45: CPT | Performed by: FAMILY MEDICINE

## 2019-12-03 PROCEDURE — 99222 1ST HOSP IP/OBS MODERATE 55: CPT | Performed by: PSYCHIATRY & NEUROLOGY

## 2019-12-03 RX ORDER — CLONAZEPAM 0.5 MG/1
0.5 TABLET ORAL 2 TIMES DAILY
COMMUNITY
End: 2019-12-10 | Stop reason: HOSPADM

## 2019-12-03 RX ORDER — HALOPERIDOL 5 MG/ML
2 INJECTION INTRAMUSCULAR EVERY 6 HOURS PRN
Status: DISCONTINUED | OUTPATIENT
Start: 2019-12-03 | End: 2019-12-10 | Stop reason: HOSPADM

## 2019-12-03 RX ORDER — DIVALPROEX SODIUM 250 MG/1
250 TABLET, DELAYED RELEASE ORAL EVERY 12 HOURS SCHEDULED
Status: DISCONTINUED | OUTPATIENT
Start: 2019-12-03 | End: 2019-12-06

## 2019-12-03 RX ORDER — MELATONIN
1000 DAILY
COMMUNITY

## 2019-12-03 RX ORDER — DIVALPROEX SODIUM 125 MG/1
125 TABLET, DELAYED RELEASE ORAL
Status: DISCONTINUED | OUTPATIENT
Start: 2019-12-04 | End: 2019-12-03 | Stop reason: SDUPTHER

## 2019-12-03 RX ORDER — RISPERIDONE 0.5 MG/1
0.5 TABLET, ORALLY DISINTEGRATING ORAL EVERY 8 HOURS PRN
Status: DISCONTINUED | OUTPATIENT
Start: 2019-12-03 | End: 2019-12-04

## 2019-12-03 RX ORDER — DIVALPROEX SODIUM 250 MG/1
250 TABLET, DELAYED RELEASE ORAL EVERY 12 HOURS SCHEDULED
COMMUNITY
End: 2019-12-10 | Stop reason: HOSPADM

## 2019-12-03 RX ORDER — ESCITALOPRAM OXALATE 10 MG/1
10 TABLET ORAL DAILY
COMMUNITY
End: 2019-12-10 | Stop reason: HOSPADM

## 2019-12-03 RX ORDER — ACETAMINOPHEN 325 MG/1
650 TABLET ORAL EVERY 4 HOURS PRN
Status: DISCONTINUED | OUTPATIENT
Start: 2019-12-03 | End: 2019-12-10 | Stop reason: HOSPADM

## 2019-12-03 RX ORDER — NICOTINE 21 MG/24HR
1 PATCH, TRANSDERMAL 24 HOURS TRANSDERMAL DAILY
Status: DISCONTINUED | OUTPATIENT
Start: 2019-12-03 | End: 2019-12-04

## 2019-12-03 RX ORDER — DIVALPROEX SODIUM 125 MG/1
125 CAPSULE, COATED PELLETS ORAL
Status: DISCONTINUED | OUTPATIENT
Start: 2019-12-04 | End: 2019-12-04

## 2019-12-03 RX ORDER — LORAZEPAM 0.5 MG/1
0.5 TABLET ORAL EVERY 8 HOURS PRN
Status: DISCONTINUED | OUTPATIENT
Start: 2019-12-03 | End: 2019-12-10 | Stop reason: HOSPADM

## 2019-12-03 RX ORDER — BENZTROPINE MESYLATE 0.5 MG/1
0.5 TABLET ORAL EVERY 8 HOURS PRN
Status: DISCONTINUED | OUTPATIENT
Start: 2019-12-03 | End: 2019-12-10 | Stop reason: HOSPADM

## 2019-12-03 RX ORDER — TRAZODONE HYDROCHLORIDE 50 MG/1
25 TABLET ORAL
Status: DISCONTINUED | OUTPATIENT
Start: 2019-12-03 | End: 2019-12-10 | Stop reason: HOSPADM

## 2019-12-03 RX ORDER — AMLODIPINE BESYLATE 5 MG/1
5 TABLET ORAL DAILY
Status: DISCONTINUED | OUTPATIENT
Start: 2019-12-03 | End: 2019-12-10 | Stop reason: HOSPADM

## 2019-12-03 RX ORDER — LIDOCAINE 50 MG/G
1 PATCH TOPICAL DAILY
Status: DISCONTINUED | OUTPATIENT
Start: 2019-12-03 | End: 2019-12-10 | Stop reason: HOSPADM

## 2019-12-03 RX ORDER — BENZTROPINE MESYLATE 1 MG/ML
1 INJECTION INTRAMUSCULAR; INTRAVENOUS EVERY 8 HOURS PRN
Status: DISCONTINUED | OUTPATIENT
Start: 2019-12-03 | End: 2019-12-10 | Stop reason: HOSPADM

## 2019-12-03 RX ORDER — ALBUTEROL SULFATE 90 UG/1
2 AEROSOL, METERED RESPIRATORY (INHALATION) EVERY 6 HOURS PRN
Status: DISCONTINUED | OUTPATIENT
Start: 2019-12-03 | End: 2019-12-10 | Stop reason: HOSPADM

## 2019-12-03 RX ORDER — SENNOSIDES 8.6 MG
1 TABLET ORAL
Status: DISCONTINUED | OUTPATIENT
Start: 2019-12-03 | End: 2019-12-10 | Stop reason: HOSPADM

## 2019-12-03 RX ORDER — SENNA PLUS 8.6 MG/1
2 TABLET ORAL
COMMUNITY

## 2019-12-03 RX ORDER — DIVALPROEX SODIUM 125 MG/1
125 TABLET, DELAYED RELEASE ORAL
COMMUNITY
End: 2019-12-10 | Stop reason: HOSPADM

## 2019-12-03 RX ORDER — LORAZEPAM 2 MG/ML
0.5 INJECTION INTRAMUSCULAR EVERY 8 HOURS PRN
Status: DISCONTINUED | OUTPATIENT
Start: 2019-12-03 | End: 2019-12-10 | Stop reason: HOSPADM

## 2019-12-03 RX ORDER — ACETAMINOPHEN 325 MG/1
650 TABLET ORAL EVERY 6 HOURS PRN
Status: DISCONTINUED | OUTPATIENT
Start: 2019-12-03 | End: 2019-12-03 | Stop reason: SDUPTHER

## 2019-12-03 RX ORDER — FLUTICASONE FUROATE AND VILANTEROL 200; 25 UG/1; UG/1
1 POWDER RESPIRATORY (INHALATION)
Status: DISCONTINUED | OUTPATIENT
Start: 2019-12-03 | End: 2019-12-10 | Stop reason: HOSPADM

## 2019-12-03 RX ORDER — CLONAZEPAM 0.5 MG/1
0.25 TABLET ORAL EVERY 12 HOURS SCHEDULED
Status: DISCONTINUED | OUTPATIENT
Start: 2019-12-03 | End: 2019-12-06

## 2019-12-03 RX ORDER — HALOPERIDOL 0.5 MG/1
2 TABLET ORAL EVERY 8 HOURS PRN
Status: DISCONTINUED | OUTPATIENT
Start: 2019-12-03 | End: 2019-12-10 | Stop reason: HOSPADM

## 2019-12-03 RX ORDER — MAGNESIUM HYDROXIDE/ALUMINUM HYDROXICE/SIMETHICONE 120; 1200; 1200 MG/30ML; MG/30ML; MG/30ML
30 SUSPENSION ORAL EVERY 4 HOURS PRN
Status: DISCONTINUED | OUTPATIENT
Start: 2019-12-03 | End: 2019-12-10 | Stop reason: HOSPADM

## 2019-12-03 RX ADMIN — LIDOCAINE 1 PATCH: 50 PATCH TOPICAL at 15:04

## 2019-12-03 RX ADMIN — SENNOSIDES 8.6 MG: 8.6 TABLET, FILM COATED ORAL at 21:19

## 2019-12-03 RX ADMIN — DIVALPROEX SODIUM 250 MG: 250 TABLET, DELAYED RELEASE ORAL at 21:20

## 2019-12-03 RX ADMIN — GADOBUTROL 6 ML: 604.72 INJECTION INTRAVENOUS at 14:12

## 2019-12-03 RX ADMIN — CLONAZEPAM 0.25 MG: 0.5 TABLET ORAL at 21:19

## 2019-12-03 RX ADMIN — AMLODIPINE BESYLATE 5 MG: 5 TABLET ORAL at 15:04

## 2019-12-03 RX ADMIN — ACETAMINOPHEN 650 MG: 325 TABLET ORAL at 21:59

## 2019-12-03 RX ADMIN — FLUTICASONE FUROATE AND VILANTEROL TRIFENATATE 1 PUFF: 200; 25 POWDER RESPIRATORY (INHALATION) at 15:04

## 2019-12-03 NOTE — PLAN OF CARE
Problem: Risk for Violence/Aggression Toward Others  Goal: Treatment Goal: Refrain from acts of violence/aggression during length of stay, and demonstrate improved impulse control at the time of discharge  Outcome: Progressing  Goal: Verbalize thoughts and feelings  Description  Interventions:  - Assess and re-assess patient's level of risk, every waking shift  - Engage patient in 1:1 interactions, daily, for a minimum of 15 minutes   - Allow patient to express feelings and frustrations in a safe and non-threatening manner   - Establish rapport/trust with patient   Outcome: Progressing  Goal: Refrain from harming others  Outcome: Progressing  Goal: Refrain from destructive acts on the environment or property  Outcome: Progressing  Goal: Control angry outbursts  Description  Interventions:  - Monitor patient closely, per order  - Ensure early verbal de-escalation  - Monitor prn medication needs  - Set reasonable/therapeutic limits, outline behavioral expectations, and consequences   - Provide a non-threatening milieu, utilizing the least restrictive interventions   Outcome: Progressing  Goal: Attend and participate in unit activities, including therapeutic, recreational, and educational groups  Description  Interventions:  - Provide therapeutic and educational activities daily, encourage attendance and participation, and document same in the medical record   Outcome: Progressing  Goal: Identify appropriate positive anger management techniques  Description  Interventions:  - Offer anger management and coping skills groups   - Staff will provide positive feedback for appropriate anger control  Outcome: Progressing     Problem: DISCHARGE PLANNING  Goal: Discharge to home or other facility with appropriate resources  Description  INTERVENTIONS:  - Identify barriers to discharge w/patient and caregiver  - Arrange for needed discharge resources and transportation as appropriate  - Identify discharge learning needs (meds, wound care, etc )  - Arrange for interpretive services to assist at discharge as needed  - Refer to Case Management Department for coordinating discharge planning if the patient needs post-hospital services based on physician/advanced practitioner order or complex needs related to functional status, cognitive ability, or social support system  Outcome: Progressing
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
## 2019-12-03 NOTE — ASSESSMENT & PLAN NOTE
· Resume her Advair which he is going to be 2 Breo and also albuterol p r n  Naomy Ying She is currently not in exacerbation

## 2019-12-03 NOTE — EMTALA/ACUTE CARE TRANSFER
[facility] EMERGENCY DEPARTMENT  [address] [place] [address]  Dept: Barbara Mendoza [place] 227 CONSENT    NAME Duke Ornelas                                         1945                              MRN [id number]    I have been informed of my rights regarding examination, treatment, and transfer   by Dr Thea Trujillo MD    Benefits: Specialized equipment and/or services available at the receiving facility (Include comment)________________________(inpatient psychiatric hospitalization )    Risks:        Transfer Request   I acknowledge that my medical condition has been evaluated and explained to me by the emergency department physician or other qualified medical person and/or my attending physician who has recommended and offered to me further medical examination and treatment  I understand the Hospital's obligation with respect to the treatment and stabilization of my emergency medical condition  I nevertheless request to be transferred  I release the Hospital, the doctor, and any other persons caring for me from all responsibility or liability for any injury or ill effects that may result from my transfer and agree to accept all responsibility for the consequences of my choice to transfer, rather than receive stabilizing treatment at the Hospital  I understand that because the transfer is my request, my insurance may not provide reimbursement for the services  The Hospital will assist and direct me and my family in how to make arrangements for transfer, but the hospital is not liable for any fees charged by the transport service  In spite of this understanding, I refuse to consent to further medical examination and treatment which has been offered to me, and request transfer to [address] [place] [address] Name, [facility] : Colten Reed [address] [facility] [address]   I authorize the performance of emergency medical procedures and treatments upon me in both transit and upon arrival at the receiving facility  Additionally, I authorize the release of any and all medical records to the receiving facility and request they be transported with me, if possible  I authorize the performance of emergency medical procedures and treatments upon me in both transit and upon arrival at the receiving facility  Additionally, I authorize the release of any and all medical records to the receiving facility and request they be transported with me, if possible  I understand that the safest mode of transportation during a medical emergency is an ambulance and that the Hospital advocates the use of this mode of transport  Risks of traveling to the receiving facility by car, including absence of medical control, life sustaining equipment, such as oxygen, and medical personnel has been explained to me and I fully understand them  (JASON CORRECT BOX BELOW)  [  ]  I consent to the stated transfer and to be transported by ambulance/helicopter  [  ]  I consent to the stated transfer, but refuse transportation by ambulance and accept full responsibility for my transportation by car  I understand the risks of non-ambulance transfers and I exonerate the Hospital and its staff from any deterioration in my condition that results from this refusal     X___________________________________________    DATE  19  TIME________  Signature of patient or legally responsible individual signing on patient behalf           RELATIONSHIP TO PATIENT_________________________          Provider Certification    NAME Herminio Ornelas                                        [facility] 1945                              MRN [id number]    A medical screening exam was performed on the above named patient  Based on the examination:    Condition Necessitating Transfer The primary encounter diagnosis was Left flank pain  Diagnoses of Back pain and Cognitive impairment were also pertinent to this visit      Patient Condition: The patient has been stabilized such that within reasonable medical probability, no material deterioration of the patient condition or the condition of the unborn child(skinny) is likely to result from the transfer    Reason for Transfer: Level of Care needed not available at this facility(inpatient psychiatric hospitalization )    Transfer Requirements: [address] [place] [address] [address] [facility] [address] [facility] [address]   · Space available and qualified personnel available for treatment as acknowledged by Tresa Roldan [phone]  · Agreed to accept transfer and to provide appropriate medical treatment as acknowledged by       Dr Mona Escobedo MD  · Appropriate medical records of the examination and treatment of the patient are provided at the time of transfer   [address] [facility], [address] _______  · Transfer will be performed by qualified personnel from [facility]   and appropriate transfer equipment as required, including the use of necessary and appropriate life support measures  Provider Certification: I have examined the patient and explained the following risks and benefits of being transferred/refusing transfer to the patient/family:  The patient is stable for psychiatric transfer because they are medically stable, and is protected from harming him/herself or others during transport      Based on these reasonable risks and benefits to the patient and/or the unborn child(skinny), and based upon the information available at the time of the patients examination, I certify that the medical benefits reasonably to be expected from the provision of appropriate medical treatments at another medical facility outweigh the increasing risks, if any, to the individuals medical condition, and in the case of labor to the unborn child, from effecting the transfer      X____________________________________________ DATE 12/03/19        TIME_______      ORIGINAL - SEND TO MEDICAL RECORDS   COPY - SEND WITH PATIENT DURING TRANSFER

## 2019-12-03 NOTE — ASSESSMENT & PLAN NOTE
· Chronic complaining of back pain which is chronic lower back pain will provide her with Tylenol and Lidoderm patch
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
## 2019-12-03 NOTE — ED NOTES
Patient is accepted at [facility] 6T  Patient is accepted by Dr Saadia Mills per [address] is arranged with [facility] is scheduled for 0030        Nurse report is to be called to [phone] prior to patient transfer
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
## 2019-12-03 NOTE — CASE MANAGEMENT
303 paperwork completed and faxed into [facility] Sender at [facility]  CM will continue to follow and provide services as needed
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
## 2019-12-03 NOTE — PROGRESS NOTES
Pt  Superficially engaged in several groups  She was able to state frustration with others yet not able to recall acting on past anger  12/03/19 1400   Activity/Group Checklist   Group Anger management  (game "chill")   Attendance Attended   Attendance Duration (min) 16-30   Interactions Other (Comment)  (Pt  expressed feelings of anger yet superficial about them )   Affect/Mood Calm   Goals Achieved Able to engage in interactions; Able to listen to others; Identified feelings; Discussed coping strategies
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
## 2019-12-03 NOTE — ED NOTES
302 faxed to [facility] for review at [facility] once labs are complete       CLARISSE Hickman  12/02/19   8383
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
## 2019-12-03 NOTE — PROGRESS NOTES
12/03/19 1100   Team Meeting   Meeting Type Daily Rounds   Team Members Present   Team Members Present Physician;Nurse;;Occupational Therapist   Physician Team Member Dwayne Arroyo   Nursing Team Member [address] Management Team Member León   OT Team Member Giovanni Joya   Pt is a new admission- Pt discussed in rounds

## 2019-12-03 NOTE — ASSESSMENT & PLAN NOTE
· Patient is alert and oriented to place and name when I ask about the time she says "who cares", she focuses on her mother, per Psychiatry  · Patient has had cognitive decline for some time , she had no brain imaging done including mri -last ct was done and it was 2014- which was neg- will obtain mri of brain r/o any masses as has history of breast ca as well

## 2019-12-03 NOTE — ASSESSMENT & PLAN NOTE
· Since July her hemoglobin started to drop to 10 9 today to 8 9  She does not have any melena or bloody stools  She had a colonoscopy before unknown of her results  Will check a fecal occult  Recheck an H&H tomorrow  Will want to get a fecal occult without placing on iron pills for now  If drops lower tomorrow will start anyway

## 2019-12-04 LAB
FOLATE SERPL-MCNC: 19.7 NG/ML (ref 3.1–17.5)
HCT VFR BLD AUTO: 30.4 % (ref 36–46)
HGB BLD-MCNC: 9.4 G/DL (ref 12–16)
VALPROATE SERPL-MCNC: 62 UG/ML (ref 50–100)
VIT B12 SERPL-MCNC: 436 PG/ML (ref 100–900)

## 2019-12-04 PROCEDURE — 85014 HEMATOCRIT: CPT | Performed by: FAMILY MEDICINE

## 2019-12-04 PROCEDURE — 85018 HEMOGLOBIN: CPT | Performed by: FAMILY MEDICINE

## 2019-12-04 PROCEDURE — 82607 VITAMIN B-12: CPT | Performed by: PSYCHIATRY & NEUROLOGY

## 2019-12-04 PROCEDURE — 99232 SBSQ HOSP IP/OBS MODERATE 35: CPT | Performed by: PSYCHIATRY & NEUROLOGY

## 2019-12-04 RX ORDER — RISPERIDONE 0.25 MG/1
0.25 TABLET, FILM COATED ORAL EVERY 12 HOURS SCHEDULED
Status: DISCONTINUED | OUTPATIENT
Start: 2019-12-04 | End: 2019-12-06

## 2019-12-04 RX ORDER — SULFAMETHOXAZOLE AND TRIMETHOPRIM 800; 160 MG/1; MG/1
1 TABLET ORAL EVERY 12 HOURS SCHEDULED
Status: DISCONTINUED | OUTPATIENT
Start: 2019-12-04 | End: 2019-12-10 | Stop reason: HOSPADM

## 2019-12-04 RX ADMIN — DIVALPROEX SODIUM 250 MG: 250 TABLET, DELAYED RELEASE ORAL at 09:03

## 2019-12-04 RX ADMIN — RISPERIDONE 0.25 MG: 0.25 TABLET ORAL at 21:13

## 2019-12-04 RX ADMIN — SULFAMETHOXAZOLE AND TRIMETHOPRIM 1 TABLET: 800; 160 TABLET ORAL at 21:13

## 2019-12-04 RX ADMIN — FLUTICASONE FUROATE AND VILANTEROL TRIFENATATE 1 PUFF: 200; 25 POWDER RESPIRATORY (INHALATION) at 09:10

## 2019-12-04 RX ADMIN — ACETAMINOPHEN 650 MG: 325 TABLET ORAL at 23:36

## 2019-12-04 RX ADMIN — CLONAZEPAM 0.25 MG: 0.5 TABLET ORAL at 21:13

## 2019-12-04 RX ADMIN — DIVALPROEX SODIUM 250 MG: 250 TABLET, DELAYED RELEASE ORAL at 21:13

## 2019-12-04 RX ADMIN — DIVALPROEX SODIUM 125 MG: 125 CAPSULE, COATED PELLETS ORAL at 13:55

## 2019-12-04 RX ADMIN — LIDOCAINE 1 PATCH: 50 PATCH TOPICAL at 09:03

## 2019-12-04 RX ADMIN — SULFAMETHOXAZOLE AND TRIMETHOPRIM 1 TABLET: 800; 160 TABLET ORAL at 15:37

## 2019-12-04 RX ADMIN — AMLODIPINE BESYLATE 5 MG: 5 TABLET ORAL at 09:03

## 2019-12-04 RX ADMIN — SERTRALINE HYDROCHLORIDE 50 MG: 50 TABLET ORAL at 09:03

## 2019-12-04 RX ADMIN — SENNOSIDES 8.6 MG: 8.6 TABLET, FILM COATED ORAL at 21:13

## 2019-12-04 RX ADMIN — CLONAZEPAM 0.25 MG: 0.5 TABLET ORAL at 09:01

## 2019-12-04 NOTE — PROGRESS NOTES
12/04/19 0830   Team Meeting   Meeting Type Daily Rounds   Team Members Present   Team Members Present Physician;Nurse;; Other (Discipline and Name)   Physician Team Member Dr Jie Spicer Team Member [facility] Management Team Member PASHA Escamilla   OT Team Member SARAVANAN Molina      Reviewed with Team, discussed medications, 303 hearing Thursday, poor sleep last night sleep med requested  Continue with medication management

## 2019-12-04 NOTE — PLAN OF CARE
Problem: Risk for Violence/Aggression Toward Others  Goal: Treatment Goal: Refrain from acts of violence/aggression during length of stay, and demonstrate improved impulse control at the time of discharge  Outcome: Progressing  Goal: Verbalize thoughts and feelings  Description  Interventions:  - Assess and re-assess patient's level of risk, every waking shift  - Engage patient in 1:1 interactions, daily, for a minimum of 15 minutes   - Allow patient to express feelings and frustrations in a safe and non-threatening manner   - Establish rapport/trust with patient   Outcome: Progressing  Goal: Refrain from harming others  Outcome: Progressing  Goal: Refrain from destructive acts on the environment or property  Outcome: Progressing  Goal: Control angry outbursts  Description  Interventions:  - Monitor patient closely, per order  - Ensure early verbal de-escalation  - Monitor prn medication needs  - Set reasonable/therapeutic limits, outline behavioral expectations, and consequences   - Provide a non-threatening milieu, utilizing the least restrictive interventions   Outcome: Progressing  Goal: Attend and participate in unit activities, including therapeutic, recreational, and educational groups  Description  Interventions:  - Provide therapeutic and educational activities daily, encourage attendance and participation, and document same in the medical record   Outcome: Progressing  Goal: Identify appropriate positive anger management techniques  Description  Interventions:  - Offer anger management and coping skills groups   - Staff will provide positive feedback for appropriate anger control  Outcome: Progressing     Problem: Ineffective Coping  Goal: Participates in unit activities  Description  Interventions:  - Provide therapeutic environment   - Provide required programming   - Redirect inappropriate behaviors   Outcome: Progressing
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
## 2019-12-04 NOTE — NURSING NOTE
Patient is present in the dayroom and is out for meals  Pt is medication compliant and cooperative with care  Pt is visible, social with select peers, and pleasant upon approach  Pt reported a headache rated 6/10  Pt is currently denying all other s/s at this time  Will continue to monitor

## 2019-12-04 NOTE — PROGRESS NOTES
12/04/19 1000   Activity/Group Checklist   Group Community meeting  (completed relapse prevention plan w  assistance )   Attendance Attended   Attendance Duration (min) 16-30   Interactions Disorganized interaction   Affect/Mood Calm   Goals Achieved Able to engage in interactions; Identified relapse prevention strategies; Discussed coping strategies; Identified resources and support systems

## 2019-12-04 NOTE — PLAN OF CARE
Problem: DISCHARGE PLANNING  Goal: Discharge to home or other facility with appropriate resources  Description  INTERVENTIONS:  - Identify barriers to discharge w/patient and caregiver  - Arrange for needed discharge resources and transportation as appropriate  - Identify discharge learning needs (meds, wound care, etc )  - Arrange for interpretive services to assist at discharge as needed  - Refer to Case Management Department for coordinating discharge planning if the patient needs post-hospital services based on physician/advanced practitioner order or complex needs related to functional status, cognitive ability, or social support system  Outcome: Progressing       Patient remains paranoid but calm and cooperative  Patient reports that she is going to get a house in the [facility] and hire all of the staff here for her [address]  CM left voicemail for guardian, awaiting call back  CM will continue to follow and provide services as needed

## 2019-12-04 NOTE — PROGRESS NOTES
Right hand has an abrasion with surrounding cellulitis I did touch it is warm  Patient does have a history of MRSA will place her on Bactrim DS 1 tab p o  B i d   For 7 days her renal function is normal potassium is normal

## 2019-12-04 NOTE — NURSING NOTE
Patient walking around unit going from day room to nurses desk  No agitation or aggressive behaviors  Patient fixated on finding out name of surgeon who operated on her back  Social with ppers

## 2019-12-04 NOTE — NURSING NOTE
Pt is cooperative with care and medication compliant  Pt comes out for meals and groups  Pt is visible in the dayroom and social with select peers  Pt denies s/s including SI and HI  Pt shows no signs of agitation or aggression, but remains sexually preoccupied making comments about " the hot men on the [place]"  Pt is currently attending group  Will continue to monitor

## 2019-12-04 NOTE — CASE MANAGEMENT
CM spoke with Holly Hartley from [facility]  Patient is able to return on a 15 day bed hold, an onsite does not need to be completed, and she avelar not need to be re optioned  Patient will need a return referral in allscripts prior to return  CM will continue to follow and provide services as needed

## 2019-12-05 PROCEDURE — 97167 OT EVAL HIGH COMPLEX 60 MIN: CPT

## 2019-12-05 PROCEDURE — 99232 SBSQ HOSP IP/OBS MODERATE 35: CPT | Performed by: PSYCHIATRY & NEUROLOGY

## 2019-12-05 RX ADMIN — ACETAMINOPHEN 650 MG: 325 TABLET ORAL at 15:25

## 2019-12-05 RX ADMIN — RISPERIDONE 0.25 MG: 0.25 TABLET ORAL at 21:10

## 2019-12-05 RX ADMIN — DIVALPROEX SODIUM 250 MG: 250 TABLET, DELAYED RELEASE ORAL at 08:46

## 2019-12-05 RX ADMIN — RISPERIDONE 0.25 MG: 0.25 TABLET ORAL at 08:46

## 2019-12-05 RX ADMIN — AMLODIPINE BESYLATE 5 MG: 5 TABLET ORAL at 08:46

## 2019-12-05 RX ADMIN — ACETAMINOPHEN 650 MG: 325 TABLET ORAL at 20:23

## 2019-12-05 RX ADMIN — FLUTICASONE FUROATE AND VILANTEROL TRIFENATATE 1 PUFF: 200; 25 POWDER RESPIRATORY (INHALATION) at 08:46

## 2019-12-05 RX ADMIN — LIDOCAINE 1 PATCH: 50 PATCH TOPICAL at 08:46

## 2019-12-05 RX ADMIN — SULFAMETHOXAZOLE AND TRIMETHOPRIM 1 TABLET: 800; 160 TABLET ORAL at 08:46

## 2019-12-05 RX ADMIN — SENNOSIDES 8.6 MG: 8.6 TABLET, FILM COATED ORAL at 21:10

## 2019-12-05 RX ADMIN — MAGNESIUM HYDROXIDE 30 ML: 400 SUSPENSION ORAL at 09:59

## 2019-12-05 RX ADMIN — CLONAZEPAM 0.25 MG: 0.5 TABLET ORAL at 08:46

## 2019-12-05 RX ADMIN — DIVALPROEX SODIUM 250 MG: 250 TABLET, DELAYED RELEASE ORAL at 21:10

## 2019-12-05 RX ADMIN — SULFAMETHOXAZOLE AND TRIMETHOPRIM 1 TABLET: 800; 160 TABLET ORAL at 21:10

## 2019-12-05 RX ADMIN — CLONAZEPAM 0.25 MG: 0.5 TABLET ORAL at 21:10

## 2019-12-05 RX ADMIN — SERTRALINE HYDROCHLORIDE 50 MG: 50 TABLET ORAL at 08:46
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
## 2019-12-05 NOTE — NURSING NOTE
Pt declined prn MOM for constipation  Stated she would like it in the morning, Will pass on in report

## 2019-12-05 NOTE — NURSING NOTE
Pt is calm, cooperative with care and medication compliant  Pt is visible on the unit and comes out for meals and to make needs known  Pt is social with peers and appropriate in conversation  Pt denies all s/s including SI and HI and shows no signs of agitation or aggression  Pt frequents the nursing station to speak with staff, but is redirectable  Will continue to monitor

## 2019-12-05 NOTE — PROGRESS NOTES
Pt denies living at [facility] with intermttent drowsiness in groups  12/05/19 1000 12/05/19 1100 12/05/19 1400   Activity/Group Checklist   Group [employer] meeting  (holiday song match task ) Nursing Education  (guided imagery) Life Skills  (healthy aging [facility])   Attendance Attended Attended Attended   Attendance Duration (min) 16-30  --  31-45   Interactions Disorganized interaction  --  Disorganized interaction   Affect/Mood Other (Comment)  (Pt  calm yet only intermittently engaged in task )  --  Incongruent  (social then became drowsy )   Goals Achieved Able to listen to others  --  Able to engage in interactions; Discussed coping strategies

## 2019-12-05 NOTE — CASE MANAGEMENT
303 hearing held with Dr Rosie Espinoza  Patient not in attendance however did tell  that she was in agreement to stay  [facility] agreed with recommendation of further inpatient treatment up to 20 days  CM will continue to follow and provide services as needed

## 2019-12-05 NOTE — PROGRESS NOTES
12/05/19 1300   Team Meeting   Meeting Type Daily Rounds   Team Members Present   Team Members Present Physician;Nurse;;Occupational Therapist   Physician Team Member Paddy satnana   Nursing Team Member Maribell, [address] [place] [address] Management Team Member Maryanne Jeans   OT Team Member Jesse Grimes ok to go back to [facility] [place]  Will monitor medication effectiveness

## 2019-12-05 NOTE — PROGRESS NOTES
[facility]iat[facility]  Psychiatrist's Progress Note    Patient Name: Leidy Rose  MRN: [id number]  DOS: 12/05/19     Chief Complaint:  agitation/aggression    Interval History: Per staff, patient has had no agitation or aggression overnight  Patient denies complaints in general  Her thought process is tangential and irrelevant  She talks about her fiance again and remains paranoid about him  Medication compliant  Adverse effects of medications: none noted      Mental Status Exam [Per above +]  Appearance: limited grooming, fair hygiene; no abnormal involuntary movements noted  Behavior: calm, oddly related  Speech: wnl  Mood: ok  Affect: neutral, stable, reactive  Thought process: illogical, tangential  Thought content: paranoid delusions elicited; no reports of SI  Perceptual disturbances: does not appear to be internally preoccupied  Cognition: disoriented to all domains    Insight: poor  Judgement: poor      Current Facility-Administered Medications:     acetaminophen (TYLENOL) tablet 650 mg, 650 mg, Oral, Q4H PRN, Christine Rivera MD, 650 mg at 12/05/19 1525    albuterol (PROVENTIL HFA,VENTOLIN HFA) inhaler 2 puff, 2 puff, Inhalation, Q6H PRN, Hattie Nelson MD    aluminum-magnesium hydroxide-simethicone (MYLANTA) 200-200-20 mg/5 mL oral suspension 30 mL, 30 mL, Oral, Q4H PRN, Christine Rivera MD    amLODIPine (NORVASC) tablet 5 mg, 5 mg, Oral, Daily, Hattie Nelson MD, 5 mg at 12/05/19 0846    benztropine (COGENTIN) injection 1 mg, 1 mg, Intramuscular, Q8H PRN, Christine Rivera MD    benztropine (COGENTIN) tablet 0 5 mg, 0 5 mg, Oral, Q8H PRN, Christine Rivera MD    clonazePAM (KlonoPIN) tablet 0 25 mg, 0 25 mg, Oral, Q12H [address], Fatimah Arce MD, 0 25 mg at 12/05/19 0846    divalproex sodium (DEPAKOTE) EC tablet 250 mg, 250 mg, Oral, Q12H [address], Fatimah Arce MD, 250 mg at 12/05/19 0846    fluticasone-vilanterol (BREO ELLIPTA) 200-25 MCG/INH inhaler 1 puff, 1 puff, Inhalation, Daily, Gabrielle Ocampo MD, 1 puff at 12/05/19 0846    haloperidol (HALDOL) tablet 2 mg, 2 mg, Oral, Q8H PRN, Shelby Golden MD    haloperidol lactate (HALDOL) injection 2 mg, 2 mg, Intramuscular, Q6H PRN, Shelby Golden MD    lidocaine (LIDODERM) 5 % patch 1 patch, 1 patch, Topical, Daily, Gabrielle Ocampo MD, 1 patch at 12/05/19 0846    LORazepam (ATIVAN) 2 mg/mL injection 0 5 mg, 0 5 mg, Intramuscular, Q8H PRN, Shelby Golden MD    LORazepam (ATIVAN) tablet 0 5 mg, 0 5 mg, Oral, Q8H PRN, Shelby Golden MD    magnesium hydroxide (MILK OF MAGNESIA) 400 mg/5 mL oral suspension 30 mL, 30 mL, Oral, Daily PRN, Shelby Golden MD, 30 mL at 12/05/19 0959    risperiDONE (RisperDAL) tablet 0 25 mg, 0 25 mg, Oral, Q12H [address], Mitul Kaur MD, 0 25 mg at 12/05/19 0846    senna (SENOKOT) tablet 8 6 mg, 1 tablet, Oral, HS, Gabrielle Ocampo MD, 8 6 mg at 12/04/19 2113    sertraline (ZOLOFT) tablet 50 mg, 50 mg, Oral, Daily, Mitul Kaur MD, 50 mg at 12/05/19 0846    sulfamethoxazole-trimethoprim (BACTRIM DS) 800-160 mg per tablet 1 tablet, 1 tablet, Oral, Q12H [address], Gabrielle Ocampo MD, 1 tablet at 12/05/19 0846    traZODone (DESYREL) tablet 25 mg, 25 mg, Oral, HS PRN, Shelby Golden MD    Vitals:    12/05/19 1512   BP: 125/71   Pulse: 74   Resp: 18   Temp: 99 1 °F (37 3 °C)   SpO2: 99%       Lab/work up: h/h 9 4/30 4    Assessment:     Axis I:  · Mood disorder unspecified  · H/o depression  · Major neurocognitive d/o, 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, with behavioral disturbances  · H/o polysubstance abuse  Axis II:  · deferred  Axis III:  - anemia, COPD, HTN, constipation; h/o lumbar spinal stenosis s/p left L4-5 hemilaminectomy and bilateral foraminotomy, transforaminal lumbar interbody fusion with pedicle screw and myke fixation fusion L4-5  Axis IV:  · No social support; h/o abuse    Progress: limited     Plan:   1   Disposition: Patient meets criteria for ongoing acute inpatient treatment due to being a danger to self and others  Patient will be discharged when there is an absence of agitation [id number]  2  Legal status: voluntary  3  Psychopharmacologic interventions:  - Increase risperidone to 0 5mg po bid  - discontinue klonopin 0 25mg po bid  - continue depakote 250mg po bid - will likely taper prior to discharge  - Continue zoloft 50mg po daily for now - has a h/o depression  - Continue to monitor agitation  4  Medical comorbidities: Hospitalist following due to anemia  5  Other therapies: Individual/group/milieu therapy as appropriate; Discussed risks and benefits of medications at initiation of therapy; patient/family verbalized understanding and agreed with plan  6  Social issues: Case management following to arrange outpatient follow up  7  Work up: none  8   Precautions: Routine q7min checks, vitals qhift    Thomas Lockhart MD  12/05/19

## 2019-12-06 ENCOUNTER — APPOINTMENT (INPATIENT)
Dept: ULTRASOUND IMAGING | Facility: HOSPITAL | Age: 74
DRG: 885 | End: 2019-12-06
Payer: COMMERCIAL

## 2019-12-06 PROCEDURE — 76770 US EXAM ABDO BACK WALL COMP: CPT

## 2019-12-06 PROCEDURE — 99232 SBSQ HOSP IP/OBS MODERATE 35: CPT | Performed by: PSYCHIATRY & NEUROLOGY

## 2019-12-06 RX ORDER — DIVALPROEX SODIUM 125 MG/1
125 CAPSULE, COATED PELLETS ORAL EVERY 12 HOURS SCHEDULED
Status: DISCONTINUED | OUTPATIENT
Start: 2019-12-06 | End: 2019-12-09

## 2019-12-06 RX ORDER — RISPERIDONE 0.5 MG/1
0.5 TABLET, FILM COATED ORAL EVERY 12 HOURS SCHEDULED
Status: DISCONTINUED | OUTPATIENT
Start: 2019-12-06 | End: 2019-12-08

## 2019-12-06 RX ORDER — BISACODYL 10 MG
10 SUPPOSITORY, RECTAL RECTAL ONCE
Status: COMPLETED | OUTPATIENT
Start: 2019-12-06 | End: 2019-12-06

## 2019-12-06 RX ADMIN — SERTRALINE HYDROCHLORIDE 50 MG: 50 TABLET ORAL at 09:11

## 2019-12-06 RX ADMIN — CLONAZEPAM 0.25 MG: 0.5 TABLET ORAL at 09:07

## 2019-12-06 RX ADMIN — AMLODIPINE BESYLATE 5 MG: 5 TABLET ORAL at 09:07

## 2019-12-06 RX ADMIN — BISACODYL 10 MG: 10 SUPPOSITORY RECTAL at 10:36

## 2019-12-06 RX ADMIN — DIVALPROEX SODIUM 250 MG: 250 TABLET, DELAYED RELEASE ORAL at 09:07

## 2019-12-06 RX ADMIN — FLUTICASONE FUROATE AND VILANTEROL TRIFENATATE 1 PUFF: 200; 25 POWDER RESPIRATORY (INHALATION) at 09:06

## 2019-12-06 RX ADMIN — SENNOSIDES 8.6 MG: 8.6 TABLET, FILM COATED ORAL at 21:20

## 2019-12-06 RX ADMIN — SULFAMETHOXAZOLE AND TRIMETHOPRIM 1 TABLET: 800; 160 TABLET ORAL at 09:10

## 2019-12-06 RX ADMIN — SULFAMETHOXAZOLE AND TRIMETHOPRIM 1 TABLET: 800; 160 TABLET ORAL at 21:20

## 2019-12-06 RX ADMIN — ACETAMINOPHEN 650 MG: 325 TABLET ORAL at 09:28

## 2019-12-06 RX ADMIN — RISPERIDONE 0.5 MG: 0.5 TABLET ORAL at 21:20

## 2019-12-06 RX ADMIN — DIVALPROEX SODIUM 125 MG: 125 CAPSULE, COATED PELLETS ORAL at 21:20

## 2019-12-06 RX ADMIN — RISPERIDONE 0.25 MG: 0.25 TABLET ORAL at 09:07

## 2019-12-06 RX ADMIN — LIDOCAINE 1 PATCH: 50 PATCH TOPICAL at 09:08

## 2019-12-06 NOTE — NURSING NOTE
Patient refused to go to bed saying she was not tired  Paced in room and sat in chair  Slept from 0100 to 0600

## 2019-12-06 NOTE — NURSING NOTE
Patient pleasant and cooperative with the care  Suppository given and was effective   Patient has scheduled kidney ultasound for 2 30 pm  She is instructed to drink a lot and fill the bladder  She understands and is cooperative

## 2019-12-06 NOTE — PROGRESS NOTES
12/06/19 1100   Activity/Group Checklist   Group Other (Comment)  ( education)   Attendance Attended   Attendance Duration (min) 31-45   Interactions Interacted appropriately   Affect/Mood Appropriate   Goals Achieved Identified feelings; Discussed coping strategies; Able to listen to others; Able to engage in interactions; Able to reflect/comment on own behavior;Able to manage/cope with feelings;Verbalized increased hopefulness; Able to self-disclose

## 2019-12-06 NOTE — PROGRESS NOTES
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

## 2019-12-06 NOTE — PROGRESS NOTES
12/06/19 0900   Team Meeting   Meeting Type Daily Rounds   Team Members Present   Team Members Present Physician;Nurse;;Occupational Therapist   Physician Team Member [address] Team Member [facility] Team Member Garry Parekh   OT Team Member Jesse   Pt to return to [address] [place] [address] [place] @ [place]

## 2019-12-06 NOTE — PROGRESS NOTES
[facility] In[facility]iatric Psychiatry  Psychiatrist's Progress Note    Patient Name: Tl Matamoros  MRN: [id number]  DOS: 12/06/19     Chief Complaint:  agitation/aggression    Interval History: Per staff, patient has been calm but she remains paranoid about her ex-fiance  She thinks he worked with a surgeon to sabotage her surgery  She is completely disoriented  Staff also report she has been making moderately sexually inappropriate statements but has not been intrusive  Medication compliant  Adverse effects of medications: none noted      Mental Status Exam [Per above +]  Appearance: disheveled, has matted hair; adequate hygiene with staff assistance  Behavior: calm but oddly related  Speech: wnl  Mood: anxious  Affect: congruent but stable  Thought process: tangential, illogical  Thought content: paranoid delusions are ongoing  Perceptual disturbances: denies AH/VH  Cognition: disoriented to all domains    Insight: poor  Judgement: poor        Current Facility-Administered Medications:     acetaminophen (TYLENOL) tablet 650 mg, 650 mg, Oral, Q4H PRN, Mayte Koenig MD, 650 mg at 12/06/19 0928    albuterol (PROVENTIL HFA,VENTOLIN HFA) inhaler 2 puff, 2 puff, Inhalation, Q6H PRN, Steven Wooten MD    aluminum-magnesium hydroxide-simethicone (MYLANTA) 200-200-20 mg/5 mL oral suspension 30 mL, 30 mL, Oral, Q4H PRN, Mayte Koenig MD    amLODIPine (NORVASC) tablet 5 mg, 5 mg, Oral, Daily, Steven Wooten MD, 5 mg at 12/06/19 0907    benztropine (COGENTIN) injection 1 mg, 1 mg, Intramuscular, Q8H PRN, Mayte Koenig MD    benztropine (COGENTIN) tablet 0 5 mg, 0 5 mg, Oral, Q8H PRN, Mayte Koenig MD    divalproex sodium (DEPAKOTE) EC tablet 250 mg, 250 mg, Oral, Q12H [address], Necia Heimlich, MD, 250 mg at 12/06/19 0907    fluticasone-vilanterol (BREO ELLIPTA) 200-25 MCG/INH inhaler 1 puff, 1 puff, Inhalation, Daily, Steven Wooten MD, 1 puff at 12/06/19 0906    haloperidol (HALDOL) tablet 2 mg, 2 mg, Oral, Q8H PRN, Jeane Mak MD    haloperidol lactate (HALDOL) injection 2 mg, 2 mg, Intramuscular, Q6H PRN, Jeane Mak MD    lidocaine (LIDODERM) 5 % patch 1 patch, 1 patch, Topical, Daily, Iglesia Leach MD, 1 patch at 12/06/19 0908    LORazepam (ATIVAN) 2 mg/mL injection 0 5 mg, 0 5 mg, Intramuscular, Q8H PRN, Jeane Mak MD    LORazepam (ATIVAN) tablet 0 5 mg, 0 5 mg, Oral, Q8H PRN, Jeane Mak MD    magnesium hydroxide (MILK OF MAGNESIA) 400 mg/5 mL oral suspension 30 mL, 30 mL, Oral, Daily PRN, Jeane Mak MD, 30 mL at 12/05/19 0959    risperiDONE (RisperDAL) tablet 0 25 mg, 0 25 mg, Oral, Q12H [address], Shawn Macario MD, 0 25 mg at 12/06/19 0907    senna (SENOKOT) tablet 8 6 mg, 1 tablet, Oral, HS, Iglesia Leach MD, 8 6 mg at 12/05/19 2110    sertraline (ZOLOFT) tablet 50 mg, 50 mg, Oral, Daily, Shawn Macario MD, 50 mg at 12/06/19 0911    sulfamethoxazole-trimethoprim (BACTRIM DS) 800-160 mg per tablet 1 tablet, 1 tablet, Oral, Q12H [address], Iglesia Leach MD, 1 tablet at 12/06/19 0910    traZODone (DESYREL) tablet 25 mg, 25 mg, Oral, HS PRN, Jeane Mak MD    Vitals:    12/06/19 0747   BP: 123/66   Pulse: 80   Resp: 16   Temp:    SpO2: 95%       Lab/work up: h/h 9 4/30 4    Assessment:     Axis I:  · Mood disorder unspecified  · H/o depression  · Major neurocognitive d/o, 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, with behavioral disturbances  · H/o polysubstance abuse  Axis II:  · deferred  Axis III:  - anemia, COPD, HTN, constipation; h/o lumbar spinal stenosis s/p left L4-5 hemilaminectomy and bilateral foraminotomy, transforaminal lumbar interbody fusion with pedicle screw and myke fixation fusion L4-5  Axis IV:  · No social support; h/o abuse    Progress: limited     Plan:   1  Disposition: Patient meets criteria for ongoing acute inpatient treatment due to being a danger to self and others  Patient will be discharged when there is an absence of agitation [id number]  2  Legal status: voluntary  3  Psychopharmacologic interventions:  - continue risperidone 0 5mg po bid  - decrease depakote to 125mg po bid - will likely taper prior to discharge  - Continue zoloft 50mg po daily for now - has a h/o depression  - Continue to monitor agitation  4  Medical comorbidities: Hospitalist following due to anemia  5  Other therapies: Individual/group/milieu therapy as appropriate; Discussed risks and benefits of medications at initiation of therapy; patient/family verbalized understanding and agreed with plan  6  Social issues: Case management following to arrange outpatient follow up  7  Work up: none  8   Precautions: Routine q7min checks, vitals qhift    Aamir Joseph MD  12/06/19

## 2019-12-06 NOTE — PLAN OF CARE
Problem: DISCHARGE PLANNING  Goal: Discharge to home or other facility with appropriate resources  Description  INTERVENTIONS:  - Identify barriers to discharge w/patient and caregiver  - Arrange for needed discharge resources and transportation as appropriate  - Identify discharge learning needs (meds, wound care, etc )  - Arrange for interpretive services to assist at discharge as needed  - Refer to Case Management Department for coordinating discharge planning if the patient needs post-hospital services based on physician/advanced practitioner order or complex needs related to functional status, cognitive ability, or social support system  Outcome: Progressing       Patient remains paranoid and sexually preoccupied   Planned d/c for tuesday

## 2019-12-06 NOTE — NURSING NOTE
Patient spent evening sitting in day room or walking in halls with peers  Interacts appropriately with peers and staff  Patient preoccupied with finding the surgeon who operated on her back and wants to talk to her boyfriend to find out who operated on her  No agitation or aggressive behaviors

## 2019-12-07 PROCEDURE — 99232 SBSQ HOSP IP/OBS MODERATE 35: CPT | Performed by: PSYCHIATRY & NEUROLOGY

## 2019-12-07 RX ADMIN — FLUTICASONE FUROATE AND VILANTEROL TRIFENATATE 1 PUFF: 200; 25 POWDER RESPIRATORY (INHALATION) at 08:17

## 2019-12-07 RX ADMIN — RISPERIDONE 0.5 MG: 0.5 TABLET ORAL at 08:15

## 2019-12-07 RX ADMIN — SULFAMETHOXAZOLE AND TRIMETHOPRIM 1 TABLET: 800; 160 TABLET ORAL at 08:16

## 2019-12-07 RX ADMIN — DIVALPROEX SODIUM 125 MG: 125 CAPSULE, COATED PELLETS ORAL at 08:15

## 2019-12-07 RX ADMIN — ACETAMINOPHEN 650 MG: 325 TABLET ORAL at 08:52

## 2019-12-07 RX ADMIN — ACETAMINOPHEN 650 MG: 325 TABLET ORAL at 17:01

## 2019-12-07 RX ADMIN — SULFAMETHOXAZOLE AND TRIMETHOPRIM 1 TABLET: 800; 160 TABLET ORAL at 21:29

## 2019-12-07 RX ADMIN — SERTRALINE HYDROCHLORIDE 50 MG: 50 TABLET ORAL at 08:15

## 2019-12-07 RX ADMIN — RISPERIDONE 0.5 MG: 0.5 TABLET ORAL at 21:27

## 2019-12-07 RX ADMIN — SENNOSIDES 8.6 MG: 8.6 TABLET, FILM COATED ORAL at 21:27

## 2019-12-07 RX ADMIN — DIVALPROEX SODIUM 125 MG: 125 CAPSULE, COATED PELLETS ORAL at 21:27

## 2019-12-07 RX ADMIN — LIDOCAINE 1 PATCH: 50 PATCH TOPICAL at 08:16

## 2019-12-07 RX ADMIN — ACETAMINOPHEN 650 MG: 325 TABLET ORAL at 21:26

## 2019-12-07 NOTE — PROGRESS NOTES
Social and smiling in dayroom, talkative with peers and staff  Laughing and making jokes  Denies symptoms  Appears to be more oriented  Talkative and engaging with select male peer in evening  Medication compliant

## 2019-12-07 NOTE — NURSING NOTE
Pt is calm and cooperative  Pt is seen in the dayroom, social with peers  Pt reports a good night sleep, is bright  Pt currently denies all s/s reports she is "feeling better", offers no complaints, is medication compliant

## 2019-12-07 NOTE — PROGRESS NOTES
Progress Note - [facility] TARA Pysher 69 y o  female MRN: [id number]  Unit/Bed#: Cheryl Butts 012-49 Encounter: [id number]    Assessment/Plan   Principal Problem:    Mood disorder ([address] )  Active Problems:    Lumbar back pain with radiculopathy affecting right lower extremity    Essential hypertension    Major neurocognitive disorder due to possible Alzheimer's disease, with behavioral disturbance (HCC)    Constipation    COPD (chronic obstructive pulmonary disease) (HCC)    Iron deficiency anemia    History of breast cancer      Behavior over the last 24 hours:  Improved  The patient was social and pleasant in the dayroom  She was interacting with staff and peers  She was laughing and making jokes, talkative, and engaging  No medication complaints  This morning, the patient stated that she thinks she is doing better  She is worried about her mother's house  It was left to her after he mother and step father passing away  She is currently living in a nursing home, when asked about it, "I dont have to stay there, I needed to get away from things " She spoke about going to the police station  "sooner or later to have Krissy and his fiance arrested  They have 4 kids " She believes that the kids are not safe with them  She was redirected back to how she is feeling  "I feel insecure " She is feeling anxious  She denied SI/HI  "Im a devote coward, I would not hurt me or anyone else " She is confused about the reason she is here  She slept okay, but her roommate was snoring and disrupted her  Appetite is good  Sleep: disrupted  Appetite: normal  Medication side effects: No  ROS: constipation and back pain    Mental Status Evaluation:  Appearance:  age appropriate   Behavior:  normal   Speech:  normal volume and increased rate   Mood:  anxious   Affect:  broad   Thought Process:  flight of ideas, perserverative and tangential   Thought Content:  delusions  paranoid and obsessions   Perseverative  paranoid   Perceptual Disturbances: None   Risk Potential: Suicidal Ideations none and Homicidal Ideations none   Sensorium:  person and place   Cognition:  grossly intact   Consciousness:  alert and awake    Attention: attention span appeared shorter than expected for age   Insight:  impaired due to mental state   Judgment: impaired due to mental state   Gait/Station: normal gait/station and uses a walker for back pain   Motor Activity: no abnormal movements     Progress Toward Goals: "" I dont know, jimena made friends "    Recommended Treatment: Continue with group therapy, milieu therapy and occupational therapy  Continue current medications  Normal US kidneys was normal  Will monitor constipation  Risks, benefits and possible side effects of Medications:   Risks, benefits, and possible side effects of medications explained to patient and patient verbalizes understanding        Medications:   all current active meds have been reviewed and current meds:   Current Facility-Administered Medications   Medication Dose Route Frequency    acetaminophen (TYLENOL) tablet 650 mg  650 mg Oral Q4H PRN    albuterol (PROVENTIL HFA,VENTOLIN HFA) inhaler 2 puff  2 puff Inhalation Q6H PRN    aluminum-magnesium hydroxide-simethicone (MYLANTA) 200-200-20 mg/5 mL oral suspension 30 mL  30 mL Oral Q4H PRN    amLODIPine (NORVASC) tablet 5 mg  5 mg Oral Daily    benztropine (COGENTIN) injection 1 mg  1 mg Intramuscular Q8H PRN    benztropine (COGENTIN) tablet 0 5 mg  0 5 mg Oral Q8H PRN    divalproex sodium (DEPAKOTE SPRINKLE) capsule 125 mg  125 mg Oral Q12H [address]    fluticasone-vilanterol (BREO ELLIPTA) 200-25 MCG/INH inhaler 1 puff  1 puff Inhalation Daily    haloperidol (HALDOL) tablet 2 mg  2 mg Oral Q8H PRN    haloperidol lactate (HALDOL) injection 2 mg  2 mg Intramuscular Q6H PRN    lidocaine (LIDODERM) 5 % patch 1 patch  1 patch Topical Daily    LORazepam (ATIVAN) 2 mg/mL injection 0 5 mg  0 5 mg Intramuscular Q8H PRN    LORazepam (ATIVAN) tablet 0 5 mg  0 5 mg Oral Q8H PRN    magnesium hydroxide (MILK OF MAGNESIA) 400 mg/5 mL oral suspension 30 mL  30 mL Oral Daily PRN    risperiDONE (RisperDAL) tablet 0 5 mg  0 5 mg Oral Q12H [address]    senna (SENOKOT) tablet 8 6 mg  1 tablet Oral HS    sertraline (ZOLOFT) tablet 50 mg  50 mg Oral Daily    sulfamethoxazole-trimethoprim (BACTRIM DS) 800-160 mg per tablet 1 tablet  1 tablet Oral Q12H [address]    traZODone (DESYREL) tablet 25 mg  25 mg Oral HS PRN     Labs: RENAL ULTRASOUND     INDICATION:   Left flank pain      COMPARISON: None     TECHNIQUE:   Ultrasound of the retroperitoneum was performed with a curvilinear transducer utilizing volumetric sweeps and still imaging techniques       FINDINGS:     KIDNEYS:  Symmetric and normal size  Right kidney:  10 4 x 4 0 cm  Left kidney:  11 5 x 5 0 cm      Right kidney  Normal echogenicity and contour  No suspicious masses detected  No hydronephrosis  No shadowing calculi  No perinephric fluid collections      Left kidney  Normal echogenicity and contour  No suspicious masses detected  No hydronephrosis  No shadowing calculi  No perinephric fluid collections      URETERS:  Nonvisualized      BLADDER:   Normally distended  No focal thickening or mass lesions         IMPRESSION:     No hydronephrosis  Normal size kidneys  Unremarkable renal stone    Counseling / Coordination of Care  Total floor / unit time spent today 20 minutes  Greater than 50% of total time was spent with the patient and / or family counseling and / or coordination of care   A description of the counseling / coordination of care: nursing

## 2019-12-08 PROCEDURE — ND001 PR NO DOCUMENTATION: Performed by: PSYCHIATRY & NEUROLOGY

## 2019-12-08 RX ORDER — RISPERIDONE 1 MG/1
1 TABLET, FILM COATED ORAL
Status: DISCONTINUED | OUTPATIENT
Start: 2019-12-08 | End: 2019-12-10 | Stop reason: HOSPADM

## 2019-12-08 RX ORDER — RISPERIDONE 0.5 MG/1
0.5 TABLET, FILM COATED ORAL DAILY
Status: DISCONTINUED | OUTPATIENT
Start: 2019-12-09 | End: 2019-12-10 | Stop reason: HOSPADM

## 2019-12-08 RX ADMIN — SENNOSIDES 8.6 MG: 8.6 TABLET, FILM COATED ORAL at 21:49

## 2019-12-08 RX ADMIN — ACETAMINOPHEN 650 MG: 325 TABLET ORAL at 08:14

## 2019-12-08 RX ADMIN — SULFAMETHOXAZOLE AND TRIMETHOPRIM 1 TABLET: 800; 160 TABLET ORAL at 21:49

## 2019-12-08 RX ADMIN — SERTRALINE HYDROCHLORIDE 50 MG: 50 TABLET ORAL at 08:12

## 2019-12-08 RX ADMIN — RISPERIDONE 0.5 MG: 0.5 TABLET ORAL at 08:12

## 2019-12-08 RX ADMIN — DIVALPROEX SODIUM 125 MG: 125 CAPSULE, COATED PELLETS ORAL at 08:12

## 2019-12-08 RX ADMIN — RISPERIDONE 1 MG: 1 TABLET ORAL at 21:49

## 2019-12-08 RX ADMIN — DIVALPROEX SODIUM 125 MG: 125 CAPSULE, COATED PELLETS ORAL at 21:49

## 2019-12-08 RX ADMIN — ACETAMINOPHEN 650 MG: 325 TABLET ORAL at 17:53

## 2019-12-08 RX ADMIN — ACETAMINOPHEN 650 MG: 325 TABLET ORAL at 22:45

## 2019-12-08 RX ADMIN — AMLODIPINE BESYLATE 5 MG: 5 TABLET ORAL at 08:12

## 2019-12-08 RX ADMIN — LIDOCAINE 1 PATCH: 50 PATCH TOPICAL at 08:12

## 2019-12-08 RX ADMIN — FLUTICASONE FUROATE AND VILANTEROL TRIFENATATE 1 PUFF: 200; 25 POWDER RESPIRATORY (INHALATION) at 08:13

## 2019-12-08 RX ADMIN — SULFAMETHOXAZOLE AND TRIMETHOPRIM 1 TABLET: 800; 160 TABLET ORAL at 08:12

## 2019-12-08 NOTE — PROGRESS NOTES
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, with behavioral disturbance (HCC)    Constipation    COPD (chronic obstructive pulmonary disease) ([facility] )    Iron deficiency anemia    History of breast cancer      Behavior over the last 24 hours:  Regressed  Last evening, she was noted to be going into other patients' rooms and taking their belongings  She was intrusive  Took Tylenol for left rib pain  The medication was effective  no other complaints  She slept all night  This morning, the patient was seen in the office  "Im alive, Im nervous " The patient stated that she feels insecure about what her life is going to be like  " I have enough money in my account to get an apt and make an appt to see a psychiatrist " When asked about last night and taking other people's things, she denied this and stated that she did not remember it  She stated "I wouldn't have done that intentionally " She feels anxious and unsure of the future  Appetite is good  She slept well  She just showered with assistance this morning  Spoke of her grandchildren and children  Talked about her ex-boyfriend and pressing charges  Sleep: normal  Appetite: normal  Medication side effects: No  ROS: back pain    Mental Status Evaluation:  Appearance:  age appropriate in a gown   Behavior:  normal   Speech:  normal volume and increased rate   Mood:  anxious   Affect:  broad   Thought Process:  flight of ideas, perserverative and tangential   Thought Content:  delusions  paranoid and obsessions   Perseverative  paranoid   Perceptual Disturbances: None   Risk Potential: Suicidal Ideations none and Homicidal Ideations none Sensorium:  person and place   Cognition:  grossly intact   Consciousness:  alert and awake    Attention: attention span appeared shorter than expected for age   Insight:  impaired due to mental state   Judgment: impaired due to mental state   Gait/Station: normal gait/station and uses a walker for back pain   Motor Activity: no abnormal movements     Progress Toward Goals: " my goal would have been to go to [place] and press charges "    Recommended Treatment: Continue with group therapy, milieu therapy and occupational therapy  Continue current medications and increase Risperdal to 1mg in the evening  Normal US kidneys was normal      Risks, benefits and possible side effects of Medications:   Risks, benefits, and possible side effects of medications explained to patient and patient verbalizes understanding        Medications:   all current active meds have been reviewed and current meds:   Current Facility-Administered Medications   Medication Dose Route Frequency    acetaminophen (TYLENOL) tablet 650 mg  650 mg Oral Q4H PRN    albuterol (PROVENTIL HFA,VENTOLIN HFA) inhaler 2 puff  2 puff Inhalation Q6H PRN    aluminum-magnesium hydroxide-simethicone (MYLANTA) 200-200-20 mg/5 mL oral suspension 30 mL  30 mL Oral Q4H PRN    amLODIPine (NORVASC) tablet 5 mg  5 mg Oral Daily    benztropine (COGENTIN) injection 1 mg  1 mg Intramuscular Q8H PRN    benztropine (COGENTIN) tablet 0 5 mg  0 5 mg Oral Q8H PRN    divalproex sodium (DEPAKOTE SPRINKLE) capsule 125 mg  125 mg Oral Q12H [facility]    fluticasone-vilanterol (BREO ELLIPTA) 200-25 MCG/INH inhaler 1 puff  1 puff Inhalation Daily    haloperidol (HALDOL) tablet 2 mg  2 mg Oral Q8H PRN    haloperidol lactate (HALDOL) injection 2 mg  2 mg Intramuscular Q6H PRN    lidocaine (LIDODERM) 5 % patch 1 patch  1 patch Topical Daily    LORazepam (ATIVAN) 2 mg/mL injection 0 5 mg  0 5 mg Intramuscular Q8H PRN    LORazepam (ATIVAN) tablet 0 5 mg  0 5 mg Oral Q8H PRN    magnesium hydroxide (MILK OF MAGNESIA) 400 mg/5 mL oral suspension 30 mL  30 mL Oral Daily PRN    risperiDONE (RisperDAL) tablet 0 5 mg  0 5 mg Oral Q12H [address]    senna (SENOKOT) tablet 8 6 mg  1 tablet Oral HS    sertraline (ZOLOFT) tablet 50 mg  50 mg Oral Daily    sulfamethoxazole-trimethoprim (BACTRIM DS) 800-160 mg per tablet 1 tablet  1 tablet Oral Q12H [address]    traZODone (DESYREL) tablet 25 mg  25 mg Oral HS PRN     Labs: RENAL ULTRASOUND     INDICATION:   Left flank pain      COMPARISON: None     TECHNIQUE:   Ultrasound of the retroperitoneum was performed with a curvilinear transducer utilizing volumetric sweeps and still imaging techniques       FINDINGS:     KIDNEYS:  Symmetric and normal size  Right kidney:  10 4 x 4 0 cm  Left kidney:  11 5 x 5 0 cm      Right kidney  Normal echogenicity and contour  No suspicious masses detected  No hydronephrosis  No shadowing calculi  No perinephric fluid collections      Left kidney  Normal echogenicity and contour  No suspicious masses detected  No hydronephrosis  No shadowing calculi  No perinephric fluid collections      URETERS:  Nonvisualized      BLADDER:   Normally distended  No focal thickening or mass lesions         IMPRESSION:     No hydronephrosis  Normal size kidneys  Unremarkable renal stone    Counseling / Coordination of Care  Total floor / unit time spent today 20 minutes  Greater than 50% of total time was spent with the patient and / or family counseling and / or coordination of care   A description of the counseling / coordination of care: nursing

## 2019-12-08 NOTE — NURSING NOTE
Pt requested and was given Tylenol PRN for pain 5/10 in her left rib cage and back at 2126  Med was effective  Pt did not offer other complains  Was visible in a unit and compliant with pm med  Will continue to monitor  Pt appeared sleeping last night  No signs of distress noted

## 2019-12-08 NOTE — NURSING NOTE
Pt is calm and cooperative  Pt is seen in the dayroom, social with peers  Pt shows no signs of agitation, denies all s/s  Pt has complaints of back pain, relieved with Tylenol  Pt is medication compliant

## 2019-12-09 PROCEDURE — 99232 SBSQ HOSP IP/OBS MODERATE 35: CPT | Performed by: PSYCHIATRY & NEUROLOGY

## 2019-12-09 RX ORDER — SULFAMETHOXAZOLE AND TRIMETHOPRIM 800; 160 MG/1; MG/1
1 TABLET ORAL EVERY 12 HOURS SCHEDULED
Qty: 3 TABLET | Refills: 0 | Status: CANCELLED | OUTPATIENT
Start: 2019-12-09 | End: 2019-12-11

## 2019-12-09 RX ORDER — LIDOCAINE 50 MG/G
1 PATCH TOPICAL DAILY
Qty: 30 PATCH | Refills: 0 | Status: CANCELLED | OUTPATIENT
Start: 2019-12-10

## 2019-12-09 RX ORDER — LIDOCAINE 50 MG/G
1 PATCH TOPICAL DAILY
Qty: 30 PATCH | Refills: 0 | Status: SHIPPED | OUTPATIENT
Start: 2019-12-10

## 2019-12-09 RX ORDER — SULFAMETHOXAZOLE AND TRIMETHOPRIM 800; 160 MG/1; MG/1
1 TABLET ORAL EVERY 12 HOURS SCHEDULED
Qty: 3 TABLET | Refills: 0 | Status: SHIPPED | OUTPATIENT
Start: 2019-12-09 | End: 2019-12-11

## 2019-12-09 RX ORDER — RISPERIDONE 0.5 MG/1
0.5 TABLET, FILM COATED ORAL EVERY MORNING
Qty: 30 TABLET | Refills: 0 | Status: SHIPPED | OUTPATIENT
Start: 2019-12-09 | End: 2020-11-02

## 2019-12-09 RX ORDER — RISPERIDONE 1 MG/1
1 TABLET, FILM COATED ORAL
Qty: 30 TABLET | Refills: 0 | Status: SHIPPED | OUTPATIENT
Start: 2019-12-09

## 2019-12-09 RX ORDER — RISPERIDONE 0.5 MG/1
0.5 TABLET, FILM COATED ORAL EVERY MORNING
Qty: 30 TABLET | Refills: 0 | Status: CANCELLED | OUTPATIENT
Start: 2019-12-09

## 2019-12-09 RX ORDER — RISPERIDONE 1 MG/1
1 TABLET, FILM COATED ORAL
Qty: 30 TABLET | Refills: 0 | Status: CANCELLED | OUTPATIENT
Start: 2019-12-09

## 2019-12-09 RX ADMIN — SENNOSIDES 8.6 MG: 8.6 TABLET, FILM COATED ORAL at 21:00

## 2019-12-09 RX ADMIN — SULFAMETHOXAZOLE AND TRIMETHOPRIM 1 TABLET: 800; 160 TABLET ORAL at 09:14

## 2019-12-09 RX ADMIN — SERTRALINE HYDROCHLORIDE 50 MG: 50 TABLET ORAL at 09:07

## 2019-12-09 RX ADMIN — RISPERIDONE 0.5 MG: 0.5 TABLET ORAL at 09:21

## 2019-12-09 RX ADMIN — ACETAMINOPHEN 650 MG: 325 TABLET ORAL at 09:06

## 2019-12-09 RX ADMIN — SULFAMETHOXAZOLE AND TRIMETHOPRIM 1 TABLET: 800; 160 TABLET ORAL at 20:57

## 2019-12-09 RX ADMIN — LIDOCAINE 1 PATCH: 50 PATCH TOPICAL at 09:07

## 2019-12-09 RX ADMIN — AMLODIPINE BESYLATE 5 MG: 5 TABLET ORAL at 09:07

## 2019-12-09 RX ADMIN — ACETAMINOPHEN 650 MG: 325 TABLET ORAL at 17:51

## 2019-12-09 RX ADMIN — FLUTICASONE FUROATE AND VILANTEROL TRIFENATATE 1 PUFF: 200; 25 POWDER RESPIRATORY (INHALATION) at 09:10

## 2019-12-09 RX ADMIN — RISPERIDONE 1 MG: 1 TABLET ORAL at 21:00

## 2019-12-09 RX ADMIN — DIVALPROEX SODIUM 125 MG: 125 CAPSULE, COATED PELLETS ORAL at 09:07

## 2019-12-09 NOTE — BH TRANSITION RECORD
Contact Information: If you have any questions, concerns, pended studies, tests and/or procedures, or emergencies regarding your inpatient behavioral health visit  Please contact [facility] older adult behavioral health unit 6T [phone] and ask to speak to a , nurse or physician  A contact is available 24 hours/ 7 days a week at this number  Summary of Procedures Performed During your Stay:  Below is a list of major procedures performed during your hospital stay and a summary of results:  - No major procedures performed  Pending Studies (From admission, onward)     Start     Ordered    12/03/19 1312  Occult blood 1-3, stool  Once      12/03/19 1311              If studies are pending at discharge, follow up with your PCP and/or referring provider

## 2019-12-09 NOTE — QUICK NOTE
Patient is not a danger to self or others at this time  She has had no aggression or agitation since admission  Patient can be discharged

## 2019-12-09 NOTE — PROGRESS NOTES
12/09/19 1100   Activity/Group Checklist   Group Other (Comment)  ( education/taking care of self )   Attendance Attended   Attendance Duration (min) 31-45   Interactions Disorganized interaction   Affect/Mood Incongruent   Goals Achieved Identified feelings; Identified relapse prevention strategies; Able to listen to others; Able to engage in interactions

## 2019-12-09 NOTE — PROGRESS NOTES
12/09/19 1400   Activity/Group Checklist   Group Life Skills  (reminscing/holiday craft )   Attendance Attended   Attendance Duration (min) 46-60   Interactions Disorganized interaction  (unable to complete a simple ornament craft )   Affect/Mood Incongruent  (social and pleasantly confused )   Goals Achieved Able to engage in interactions   Pt attended all three groups with redirection/limited attention span

## 2019-12-09 NOTE — PROGRESS NOTES
[address] [facility] Inpatient Geriatric Psychiatry  Psychiatrist's Progress Note    Patient Name: Acosta Moreno  MRN: [id number]  DOS: 12/09/19     Chief Complaint:  agitation/aggression    Interval History: Per staff, patient has been calm, cooperative  She slept well overnight  She appears to be in good spirits  She is still preoccupied with getting an apartment of her own  No delusions elicited  Medication compliant       Adverse effects of medications: none noted      Mental Status Exam [Per above +]  Appearance: limited grooming, no abnormal involuntary movements noted  Behavior: calm, cooperative  Speech: wnl  Mood: denies complaints  Affect: neutral, stable, reactive  Thought process: linear  Thought content: no delusions elicited; denies SI/HI  Perceptual disturbances: no appearance of internal preoccupation  Cognition: disoriented to all domains  Insight: poor  Judgement: poor but adequate impulse control      Current Facility-Administered Medications:     acetaminophen (TYLENOL) tablet 650 mg, 650 mg, Oral, Q4H PRN, Mona Escobedo MD, 650 mg at 12/09/19 0906    albuterol (PROVENTIL HFA,VENTOLIN HFA) inhaler 2 puff, 2 puff, Inhalation, Q6H PRN, Grace Bob MD    aluminum-magnesium hydroxide-simethicone (MYLANTA) 200-200-20 mg/5 mL oral suspension 30 mL, 30 mL, Oral, Q4H PRN, Mona Escobedo MD    amLODIPine (NORVASC) tablet 5 mg, 5 mg, Oral, Daily, Graec Bob MD, 5 mg at 12/09/19 0907    benztropine (COGENTIN) injection 1 mg, 1 mg, Intramuscular, Q8H PRN, Mona Escobedo MD    benztropine (COGENTIN) tablet 0 5 mg, 0 5 mg, Oral, Q8H PRN, Mona Escobedo MD    fluticasone-vilanterol (BREO ELLIPTA) 200-25 MCG/INH inhaler 1 puff, 1 puff, Inhalation, Daily, Grace Bob MD, 1 puff at 12/09/19 0910    haloperidol (HALDOL) tablet 2 mg, 2 mg, Oral, Q8H PRN, Mona Escobedo MD    haloperidol lactate (HALDOL) injection 2 mg, 2 mg, Intramuscular, Q6H PRN, Laurie Kang MD    lidocaine (LIDODERM) 5 % patch 1 patch, 1 patch, Topical, Daily, Elisabeth Holly MD, 1 patch at 12/09/19 0907    LORazepam (ATIVAN) 2 mg/mL injection 0 5 mg, 0 5 mg, Intramuscular, Q8H PRN, Laurie Kang MD    LORazepam (ATIVAN) tablet 0 5 mg, 0 5 mg, Oral, Q8H PRN, Laurie Kang MD    magnesium hydroxide (MILK OF MAGNESIA) 400 mg/5 mL oral suspension 30 mL, 30 mL, Oral, Daily PRN, Laurie Kang MD, 30 mL at 12/05/19 0959    risperiDONE (RisperDAL) tablet 0 5 mg, 0 5 mg, Oral, Daily, Stewart Dorsey MD, 0 5 mg at 12/09/19 1206    risperiDONE (RisperDAL) tablet 1 mg, 1 mg, Oral, HS, Stewart Dorsey MD, 1 mg at 12/08/19 2149    senna (SENOKOT) tablet 8 6 mg, 1 tablet, Oral, HS, Elisabeth Holly MD, 8 6 mg at 12/08/19 2149    sertraline (ZOLOFT) tablet 50 mg, 50 mg, Oral, Daily, Gama Trinh MD, 50 mg at 12/09/19 8317    sulfamethoxazole-trimethoprim (BACTRIM DS) 800-160 mg per tablet 1 tablet, 1 tablet, Oral, Q12H [address], Elisabeth Holly MD, 1 tablet at 12/09/19 0914    traZODone (DESYREL) tablet 25 mg, 25 mg, Oral, HS PRN, Laurie Kang MD    Vitals:    12/09/19 1500   BP: 139/63   Pulse: 87   Resp: 16   Temp: 99 °F (37 2 °C)   SpO2: 96%       Lab/work up: h/h 9 4/30 4    Assessment:     Axis I:  · Mood disorder unspecified  · H/o depression  · Major neurocognitive d/o, 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, with behavioral disturbances  · H/o polysubstance abuse  Axis II:  · deferred  Axis III:  - anemia, COPD, HTN, constipation; h/o lumbar spinal stenosis s/p left L4-5 hemilaminectomy and bilateral foraminotomy, transforaminal lumbar interbody fusion with pedicle screw and myke fixation fusion L4-5  Axis IV:  · No social support; h/o abuse    Progress: limited     Plan:   1  Disposition: Patient meets criteria for ongoing acute inpatient treatment due to being a danger to self and others   Patient will be discharged when there is an absence of agitation [id number]  2  Legal status: voluntary  3  Psychopharmacologic interventions:  - continue risperidone 0 5mg po daily and 1mg po qhs  - discontinue depakote 125mg po bid  - Continue zoloft 50mg po daily for now - has a h/o depression  - Continue to monitor agitation  4  Medical comorbidities: Hospitalist following due to anemia  5  Other therapies: Individual/group/milieu therapy as appropriate; Discussed risks and benefits of medications at initiation of therapy; patient/family verbalized understanding and agreed with plan  6  Social issues: Case management following to arrange outpatient follow up  7  Work up: none  8   Precautions: Routine q7min checks, vitals qhift    Theodora Piper MD  12/09/19

## 2019-12-09 NOTE — NURSING NOTE
Pt is cooperative with care and is medication compliant  Pt is visible in the milieu, out for meals and groups  Pt is calm and pleasant, appropriate with staff and social with peers  Pt denies s/s, no agitation or aggression noted  Will monitor

## 2019-12-09 NOTE — NURSING NOTE
Pt, calm and pleasant, was seen in a day room watching TV  She denied pain at the time as well as other symptoms, was compliant with HS med  Pt requested and was given Tylenol PRN for moderate pain in left side of her body( rib cage or upper stomach area) with no relation to food intake at 2245  Med appeared effective- no further complains offered

## 2019-12-09 NOTE — CASE MANAGEMENT
[facility] set up for 10am on 12/10  Information given to Vinay Olsen from [place] as request via [place]  CM will continue to follow and provide services as needed

## 2019-12-09 NOTE — PLAN OF CARE
Problem: DISCHARGE PLANNING  Goal: Discharge to home or other facility with appropriate resources  Description  INTERVENTIONS:  - Identify barriers to discharge w/patient and caregiver  - Arrange for needed discharge resources and transportation as appropriate  - Identify discharge learning needs (meds, wound care, etc )  - Arrange for interpretive services to assist at discharge as needed  - Refer to Case Management Department for coordinating discharge planning if the patient needs post-hospital services based on physician/advanced practitioner order or complex needs related to functional status, cognitive ability, or social support system  Outcome: Adequate for Discharge     Marvin Esparza made aware of patient's discharge back to [employer] tomorrow at 10am

## 2019-12-10 VITALS
BODY MASS INDEX: 26.14 KG/M2 | HEART RATE: 87 BPM | DIASTOLIC BLOOD PRESSURE: 65 MMHG | HEIGHT: 61 IN | OXYGEN SATURATION: 96 % | RESPIRATION RATE: 16 BRPM | SYSTOLIC BLOOD PRESSURE: 121 MMHG | WEIGHT: 138.45 LBS | TEMPERATURE: 97.6 F

## 2019-12-10 PROCEDURE — 99238 HOSP IP/OBS DSCHRG MGMT 30/<: CPT | Performed by: PSYCHIATRY & NEUROLOGY

## 2019-12-10 RX ADMIN — SERTRALINE HYDROCHLORIDE 50 MG: 50 TABLET ORAL at 08:34

## 2019-12-10 RX ADMIN — AMLODIPINE BESYLATE 5 MG: 5 TABLET ORAL at 08:34

## 2019-12-10 RX ADMIN — LIDOCAINE 1 PATCH: 50 PATCH TOPICAL at 08:35

## 2019-12-10 RX ADMIN — FLUTICASONE FUROATE AND VILANTEROL TRIFENATATE 1 PUFF: 200; 25 POWDER RESPIRATORY (INHALATION) at 08:37

## 2019-12-10 RX ADMIN — SULFAMETHOXAZOLE AND TRIMETHOPRIM 1 TABLET: 800; 160 TABLET ORAL at 08:34

## 2019-12-10 RX ADMIN — RISPERIDONE 0.5 MG: 0.5 TABLET ORAL at 08:34

## 2019-12-10 NOTE — PROGRESS NOTES
12/10/19 0839   Team Meeting   Meeting Type Daily Rounds   Team Members Present   Team Members Present Physician;Nurse;;Occupational Therapist;Other (Discipline and Name)   Physician Team Member Barb Bauer 232 Team Member [facility] Management Team Member Ernesto Flower   OT Team Member Saintclair Pinta   Other (Discipline and Name) Angela Josue     Addendum: Physician Team Member: Andrew Flowers    Reviewed case with team  Pt cannot follow directions  D/C today 10 am back to [place]

## 2019-12-10 NOTE — NURSING NOTE
Pt calm, cooperative with care and pleasant  Med compliant  Denies all symptoms and is oriented to person and place  Visible in milieu, social with peers and out for breakfast   Ambulating safely with roller walker

## 2019-12-10 NOTE — CASE MANAGEMENT
Patient is being discharged, no SI/HI, no psychosis or A/V  Patient is in agreement with discharge  No questions verbalized  Patient provided with after care appointment, discharge instructions and prescriptions  IMM, core measures, transition of care, discharge instructions, and treatment plan complete  Patient will be transported by [facility] at [place] [address] for discharge back to [place]  SW will continue to follow up as needed

## 2019-12-10 NOTE — DISCHARGE INSTR - OTHER ORDERS
You are being discharged back to [facility], T-05  phone [phone], fax [phone]  You are being transported by [facility], [phone]  You will be seen onsite by Luther Chery MD following return to the facility within 7 days  [facility] Phone Number : [phone]  National Suicide Hotline : 1 599.833.3581    *Telephone Support Services:   When you need someone to listen, the [facility] is available from the hours of 6am- 2am, 7 days a week  No one is available from the hours of 2am-6am  A representative can be reached at [phone]  *Provider Appointments:   Walk-in appointments are available Monday-Thursday 9am-11am and Wednesday 1:00-3:00 at [facility] Room 60  The address [place], [place] and the telephone number is [phone]  Please refer to handout for what is necessary to take with you  Monthly Support for Persons with Mental Illness  The Peer Support Group is a monthly meeting for individuals facing the challenges of recovering from severe and persistent mental illness  Depression, manic depression, schizophrenia, and general anxiety disorder are only a few of the diagnoses of individuals who have found a supportive place at our meetings  Our [place]  We are a fellowship of individuals who share a common goal of recovery and the ability to maintain mental and emotional stability  We help others and ourselves through sharing our experiences, strength and hope with each other  No matter how traumatic our past or how despairing our present may be, there is hope for a new day  Sessions take place in an intimate, confidential setting to allow individuals to share openly with each other  You'll know that you are not alone  Drop inno registration is necessary  Here are the times and locations    LISA  Monthly: 1st Monday, 7:00-8:30 pm  [address] [place] [address] [place]  Monthly: 3rd Monday, 7:00-8:30 pm  [address]  [address] [facility]

## 2019-12-10 NOTE — PLAN OF CARE
Problem: Risk for Violence/Aggression Toward Others  Goal: Treatment Goal: Refrain from acts of violence/aggression during length of stay, and demonstrate improved impulse control at the time of discharge  Outcome: Adequate for Discharge  Goal: Verbalize thoughts and feelings  Description  Interventions:  - Assess and re-assess patient's level of risk, every waking shift  - Engage patient in 1:1 interactions, daily, for a minimum of 15 minutes   - Allow patient to express feelings and frustrations in a safe and non-threatening manner   - Establish rapport/trust with patient   Outcome: Adequate for Discharge  Goal: Refrain from harming others  Outcome: Adequate for Discharge  Goal: Refrain from destructive acts on the environment or property  Outcome: Adequate for Discharge  Goal: Control angry outbursts  Description  Interventions:  - Monitor patient closely, per order  - Ensure early verbal de-escalation  - Monitor prn medication needs  - Set reasonable/therapeutic limits, outline behavioral expectations, and consequences   - Provide a non-threatening milieu, utilizing the least restrictive interventions   Outcome: Adequate for Discharge  Goal: Attend and participate in unit activities, including therapeutic, recreational, and educational groups  Description  Interventions:  - Provide therapeutic and educational activities daily, encourage attendance and participation, and document same in the medical record   Outcome: Adequate for Discharge  Goal: Identify appropriate positive anger management techniques  Description  Interventions:  - Offer anger management and coping skills groups   - Staff will provide positive feedback for appropriate anger control  Outcome: Adequate for Discharge     Problem: Ineffective Coping  Goal: Participates in unit activities  Description  Interventions:  - Provide therapeutic environment   - Provide required programming   - Redirect inappropriate behaviors   Outcome: Adequate for Discharge

## 2019-12-10 NOTE — DISCHARGE INSTRUCTIONS
Risperidone (By mouth)   Risperidone (ris-PER-i-done)  Treats schizophrenia, bipolar disorder, and irritability caused by autistic disorder  Brand Name(s): RisperDAL, RisperDAL M-Tab, risperiDONE M-Tab   There may be other brand names for this medicine  When This Medicine Should Not Be Used: This medicine is not right for everyone  Do not use it if you had an allergic reaction to risperidone or paliperidone  How to Use This Medicine:   Liquid, Tablet, Dissolving Tablet  · Take your medicine as directed  Your dose may need to be changed several times to find what works best for you  · Measure the oral liquid medicine with a marked measuring spoon, oral syringe, or medicine cup  You may mix your dose with water, coffee, low-fat milk, or orange juice  Do not mix it with cola or tea  · Make sure your hands are dry before you handle the disintegrating tablet  Peel back the foil from the blister pack, then remove the tablet  Do not push the tablet through the foil  Place the tablet in your mouth  After it has melted, swallow or take a drink of water  · Missed dose: Take a dose as soon as you remember  If it is almost time for your next dose, wait until then and take a regular dose  Do not take extra medicine to make up for a missed dose  · Store the medicine in a closed container at room temperature, away from heat, moisture, and direct light  Do not freeze the oral liquid  Drugs and Foods to Avoid:   Ask your doctor or pharmacist before using any other medicine, including over-the-counter medicines, vitamins, and herbal products  · Some medicines can affect how risperidone works  Tell your doctor if you are using carbamazepine, clozapine, fluoxetine, furosemide, levodopa, paroxetine, phenobarbital, phenytoin, quinidine, rifampin, or blood pressure medicine  · Do not drink alcohol while you are using this medicine  · Tell your doctor if you use anything else that makes you sleepy   Some examples are allergy medicine, narcotic pain medicine, and alcohol  Warnings While Using This Medicine:   · Tell your doctor if you are pregnant or breastfeeding, or if you have kidney disease, liver disease, diabetes, high cholesterol, Parkinson disease, trouble swallowing, or a history of breast cancer or seizures  Tell your doctor if you have heart failure, low blood pressure, or a history of a heart attack or stroke  · This medicine may cause the following problems:  ¨ Increased risk of stroke  ¨ Neuroleptic malignant syndrome (a nerve disorder that could be life-threatening)  ¨ Tardive dyskinesia (a muscle disorder that could become permanent)  ¨ High blood sugar levels or high cholesterol levels  ¨ Increased levels of prolactin hormone  · This medicine may make you dizzy, drowsy, or have trouble with thinking or controlling body movements, which may lead to falls, fractures or other injuries  Do not drive or do anything else that could be dangerous until you know how this medicine affects you  · This medicine may change how your body regulates temperature  Avoid activities that could cause you to become very cold, hot, or dehydrated  · This medicine may make you bleed, bruise, or get infections more easily  Take precautions to prevent illness and injury  Wash your hands often  · Risperdal® M-Tab® contains aspartame (phenylalanine)  If you have phenylketonuria (PKU), talk to your doctor before using this medicine  · Your doctor will do lab tests at regular visits to check on the effects of this medicine  Keep all appointments  · Keep all medicine out of the reach of children  Never share your medicine with anyone    Possible Side Effects While Using This Medicine:   Call your doctor right away if you notice any of these side effects:  · Allergic reaction: Itching or hives, swelling in your face or hands, swelling or tingling in your mouth or throat, chest tightness, trouble breathing  · Fast, slow, pounding, or uneven heartbeat  · Fever, sweating, confusion, or muscle stiffness  · Increased hunger or thirst, change in how much or how often you urinate  · Jerky muscle movements you cannot control (often in your face, tongue, or jaw)  · Lightheadedness, dizziness, or fainting  · Numbness or weakness on one side of your body, sudden or severe headache, problems with vision, speech, or walking  · Painful, prolonged erection  · Seizures or tremors  · Swelling of the breasts, breast soreness, nipple discharge (in both women and men)  · Trouble swallowing  If you notice these less serious side effects, talk with your doctor:   · Constipation, diarrhea, nausea, or upset stomach  · Drooling  · Drowsiness or trouble sleeping  · Weight gain  If you notice other side effects that you think are caused by this medicine, tell your doctor  Call your doctor for medical advice about side effects  You may report side effects to FDA at 5-369-FDA-6724  © 2017 [employer]  Information is for End User's use only and may not be sold, redistributed or otherwise used for commercial purposes  The above information is an  only  It is not intended as medical advice for individual conditions or treatments  Talk to your doctor, nurse or pharmacist before following any medical regimen to see if it is safe and effective for you  Sertraline (By mouth)   Sertraline (SER-tra-bradley)  Treats depression, obsessive-compulsive disorder (OCD), posttraumatic stress disorder (PTSD), premenstrual dysphoric disorder (PMDD), social anxiety disorder, and panic disorder  This medicine is an SSRI  Brand Name(s): Zoloft   There may be other brand names for this medicine  When This Medicine Should Not Be Used: This medicine is not right for everyone  Do not use it if you had an allergic reaction to sertraline  How to Use This Medicine:   Liquid, Tablet  · Take your medicine as directed   Your dose may need to be changed several times to find what works best for you  You may need to take it for a few weeks or months before you feel better  · Oral liquid: Use the dropper provided to remove the medicine and mix it with 1/2 cup (4 ounces) of water, ginger ale, lemon-lime soda, lemonade, or orange juice  Drink the mixture right away  It is normal for it to look a bit hazy  · This medicine should come with a Medication Guide  Ask your pharmacist for a copy if you do not have one  · Missed dose: Take a dose as soon as you remember  If it is almost time for your next dose, wait until then and take a regular dose  Do not take extra medicine to make up for a missed dose  · Store the medicine in a closed container at room temperature, away from heat, moisture, and direct light  Drugs and Foods to Avoid:   Ask your doctor or pharmacist before using any other medicine, including over-the-counter medicines, vitamins, and herbal products  · Do not use this medicine together with pimozide  Do not use this medicine and an MAO inhibitor (MAOI) within 14 days of each other  Do not use the oral liquid form of sertraline if you are also using disulfiram   · Some medicines can affect how sertraline works  Tell your doctor if you are using the following:   ¨ Buspirone, cimetidine, cisapride, diazepam, digitoxin, fentanyl, flecainide, lithium, phenytoin, propafenone, Lucero's wort, tramadol, tryptophan supplements, or valproate  ¨ A blood thinner (such as warfarin), a diuretic (water pill), an NSAID pain or arthritis medicine (such as aspirin, diclofenac, ibuprofen), a tricyclic antidepressant, a triptan medicine for migraine headaches  · Do not drink alcohol while you are using this medicine  Warnings While Using This Medicine:   · Tell your doctor if you are pregnant or breastfeeding, or if you have liver disease, bleeding problems, glaucoma, heart disease, or a seizure disorder    · For some children, teenagers, and young adults, this medicine may increase mental or emotional problems  This may lead to thoughts of suicide and violence  Talk with your doctor right away if you have any thoughts or behavior changes that concern you  Tell your doctor if you or anyone in your family has a history of bipolar disorder or suicide attempts  · This medicine may cause the following problems:   ¨ Serotonin syndrome (when taken with certain medicines)  ¨ Low sodium levels (more common in elderly patients and those who take diuretics or become dehydrated)  · Tell your doctor if you are sensitive to latex, because the oral liquid comes with a latex rubber dropper  · This medicine may make you dizzy or drowsy  Do not drive or do anything that could be dangerous until you know how this medicine affects you  · Do not stop using this medicine suddenly  Your doctor will need to slowly decrease your dose before you stop it completely  · Your doctor will check your progress and the effects of this medicine at regular visits  Keep all appointments  · Keep all medicine out of the reach of children  Never share your medicine with anyone    Possible Side Effects While Using This Medicine:   Call your doctor right away if you notice any of these side effects:  · Allergic reaction: Itching or hives, swelling in your face or hands, swelling or tingling in your mouth or throat, chest tightness, trouble breathing  · Anxiety, restlessness, fast heartbeat, fever, sweating, muscle spasms, twitching, nausea, vomiting, diarrhea, seeing or hearing things that are not there  · Blistering, peeling, or red skin rash  · Confusion, weakness, and muscle twitching  · Eye pain, vision changes, seeing halos around lights  · Feeling more excited or energetic than usual  · Thoughts of hurting yourself or others, unusual behavior  · Unusual bleeding or bruising  If you notice these less serious side effects, talk with your doctor:   · Dry mouth  · Loss of appetite, weight loss  · Mild diarrhea, constipation, nausea, vomiting  · Sexual problems  · Sleepiness, or trouble sleeping  If you notice other side effects that you think are caused by this medicine, tell your doctor  Call your doctor for medical advice about side effects  You may report side effects to FDA at 8-146-ICA-4608  © 2017 [employer] Information is for End User's use only and may not be sold, redistributed or otherwise used for commercial purposes  The above information is an  only  It is not intended as medical advice for individual conditions or treatments  Talk to your doctor, nurse or pharmacist before following any medical regimen to see if it is safe and effective for you  Metabolic Syndrome X   WHAT YOU NEED TO KNOW:   Metabolic syndrome is a group of medical conditions that can lead to heart disease, stroke, or type 2 diabetes  The medical conditions include high triglycerides, low HDL cholesterol, high blood pressure, high blood sugar levels, and extra abdominal fat  DISCHARGE INSTRUCTIONS:   Medicines:   · Blood pressure medicine: This is given to lower your blood pressure  A controlled blood pressure helps protect your organs, such as your heart, lungs, brain, and kidneys  Take your blood pressure medicine exactly as directed  · Cholesterol medicine: This type of medicine is given to help decrease (lower) the amount of cholesterol (fat) in your blood  Cholesterol medicine works best if you also exercise and eat a healthy diet that is low in certain kinds of fats  Some cholesterol medicines may cause liver problems  You may need to have blood taken for tests while using this medicine  · Take your medicine as directed  Contact your healthcare provider if you think your medicine is not helping or if you have side effects  Tell him or her if you are allergic to any medicine  Keep a list of the medicines, vitamins, and herbs you take   Include the amounts, and when and why you take them  Bring the list or the pill bottles to follow-up visits  Carry your medicine list with you in case of an emergency  · Hypoglycemic medicine: This medicine may be given to decrease the amount of sugar in your blood  Hypoglycemic medicine helps your body move the sugar to your cells, where it is needed for energy  Follow up with your healthcare provider as directed:  Write down your questions so you remember to ask them during your visits  Manage metabolic syndrome:   · Maintain a healthy weight:  Weight loss helps lower cholesterol, triglycerides, blood pressure, and blood glucose levels  It can also raise HDL (good cholesterol)  Ask your healthcare provider how much you should weigh  Ask him to help you create a weight loss plan if you are overweight  · Eat a variety of healthy foods:  Healthy foods include fruits, vegetables, whole-grain breads, low-fat dairy products, beans, lean meats, and fish  Eat foods that are low in fat and sodium (salt)  A dietitian can help you plan healthy meals  · Exercise:  Ask your healthcare provider to help you create an exercise plan  Exercise can help lower your blood pressure, cholesterol, and blood sugar levels  Exercise can also help raise your HDL level and help you to lose weight  Get at least 30 minutes of exercise, 5 days each week  · Check your blood pressure as directed: You may be asked to keep a record of your blood pressure and bring it with you to follow-up visits  Ask your healthcare provider what your blood pressure should be and how to check it  · Limit alcohol:  Women should limit alcohol to 1 drink a day  Men should limit alcohol to 2 drinks a day  A drink of alcohol is 12 ounces of beer, 5 ounces of wine, or 1½ ounces of liquor  · Do not smoke: If you smoke, it is never too late to quit  Smoking further increases your risk for heart disease and stroke  Ask your healthcare provider for information if you need help quitting    Contact your healthcare provider if:   · You have more thirst or hunger than usual      · You urinate more frequently  · You have blurred vision  · You have questions or concerns about your condition or care  Seek care immediately or call 911 if:   · Your blood pressure is higher than healthcare providers told it should be  · You have chest pain or discomfort that spreads to your arms, jaw, or back  · You have a severe headache or dizziness  · You have trouble thinking, speaking, or understanding others  © 2017 [facility] [facility] Information is for End User's use only and may not be sold, redistributed or otherwise used for commercial purposes  All illustrations and images included in CareNotes® are the copyrighted property of [employer]  or [employer]  The above information is an  only  It is not intended as medical advice for individual conditions or treatments  Talk to your doctor, nurse or pharmacist before following any medical regimen to see if it is safe and effective for you

## 2019-12-10 NOTE — PROGRESS NOTES
Upbeat and positive mood, socializing with peers, engaged in conversation  Reports flank pain, accepted tylenol, effective  No symptoms reported throughout shift

## 2019-12-10 NOTE — NURSING NOTE
Report called to Jamey at [facility] and AVS reviewed  Phone number left for further questions  Belongings returned  Pt left via stretcher with EMS at this time, in NAD

## 2019-12-10 NOTE — PLAN OF CARE
Problem: DISCHARGE PLANNING  Goal: Discharge to home or other facility with appropriate resources  Description  INTERVENTIONS:  - Identify barriers to discharge w/patient and caregiver  - Arrange for needed discharge resources and transportation as appropriate  - Identify discharge learning needs (meds, wound care, etc )  - Arrange for interpretive services to assist at discharge as needed  - Refer to Case Management Department for coordinating discharge planning if the patient needs post-hospital services based on physician/advanced practitioner order or complex needs related to functional status, cognitive ability, or social support system  Outcome: Completed     Facility and guardian in agreement with discharge back to [facility]

## 2020-03-13 ENCOUNTER — HOSPITAL ENCOUNTER (EMERGENCY)
Facility: HOSPITAL | Age: 75
Discharge: HOME | End: 2020-03-13
Attending: EMERGENCY MEDICINE
Payer: COMMERCIAL

## 2020-03-13 VITALS
WEIGHT: 140.21 LBS | HEIGHT: 61 IN | BODY MASS INDEX: 26.47 KG/M2 | RESPIRATION RATE: 18 BRPM | OXYGEN SATURATION: 98 % | SYSTOLIC BLOOD PRESSURE: 135 MMHG | TEMPERATURE: 98.4 F | HEART RATE: 70 BPM | DIASTOLIC BLOOD PRESSURE: 70 MMHG

## 2020-03-13 DIAGNOSIS — R10.9 LEFT FLANK PAIN: Primary | ICD-10-CM

## 2020-03-13 LAB
ALBUMIN SERPL BCP-MCNC: 3.7 G/DL (ref 3.5–5)
ALP SERPL-CCNC: 50 U/L (ref 46–116)
ALT SERPL W P-5'-P-CCNC: 32 U/L (ref 12–78)
ANION GAP SERPL CALCULATED.3IONS-SCNC: 8 MMOL/L (ref 4–13)
AST SERPL W P-5'-P-CCNC: 15 U/L (ref 5–45)
BASOPHILS # BLD AUTO: 0.05 THOUSANDS/ΜL (ref 0–0.1)
BASOPHILS NFR BLD AUTO: 1 % (ref 0–1)
BILIRUB SERPL-MCNC: 0.21 MG/DL (ref 0.2–1)
BILIRUB UR QL STRIP: NEGATIVE
BUN SERPL-MCNC: 15 MG/DL (ref 5–25)
CALCIUM SERPL-MCNC: 8.9 MG/DL (ref 8.3–10.1)
CHLORIDE SERPL-SCNC: 105 MMOL/L (ref 100–108)
CLARITY UR: CLEAR
CO2 SERPL-SCNC: 27 MMOL/L (ref 21–32)
COLOR UR: YELLOW
CREAT SERPL-MCNC: 0.86 MG/DL (ref 0.6–1.3)
EOSINOPHIL # BLD AUTO: 0.05 THOUSAND/ΜL (ref 0–0.61)
EOSINOPHIL NFR BLD AUTO: 1 % (ref 0–6)
ERYTHROCYTE [DISTWIDTH] IN BLOOD BY AUTOMATED COUNT: 18.6 % (ref 11.6–15.1)
GFR SERPL CREATININE-BSD FRML MDRD: 68 ML/MIN/1.73SQ M
GLUCOSE SERPL-MCNC: 105 MG/DL (ref 65–140)
GLUCOSE UR STRIP-MCNC: NEGATIVE MG/DL
HCT VFR BLD AUTO: 33.1 % (ref 34.8–46.1)
HGB BLD-MCNC: 9.6 G/DL (ref 11.5–15.4)
HGB UR QL STRIP.AUTO: NEGATIVE
IMM GRANULOCYTES # BLD AUTO: 0.01 THOUSAND/UL (ref 0–0.2)
IMM GRANULOCYTES NFR BLD AUTO: 0 % (ref 0–2)
KETONES UR STRIP-MCNC: NEGATIVE MG/DL
LEUKOCYTE ESTERASE UR QL STRIP: NEGATIVE
LIPASE SERPL-CCNC: 200 U/L (ref 73–393)
LYMPHOCYTES # BLD AUTO: 1.44 THOUSANDS/ΜL (ref 0.6–4.47)
LYMPHOCYTES NFR BLD AUTO: 30 % (ref 14–44)
MCH RBC QN AUTO: 22.7 PG (ref 26.8–34.3)
MCHC RBC AUTO-ENTMCNC: 29 G/DL (ref 31.4–37.4)
MCV RBC AUTO: 78 FL (ref 82–98)
MONOCYTES # BLD AUTO: 0.53 THOUSAND/ΜL (ref 0.17–1.22)
MONOCYTES NFR BLD AUTO: 11 % (ref 4–12)
NEUTROPHILS # BLD AUTO: 2.73 THOUSANDS/ΜL (ref 1.85–7.62)
NEUTS SEG NFR BLD AUTO: 57 % (ref 43–75)
NITRITE UR QL STRIP: NEGATIVE
NRBC BLD AUTO-RTO: 0 /100 WBCS
PH UR STRIP.AUTO: 6 [PH]
PLATELET # BLD AUTO: 332 THOUSANDS/UL (ref 149–390)
PMV BLD AUTO: 9.4 FL (ref 8.9–12.7)
POTASSIUM SERPL-SCNC: 3.8 MMOL/L (ref 3.5–5.3)
PROT SERPL-MCNC: 7 G/DL (ref 6.4–8.2)
PROT UR STRIP-MCNC: NEGATIVE MG/DL
RBC # BLD AUTO: 4.22 MILLION/UL (ref 3.81–5.12)
SODIUM SERPL-SCNC: 140 MMOL/L (ref 136–145)
SP GR UR STRIP.AUTO: <=1.005 (ref 1–1.03)
UROBILINOGEN UR QL STRIP.AUTO: 0.2 E.U./DL
WBC # BLD AUTO: 4.81 THOUSAND/UL (ref 4.31–10.16)

## 2020-03-13 PROCEDURE — 99283 EMERGENCY DEPT VISIT LOW MDM: CPT | Performed by: EMERGENCY MEDICINE

## 2020-03-13 PROCEDURE — 99284 EMERGENCY DEPT VISIT MOD MDM: CPT

## 2020-03-13 PROCEDURE — 85025 COMPLETE CBC W/AUTO DIFF WBC: CPT | Performed by: EMERGENCY MEDICINE

## 2020-03-13 PROCEDURE — 80053 COMPREHEN METABOLIC PANEL: CPT | Performed by: EMERGENCY MEDICINE

## 2020-03-13 PROCEDURE — 81003 URINALYSIS AUTO W/O SCOPE: CPT | Performed by: EMERGENCY MEDICINE

## 2020-03-13 PROCEDURE — 36415 COLL VENOUS BLD VENIPUNCTURE: CPT | Performed by: EMERGENCY MEDICINE

## 2020-03-13 PROCEDURE — 96360 HYDRATION IV INFUSION INIT: CPT

## 2020-03-13 PROCEDURE — 83690 ASSAY OF LIPASE: CPT | Performed by: EMERGENCY MEDICINE

## 2020-03-13 RX ORDER — ACETAMINOPHEN 325 MG/1
975 TABLET ORAL ONCE
Status: COMPLETED | OUTPATIENT
Start: 2020-03-13 | End: 2020-03-13

## 2020-03-13 RX ADMIN — ACETAMINOPHEN 975 MG: 325 TABLET, FILM COATED ORAL at 18:10

## 2020-03-13 RX ADMIN — SODIUM CHLORIDE 1000 ML: 0.9 INJECTION, SOLUTION INTRAVENOUS at 18:26

## 2020-03-13 NOTE — ED NOTES
JOHNSON contacted at this time to arrange transportation back to [facility]        Micheline Morales RN  03/13/20 1954

## 2020-03-13 NOTE — ED PROVIDER NOTES
History  Chief Complaint   Patient presents with    Abdominal Pain     pt is from [facility] complains of L side pain      Patient is a 68-year-old female with a history of hypertension, , depression, and ADHD who presents with abdominal pain  Patient states that she was laying on the ground and watching TV when she developed acute onset of left flank pain  She states that it radiated into her left abdomen  She states that the pain has somewhat improved but is still present  She denies associated nausea, vomiting, diarrhea, fever, chills or other complaints  She denies a history of similar symptoms  She has no other complaints at this time  History provided by:  Patient  Flank Pain   Pain location:  L flank  Pain radiation: L abdomen  Pain severity:  Moderate  Timing:  Constant  Progression:  Improving  Chronicity:  New  Ineffective treatments:  None tried  Associated symptoms: no chest pain, no chills, no constipation, no cough, no diarrhea, no dysuria, no fever, no hematuria, no nausea, no shortness of breath, no sore throat, no vaginal bleeding, no vaginal discharge and no vomiting        Prior to Admission Medications   Prescriptions Last Dose Informant Patient Reported? Taking?    ADVAIR DISKUS 250-50 MCG/DOSE inhaler  Outside Facility (Specify) Yes No   Sig: Inhale 1 puff 2 (two) times a day    PROAIR  (90 Base) MCG/ACT inhaler  Outside Facility (Specify) Yes No   Sig: Inhale 2 puffs every 4 (four) hours as needed for wheezing or shortness of breath    cholecalciferol (VITAMIN D3) 1,000 units tablet   Yes No   Sig: Take 1,000 Units by mouth daily   lidocaine (LIDODERM) 5 %   No No   Sig: Apply 1 patch topically daily Remove & Discard patch within 12 hours or as directed by MD   magnesium hydroxide (MILK OF MAGNESIA) 400 mg/5 mL oral suspension  Outside Facility (Specify) Yes No   Sig: Take by mouth daily as needed for constipation   risperiDONE (RisperDAL) 0 5 mg tablet   No No   Sig: Take 1 tablet (0 5 mg total) by mouth every morning   risperiDONE (RisperDAL) 1 mg tablet   No No   Sig: Take 1 tablet (1 mg total) by mouth daily at bedtime   senna (SENOKOT) 8 6 MG tablet   Yes No   Sig: Take 2 tablets by mouth daily at bedtime   sertraline (ZOLOFT) 50 mg tablet   No No   Sig: Take 1 tablet (50 mg total) by mouth daily      Facility-Administered Medications: None       Past Medical History:   Diagnosis Date    ADHD     Asthma     controlled    Bilateral sacroiliitis (HCC)     Cancer ([facility] [address] )     breast    Depression     History of fall     History of prediabetes     Hypertension     Iron deficiency anemia     Kidney stone     Myofascial pain     Osteoporosis     S/P lumbar spinal fusion        Past Surgical History:   Procedure Laterality Date    CERVICAL SPINE SURGERY  2013    Multilevel posterior cervical decompression / fusion    HYSTERECTOMY      MASTECTOMY Right     MT ARTHDSIS POST/POSTEROLATRL/POSTINTERBODY LUMBAR N/A 7/17/2019    Procedure: Left L4-5 hemilaminectomy and bilateral foraminotomy and Minimally invasive transforaminal lumbar interbody fusion with pedicle screw and myke fixation fusion L4-5, right-sided approach; minimally invasive right L5-S1 foraminotomy and diskectomy;  Surgeon: Mark Champion MD;  Location: [facility];  Service: Neurosurgery    TONSILLECTOMY         Family History   Problem Relation Age of Onset    Diabetes Mother     No Known Problems Father      I have reviewed and agree with the history as documented  E-Cigarette/Vaping     E-Cigarette/Vaping Substances     Social History     Tobacco Use    Smoking status: Former Smoker     Packs/day: 0 50     Types: Cigarettes    Smokeless tobacco: Never Used   Substance Use Topics    Alcohol use: Not Currently    Drug use: No       Review of Systems   Constitutional: Negative for chills, diaphoresis and fever  HENT: Negative for nosebleeds, sore throat and trouble swallowing      Eyes: Negative for photophobia, pain and visual disturbance  Respiratory: Negative for cough, chest tightness and shortness of breath  Cardiovascular: Negative for chest pain, palpitations and leg swelling  Gastrointestinal: Negative for abdominal pain, constipation, diarrhea, nausea and vomiting  Endocrine: Negative for polydipsia and polyuria  Genitourinary: Positive for flank pain  Negative for difficulty urinating, dysuria, hematuria, pelvic pain, vaginal bleeding and vaginal discharge  Musculoskeletal: Negative for back pain, neck pain and neck stiffness  Skin: Negative for pallor and rash  Neurological: Negative for dizziness, seizures, light-headedness and headaches  All other systems reviewed and are negative  Physical Exam  Physical Exam   Constitutional: She is oriented to person, place, and time  She appears well-developed and well-nourished  No distress  HENT:   Head: Normocephalic and atraumatic  Mouth/Throat: Oropharynx is clear and moist and mucous membranes are normal    Eyes: Pupils are equal, round, and reactive to light  EOM are normal    Neck: Normal range of motion  Neck supple  Cardiovascular: Normal rate, regular rhythm, normal heart sounds, intact distal pulses and normal pulses  Pulmonary/Chest: Effort normal and breath sounds normal  No respiratory distress  Abdominal: Soft  She exhibits no distension  There is tenderness (Mild tenderness to palpation in the left flank  )  There is no rigidity, no rebound, no guarding and no CVA tenderness  Musculoskeletal: Normal range of motion  She exhibits no edema or tenderness  Lymphadenopathy:     She has no cervical adenopathy  Neurological: She is alert and oriented to person, place, and time  She has normal strength  No cranial nerve deficit or sensory deficit  Skin: Skin is warm and dry  Capillary refill takes less than 2 seconds  Psychiatric: She has a normal mood and affect  Nursing note and vitals reviewed        Vital Signs  ED Triage Vitals   Temperature Pulse Respirations Blood Pressure SpO2   03/13/20 1707 03/13/20 1707 03/13/20 1707 03/13/20 1707 03/13/20 1707   98 9 °F (37 2 °C) 67 16 133/74 96 %      Temp Source Heart Rate Source Patient Position - Orthostatic VS BP Location FiO2 (%)   03/13/20 1707 03/13/20 1707 03/13/20 1707 03/13/20 1707 --   Oral Monitor Lying Right arm       Pain Score       03/13/20 1953       2           Vitals:    03/13/20 1707 03/13/20 1818 03/13/20 2020   BP: 133/74 131/73 135/70   Pulse: 67 68 70   Patient Position - Orthostatic VS: Lying  Sitting         Visual Acuity      ED Medications  Medications   sodium chloride 0 9 % bolus 1,000 mL (0 mL Intravenous Stopped 3/13/20 1952)   acetaminophen (TYLENOL) tablet 975 mg (975 mg Oral Given 3/13/20 1810)       Diagnostic Studies  Results Reviewed     Procedure Component Value Units Date/Time    UA (URINE) with reflex to Scope [[id number]] Collected:  03/13/20 1908    Lab Status:  Final result Specimen:  Urine, Clean Catch Updated:  03/13/20 1916     Color, UA Yellow     Clarity, UA Clear     Specific Gravity, UA <=1 005     pH, UA 6 0     Leukocytes, UA Negative     Nitrite, UA Negative     Protein, UA Negative mg/dl      Glucose, UA Negative mg/dl      Ketones, UA Negative mg/dl      Urobilinogen, UA 0 2 E U /dl      Bilirubin, UA Negative     Blood, UA Negative    Comprehensive metabolic panel [[id number]] Collected:  03/13/20 1817    Lab Status:  Final result Specimen:  Blood from Arm, Left Updated:  03/13/20 1847     Sodium 140 mmol/L      Potassium 3 8 mmol/L      Chloride 105 mmol/L      CO2 27 mmol/L      ANION GAP 8 mmol/L      BUN 15 mg/dL      Creatinine 0 86 mg/dL      Glucose 105 mg/dL      Calcium 8 9 mg/dL      AST 15 U/L      ALT 32 U/L      Alkaline Phosphatase 50 U/L      Total Protein 7 0 g/dL      Albumin 3 7 g/dL      Total Bilirubin 0 21 mg/dL      eGFR 68 ml/min/1 73sq m     Narrative:       [place] guidelines for Chronic Kidney Disease (CKD):     Stage 1 with normal or high GFR (GFR > 90 mL/min/1 73 square meters)    Stage 2 Mild CKD (GFR = 60-89 mL/min/1 73 square meters)    Stage 3A Moderate CKD (GFR = 45-59 mL/min/1 73 square meters)    Stage 3B Moderate CKD (GFR = 30-44 mL/min/1 73 square meters)    Stage 4 Severe CKD (GFR = 15-29 mL/min/1 73 square meters)    Stage 5 End Stage CKD (GFR <15 mL/min/1 73 square meters)  Note: GFR calculation is accurate only with a steady state creatinine    Lipase [[id number]]  (Normal) Collected:  03/13/20 1817    Lab Status:  Final result Specimen:  Blood from Arm, Left Updated:  03/13/20 1846     Lipase 200 u/L     CBC and differential [[id number]]  (Abnormal) Collected:  03/13/20 1817    Lab Status:  Final result Specimen:  Blood from Arm, Left Updated:  03/13/20 1831     WBC 4 81 Thousand/uL      RBC 4 22 Million/uL      Hemoglobin 9 6 g/dL      Hematocrit 33 1 %      MCV 78 fL      MCH 22 7 pg      MCHC 29 0 g/dL      RDW 18 6 %      MPV 9 4 fL      Platelets 121 Thousands/uL      nRBC 0 /100 WBCs      Neutrophils Relative 57 %      Immat GRANS % 0 %      Lymphocytes Relative 30 %      Monocytes Relative 11 %      Eosinophils Relative 1 %      Basophils Relative 1 %      Neutrophils Absolute 2 73 Thousands/µL      Immature Grans Absolute 0 01 Thousand/uL      Lymphocytes Absolute 1 44 Thousands/µL      Monocytes Absolute 0 53 Thousand/µL      Eosinophils Absolute 0 05 Thousand/µL      Basophils Absolute 0 05 Thousands/µL                  No orders to display              Procedures  Procedures         ED Course                                 MDM  Number of Diagnoses or Management Options  Left flank pain: new and requires workup  Diagnosis management comments: Patient presents with left flank pain radiating into the left abdomen  Patient does not appear to be in distress and  admits that the patient is significantly improved  Labs unremarkable    Urinalysis negative for infection or hematuria  Do not suspect acute surgical process including but not limited to acute cholecystitis, acute appendicitis, SBO, mesenteric ischemia, AAA, vascular dissection, vascular occlusion, perforated viscus, ectopic pregnancy  Do not suspect intrathoracic cause of abdominal pain  Symptoms improved and patient is tolerating po  Patient advised to return to ED if symptoms worsen or persist  Patient also advised to follow up with PCP  Amount and/or Complexity of Data Reviewed  Clinical lab tests: ordered and reviewed  Tests in the medicine section of CPT®: ordered and reviewed  Review and summarize past medical records: yes  Independent visualization of images, tracings, or specimens: yes    Risk of Complications, Morbidity, and/or Mortality  Presenting problems: moderate  Diagnostic procedures: moderate  Management options: moderate    Patient Progress  Patient progress: stable        Disposition  Final diagnoses:   Left flank pain     Time reflects when diagnosis was documented in both MDM as applicable and the Disposition within this note     Time User Action Codes Description Comment    3/13/2020  7:38 PM Nicki Mane Add [R10 9] Left flank pain       ED Disposition     ED Disposition Condition Date/Time Comment    Discharge Stable Fri Mar 13, 2020  7:38 PM Dee Ornelas discharge to home/self care  Follow-up Information     Follow up With Specialties Details Why Contact Info    Eric Aguilar MD Internal Medicine Schedule an appointment as soon as possible for a visit  Return to ED sooner if symptoms worsen or persist  [facility] [id number]  [address] [facility]  [phone]            Patient's Medications   Discharge Prescriptions    No medications on file     No discharge procedures on file      PDMP Review     None          ED Provider  Electronically Signed by           Hubert Osorio DO  03/13/20 2278

## 2020-10-06 ENCOUNTER — TRANSCRIBE ORDERS (OUTPATIENT)
Dept: ADMINISTRATIVE | Facility: HOSPITAL | Age: 75
End: 2020-10-06

## 2020-10-06 DIAGNOSIS — M48.062 SPINAL STENOSIS, LUMBAR REGION, WITH NEUROGENIC CLAUDICATION: Primary | ICD-10-CM

## 2020-10-28 ENCOUNTER — HOSPITAL ENCOUNTER (OUTPATIENT)
Dept: MRI IMAGING | Facility: HOSPITAL | Age: 75
Discharge: HOME | End: 2020-10-28
Payer: COMMERCIAL

## 2020-10-28 DIAGNOSIS — M48.062 SPINAL STENOSIS, LUMBAR REGION, WITH NEUROGENIC CLAUDICATION: ICD-10-CM

## 2020-10-28 PROCEDURE — G1004 CDSM NDSC: HCPCS

## 2020-10-28 PROCEDURE — 72148 MRI LUMBAR SPINE W/O DYE: CPT

## 2020-11-02 ENCOUNTER — OFFICE VISIT (OUTPATIENT)
Dept: NEUROSURGERY | Facility: CLINIC | Age: 75
End: 2020-11-02
Payer: COMMERCIAL

## 2020-11-02 VITALS
RESPIRATION RATE: 16 BRPM | HEART RATE: 82 BPM | HEIGHT: 61 IN | DIASTOLIC BLOOD PRESSURE: 62 MMHG | TEMPERATURE: 97.9 F | BODY MASS INDEX: 27.23 KG/M2 | WEIGHT: 144.2 LBS | SYSTOLIC BLOOD PRESSURE: 103 MMHG

## 2020-11-02 DIAGNOSIS — M54.50 MIDLINE LOW BACK PAIN WITHOUT SCIATICA, UNSPECIFIED CHRONICITY: Primary | ICD-10-CM

## 2020-11-02 PROCEDURE — 99213 OFFICE O/P EST LOW 20 MIN: CPT | Performed by: PHYSICIAN ASSISTANT

## 2020-11-02 RX ORDER — ACETAMINOPHEN 325 MG/1
650 TABLET ORAL EVERY 4 HOURS PRN
COMMUNITY

## 2020-11-18 ENCOUNTER — TELEPHONE (OUTPATIENT)
Dept: NEUROSURGERY | Facility: CLINIC | Age: 75
End: 2020-11-18

## 2020-11-30 ENCOUNTER — TELEPHONE (OUTPATIENT)
Dept: NEUROSURGERY | Facility: CLINIC | Age: 75
End: 2020-11-30

## 2020-12-09 ENCOUNTER — TELEPHONE (OUTPATIENT)
Dept: NEUROSURGERY | Facility: CLINIC | Age: 75
End: 2020-12-09

## 2020-12-11 ENCOUNTER — HOSPITAL ENCOUNTER (OUTPATIENT)
Dept: RADIOLOGY | Facility: HOSPITAL | Age: 75
Discharge: HOME | End: 2020-12-11
Payer: COMMERCIAL

## 2020-12-11 DIAGNOSIS — M54.50 MIDLINE LOW BACK PAIN WITHOUT SCIATICA, UNSPECIFIED CHRONICITY: ICD-10-CM

## 2020-12-11 PROCEDURE — 72120 X-RAY BEND ONLY L-S SPINE: CPT

## 2020-12-22 ENCOUNTER — TELEMEDICINE (OUTPATIENT)
Dept: NEUROSURGERY | Facility: CLINIC | Age: 75
End: 2020-12-22
Payer: COMMERCIAL

## 2020-12-22 DIAGNOSIS — M54.50 CHRONIC BILATERAL LOW BACK PAIN WITHOUT SCIATICA: Primary | ICD-10-CM

## 2020-12-22 DIAGNOSIS — Z98.890 HISTORY OF LUMBAR SURGERY: ICD-10-CM

## 2020-12-22 DIAGNOSIS — G89.29 CHRONIC BILATERAL LOW BACK PAIN WITHOUT SCIATICA: Primary | ICD-10-CM

## 2020-12-22 PROBLEM — M54.9 CHRONIC BILATERAL BACK PAIN: Status: ACTIVE | Noted: 2020-12-22

## 2020-12-22 PROCEDURE — 99212 OFFICE O/P EST SF 10 MIN: CPT | Performed by: PHYSICIAN ASSISTANT

## 2022-05-20 NOTE — TELEPHONE ENCOUNTER
JUDE Guevara called to say they received an referral for home health on patient  She spoke with the patient and she did not want to start home health right away  She wanted to wait until after she saw Dr Allison Ward and received injections  Home health just wanted it noted that patient was contacted  They also stated that they will need to have a new order written in order to see patient again  I explained that Dr Allison Ward did not order the home health and she understood but she wanted to let you know what happened if you saw it in her chart 
See below FYI   Pt will be seeing FQ 3/1 for consult
None

## 2023-01-05 ENCOUNTER — NURSING HOME VISIT (OUTPATIENT)
Dept: GERIATRICS | Facility: OTHER | Age: 78
End: 2023-01-05

## 2023-01-05 DIAGNOSIS — F41.9 ANXIETY DISORDER, UNSPECIFIED TYPE: ICD-10-CM

## 2023-01-05 DIAGNOSIS — F02.80 MAJOR NEUROCOGNITIVE DISORDER DUE TO ALZHEIMER'S DISEASE (HCC): ICD-10-CM

## 2023-01-05 DIAGNOSIS — G30.9 MAJOR NEUROCOGNITIVE DISORDER DUE TO ALZHEIMER'S DISEASE (HCC): ICD-10-CM

## 2023-01-05 DIAGNOSIS — F39 MOOD DISORDER (HCC): Primary | ICD-10-CM

## 2023-02-02 ENCOUNTER — NURSING HOME VISIT (OUTPATIENT)
Dept: GERIATRICS | Facility: OTHER | Age: 78
End: 2023-02-02

## 2023-02-02 DIAGNOSIS — F39 MOOD DISORDER (HCC): Primary | ICD-10-CM

## 2023-02-02 DIAGNOSIS — F02.80 MAJOR NEUROCOGNITIVE DISORDER DUE TO ALZHEIMER'S DISEASE (HCC): ICD-10-CM

## 2023-02-02 DIAGNOSIS — G30.9 MAJOR NEUROCOGNITIVE DISORDER DUE TO ALZHEIMER'S DISEASE (HCC): ICD-10-CM

## 2023-02-02 DIAGNOSIS — F41.9 ANXIETY DISORDER, UNSPECIFIED TYPE: ICD-10-CM

## 2023-02-28 PROBLEM — G30.9 MAJOR NEUROCOGNITIVE DISORDER DUE TO ALZHEIMER'S DISEASE (HCC): Status: ACTIVE | Noted: 2019-01-30

## 2023-02-28 PROBLEM — F02.80 MAJOR NEUROCOGNITIVE DISORDER DUE TO ALZHEIMER'S DISEASE (HCC): Status: ACTIVE | Noted: 2019-01-30

## 2023-02-28 NOTE — PROGRESS NOTES
MEDICATION MANAGEMENT NOTE        [facility]  POS: 32: NF- [facility] Term, [address] [place]      Name and Date of Birth:  Radha Ornelas 78 y o  1945 MRN: [id number]    Date of Visit: January 5, 2023    No Known Allergies  SUBJECTIVE:    Rasheed Rodriguez is seen today for a follow up for depression, mood disorder, anxiety, psychosis and dementia  She continues to experience significant symptoms since the last visit  Asked to see patient today due to some increase in agitation and recently aggressive toward another resident due to increase in paranoia/delusions  She is currently on Risperdal 0 5 mg BID in addition to Zoloft 75 mg  Will recommend increasing Risperdal to 1 mg at HS  She is presenting today as pleasantly confused and does not seem to recall incident due to advancing dementia  Ongoing anxiety due to her dementia, and intermittently picking at her skin  She denies any side effects from medications  PLAN:    All medications and routine orders were reviewed and updated as needed  Increase Risperdal 0 5 mg in AM and 1 mg HS  Medication management every 1 month  Plan discussed with: Nurse    Diagnoses and all orders for this visit:    [facility] disorder ([facility] )    Major neurocognitive disorder due to Alzheimer's disease ([facility] )    Anxiety disorder, unspecified type        Current Medications  Medications reviewed and updated in facility chart  Psychotherapy Provided:     Individual psychotherapy provided: Supportive counseling provided  Reassurance and supportive therapy provided  HPI ROS Appetite Changes and Sleep:     She reports fluctuating sleep pattern, fluctuating appetite, fluctuating energy levels   Denies homicidal ideation, denies suicidal ideation    Review Of Systems:   all other systems are negative         Mental Status Evaluation:    Appearance:  age appropriate   Behavior:  cooperative   Speech:  word finding difficulty   Mood:  dysphoric Affect:  blunted   Thought Process:  disorganized   Associations: loose associations   Thought Content:  some paranoia   Perceptual Disturbances: does not appear responding to internal stimuli   Risk Potential: Suicidal ideation - None  Homicidal ideation - None at present  Potential for aggression - No   Sensorium:  oriented to person   Memory:  recent memory impaired   Consciousness:  alert and awake   Attention: decreased concentration and decreased attention span   Insight:  poor   Judgment: poor   Gait/Station: normal gait/station, normal balance   Motor Activity: no abnormal movements     Past Psychiatric History Update:     Inpatient Psychiatric Admission Since Last Encounter:   no  Suicide Attempt Or Self Mutilation Since Last Encounter:   no  Incidence of Violent Behavior Since Last Encounter:   no    Traumatic History Update:     New Onset of Abuse Since Last Encounter:   no  Traumatic Events Since Last Encounter:   no    Social History:    Changes since last encounter:  no    History Review: The following portions of the patient's history were reviewed and updated as appropriate: allergies, current medications, past family history, past medical history, past social history, past surgical history and problem list     OBJECTIVE:     Vital signs in last 24 hours: Vital signs and nursing notes reviewed in facility chart  Laboratory Results: Lab results reviewed in facility chart  Medications Risks/Benefits:      Risks, Benefits And Possible Side Effects Of Medications:    Discussed risks and benefits of treatment with patient including :N/A and unable to comprehend     Controlled Medication Discussion:     Not applicable - controlled prescriptions are not prescribed by this practice    Evaluation of Psychotropic Drugs for possible gradual dose reductions    Psychotropic medications have been reviewed    Patient continues with symptoms of mood disorder with psychosis, depression, anxiety as noted above  Any/or further dose reductions at this time would be clinically contraindicated, as it would be likely to cause worsening of symptoms          MONTY Santiago

## 2023-02-28 NOTE — PROGRESS NOTES
MEDICATION MANAGEMENT NOTE        [address]  POS: 32: [address]- [address] Term, [address] [place]      Name and Date of Birth:  Jose Ornelas 78 y o  1945 MRN: [id number]    Date of Visit: February 2, 2023    No Known Allergies  SUBJECTIVE:    Fadumo Parada is seen today for a follow up for mood disorder and psychosis  She continues to experience on and off symptoms since the last visit  At last visit, her Risperdal was increased to 0 5 mg in AM and 1 mg HS, due to increase in paranoia and physical aggression  She is tolerating this medication adjustment with some improvement noted, in that there has been no reports of physical aggression recently  She continues with depressive symptoms at times and anxiety, and some intermittent paranoia  She denies any side effects from current psychiatric medications  PLAN:    All medications and routine orders were reviewed and updated as needed  Continue medications the same for now  Medication management every 1 month  Plan discussed with: Nurse    Diagnoses and all orders for this visit:    Major neurocognitive disorder due to Alzheimer's disease ([facility] )    Mood disorder ([facility] )    Anxiety disorder, unspecified type        Current Medications  Medications reviewed and updated in facility chart  Psychotherapy Provided:     Individual psychotherapy provided: Supportive counseling provided  Reassurance and supportive therapy provided  HPI ROS Appetite Changes and Sleep:     She reports fluctuating sleep pattern, fluctuating appetite, fluctuating energy levels   Denies homicidal ideation, denies suicidal ideation    Review Of Systems:   all other systems are negative         Mental Status Evaluation:    Appearance:  age appropriate   Behavior:  cooperative   Speech:  normal rate, normal volume   Mood:  depressed   Affect:  flat   Thought Process:  goal directed   Associations: intact associations   Thought Content:  paranoid ideation   Perceptual Disturbances: none, does not appear responding to internal stimuli   Risk Potential: Suicidal ideation - None  Homicidal ideation - None  Potential for aggression - No   Sensorium:  oriented to person   Memory:  recent memory impaired   Consciousness:  alert and awake   Attention: poor concentration and poor attention span   Insight:  poor   Judgment: poor   Gait/Station: uses walker   Motor Activity: no abnormal movements     Past Psychiatric History Update:     Inpatient Psychiatric Admission Since Last Encounter:   no  Suicide Attempt Or Self Mutilation Since Last Encounter:   no  Incidence of Violent Behavior Since Last Encounter:   no    Traumatic History Update:     New Onset of Abuse Since Last Encounter:   no  Traumatic Events Since Last Encounter:   no    Social History:    Changes since last encounter:  no    History Review: The following portions of the patient's history were reviewed and updated as appropriate: allergies, current medications, past family history, past medical history, past social history, past surgical history and problem list     OBJECTIVE:     Vital signs in last 24 hours: Vital signs and nursing notes reviewed in facility chart  Laboratory Results: Lab results reviewed in facility chart  Medications Risks/Benefits:      Risks, Benefits And Possible Side Effects Of Medications:    Discussed risks and benefits of treatment with patient including :N/A and unable to comprehend     Controlled Medication Discussion:     Not applicable - controlled prescriptions are not prescribed by this practice    Evaluation of Psychotropic Drugs for possible gradual dose reductions    Psychotropic medications have been reviewed  Patient continues with symptoms of mood disorder with psychosis and anxiety, depressed mood as noted above    Any/or further dose reductions at this time would be clinically contraindicated, as it would be likely to cause worsening of symptoms          Versa Stands, CRNP

## 2023-03-02 ENCOUNTER — NURSING HOME VISIT (OUTPATIENT)
Dept: GERIATRICS | Facility: OTHER | Age: 78
End: 2023-03-02

## 2023-03-02 DIAGNOSIS — F41.9 ANXIETY DISORDER, UNSPECIFIED TYPE: ICD-10-CM

## 2023-03-02 DIAGNOSIS — F39 MOOD DISORDER (HCC): Primary | ICD-10-CM

## 2023-03-02 DIAGNOSIS — G30.9 MAJOR NEUROCOGNITIVE DISORDER DUE TO ALZHEIMER'S DISEASE (HCC): ICD-10-CM

## 2023-03-02 DIAGNOSIS — F02.80 MAJOR NEUROCOGNITIVE DISORDER DUE TO ALZHEIMER'S DISEASE (HCC): ICD-10-CM

## 2023-03-09 ENCOUNTER — NURSING HOME VISIT (OUTPATIENT)
Dept: GERIATRICS | Facility: OTHER | Age: 78
End: 2023-03-09

## 2023-03-09 DIAGNOSIS — F39 MOOD DISORDER (HCC): Primary | ICD-10-CM

## 2023-03-09 DIAGNOSIS — F02.80 MAJOR NEUROCOGNITIVE DISORDER DUE TO ALZHEIMER'S DISEASE (HCC): ICD-10-CM

## 2023-03-09 DIAGNOSIS — G30.9 MAJOR NEUROCOGNITIVE DISORDER DUE TO ALZHEIMER'S DISEASE (HCC): ICD-10-CM

## 2023-03-09 DIAGNOSIS — F29 UNSPECIFIED PSYCHOSIS NOT DUE TO A SUBSTANCE OR KNOWN PHYSIOLOGICAL CONDITION (HCC): ICD-10-CM

## 2023-03-09 DIAGNOSIS — F41.9 ANXIETY DISORDER, UNSPECIFIED TYPE: ICD-10-CM

## 2023-03-10 PROBLEM — F29 UNSPECIFIED PSYCHOSIS NOT DUE TO A SUBSTANCE OR KNOWN PHYSIOLOGICAL CONDITION (HCC): Status: ACTIVE | Noted: 2023-03-10

## 2023-03-10 NOTE — PROGRESS NOTES
MEDICATION MANAGEMENT NOTE        [address]  POS: 32: NF- Long Term, [address] [facility]      Name and Date of Birth:  Tyrell Ornelas 78 y o  1945 MRN: [id number]    Date of Visit: March 9, 2023    No Known Allergies  SUBJECTIVE:    Vickie Solomon is seen today for a follow up for mood disorder, anxiety, psychosis and dementia  She continues to experience significant symptoms since the last visit  She continues to have episodes of being physically aggressive towards other residents and seems to be having some increasing paranoia towards other residents  Increase in angry mood  She is currently seen in the community area and social with peers  However, in conversation, she is fixated on getting home to her mother and presents suspicious and difficult to reassure or redirect  She minimizes any psychiatric symptoms  She has poor insight into her cognitive decline or need for supervised living  Would recommend increasing Risperdal, as she does seem to be tolerating this medication without noted adverse effects and will help with physical aggression  She denies any side effects from current psychiatric medications  PLAN:    All medications and routine orders were reviewed and updated as needed  Recommend increase Risperdal 1 mg twice daily    Medication management every 1 month  Plan discussed with: Patient    Diagnoses and all orders for this visit:    Mood disorder ([facility] )    Major neurocognitive disorder due to Alzheimer's disease ([facility] )    Anxiety disorder, unspecified type    Unspecified psychosis not due to a substance or known physiological condition ([facility] )        Current Medications  Medications reviewed and updated in facility chart  Psychotherapy Provided:     Individual psychotherapy provided: Supportive counseling provided  Medications, treatment progress and treatment plan reviewed with Vickie Solomon  Reassurance and supportive therapy provided        HPI ROS Appetite Changes and Sleep:     She reports fluctuating sleep pattern, fluctuating appetite, fluctuating energy levels  Denies homicidal ideation, denies suicidal ideation    Review Of Systems:   no complaints, all other systems are negative         Mental Status Evaluation:    Appearance:  age appropriate   Behavior:  guarded, uncooperative   Speech:  normal rate, normal volume   Mood:  dysphoric, irritable   Affect:  blunted   Thought Process:  disorganized   Associations: loose associations   Thought Content:  paranoid ideation   Perceptual Disturbances: none   Risk Potential: Suicidal ideation - None  Homicidal ideation - None  Potential for aggression - Yes, due to poor impulse control   Sensorium:  oriented to person   Memory:  recent memory impaired   Consciousness:  alert and awake   Attention: decreased concentration and decreased attention span   Insight:  limited   Judgment: poor   Gait/Station: normal gait/station, normal balance   Motor Activity: no abnormal movements     Past Psychiatric History Update:     Inpatient Psychiatric Admission Since Last Encounter:   no  Suicide Attempt Or Self Mutilation Since Last Encounter:   no  Incidence of Violent Behavior Since Last Encounter:   no    Traumatic History Update:     New Onset of Abuse Since Last Encounter:   no  Traumatic Events Since Last Encounter:   no    Social History:    Changes since last encounter:  no    History Review: The following portions of the patient's history were reviewed and updated as appropriate: allergies, current medications, past family history, past medical history, past social history, past surgical history and problem list     OBJECTIVE:     Vital signs in last 24 hours: Vital signs and nursing notes reviewed in facility chart  Laboratory Results: Lab results reviewed in facility chart        Medications Risks/Benefits:      Risks, Benefits And Possible Side Effects Of Medications:    Discussed risks and benefits of treatment with patient including risk of parkinsonian symptoms, metabolic syndrome, tardive dyskinesia and neuroleptic malignant syndrome related to treatment with antipsychotic medications     Controlled Medication Discussion:     Not applicable - controlled prescriptions are not prescribed by this practice    Evaluation of Psychotropic Drugs for possible gradual dose reductions    Psychotropic medications have been reviewed  Patient continues with symptoms of mood disorder with psychosis and increased agitation as noted above  Any/or further dose reductions at this time would be clinically contraindicated, as it would be likely to cause worsening of symptoms          MONTY Gonzalez

## 2023-03-30 ENCOUNTER — NURSING HOME VISIT (OUTPATIENT)
Dept: GERIATRICS | Facility: OTHER | Age: 78
End: 2023-03-30

## 2023-03-30 DIAGNOSIS — F02.80 MAJOR NEUROCOGNITIVE DISORDER DUE TO ALZHEIMER'S DISEASE (HCC): ICD-10-CM

## 2023-03-30 DIAGNOSIS — F39 MOOD DISORDER (HCC): Primary | ICD-10-CM

## 2023-03-30 DIAGNOSIS — F41.9 ANXIETY DISORDER, UNSPECIFIED TYPE: ICD-10-CM

## 2023-03-30 DIAGNOSIS — G30.9 MAJOR NEUROCOGNITIVE DISORDER DUE TO ALZHEIMER'S DISEASE (HCC): ICD-10-CM

## 2023-03-31 NOTE — PROGRESS NOTES
MEDICATION MANAGEMENT NOTE        [facility]  POS: 32: NF- Long Term, [facility]      Name and Date of Birth:  Lizbeth Ornelas 78 y o  1945 MRN: [id number]    Date of Visit: March 30, 2023    No Known Allergies  SUBJECTIVE:    Shelly Duffy is seen today for a follow up for depression, mood disorder, anxiety and dementia  She continues to experience on and off symptoms since the last visit  Shelly Duffy is seen in follow-up for increase in mood lability with agitation and aggression over the past several months  She has been more intrusive with her peers, wandering into their rooms and taking belongings, more difficult to redirect  She has had recent medication adjustments and increases  She is now on Risperdal 1 mg twice daily and Zoloft 100 mg daily  While her behaviors have not completely resolved, charting does seem to indicate a decrease in frequency and intensity of agitation and aggressive behaviors  Today she is sitting in the common area and social with peers  She is in good spirits during our visit  Cognitive decline is noted and she has increase in difficulty in participating in conversation, word-finding difficulties, and responses are often nonsensical   However she remains smiling and pleasant during our interaction  We will continue medications the same for now and follow up in 1 month  She denies any side effects from current psychiatric medications  PLAN:    All medications and routine orders were reviewed and updated as needed  continue psychiatric medications the same      Medication management every 1 month  Plan discussed with: Nurse    Diagnoses and all orders for this visit:    [facility] )    Major neurocognitive disorder due to Alzheimer's disease ([facility] )    Anxiety disorder, unspecified type        Current Medications  Medications reviewed and updated in facility chart      Psychotherapy Provided:     Individual psychotherapy provided: Supportive counseling provided  Reassurance and supportive therapy provided  HPI ROS Appetite Changes and Sleep:     She reports fluctuating sleep pattern, fluctuating appetite, fluctuating energy levels  Denies homicidal ideation, denies suicidal ideation    Review Of Systems:   no complaints, all other systems are negative         Mental Status Evaluation:    Appearance:  age appropriate   Behavior:  pleasant   Speech:  aphasia, word finding difficulty   Mood:  improved   Affect:  brighter   Thought Process:  disorganized   Associations: loose associations   Thought Content:  some paranoia   Perceptual Disturbances: none   Risk Potential: Suicidal ideation - None  Homicidal ideation - None  Potential for aggression - Not at present   Sensorium:  disoriented to place, time/date and situation   Memory:  recent memory impaired   Consciousness:  alert and awake   Attention: poor concentration and poor attention span   Insight:  nil   Judgment: poor   Gait/Station: normal gait/station, normal balance   Motor Activity: no abnormal movements     Past Psychiatric History Update:     Inpatient Psychiatric Admission Since Last Encounter:   no  Suicide Attempt Or Self Mutilation Since Last Encounter:   no  Incidence of Violent Behavior Since Last Encounter:   no    Traumatic History Update:     New Onset of Abuse Since Last Encounter:   no  Traumatic Events Since Last Encounter:   no    Social History:    Changes since last encounter:  no    History Review: The following portions of the patient's history were reviewed and updated as appropriate: allergies, current medications, past family history, past medical history, past social history, past surgical history and problem list     OBJECTIVE:     Vital signs in last 24 hours: Vital signs and nursing notes reviewed in facility chart  Laboratory Results: Lab results reviewed in facility chart        Medications Risks/Benefits:      Risks, Benefits And Possible Side Effects Of Medications:    Discussed risks and benefits of treatment with patient including :N/A and Unable to comprehend     Controlled Medication Discussion:     Not applicable - controlled prescriptions are not prescribed by this practice    Evaluation of Psychotropic Drugs for possible gradual dose reductions    Psychotropic medications have been reviewed  Patient continues with symptoms of mood disorder with agitation and aggression as noted above  Any/or further dose reductions at this time would be clinically contraindicated, as it would be likely to cause worsening of symptoms          MONTY Rios

## 2023-04-27 ENCOUNTER — NURSING HOME VISIT (OUTPATIENT)
Dept: GERIATRICS | Facility: OTHER | Age: 78
End: 2023-04-27

## 2023-04-27 DIAGNOSIS — G30.9 MAJOR NEUROCOGNITIVE DISORDER DUE TO ALZHEIMER'S DISEASE (HCC): Primary | ICD-10-CM

## 2023-04-27 DIAGNOSIS — F02.80 MAJOR NEUROCOGNITIVE DISORDER DUE TO ALZHEIMER'S DISEASE (HCC): Primary | ICD-10-CM

## 2023-04-27 DIAGNOSIS — F29 UNSPECIFIED PSYCHOSIS NOT DUE TO A SUBSTANCE OR KNOWN PHYSIOLOGICAL CONDITION (HCC): ICD-10-CM

## 2023-04-29 NOTE — PROGRESS NOTES
MEDICATION MANAGEMENT NOTE        [place] [address] [facility]  POS: 32: NF- [address] [facility]      Name and Date of Birth:  Young Ornelas 78 y o  1945 MRN: [id number]    Date of Visit: April 27, 2023    No Known Allergies  SUBJECTIVE:    Sharmaine Voss is seen today for a follow up for mood disorder, anxiety, psychosis and dementia  She continues to improve gradually since the last visit  There have been no recent unwanted behaviors  Sharmaine Voss is seen in follow-up for increase in mood lability with agitation and aggression over the past several months  There seems to be some recent improvements with medication adjustments  She is in good spirits during our visit  Cognitive decline is noted and she has increase in difficulty in participating in conversation, word-finding difficulties, and responses are often nonsensical   However she remains smiling and pleasant during our interaction  We will continue medications the same for now and follow up in 1 month  She    She denies any side effects from current psychiatric medications  PLAN:    All medications and routine orders were reviewed and updated as needed  Continue psychiatric medications the same    Medication management every 1 month    Diagnoses and all orders for this visit:    Major neurocognitive disorder due to Alzheimer's disease ([facility] )    Unspecified psychosis not due to a substance or known physiological condition [facility] )        Current Medications  Medications reviewed and updated in facility chart  Psychotherapy Provided:     Individual psychotherapy provided: Supportive counseling provided  Medications, treatment progress and treatment plan reviewed with Sharmaine Voss  Reassurance and supportive therapy provided  HPI ROS Appetite Changes and Sleep:     She reports fluctuating sleep pattern, fluctuating appetite, fluctuating energy levels   Denies homicidal ideation, denies suicidal ideation    Review Of Systems:   all other systems are negative         Mental Status Evaluation:    Appearance:  adequate grooming   Behavior:  cooperative   Speech:  nonsensical   Mood:  improved   Affect:  brighter   Thought Process:  disorganized   Associations: loose associations   Thought Content:  some paranoia   Perceptual Disturbances: none   Risk Potential: Suicidal ideation - None  Homicidal ideation - None  Potential for aggression - No   Sensorium:  oriented to person, place, time/date and situation   Memory:  recent and remote memory grossly intact   Consciousness:  alert and awake   Attention: decreased concentration and decreased attention span   Insight:  limited   Judgment: limited   Gait/Station: normal gait/station, normal balance   Motor Activity: no abnormal movements     Past Psychiatric History Update:     Inpatient Psychiatric Admission Since Last Encounter:   no  Suicide Attempt Or Self Mutilation Since Last Encounter:   no  Incidence of Violent Behavior Since Last Encounter:   no    Traumatic History Update:     New Onset of Abuse Since Last Encounter:   no  Traumatic Events Since Last Encounter:   no    Social History:    Changes since last encounter:  no    History Review: The following portions of the patient's history were reviewed and updated as appropriate: allergies, current medications, past family history, past medical history, past social history, past surgical history and problem list     OBJECTIVE:     Vital signs in last 24 hours: Vital signs and nursing notes reviewed in facility chart  Laboratory Results: Lab results reviewed in facility chart        Medications Risks/Benefits:      Risks, Benefits And Possible Side Effects Of Medications:    Discussed risks and benefits of treatment with patient including :N/A and Unable to comprehend     Controlled Medication Discussion:     Not applicable - controlled prescriptions are not prescribed by this practice    Evaluation of Psychotropic Drugs for possible gradual dose reductions    Psychotropic medications have been reviewed  Patient continues with symptoms of mood lability and impulsivity and unpredictable behaviors as noted above  Any/or further dose reductions at this time would be clinically contraindicated, as it would be likely to cause worsening of symptoms          MONTY Boyd

## 2023-05-04 ENCOUNTER — NURSING HOME VISIT (OUTPATIENT)
Dept: GERIATRICS | Facility: OTHER | Age: 78
End: 2023-05-04

## 2023-05-04 DIAGNOSIS — F02.80 MAJOR NEUROCOGNITIVE DISORDER DUE TO ALZHEIMER'S DISEASE (HCC): ICD-10-CM

## 2023-05-04 DIAGNOSIS — F29 UNSPECIFIED PSYCHOSIS NOT DUE TO A SUBSTANCE OR KNOWN PHYSIOLOGICAL CONDITION (HCC): ICD-10-CM

## 2023-05-04 DIAGNOSIS — F39 MOOD DISORDER (HCC): Primary | ICD-10-CM

## 2023-05-04 DIAGNOSIS — F41.9 ANXIETY DISORDER, UNSPECIFIED TYPE: ICD-10-CM

## 2023-05-04 DIAGNOSIS — G30.9 MAJOR NEUROCOGNITIVE DISORDER DUE TO ALZHEIMER'S DISEASE (HCC): ICD-10-CM

## 2023-05-08 NOTE — PROGRESS NOTES
MEDICATION MANAGEMENT NOTE        [facility]  POS: 32: NF- Long Term, [address] [facility] [place]      Name and Date of Birth:  Marianna Ornelas 78 y o  1945 MRN: [id number]    Date of Visit: May 4, 2023    No Known Allergies  SUBJECTIVE:    Angela Dhaliwal is seen today for a follow up for mood disorder, Generalized Anxiety Disorder and dementia  She continues to decompensate gradually since the last visit  Asked to see in follow up for worsening of sexually inappropriate behaviors on unit and requiring a 1:1 to protect other patients from her unwanted advances  Unfortunately, this is a symptom of her worsening dementia and there is limited pharmacological interventions available  Does require closer monitoring as patient unable to regulate her behavior due to worsening dementia  She is unaware of her inappropriate behaviors and only laughs when I bring it up  Her Zoloft as been increased as SSRI antidepressants can sometime decrease libido  Today presents pleasantly confused and overall less anxious, less irritable  She denies any side effects from current psychiatric medications  PLAN:    All medications and routine orders were reviewed and updated as needed  Her Zoloft has been increased by attending     Medication management every 2 weeks      Diagnoses and all orders for this visit:    Mood disorder [facility])    Major neurocognitive disorder due to [facility] )    Unspecified psychosis not due to a substance or known physiological condition ([facility] )    Anxiety disorder, unspecified type        Current Medications  Medications reviewed and updated in facility chart  Psychotherapy Provided:     Individual psychotherapy provided: Supportive counseling provided  Medications, treatment progress and treatment plan reviewed with Angela Dhaliwal       CHELSEA KAMINSKI Appetite Changes and Sleep:     She reports fluctuating sleep pattern, fluctuating appetite, fluctuating energy levels  Denies homicidal ideation, denies suicidal ideation    Review Of Systems:   no complaints, all other systems are negative         Mental Status Evaluation:    Appearance:  age appropriate   Behavior:  pleasant   Speech:  word finding difficulty   Mood:  improved   Affect:  brighter   Thought Process:  disorganized   Associations: loose associations   Thought Content:  some paranoia   Perceptual Disturbances: denies auditory hallucinations when asked   Risk Potential: Suicidal ideation - None  Homicidal ideation - None  Potential for aggression - No   Sensorium:  disoriented to place, time/date and situation   Memory:  recent memory impaired   Consciousness:  alert and awake   Attention: decreased concentration and decreased attention span   Insight:  nil   Judgment: poor   Gait/Station: normal gait/station, normal balance   Motor Activity: no abnormal movements     Past Psychiatric History Update:     Inpatient Psychiatric Admission Since Last Encounter:   no  Suicide Attempt Or Self Mutilation Since Last Encounter:   no  Incidence of Violent Behavior Since Last Encounter:   no    Traumatic History Update:     New Onset of Abuse Since Last Encounter:   no  Traumatic Events Since Last Encounter:   no    Social History:    Changes since last encounter:  no    History Review: The following portions of the patient's history were reviewed and updated as appropriate: allergies, current medications, past family history, past medical history, past social history, past surgical history and problem list     OBJECTIVE:     Vital signs in last 24 hours: Vital signs and nursing notes reviewed in facility chart  Laboratory Results: Lab results reviewed in facility chart        Medications Risks/Benefits:      Risks, Benefits And Possible Side Effects Of Medications:    Discussed risks and benefits of treatment with patient including :N/A and unable to comprehend     Controlled Medication Discussion:     Not applicable - controlled prescriptions are not prescribed by this practice    Evaluation of Psychotropic Drugs for possible gradual dose reductions    Psychotropic medications have been reviewed  Patient continues with symptoms of mood disorder with increasing impulsive and intrusive behaviors as noted above  Any/or further dose reductions at this time would be clinically contraindicated, as it would be likely to cause worsening of symptoms          MONTY Garcia

## 2023-05-18 ENCOUNTER — NURSING HOME VISIT (OUTPATIENT)
Dept: GERIATRICS | Facility: OTHER | Age: 78
End: 2023-05-18
Payer: MEDICARE

## 2023-05-18 DIAGNOSIS — G30.9 MAJOR NEUROCOGNITIVE DISORDER DUE TO ALZHEIMER'S DISEASE (HCC): ICD-10-CM

## 2023-05-18 DIAGNOSIS — F02.80 MAJOR NEUROCOGNITIVE DISORDER DUE TO ALZHEIMER'S DISEASE (HCC): ICD-10-CM

## 2023-05-18 DIAGNOSIS — F29 UNSPECIFIED PSYCHOSIS NOT DUE TO A SUBSTANCE OR KNOWN PHYSIOLOGICAL CONDITION (HCC): ICD-10-CM

## 2023-05-18 DIAGNOSIS — F39 MOOD DISORDER (HCC): Primary | ICD-10-CM

## 2023-05-18 DIAGNOSIS — F41.9 ANXIETY DISORDER, UNSPECIFIED TYPE: ICD-10-CM

## 2023-05-18 PROCEDURE — 99308 SBSQ NF CARE LOW MDM 20: CPT | Performed by: NURSE PRACTITIONER

## 2023-05-18 NOTE — PROGRESS NOTES
MEDICATION MANAGEMENT NOTE        [address] [facility]  POS: 32: NF- Long Term, [facility] [place] [place]      Name and Date of Birth:  Jessica Ornelas 78 y.o. 1945 MRN: [id number]    Date of Visit: May 18, 2023    No Known Allergies  SUBJECTIVE:    Keith Shepard is seen today for a follow up for mood disorder, anxiety and dementia. She continues to experience significant symptoms since the last visit. She continues on 1:1 due to inappropriate touching of other residents. There may be some decrease in inappropriate touching. However, some increase irritability. She continues on Risperdal 1 mg BID and Zoloft 150 mg daily. She denies any side effects from current psychiatric medications. PLAN:    All medications and routine orders were reviewed and updated as needed. Continue current medications:  Medication management every 2 weeks    Diagnoses and all orders for this visit:    Mood disorder ([address])    Major neurocognitive disorder due to Alzheimer's disease ([address])    Unspecified psychosis not due to a substance or known physiological condition ([address])    Anxiety disorder, unspecified type        Current Medications  Medications reviewed and updated in facility chart. Psychotherapy Provided:     Individual psychotherapy provided: Supportive counseling provided. Reassurance and supportive therapy provided. HPI ROS Appetite Changes and Sleep:     She reports fluctuating sleep pattern, fluctuating appetite, fluctuating energy levels.  Denies homicidal ideation, denies suicidal ideation    Review Of Systems:   no complaints, all other systems are negative         Mental Status Evaluation:    Appearance:  age appropriate   Behavior:  uncooperative   Speech:  normal rate, normal volume   Mood:  irritable   Affect:  blunted   Thought Process:  disorganized   Associations: loose associations   Thought Content:  some paranoia   Perceptual Disturbances: none   Risk Potential: Suicidal ideation - None  Homicidal ideation - None  Potential for aggression - No   Sensorium:  disoriented to place, time/date and situation   Memory:  recent memory impaired   Consciousness:  alert and awake   Attention: decreased concentration and decreased attention span   Insight:  poor   Judgment: poor   Gait/Station: normal gait/station, normal balance   Motor Activity: no abnormal movements     Past Psychiatric History Update:     Inpatient Psychiatric Admission Since Last Encounter:   no  Suicide Attempt Or Self Mutilation Since Last Encounter:   no  Incidence of Violent Behavior Since Last Encounter:   no    Traumatic History Update:     New Onset of Abuse Since Last Encounter:   no  Traumatic Events Since Last Encounter:   no    Social History:    Changes since last encounter:  no    History Review: The following portions of the patient's history were reviewed and updated as appropriate: allergies, current medications, past family history, past medical history, past social history, past surgical history and problem list     OBJECTIVE:     Vital signs in last 24 hours: Vital signs and nursing notes reviewed in facility chart. Laboratory Results: Lab results reviewed in facility chart. Medications Risks/Benefits:      Risks, Benefits And Possible Side Effects Of Medications:    Discussed risks and benefits of treatment with patient including :N/A and unable to comprehend     Controlled Medication Discussion:     Not applicable - controlled prescriptions are not prescribed by this practice    Evaluation of Psychotropic Drugs for possible gradual dose reductions    Psychotropic medications have been reviewed. Patient continues with symptoms of mood disorder and increased agitation as noted above. Any/or further dose reductions at this time would be clinically contraindicated, as it would be likely to cause worsening of symptoms.         MONTY Mathew

## 2023-06-01 ENCOUNTER — NURSING HOME VISIT (OUTPATIENT)
Dept: GERIATRICS | Facility: OTHER | Age: 78
End: 2023-06-01
Payer: MEDICARE

## 2023-06-01 DIAGNOSIS — F39 MOOD DISORDER (HCC): ICD-10-CM

## 2023-06-01 DIAGNOSIS — F02.80 MAJOR NEUROCOGNITIVE DISORDER DUE TO ALZHEIMER'S DISEASE (HCC): Primary | ICD-10-CM

## 2023-06-01 DIAGNOSIS — G30.9 MAJOR NEUROCOGNITIVE DISORDER DUE TO ALZHEIMER'S DISEASE (HCC): Primary | ICD-10-CM

## 2023-06-01 DIAGNOSIS — F29 UNSPECIFIED PSYCHOSIS NOT DUE TO A SUBSTANCE OR KNOWN PHYSIOLOGICAL CONDITION (HCC): ICD-10-CM

## 2023-06-01 DIAGNOSIS — F41.9 ANXIETY DISORDER, UNSPECIFIED TYPE: ICD-10-CM

## 2023-06-01 PROCEDURE — 99308 SBSQ NF CARE LOW MDM 20: CPT | Performed by: NURSE PRACTITIONER

## 2023-07-06 ENCOUNTER — NURSING HOME VISIT (OUTPATIENT)
Dept: GERIATRICS | Facility: OTHER | Age: 78
End: 2023-07-06
Payer: MEDICARE

## 2023-07-06 DIAGNOSIS — F39 MOOD DISORDER (HCC): Primary | ICD-10-CM

## 2023-07-06 DIAGNOSIS — F41.9 ANXIETY DISORDER, UNSPECIFIED TYPE: ICD-10-CM

## 2023-07-06 DIAGNOSIS — G30.9 MAJOR NEUROCOGNITIVE DISORDER DUE TO ALZHEIMER'S DISEASE (HCC): ICD-10-CM

## 2023-07-06 DIAGNOSIS — F02.80 MAJOR NEUROCOGNITIVE DISORDER DUE TO ALZHEIMER'S DISEASE (HCC): ICD-10-CM

## 2023-07-06 DIAGNOSIS — F29 UNSPECIFIED PSYCHOSIS NOT DUE TO A SUBSTANCE OR KNOWN PHYSIOLOGICAL CONDITION (HCC): ICD-10-CM

## 2023-07-06 PROCEDURE — 99308 SBSQ NF CARE LOW MDM 20: CPT | Performed by: NURSE PRACTITIONER

## 2023-07-26 NOTE — PROGRESS NOTES
MEDICATION MANAGEMENT NOTE        [address] [facility]  POS: 32: NF- Long Term, [facility]      Name and Date of Birth:  Faheem Ornelas 78 y.o. 1945 MRN: [id number]    Date of Visit: June 1, 2023    No Known Allergies  SUBJECTIVE:    [facility] is seen today for a follow up for Major Depressive Disorder, Generalized Anxiety Disorder and dementia. She continues to experience ongoing symptoms since the last visit. She continues on 1:1 due to concerns of sexually inappropriate touching of other residents. Lack of boundaries and lack of safety awareness. She is in good spirits, unaware why she is on a 1:1, unaware of her inappropriate behaviors due to her advanced dementia. Will increase her Zoloft to 200 mg. She denies any side effects from current psychiatric medications. PLAN:    All medications and routine orders were reviewed and updated as needed. Increase Zoloft to 200 mg daily    Medication management every 1 month      Diagnoses and all orders for this visit:    Major neurocognitive disorder due to Alzheimer's disease ([address])    Unspecified psychosis not due to a substance or known physiological condition ([address])    Mood disorder ([address])    Anxiety disorder, unspecified type        Current Medications  Medications reviewed and updated in facility chart. HPI ROS Appetite Changes and Sleep:     She reports fluctuating sleep pattern, fluctuating appetite, fluctuating energy levels.  Denies homicidal ideation, denies suicidal ideation    Review Of Systems:   no complaints, all other systems are negative         Mental Status Evaluation:    Appearance:  age appropriate   Behavior:  pleasant   Speech:  normal rate, normal volume   Mood:  labile   Affect:  overbright   Thought Process:  disorganized   Associations: loose associations   Thought Content:  mild paranoia   Perceptual Disturbances: denies auditory hallucinations when asked   Risk Potential: Suicidal ideation - None  Homicidal ideation - None  Potential for aggression - No   Sensorium:  oriented to person   Memory:  recent memory impaired   Consciousness:  alert and awake   Attention: decreased concentration and decreased attention span   Insight:  poor   Judgment: poor   Gait/Station: normal gait/station, normal balance   Motor Activity: no abnormal movements       History Review: The following portions of the patient's history were reviewed and updated as appropriate: psychiatric history, trauma history allergies, current medications, past family history, past medical history, past social history, past surgical history and problem list     OBJECTIVE:     Vital signs in last 24 hours: Vital signs and nursing notes reviewed in facility chart. Laboratory Results: Lab results reviewed in facility chart. Medications Risks/Benefits:      Risks, Benefits And Possible Side Effects Of Medications:    Discussed risks and benefits of treatment with patient including :N/A and unable to comprehend     Controlled Medication Discussion:     Not applicable - controlled prescriptions are not prescribed by this practice    Evaluation of Psychotropic Drugs for possible gradual dose reductions    Psychotropic medications have been reviewed. Patient continues with symptoms of mood lability and inappropriate behaviors as noted above. Any/or further dose reductions at this time would be clinically contraindicated, as it would be likely to cause worsening of symptoms.         MONTY Tian

## 2023-07-31 NOTE — PROGRESS NOTES
MEDICATION MANAGEMENT NOTE        [address]  POS: 32: NF- Long Term, [address] [facility]      Name and Date of Birth:  Jass Ornelas 78 y.o. 1945 MRN: [id number]    Date of Visit: July 6, 2023    No Known Allergies  SUBJECTIVE:    Nancy Amaro is seen today for a follow up for mood disorder, anxiety, psychosis and dementia. She continues to experience on and off anxiety symptoms since the last visit. Resident continues 1:1 with observation. For the most part, is more pleasant and cooperative. Can get angry when redirected away from other female residents due to sexual behavior - will want to follow them into room or bathroom. She denies any side effects from current psychiatric medications. PLAN:    All medications and routine orders were reviewed and updated as needed. Continue current medications:  Medication management every 1-2 months      Diagnoses and all orders for this visit:    Mood disorder ([address])    Major neurocognitive disorder due to Alzheimer's disease ([address])    Unspecified psychosis not due to a substance or known physiological condition ([address])    Anxiety disorder, unspecified type        Current Medications  Medications reviewed and updated in facility chart. HPI ROS Appetite Changes and Sleep:     She reports fluctuating sleep pattern, fluctuating appetite, fluctuating energy levels.  Denies homicidal ideation, denies suicidal ideation    Review Of Systems:   no complaints, all other systems are negative         Mental Status Evaluation:    Appearance:  disheveled   Behavior:  cooperative   Speech:  hypertalkative   Mood:  irritable   Affect:  flat   Thought Process:  disorganized   Associations: loose associations   Thought Content:  some paranoia   Perceptual Disturbances: appears preoccupied   Risk Potential: Suicidal ideation - None  Homicidal ideation - None  Potential for aggression - No   Sensorium:  oriented to person, place and time/date   Memory:  recent memory impaired   Consciousness:  alert and awake   Attention: poor concentration and poor attention span   Insight:  poor   Judgment: poor   Gait/Station: normal gait/station, normal balance   Motor Activity: no abnormal movements       History Review: The following portions of the patient's history were reviewed and updated as appropriate: psychiatric history, trauma history allergies, current medications, past family history, past medical history, past social history, past surgical history and problem list     OBJECTIVE:     Vital signs in last 24 hours: Vital signs and nursing notes reviewed in facility chart. Laboratory Results: Lab results reviewed in facility chart. Medications Risks/Benefits:      Risks, Benefits And Possible Side Effects Of Medications:    Discussed risks and benefits of treatment with patient including :N/A and unable to comprehend     Controlled Medication Discussion:     Not applicable - controlled prescriptions are not prescribed by this practice    Evaluation of Psychotropic Drugs for possible gradual dose reductions    Psychotropic medications have been reviewed. Patient continues with symptoms of mood disorder with poor boundaries and can be agitated with redirection as noted above. Any/or further dose reductions at this time would be clinically contraindicated, as it would be likely to cause worsening of symptoms.         MONTY Roberts

## 2023-08-03 ENCOUNTER — NURSING HOME VISIT (OUTPATIENT)
Dept: GERIATRICS | Facility: OTHER | Age: 78
End: 2023-08-03
Payer: MEDICARE

## 2023-08-03 DIAGNOSIS — F02.80 MAJOR NEUROCOGNITIVE DISORDER DUE TO ALZHEIMER'S DISEASE (HCC): ICD-10-CM

## 2023-08-03 DIAGNOSIS — F29 UNSPECIFIED PSYCHOSIS NOT DUE TO A SUBSTANCE OR KNOWN PHYSIOLOGICAL CONDITION (HCC): ICD-10-CM

## 2023-08-03 DIAGNOSIS — F39 MOOD DISORDER (HCC): Primary | ICD-10-CM

## 2023-08-03 DIAGNOSIS — G30.9 MAJOR NEUROCOGNITIVE DISORDER DUE TO ALZHEIMER'S DISEASE (HCC): ICD-10-CM

## 2023-08-03 DIAGNOSIS — F41.9 ANXIETY DISORDER, UNSPECIFIED TYPE: ICD-10-CM

## 2023-08-03 PROCEDURE — 99308 SBSQ NF CARE LOW MDM 20: CPT | Performed by: NURSE PRACTITIONER

## 2023-08-03 NOTE — PROGRESS NOTES
MEDICATION MANAGEMENT NOTE        [address] [facility]  POS: 32: NF- Long Term, [address] [facility]      Name and Date of Birth:  Pietro Ornelas 78 y.o. 1945 MRN: [id number]    Date of Visit: August 3, 2023    No Known Allergies  SUBJECTIVE:    Rayna Villanueva is seen today for a follow up for Major Depressive Disorder, mood disorder, anxiety and dementia. She continues to experience on and off anxiety symptoms since the last visit. She continues on 1:1 due to inappropriately intrusive and touching other peers. Can become angry and aggressive with staff redirection. She is otherwise often pleasantly confused. She continues on Zoloft 200 mg daily and Risperdal 1 mg BID. She denies any side effects from current psychiatric medications. PLAN:    All medications and routine orders were reviewed and updated as needed. Continue current medications:  Medication management every 1-2 months months      Diagnoses and all orders for this visit:    Mood disorder ([address])    Major neurocognitive disorder due to Alzheimer's disease ([address])    Unspecified psychosis not due to a substance or known physiological condition ([address])    Anxiety disorder, unspecified type        Current Medications  Medications reviewed and updated in facility chart. HPI ROS Appetite Changes and Sleep:     She reports fluctuating sleep pattern, fluctuating appetite, fluctuating energy levels.  Denies homicidal ideation, denies suicidal ideation    Review Of Systems:   no complaints, all other systems are negative         Mental Status Evaluation:    Appearance:  age appropriate   Behavior:  still at times agitated   Speech:  normal rate, normal volume   Mood:  euthymic   Affect:  brighter   Thought Process:  disorganized   Associations: loose associations   Thought Content:  mild paranoia   Perceptual Disturbances: appears preoccupied   Risk Potential: Suicidal ideation - None  Homicidal ideation - None  Potential for aggression - Yes, due to poor impulse control   Sensorium:  disoriented to place, time/date and situation   Memory:  recent memory impaired   Consciousness:  alert and awake   Attention: decreased attention span   Insight:  poor   Judgment: poor   Gait/Station: normal gait/station, normal balance   Motor Activity: no abnormal movements       History Review: The following portions of the patient's history were reviewed and updated as appropriate: psychiatric history, trauma history allergies, current medications, past family history, past medical history, past social history, past surgical history and problem list     OBJECTIVE:     Vital signs in last 24 hours: Vital signs and nursing notes reviewed in facility chart. Laboratory Results: Lab results reviewed in facility chart. Medications Risks/Benefits:      Risks, Benefits And Possible Side Effects Of Medications:    Discussed risks and benefits of treatment with patient including :N/A and unable to comprehend     Controlled Medication Discussion:     Not applicable - controlled prescriptions are not prescribed by this practice    Evaluation of Psychotropic Drugs for possible gradual dose reductions    Psychotropic medications have been reviewed. Patient continues with symptoms of mood lability and agitation as noted above. Any/or further dose reductions at this time would be clinically contraindicated, as it would be likely to cause worsening of symptoms.         MONTY Spicer

## 2023-10-12 LAB — HBA1C MFR BLD HPLC: 6.9 %

## 2023-11-18 NOTE — PROGRESS NOTES
Attached is the PDMP for patient that is scheduled for surgery tomorrow, 07/17/19  Xray Chest 1 View- PORTABLE-Urgent

## 2024-01-01 ENCOUNTER — HOME CARE VISIT (OUTPATIENT)
Dept: HOME HOSPICE | Facility: HOSPICE | Age: 79
End: 2024-01-01
Payer: MEDICARE

## 2024-01-01 ENCOUNTER — HOME CARE VISIT (OUTPATIENT)
Dept: HOME HEALTH SERVICES | Facility: HOME HEALTHCARE | Age: 79
End: 2024-01-01
Payer: MEDICARE

## 2024-01-01 VITALS
HEART RATE: 70 BPM | TEMPERATURE: 98.2 F | DIASTOLIC BLOOD PRESSURE: 78 MMHG | RESPIRATION RATE: 20 BRPM | SYSTOLIC BLOOD PRESSURE: 118 MMHG

## 2024-01-01 VITALS — TEMPERATURE: 99.2 F | RESPIRATION RATE: 18 BRPM | HEART RATE: 88 BPM

## 2024-01-01 PROCEDURE — G0156 HHCP-SVS OF AIDE,EA 15 MIN: HCPCS

## 2024-01-01 PROCEDURE — G0299 HHS/HOSPICE OF RN EA 15 MIN: HCPCS

## 2024-04-01 NOTE — PROGRESS NOTES
MEDICATION MANAGEMENT NOTE        [address] [facility]  POS: 32: NF- Long Term, [address] [facility]      Name and Date of Birth:  Azar Ornelas 78 y o  1945 MRN: [id number]    Date of Visit: March 2, 2023    No Known Allergies  SUBJECTIVE:    Anna Palomares is seen today for a follow up for depression, mood disorder, anxiety and dementia  She continues to experience on and off symptoms since the last visit  Anna Palomares continues to have some episodes of increased agitation toward other residents, with some underlying paranoia noted  Minimal improvement since increase of Risperdal in January and ongoing irritability  Would recommend increasing Zoloft to 100 mg for mood  She denies any side effects from current psychiatric medications  PLAN:    All medications and routine orders were reviewed and updated as needed  Recommend increase of Zoloft to 100 mg    Medication management every 1 months  Plan discussed with: Nurse    Diagnoses and all orders for this visit:    Mood disorder ([facility] )    Major neurocognitive disorder due to Alzheimer's disease ([facility] )    Anxiety disorder, unspecified type        Current Medications  Medications reviewed and updated in facility chart  Psychotherapy Provided:     Individual psychotherapy provided: Supportive counseling provided  Reassurance and supportive therapy provided  HPI ROS Appetite Changes and Sleep:     She reports fluctuating sleep pattern, fluctuating appetite, fluctuating energy levels   Denies homicidal ideation, denies suicidal ideation    Review Of Systems:   all other systems are negative         Mental Status Evaluation:    Appearance:  age appropriate   Behavior:  cooperative   Speech:  word finding difficulty   Mood:  dysphoric   Affect:  blunted   Thought Process:  disorganized   Associations: loose associations   Thought Content:  some paranoia   Perceptual Disturbances: denies auditory hallucinations when asked   Risk Potential: Suicidal ideation - None  Homicidal ideation - None  Potential for aggression - Yes, due to poor impulse control   Sensorium:  oriented to person   Memory:  recent memory impaired   Consciousness:  alert and awake   Attention: poor concentration and poor attention span   Insight:  poor   Judgment: poor   Gait/Station: normal gait/station, normal balance   Motor Activity: no abnormal movements     Past Psychiatric History Update:     Inpatient Psychiatric Admission Since Last Encounter:   no  Suicide Attempt Or Self Mutilation Since Last Encounter:   no  Incidence of Violent Behavior Since Last Encounter:   no    Traumatic History Update:     New Onset of Abuse Since Last Encounter:   no  Traumatic Events Since Last Encounter:   no    Social History:    Changes since last encounter:  no    History Review: The following portions of the patient's history were reviewed and updated as appropriate: allergies, current medications, past family history, past medical history, past social history, past surgical history and problem list     OBJECTIVE:     Vital signs in last 24 hours: Vital signs and nursing notes reviewed in facility chart  Laboratory Results: Lab results reviewed in facility chart  Medications Risks/Benefits:      Risks, Benefits And Possible Side Effects Of Medications:    Discussed risks and benefits of treatment with patient including risk of suicidality, serotonin syndrome, increased QTc interval and SIADH related to treatment with antidepressants; Risk of induction of manic symptoms in certain patient populations     Controlled Medication Discussion:     Not applicable - controlled prescriptions are not prescribed by this practice    Evaluation of Psychotropic Drugs for possible gradual dose reductions    Psychotropic medications have been reviewed  Patient continues with symptoms of mood disorder with increased agitation as noted above    Any/or further dose reductions at this time would be clinically contraindicated, as it would be likely to cause worsening of symptoms          Serene Kodak, CRNP .

## 2024-06-03 NOTE — SOCIAL WORK
CM spoke with patient at the bedside  CM discussed in detail with patient re:STR and discharge planning  [facility] understands that [facility] across from [facility]  Patient would be receiving PT/OT at facility  Patient asked ,"Do I have to stay there?" Patient updated that she would be staying at the facility and would have a room with another patient  Patient is concerned about her fiancee and what he is doing while she is hospitalized  CM will f/u in the am with patient and plan of care  Called patient to cancel and reschedule appointment. Line is busy and no mychart.

## 2024-06-06 NOTE — RESULT NOTES
Verified Results  *VB - Fall Risk Assessment  (Dx Z13 89 Screen for Neurologic Disorder) [id number] 01:33PM Roseanne Thompson     Test Name Result Flag Reference   Falls Risk      No falls in the past year
5

## 2024-08-05 ENCOUNTER — APPOINTMENT (EMERGENCY)
Dept: RADIOLOGY | Facility: HOSPITAL | Age: 79
End: 2024-08-05
Payer: MEDICARE

## 2024-08-05 ENCOUNTER — HOSPITAL ENCOUNTER (EMERGENCY)
Facility: HOSPITAL | Age: 79
Discharge: HOME | End: 2024-08-05
Attending: INTERNAL MEDICINE
Payer: MEDICARE

## 2024-08-05 ENCOUNTER — APPOINTMENT (EMERGENCY)
Dept: CT IMAGING | Facility: HOSPITAL | Age: 79
End: 2024-08-05
Payer: MEDICARE

## 2024-08-05 VITALS
RESPIRATION RATE: 16 BRPM | HEART RATE: 88 BPM | DIASTOLIC BLOOD PRESSURE: 60 MMHG | SYSTOLIC BLOOD PRESSURE: 150 MMHG | OXYGEN SATURATION: 96 % | TEMPERATURE: 98.8 F

## 2024-08-05 DIAGNOSIS — R53.1 WEAKNESS: Primary | ICD-10-CM

## 2024-08-05 DIAGNOSIS — F03.90 DEMENTIA (HCC): ICD-10-CM

## 2024-08-05 LAB
2HR DELTA HS TROPONIN: 0 NG/L
ALBUMIN SERPL BCG-MCNC: 4.2 G/DL (ref 3.5–5)
ALP SERPL-CCNC: 52 U/L (ref 34–104)
ALT SERPL W P-5'-P-CCNC: 16 U/L (ref 7–52)
ANION GAP SERPL CALCULATED.3IONS-SCNC: 10 MMOL/L (ref 4–13)
APTT PPP: 31 SECONDS (ref 23–34)
AST SERPL W P-5'-P-CCNC: 15 U/L (ref 13–39)
BASOPHILS # BLD AUTO: 0.04 THOUSANDS/ÂΜL (ref 0–0.1)
BASOPHILS NFR BLD AUTO: 1 % (ref 0–1)
BILIRUB SERPL-MCNC: 0.54 MG/DL (ref 0.2–1)
BILIRUB UR QL STRIP: NEGATIVE
BUN SERPL-MCNC: 20 MG/DL (ref 5–25)
CALCIUM SERPL-MCNC: 9.8 MG/DL (ref 8.4–10.2)
CARDIAC TROPONIN I PNL SERPL HS: 3 NG/L
CARDIAC TROPONIN I PNL SERPL HS: 3 NG/L
CHLORIDE SERPL-SCNC: 109 MMOL/L (ref 96–108)
CLARITY UR: ABNORMAL
CO2 SERPL-SCNC: 27 MMOL/L (ref 21–32)
COLOR UR: YELLOW
CREAT SERPL-MCNC: 0.7 MG/DL (ref 0.6–1.3)
EOSINOPHIL # BLD AUTO: 0.23 THOUSAND/ÂΜL (ref 0–0.61)
EOSINOPHIL NFR BLD AUTO: 6 % (ref 0–6)
ERYTHROCYTE [DISTWIDTH] IN BLOOD BY AUTOMATED COUNT: 12.9 % (ref 11.6–15.1)
GFR SERPL CREATININE-BSD FRML MDRD: 84 ML/MIN/1.73SQ M
GLUCOSE SERPL-MCNC: 116 MG/DL (ref 65–140)
GLUCOSE UR STRIP-MCNC: NEGATIVE MG/DL
HCT VFR BLD AUTO: 43.3 % (ref 34.8–46.1)
HGB BLD-MCNC: 13.9 G/DL (ref 11.5–15.4)
HGB UR QL STRIP.AUTO: NEGATIVE
IMM GRANULOCYTES # BLD AUTO: 0 THOUSAND/UL (ref 0–0.2)
IMM GRANULOCYTES NFR BLD AUTO: 0 % (ref 0–2)
INR PPP: 1.05 (ref 0.85–1.19)
KETONES UR STRIP-MCNC: NEGATIVE MG/DL
LACTATE SERPL-SCNC: 1 MMOL/L (ref 0.5–2)
LEUKOCYTE ESTERASE UR QL STRIP: NEGATIVE
LYMPHOCYTES # BLD AUTO: 1.27 THOUSANDS/ÂΜL (ref 0.6–4.47)
LYMPHOCYTES NFR BLD AUTO: 36 % (ref 14–44)
MCH RBC QN AUTO: 31.3 PG (ref 26.8–34.3)
MCHC RBC AUTO-ENTMCNC: 32.1 G/DL (ref 31.4–37.4)
MCV RBC AUTO: 98 FL (ref 82–98)
MONOCYTES # BLD AUTO: 0.4 THOUSAND/ÂΜL (ref 0.17–1.22)
MONOCYTES NFR BLD AUTO: 11 % (ref 4–12)
NEUTROPHILS # BLD AUTO: 1.63 THOUSANDS/ÂΜL (ref 1.85–7.62)
NEUTS SEG NFR BLD AUTO: 46 % (ref 43–75)
NITRITE UR QL STRIP: NEGATIVE
NRBC BLD AUTO-RTO: 0 /100 WBCS
PH UR STRIP.AUTO: 6 [PH]
PLATELET # BLD AUTO: 212 THOUSANDS/UL (ref 149–390)
PMV BLD AUTO: 9.8 FL (ref 8.9–12.7)
POTASSIUM SERPL-SCNC: 4 MMOL/L (ref 3.5–5.3)
PROCALCITONIN SERPL-MCNC: 0.06 NG/ML
PROT SERPL-MCNC: 7.1 G/DL (ref 6.4–8.4)
PROT UR STRIP-MCNC: NEGATIVE MG/DL
PROTHROMBIN TIME: 14.1 SECONDS (ref 12.3–15)
RBC # BLD AUTO: 4.44 MILLION/UL (ref 3.81–5.12)
SODIUM SERPL-SCNC: 146 MMOL/L (ref 135–147)
SP GR UR STRIP.AUTO: >=1.03 (ref 1–1.03)
UROBILINOGEN UR QL STRIP.AUTO: 0.2 E.U./DL
WBC # BLD AUTO: 3.57 THOUSAND/UL (ref 4.31–10.16)

## 2024-08-05 PROCEDURE — 83605 ASSAY OF LACTIC ACID: CPT | Performed by: INTERNAL MEDICINE

## 2024-08-05 PROCEDURE — 99284 EMERGENCY DEPT VISIT MOD MDM: CPT | Performed by: INTERNAL MEDICINE

## 2024-08-05 PROCEDURE — 85025 COMPLETE CBC W/AUTO DIFF WBC: CPT | Performed by: INTERNAL MEDICINE

## 2024-08-05 PROCEDURE — 80053 COMPREHEN METABOLIC PANEL: CPT | Performed by: INTERNAL MEDICINE

## 2024-08-05 PROCEDURE — 93005 ELECTROCARDIOGRAM TRACING: CPT

## 2024-08-05 PROCEDURE — 84484 ASSAY OF TROPONIN QUANT: CPT | Performed by: INTERNAL MEDICINE

## 2024-08-05 PROCEDURE — 96366 THER/PROPH/DIAG IV INF ADDON: CPT

## 2024-08-05 PROCEDURE — 85610 PROTHROMBIN TIME: CPT | Performed by: INTERNAL MEDICINE

## 2024-08-05 PROCEDURE — 71045 X-RAY EXAM CHEST 1 VIEW: CPT

## 2024-08-05 PROCEDURE — 36415 COLL VENOUS BLD VENIPUNCTURE: CPT | Performed by: INTERNAL MEDICINE

## 2024-08-05 PROCEDURE — 84145 PROCALCITONIN (PCT): CPT | Performed by: INTERNAL MEDICINE

## 2024-08-05 PROCEDURE — 85730 THROMBOPLASTIN TIME PARTIAL: CPT | Performed by: INTERNAL MEDICINE

## 2024-08-05 PROCEDURE — 96372 THER/PROPH/DIAG INJ SC/IM: CPT

## 2024-08-05 PROCEDURE — 81003 URINALYSIS AUTO W/O SCOPE: CPT | Performed by: INTERNAL MEDICINE

## 2024-08-05 PROCEDURE — 96365 THER/PROPH/DIAG IV INF INIT: CPT

## 2024-08-05 PROCEDURE — 99284 EMERGENCY DEPT VISIT MOD MDM: CPT

## 2024-08-05 RX ORDER — HALOPERIDOL 5 MG/ML
5 INJECTION INTRAMUSCULAR ONCE
Status: COMPLETED | OUTPATIENT
Start: 2024-08-05 | End: 2024-08-05

## 2024-08-05 RX ORDER — CLONAZEPAM 1 MG/1
1 TABLET ORAL EVERY 6 HOURS
Status: ON HOLD | COMMUNITY

## 2024-08-05 RX ORDER — HALOPERIDOL 2 MG/1
2 TABLET ORAL 3 TIMES DAILY
Status: ON HOLD | COMMUNITY

## 2024-08-05 RX ORDER — CLONAZEPAM 1 MG/1
1 TABLET ORAL ONCE
Status: COMPLETED | OUTPATIENT
Start: 2024-08-05 | End: 2024-08-05

## 2024-08-05 RX ADMIN — HALOPERIDOL LACTATE 5 MG: 5 INJECTION, SOLUTION INTRAMUSCULAR at 12:38

## 2024-08-05 RX ADMIN — CLONAZEPAM 1 MG: 1 TABLET ORAL at 15:47

## 2024-08-05 RX ADMIN — SODIUM CHLORIDE, SODIUM LACTATE, POTASSIUM CHLORIDE, AND CALCIUM CHLORIDE 1000 ML: .6; .31; .03; .02 INJECTION, SOLUTION INTRAVENOUS at 12:19

## 2024-08-05 NOTE — ED NOTES
This RN attempted to call Nurse at [facility] to report medications given while in ED (haldol and clonazepam). Attempted 3x, disconnected twice and other call was sent to a . Unable to relay to [facility].     Jacque Stacy RN  08/05/24 [id number]

## 2024-08-05 NOTE — ED PROVIDER NOTES
History  Chief Complaint   Patient presents with    Failure To Thrive     Arrives via EMS from SNF for increased lethargy, fatigue, generalized weakness since COVID diagnosis 3 weeks ago, confused at baseline     This is a 78 years old was sent from nursing home for failure to thrive.  Patient has generalized weakness and she does not eat or drink enough.  According to the nursing home staff she has increased of lethargy.  Patient has no fever and patient is severely demented cannot communicate with us.  Patient used to be on Haldol 2 mg and Klonopin and it was held from her for the last few days.  At the ER patient has periods Of agitation.  Patient had history of cancer breast and has right mastectomy.  Patient has history of ADHD, anxiety, COPD, depression, kidney stones.  Patient is full code        Prior to Admission Medications   Prescriptions Last Dose Informant Patient Reported? Taking?   ADVAIR DISKUS 250-50 MCG/DOSE inhaler  Outside Facility (Specify) Yes No   Sig: Inhale 1 puff 2 (two) times a day    PROAIR  (90 Base) MCG/ACT inhaler  Outside Facility (Specify) Yes No   Sig: Inhale 2 puffs every 4 (four) hours as needed for wheezing or shortness of breath    acetaminophen (TYLENOL) 325 mg tablet   Yes No   Sig: Take 650 mg by mouth every 4 (four) hours as needed for mild pain   cholecalciferol (VITAMIN D3) 1,000 units tablet   Yes No   Sig: Take 1,000 Units by mouth daily   clonazePAM (KlonoPIN) 1 mg tablet   Yes Yes   Sig: Take 1 mg by mouth every 6 (six) hours   haloperidol (HALDOL) 2 mg tablet   Yes Yes   Sig: Take 2 mg by mouth 3 (three) times a day   lidocaine (LIDODERM) 5 %   No No   Sig: Apply 1 patch topically daily Remove & Discard patch within 12 hours or as directed by MD   magnesium hydroxide (MILK OF MAGNESIA) 400 mg/5 mL oral suspension  Outside Facility (Specify) Yes No   Sig: Take by mouth daily as needed for constipation   risperiDONE (RisperDAL) 1 mg tablet   No No   Sig: Take 1  tablet (1 mg total) by mouth daily at bedtime   senna (SENOKOT) 8.6 MG tablet   Yes No   Sig: Take 2 tablets by mouth daily at bedtime   sertraline (ZOLOFT) 50 mg tablet   No No   Sig: Take 1 tablet (50 mg total) by mouth daily      Facility-Administered Medications: None       Past Medical History:   Diagnosis Date    ADHD     Anxiety disorder     Asthma     controlled    Bilateral sacroiliitis (HCC)     Cancer (HCC)     breast    COPD (chronic obstructive pulmonary disease) (HCC)     Depression     History of fall     History of prediabetes     Hypertension     Iron deficiency anemia     Kidney stone     Myofascial pain     Osteoporosis     S/P lumbar spinal fusion        Past Surgical History:   Procedure Laterality Date    BACK SURGERY      injection    CERVICAL SPINE SURGERY      Multilevel posterior cervical decompression / fusion    COLONOSCOPY W/ BIOPSIES      HERNIA REPAIR      HYSTERECTOMY      IR EPIDURAL STEROID INJECTION  2014    IR EPIDURAL STEROID INJECTION  10/30/2017    IR EPIDURAL STEROID INJECTION  10/11/2017    MASTECTOMY Right     NECK SURGERY      ME ARTHRODESIS COMBINED TQ 1NTRSPC LUMBAR N/A 2019    Procedure: Left L4-5 hemilaminectomy and bilateral foraminotomy and Minimally invasive transforaminal lumbar interbody fusion with pedicle screw and myke fixation fusion L4-5, right-sided approach; minimally invasive right L5-S1 foraminotomy and diskectomy;  Surgeon: Adilson Urias MD;  Location: [facility];  Service: Neurosurgery    TONSILLECTOMY         Family History   Problem Relation Age of Onset    Diabetes Mother     Heart disease Mother          80's    No Known Problems Father     Heart disease Maternal Grandmother      I have reviewed and agree with the history as documented.    E-Cigarette/Vaping    E-Cigarette Use Never User      E-Cigarette/Vaping Substances    Nicotine No     THC No     CBD No     Flavoring No     Other No     Unknown No      Social  History     Tobacco Use    Smoking status: Former     Current packs/day: 0.50     Average packs/day: 0.5 packs/day for 55.0 years (27.5 ttl pk-yrs)     Types: Cigarettes    Smokeless tobacco: Never   Vaping Use    Vaping status: Never Used   Substance Use Topics    Alcohol use: Not Currently    Drug use: No     Comment: former marijuanna use per jen       Review of Systems   Unable to perform ROS: Dementia       Physical Exam  Physical Exam  Vitals and nursing note reviewed.   Constitutional:       General: She is not in acute distress.     Appearance: She is well-developed. She is not ill-appearing, toxic-appearing or diaphoretic.   HENT:      Head: Normocephalic and atraumatic.      Right Ear: Ear canal normal.      Left Ear: Ear canal normal.      Nose: Nose normal. No congestion or rhinorrhea.      Mouth/Throat:      Mouth: Mucous membranes are moist.      Pharynx: No oropharyngeal exudate or posterior oropharyngeal erythema.   Eyes:      General: No scleral icterus.        Right eye: No discharge.         Left eye: No discharge.      Extraocular Movements: Extraocular movements intact.      Conjunctiva/sclera: Conjunctivae normal.      Pupils: Pupils are equal, round, and reactive to light.   Neck:      Vascular: No carotid bruit.   Cardiovascular:      Rate and Rhythm: Normal rate and regular rhythm.      Heart sounds: No murmur heard.     No friction rub. No gallop.   Pulmonary:      Effort: Pulmonary effort is normal. No respiratory distress.      Breath sounds: Normal breath sounds. No wheezing, rhonchi or rales.   Chest:      Chest wall: No tenderness.   Abdominal:      General: Abdomen is flat. There is no distension.      Palpations: Abdomen is soft. There is no mass.      Tenderness: There is no abdominal tenderness. There is no right CVA tenderness, left CVA tenderness, guarding or rebound.   Musculoskeletal:         General: No swelling, tenderness, deformity or signs of injury.      Cervical back:  Normal range of motion and neck supple. No rigidity or tenderness.      Right lower leg: No edema.      Left lower leg: No edema.   Lymphadenopathy:      Cervical: No cervical adenopathy.   Skin:     General: Skin is warm and dry.      Capillary Refill: Capillary refill takes less than 2 seconds.      Coloration: Skin is not jaundiced or pale.      Findings: No bruising, erythema, lesion or rash.   Neurological:      Mental Status: She is alert.      Comments: Severely demented , can not communicate   Psychiatric:         Mood and Affect: Mood normal.         Vital Signs  ED Triage Vitals   Temperature Pulse Respirations Blood Pressure SpO2   08/05/24 1200 08/05/24 1140 08/05/24 1140 08/05/24 1140 08/05/24 1140   98.8 °F (37.1 °C) 78 18 130/73 95 %      Temp Source Heart Rate Source Patient Position - Orthostatic VS BP Location FiO2 (%)   08/05/24 1200 08/05/24 1140 08/05/24 1140 08/05/24 1140 --   Rectal Monitor Lying Left arm       Pain Score       --                  Vitals:    08/05/24 1140 08/05/24 1245 08/05/24 1415 08/05/24 1558   BP: 130/73  153/65 150/60   Pulse: 78 81 94 88   Patient Position - Orthostatic VS: Lying  Lying          Visual Acuity  Visual Acuity      Flowsheet Row Most Recent Value   L Pupil Size (mm) 3   R Pupil Size (mm) 3            ED Medications  Medications   lactated ringers bolus 1,000 mL (0 mL Intravenous Stopped 8/5/24 1525)   haloperidol lactate (HALDOL) injection 5 mg (5 mg Intramuscular Given 8/5/24 1238)   clonazePAM (KlonoPIN) tablet 1 mg (1 mg Oral Given 8/5/24 1547)       Diagnostic Studies  Results Reviewed       Procedure Component Value Units Date/Time    HS Troponin I 2hr [[id number]]  (Normal) Collected: 08/05/24 1412    Lab Status: Final result Specimen: Blood from Arm, Left Updated: 08/05/24 1449     hs TnI 2hr 3 ng/L      Delta 2hr hsTnI 0 ng/L     Procalcitonin [[id number]]  (Normal) Collected: 08/05/24 1207    Lab Status: Final result Specimen: Blood from Arm, Left  Updated: 08/05/24 1243     Procalcitonin 0.06 ng/ml     HS Troponin 0hr (reflex protocol) [[id number]]  (Normal) Collected: 08/05/24 1207    Lab Status: Final result Specimen: Blood from Arm, Left Updated: 08/05/24 1240     hs TnI 0hr 3 ng/L     Comprehensive metabolic panel [[id number]]  (Abnormal) Collected: 08/05/24 1207    Lab Status: Final result Specimen: Blood from Arm, Left Updated: 08/05/24 1238     Sodium 146 mmol/L      Potassium 4.0 mmol/L      Chloride 109 mmol/L      CO2 27 mmol/L      ANION GAP 10 mmol/L      BUN 20 mg/dL      Creatinine 0.70 mg/dL      Glucose 116 mg/dL      Calcium 9.8 mg/dL      AST 15 U/L      ALT 16 U/L      Alkaline Phosphatase 52 U/L      Total Protein 7.1 g/dL      Albumin 4.2 g/dL      Total Bilirubin 0.54 mg/dL      eGFR 84 ml/min/1.73sq m     Narrative:      National Kid[employer] Foundation guidelines for Chronic Kidney Disease (CKD):     Stage 1 with normal or high GFR (GFR > 90 mL/min/1.73 square meters)    Stage 2 Mild CKD (GFR = 60-89 mL/min/1.73 square meters)    Stage 3A Moderate CKD (GFR = 45-59 mL/min/1.73 square meters)    Stage 3B Moderate CKD (GFR = 30-44 mL/min/1.73 square meters)    Stage 4 Severe CKD (GFR = 15-29 mL/min/1.73 square meters)    Stage 5 End Stage CKD (GFR <15 mL/min/1.73 square meters)  Note: GFR calculation is accurate only with a steady state creatinine    Lactic acid [[id number]]  (Normal) Collected: 08/05/24 1207    Lab Status: Final result Specimen: Blood from Arm, Left Updated: 08/05/24 1236     LACTIC ACID 1.0 mmol/L     Narrative:      Result may be elevated if tourniquet was used during collection.    UA w Reflex to Microscopic w Reflex to Culture [[id number]]  (Abnormal) Collected: 08/05/24 1217    Lab Status: Final result Specimen: Urine, Straight Cath Updated: 08/05/24 1226     Color, UA Yellow     Clarity, UA Cloudy     Specific Gravity, UA >=1.030     pH, UA 6.0     Leukocytes, UA Negative     Nitrite, UA Negative     Protein, UA  Negative mg/dl      Glucose, UA Negative mg/dl      Ketones, UA Negative mg/dl      Urobilinogen, UA 0.2 E.U./dl      Bilirubin, UA Negative     Occult Blood, UA Negative    Protime-INR [[id number]]  (Normal) Collected: 08/05/24 1207    Lab Status: Final result Specimen: Blood from Arm, Left Updated: 08/05/24 1226     Protime 14.1 seconds      INR 1.05    Narrative:      INR Therapeutic Range    Indication                                             INR Range      Atrial Fibrillation                                               2.0-3.0  Hypercoagulable State                                    2.0.2.3  Left Ventricular Asist Device                            2.0-3.0  Mechanical Heart Valve                                  -    Aortic(with afib, MI, embolism, HF, LA enlargement,    and/or coagulopathy)                                     2.0-3.0 (2.5-3.5)     Mitral                                                             2.5-3.5  Prosthetic/Bioprosthetic Heart Valve               2.0-3.0  Venous thromboembolism (VTE: VT, PE        2.0-3.0    APTT [[id number]]  (Normal) Collected: 08/05/24 1207    Lab Status: Final result Specimen: Blood from Arm, Left Updated: 08/05/24 1226     PTT 31 seconds     CBC and differential [[id number]]  (Abnormal) Collected: 08/05/24 1207    Lab Status: Final result Specimen: Blood from Arm, Left Updated: 08/05/24 1217     WBC 3.57 Thousand/uL      RBC 4.44 Million/uL      Hemoglobin 13.9 g/dL      Hematocrit 43.3 %      MCV 98 fL      MCH 31.3 pg      MCHC 32.1 g/dL      RDW 12.9 %      MPV 9.8 fL      Platelets 212 Thousands/uL      nRBC 0 /100 WBCs      Segmented % 46 %      Immature Grans % 0 %      Lymphocytes % 36 %      Monocytes % 11 %      Eosinophils Relative 6 %      Basophils Relative 1 %      Absolute Neutrophils 1.63 Thousands/µL      Absolute Immature Grans 0.00 Thousand/uL      Absolute Lymphocytes 1.27 Thousands/µL      Absolute Monocytes 0.40 Thousand/µL      Eosinophils  Absolute 0.23 Thousand/µL      Basophils Absolute 0.04 Thousands/µL                    XR chest portable - 1 view   ED Interpretation by Luis Berger MD (08/05 5723)   CXR; NO acute cardiopulmonary findings.       Final Result by Luther Bustamante MD (08/05 8596)      No acute cardiopulmonary disease.            Resident: Nisha Shoemaker I, the attending radiologist, have reviewed the images and agree with the final report above.      Workstation performed: [id number]                    Procedures  ECG 12 Lead Documentation Only    Date/Time: 8/5/2024 12:43 PM    Performed by: Luis Berger MD  Authorized by: Luis Berger MD    Indications / Diagnosis:  Weakness  ECG reviewed by me, the ED Provider: yes    Patient location:  ED and bedside  Previous ECG:     Previous ECG:  Compared to current    Similarity:  Changes noted  Interpretation:     Interpretation: abnormal    Rate:     ECG rate:  76    ECG rate assessment: normal    Rhythm:     Rhythm: sinus rhythm    Ectopy:     Ectopy: PVCs    QRS:     QRS axis:  Left  Conduction:     Conduction: normal    ST segments:     ST segments:  Normal  T waves:     T waves: normal             ED Course                                               Medical Decision Making  This is a 78 years old was sent from nursing home for failure to thrive.  There is increased lethargy.  Patient does not eat or drink.  Patient is full code.  Patient has severe dementia and does not communicate.  Patient has history of cancer breast and right mastectomy, asthma, kidney stone, ADHD, depression anxiety, COPD.  Patient is on haldol and Klonopin daily and it was not given to her for few days.  Patient is full code.  Physical exam shows no pertinent positive findings.  Chest; CTA, no rales no wheezing no rhonchi.  Heart; regular no gallop no murmur..  EKG NSR rate 76, PVCs, left axis deviation.  CXR no acute cardiopulmonary findings.  CBC, CMP are within normal limits.   Lactic acid is 1.0 .  Procalcitonin 0.06.  Troponin is 3, second troponin is 3.  Physical exam shows no pertinent positive findings.  Labs are within normal limits.  Tested for possible sepsis including lactic acid, procalcitonin, WBCs all came back within normal limits.  UA shows no infection.  CXR shows no infiltrate.  At this time regarding discharge patient back to the nursing home.    Amount and/or Complexity of Data Reviewed  Labs: ordered.     Details: CBC, CMP are within normal limits.  Lactic acid is 1.0 .  Procalcitonin 0.06.     Radiology: ordered and independent interpretation performed.     Details: CXR; NO acute cardiopulmonary findings.   ECG/medicine tests: ordered and independent interpretation performed.     Details: EKG; SINUS  rhythm rate 76/min, PVC, no ischemic changes    Risk  Prescription drug management.                 Disposition  Final diagnoses:   Weakness   Dementia (HCC)     Time reflects when diagnosis was documented in both MDM as applicable and the Disposition within this note       Time User Action Codes Description Comment    8/5/2024  3:14 PM Luis Berger Add [R53.1] Weakness     8/5/2024  3:14 PM Luis Berger Add [F03.90] Dementia (HCC)           ED Disposition       ED Disposition   Discharge    Condition   Stable    Date/Time   Mon Aug 5, 2024  3:14 PM    Comment   Kylie Ornelas discharge to home/self care.                   Follow-up Information       Follow up With Specialties Details Why Contact Info    Marcella Schmid MD Internal Medicine In 2 days  [facility] [address] [address]  [phone]              Discharge Medication List as of 8/5/2024  3:15 PM        CONTINUE these medications which have NOT CHANGED    Details   clonazePAM (KlonoPIN) 1 mg tablet Take 1 mg by mouth every 6 (six) hours, Historical Med      haloperidol (HALDOL) 2 mg tablet Take 2 mg by mouth 3 (three) times a day, Historical Med      acetaminophen (TYLENOL)  325 mg tablet Take 650 mg by mouth every 4 (four) hours as needed for mild pain, Historical Med      ADVAIR DISKUS 250-50 MCG/DOSE inhaler Inhale 1 puff 2 (two) times a day , Starting Wed 2/20/2019, Historical Med      cholecalciferol (VITAMIN D3) 1,000 units tablet Take 1,000 Units by mouth daily, Historical Med      lidocaine (LIDODERM) 5 % Apply 1 patch topically daily Remove & Discard patch within 12 hours or as directed by MD, Starting Tue 12/10/2019, Print      magnesium hydroxide (MILK OF MAGNESIA) 400 mg/5 mL oral suspension Take by mouth daily as needed for constipation, Historical Med      PROAIR  (90 Base) MCG/ACT inhaler Inhale 2 puffs every 4 (four) hours as needed for wheezing or shortness of breath , Starting Wed 2/20/2019, Historical Med      risperiDONE (RisperDAL) 1 mg tablet Take 1 tablet (1 mg total) by mouth daily at bedtime, Starting Mon 12/9/2019, Print      senna (SENOKOT) 8.6 MG tablet Take 2 tablets by mouth daily at bedtime, Historical Med      sertraline (ZOLOFT) 50 mg tablet Take 1 tablet (50 mg total) by mouth daily, Starting Tue 12/10/2019, Print             No discharge procedures on file.    PDMP Review       None            ED Provider  Electronically Signed by             Luis Berger MD  08/05/24 2026

## 2024-08-05 NOTE — DISCHARGE INSTRUCTIONS
Follow up with your PMD.  Labs Reviewed   CBC AND DIFFERENTIAL - Abnormal       Result Value Ref Range Status    WBC 3.57 (*) 4.31 - 10.16 Thousand/uL Final    RBC 4.44  3.81 - 5.12 Million/uL Final    Hemoglobin 13.9  11.5 - 15.4 g/dL Final    Hematocrit 43.3  34.8 - 46.1 % Final    MCV 98  82 - 98 fL Final    MCH 31.3  26.8 - 34.3 pg Final    MCHC 32.1  31.4 - 37.4 g/dL Final    RDW 12.9  11.6 - 15.1 % Final    MPV 9.8  8.9 - 12.7 fL Final    Platelets 212  149 - 390 Thousands/uL Final    nRBC 0  /100 WBCs Final    Segmented % 46  43 - 75 % Final    Immature Grans % 0  0 - 2 % Final    Lymphocytes % 36  14 - 44 % Final    Monocytes % 11  4 - 12 % Final    Eosinophils Relative 6  0 - 6 % Final    Basophils Relative 1  0 - 1 % Final    Absolute Neutrophils 1.63 (*) 1.85 - 7.62 Thousands/µL Final    Absolute Immature Grans 0.00  0.00 - 0.20 Thousand/uL Final    Absolute Lymphocytes 1.27  0.60 - 4.47 Thousands/µL Final    Absolute Monocytes 0.40  0.17 - 1.22 Thousand/µL Final    Eosinophils Absolute 0.23  0.00 - 0.61 Thousand/µL Final    Basophils Absolute 0.04  0.00 - 0.10 Thousands/µL Final   COMPREHENSIVE METABOLIC PANEL - Abnormal    Sodium 146  135 - 147 mmol/L Final    Potassium 4.0  3.5 - 5.3 mmol/L Final    Chloride 109 (*) 96 - 108 mmol/L Final    CO2 27  21 - 32 mmol/L Final    ANION GAP 10  4 - 13 mmol/L Final    BUN 20  5 - 25 mg/dL Final    Creatinine 0.70  0.60 - 1.30 mg/dL Final    Comment: Standardized to IDMS reference method    Glucose 116  65 - 140 mg/dL Final    Comment: If the patient is fasting, the ADA then defines impaired fasting glucose as > 100 mg/dL and diabetes as > or equal to 123 mg/dL.    Calcium 9.8  8.4 - 10.2 mg/dL Final    AST 15  13 - 39 U/L Final    ALT 16  7 - 52 U/L Final    Comment: Specimen collection should occur prior to Sulfasalazine administration due to the potential for falsely depressed results.     Alkaline Phosphatase 52  34 - 104 U/L Final    Total Protein 7.1  6.4 -  8.4 g/dL Final    Albumin 4.2  3.5 - 5.0 g/dL Final    Total Bilirubin 0.54  0.20 - 1.00 mg/dL Final    Comment: Use of this assay is not recommended for patients undergoing treatment with eltrombopag due to the potential for falsely elevated results.  N-acetyl-p-benzoquinone imine (metabolite of Acetaminophen) will generate erroneously low results in samples for patients that have taken an overdose of Acetaminophen.    eGFR 84  ml/min/1.73sq m Final    Narrative:     National Kidney Disease Foundation guidelines for Chronic Kidney Disease (CKD):     Stage 1 with normal or high GFR (GFR > 90 mL/min/1.73 square meters)    Stage 2 Mild CKD (GFR = 60-89 mL/min/1.73 square meters)    Stage 3A Moderate CKD (GFR = 45-59 mL/min/1.73 square meters)    Stage 3B Moderate CKD (GFR = 30-44 mL/min/1.73 square meters)    Stage 4 Severe CKD (GFR = 15-29 mL/min/1.73 square meters)    Stage 5 End Stage CKD (GFR <15 mL/min/1.73 square meters)  Note: GFR calculation is accurate only with a steady state creatinine   UA W REFLEX TO MICROSCOPIC WITH REFLEX TO CULTURE - Abnormal    Color, UA Yellow  Yellow Final    Clarity, UA Cloudy (*) Clear Final    Specific Gravity, UA >=1.030  1.001 - 1.030 Final    pH, UA 6.0  5.0, 5.5, 6.0, 6.5, 7.0, 7.5, 8.0 Final    Leukocytes, UA Negative  Negative Final    Nitrite, UA Negative  Negative Final    Protein, UA Negative  Negative, Interference- unable to analyze mg/dl Final    Glucose, UA Negative  Negative mg/dl Final    Ketones, UA Negative  Negative mg/dl Final    Urobilinogen, UA 0.2  0.2, 1.0 E.U./dl E.U./dl Final    Bilirubin, UA Negative  Negative Final    Occult Blood, UA Negative  Negative Final   LACTIC ACID, PLASMA (W/REFLEX IF RESULT > 2.0) - Normal    LACTIC ACID 1.0  0.5 - 2.0 mmol/L Final    Narrative:     Result may be elevated if tourniquet was used during collection.   PROCALCITONIN TEST - Normal    Procalcitonin 0.06  <=0.25 ng/ml Final    Comment: Suspected Lower Respiratory  Tract Infection (LRTI):  - LESS than or EQUAL to 0.25 ng/mL:   low likelihood for bacterial LRTI; antibiotics DISCOURAGED.  - GREATER than 0.25 ng/mL:   increased likelihood for bacterial LRTI; antibiotics ENCOURAGED.    Suspected Sepsis:  - Strongly consider initiating antibiotics in ALL UNSTABLE patients.  - LESS than or EQUAL to 0.5 ng/mL:   low likelihood for bacterial sepsis; antibiotics DISCOURAGED.  - GREATER than 0.5 ng/mL:   increased likelihood for bacterial sepsis; antibiotics ENCOURAGED.  - GREATER than 2 ng/mL:   high risk for severe sepsis / septic shock; antibiotics strongly ENCOURAGED.    Decisions on antibiotic use should not be based solely on Procalcitonin (PCT) levels. If PCT is low but uncertainty exists with stopping antibiotics, repeat PCT in 6-24 hours to confirm the low level. If antibiotics are administered (regardless if initial PCT was high or low), repeat PCT every 1-2 days to consider early antibiotic cessation (when GREATER than 80% decrease from the peak OR when PCT drops below designated cutoffs, whichever comes first), so long as the infection is NOT one that typically requires prolonged treatment durations (e.g., bone/joint infections, endocarditis, Staph. aureus bacteremia).    Situations of FALSE-POSITIVE Procalcitonin values:  1) Newborns < 72 hours old  2) Massive stress from severe trauma / burns, major surgery, acute pancreatitis, cardiogenic / hemorrhagic shock, sickle cell crisis, or other organ perfusion abnormalities  3) Malaria and some Candidal infections  4) Treatment with agents that stimulate cytokines (e.g., OKT3, anti-lymphocyte globulins, alemtuzumab, IL-2, granulocyte transfusion [NOT GCSFs])  5) Chronic renal disease causes elevated baseline levels (consider GREATER than 0.75 ng/mL as an abnormal cut-off); initiating HD/CRRT may cause transient decreases  6) Paraneoplastic syndromes from medullary thyroid or SCLC, some forms of vasculitis, and acute  "[id number]-dxwa disease    Situations of FALSE-NEGATIVE Procalcitonin values:  1) Too early in clinical course for PCT to have reached its peak (may repeat in 6-24 hours to confirm low level)  2) Localized infection WITHOUT systemic (SIRS / sepsis) response (e.g., an abscess, osteomyelitis, cystitis)  3) Mycobacteria (e.g., Tuberculosis, MAC)  4) Cystic fibrosis exacerbations     PROTIME-INR - Normal    Protime 14.1  12.3 - 15.0 seconds Final    INR 1.05  0.85 - 1.19 Final    Narrative:     INR Therapeutic Range    Indication                                             INR Range      Atrial Fibrillation                                               2.0-3.0  Hypercoagulable State                                    2.0.2.3  Left Ventricular Asist Device                            2.0-3.0  Mechanical Heart Valve                                  -    Aortic(with afib, MI, embolism, HF, LA enlargement,    and/or coagulopathy)                                     2.0-3.0 (2.5-3.5)     Mitral                                                             2.5-3.5  Prosthetic/Bioprosthetic Heart Valve               2.0-3.0  Venous thromboembolism (VTE: VT, PE        2.0-3.0   APTT - Normal    PTT 31  23 - 34 seconds Final    Comment: Therapeutic Heparin Range =  60-90 seconds   HS TROPONIN I 0HR - Normal    hs TnI 0hr 3  \"Refer to ACS Flowchart\"- see link ng/L Final    Comment:                                              Initial (time 0) result  If >=50 ng/L, Myocardial injury suggested ;  Type of myocardial injury and treatment strategy  to be determined.  If 5-49 ng/L, a delta result at 2 hours and or 4 hours will be needed to further evaluate.  If <4 ng/L, and chest pain has been >3 hours since onset, patient may qualify for discharge based on the HEART score in the ED.  If <5 ng/L and <3hours since onset of chest pain, a delta result at 2 hours will be needed to further evaluate.    HS Troponin 99th Percentile URL of a Health " "Population=12 ng/L with a 95% Confidence Interval of 8-18 ng/L.    Second Troponin (time 2 hours)  If calculated delta >= 20 ng/L,  Myocardial injury suggested ; Type of myocardial injury and treatment strategy to be determined.  If 5-49 ng/L and the calculated delta is 5-19 ng/L, consult medical service for evaluation.  Continue evaluation for ischemia on ecg and other possible etiology and repeat hs troponin at 4 hours.  If delta is <5 ng/L at 2 hours, consider discharge based on risk stratification via the HEART score (if in ED), or DEZ risk score in IP/Observation.    HS Troponin 99th Percentile URL of a Health Population=12 ng/L with a 95% Confidence Interval of 8-18 ng/L.   HS TROPONIN I 2HR - Normal    hs TnI 2hr 3  \"Refer to ACS Flowchart\"- see link ng/L Final    Comment:                                              Initial (time 0) result  If >=50 ng/L, Myocardial injury suggested ;  Type of myocardial injury and treatment strategy  to be determined.  If 5-49 ng/L, a delta result at 2 hours and or 4 hours will be needed to further evaluate.  If <4 ng/L, and chest pain has been >3 hours since onset, patient may qualify for discharge based on the HEART score in the ED.  If <5 ng/L and <3hours since onset of chest pain, a delta result at 2 hours will be needed to further evaluate.    HS Troponin 99th Percentile URL of a Health Population=12 ng/L with a 95% Confidence Interval of 8-18 ng/L.    Second Troponin (time 2 hours)  If calculated delta >= 20 ng/L,  Myocardial injury suggested ; Type of myocardial injury and treatment strategy to be determined.  If 5-49 ng/L and the calculated delta is 5-19 ng/L, consult medical service for evaluation.  Continue evaluation for ischemia on ecg and other possible etiology and repeat hs troponin at 4 hours.  If delta is <5 ng/L at 2 hours, consider discharge based on risk stratification via the HEART score (if in ED), or DEZ risk score in IP/Observation.    HS Troponin " 99th Percentile URL of a Health Population=12 ng/L with a 95% Confidence Interval of 8-18 ng/L.    Delta 2hr hsTnI 0  <20 ng/L Final   HS TROPONIN I 4HR     XR chest portable - 1 view   ED Interpretation   CXR; NO acute cardiopulmonary findings.       Final Result      No acute cardiopulmonary disease.            Resident: Nisha Shoemaker I, the attending radiologist, have reviewed the images and agree with the final report above.      Workstation performed: [id number]

## 2024-08-06 LAB
ATRIAL RATE: 76 BPM
P AXIS: 62 DEGREES
PR INTERVAL: 162 MS
QRS AXIS: -32 DEGREES
QRSD INTERVAL: 102 MS
QT INTERVAL: 398 MS
QTC INTERVAL: 447 MS
T WAVE AXIS: 74 DEGREES
VENTRICULAR RATE: 76 BPM

## 2024-08-06 PROCEDURE — 93010 ELECTROCARDIOGRAM REPORT: CPT | Performed by: INTERNAL MEDICINE

## 2024-08-16 ENCOUNTER — APPOINTMENT (EMERGENCY)
Dept: RADIOLOGY | Facility: HOSPITAL | Age: 79
DRG: 522 | End: 2024-08-16
Payer: MEDICARE

## 2024-08-16 ENCOUNTER — APPOINTMENT (EMERGENCY)
Dept: CT IMAGING | Facility: HOSPITAL | Age: 79
DRG: 522 | End: 2024-08-16
Payer: MEDICARE

## 2024-08-16 ENCOUNTER — HOSPITAL ENCOUNTER (INPATIENT)
Facility: HOSPITAL | Age: 79
LOS: 4 days | Discharge: NURSING, REHAB OR LONG-TERM CARE | DRG: 522 | End: 2024-08-20
Attending: EMERGENCY MEDICINE | Admitting: SURGERY
Payer: MEDICARE
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
## 2024-08-16 DIAGNOSIS — R73.9 HYPERGLYCEMIA: ICD-10-CM

## 2024-08-16 DIAGNOSIS — E87.0 HYPERNATREMIA: ICD-10-CM

## 2024-08-16 DIAGNOSIS — S72.001A CLOSED FRACTURE OF RIGHT HIP, INITIAL ENCOUNTER (HCC): ICD-10-CM

## 2024-08-16 DIAGNOSIS — S72.001A CLOSED DISPLACED FRACTURE OF RIGHT FEMORAL NECK (HCC): ICD-10-CM

## 2024-08-16 DIAGNOSIS — F03.C18 SEVERE DEMENTIA WITH OTHER BEHAVIORAL DISTURBANCE, UNSPECIFIED DEMENTIA TYPE (HCC): ICD-10-CM

## 2024-08-16 LAB
ABO GROUP BLD: NORMAL
ALBUMIN SERPL BCG-MCNC: 3.8 G/DL (ref 3.5–5)
ALP SERPL-CCNC: 81 U/L (ref 34–104)
ALT SERPL W P-5'-P-CCNC: 29 U/L (ref 7–52)
ANION GAP SERPL CALCULATED.3IONS-SCNC: 10 MMOL/L (ref 4–13)
ANION GAP SERPL CALCULATED.3IONS-SCNC: 8 MMOL/L (ref 4–13)
AST SERPL W P-5'-P-CCNC: 13 U/L (ref 13–39)
BASOPHILS # BLD AUTO: 0.06 THOUSANDS/ÂΜL (ref 0–0.1)
BASOPHILS NFR BLD AUTO: 1 % (ref 0–1)
BILIRUB SERPL-MCNC: 0.4 MG/DL (ref 0.2–1)
BLD GP AB SCN SERPL QL: NEGATIVE
BUN SERPL-MCNC: 29 MG/DL (ref 5–25)
BUN SERPL-MCNC: 32 MG/DL (ref 5–25)
CALCIUM SERPL-MCNC: 8.8 MG/DL (ref 8.4–10.2)
CALCIUM SERPL-MCNC: 9.3 MG/DL (ref 8.4–10.2)
CHLORIDE SERPL-SCNC: 116 MMOL/L (ref 96–108)
CHLORIDE SERPL-SCNC: 118 MMOL/L (ref 96–108)
CO2 SERPL-SCNC: 24 MMOL/L (ref 21–32)
CO2 SERPL-SCNC: 26 MMOL/L (ref 21–32)
CREAT SERPL-MCNC: 0.69 MG/DL (ref 0.6–1.3)
CREAT SERPL-MCNC: 0.87 MG/DL (ref 0.6–1.3)
EOSINOPHIL # BLD AUTO: 0.32 THOUSAND/ÂΜL (ref 0–0.61)
EOSINOPHIL NFR BLD AUTO: 3 % (ref 0–6)
ERYTHROCYTE [DISTWIDTH] IN BLOOD BY AUTOMATED COUNT: 13.1 % (ref 11.6–15.1)
GFR SERPL CREATININE-BSD FRML MDRD: 64 ML/MIN/1.73SQ M
GFR SERPL CREATININE-BSD FRML MDRD: 84 ML/MIN/1.73SQ M
GLUCOSE SERPL-MCNC: 236 MG/DL (ref 65–140)
GLUCOSE SERPL-MCNC: 247 MG/DL (ref 65–140)
GLUCOSE SERPL-MCNC: 329 MG/DL (ref 65–140)
HCT VFR BLD AUTO: 46.3 % (ref 34.8–46.1)
HGB BLD-MCNC: 14.7 G/DL (ref 11.5–15.4)
IMM GRANULOCYTES # BLD AUTO: 0.03 THOUSAND/UL (ref 0–0.2)
IMM GRANULOCYTES NFR BLD AUTO: 0 % (ref 0–2)
LYMPHOCYTES # BLD AUTO: 1.17 THOUSANDS/ÂΜL (ref 0.6–4.47)
LYMPHOCYTES NFR BLD AUTO: 12 % (ref 14–44)
MCH RBC QN AUTO: 31.1 PG (ref 26.8–34.3)
MCHC RBC AUTO-ENTMCNC: 31.7 G/DL (ref 31.4–37.4)
MCV RBC AUTO: 98 FL (ref 82–98)
MONOCYTES # BLD AUTO: 0.88 THOUSAND/ÂΜL (ref 0.17–1.22)
MONOCYTES NFR BLD AUTO: 9 % (ref 4–12)
NEUTROPHILS # BLD AUTO: 7.12 THOUSANDS/ÂΜL (ref 1.85–7.62)
NEUTS SEG NFR BLD AUTO: 75 % (ref 43–75)
NRBC BLD AUTO-RTO: 0 /100 WBCS
PLATELET # BLD AUTO: 373 THOUSANDS/UL (ref 149–390)
PMV BLD AUTO: 10.5 FL (ref 8.9–12.7)
POTASSIUM SERPL-SCNC: 3.7 MMOL/L (ref 3.5–5.3)
POTASSIUM SERPL-SCNC: 3.9 MMOL/L (ref 3.5–5.3)
PROT SERPL-MCNC: 7.2 G/DL (ref 6.4–8.4)
RBC # BLD AUTO: 4.72 MILLION/UL (ref 3.81–5.12)
RH BLD: POSITIVE
SODIUM SERPL-SCNC: 150 MMOL/L (ref 135–147)
SODIUM SERPL-SCNC: 152 MMOL/L (ref 135–147)
SPECIMEN EXPIRATION DATE: NORMAL
WBC # BLD AUTO: 9.58 THOUSAND/UL (ref 4.31–10.16)

## 2024-08-16 PROCEDURE — 36415 COLL VENOUS BLD VENIPUNCTURE: CPT

## 2024-08-16 PROCEDURE — 99223 1ST HOSP IP/OBS HIGH 75: CPT | Performed by: SURGERY

## 2024-08-16 PROCEDURE — 85610 PROTHROMBIN TIME: CPT | Performed by: PHYSICIAN ASSISTANT

## 2024-08-16 PROCEDURE — 70450 CT HEAD/BRAIN W/O DYE: CPT

## 2024-08-16 PROCEDURE — 86900 BLOOD TYPING SEROLOGIC ABO: CPT

## 2024-08-16 PROCEDURE — 96375 TX/PRO/DX INJ NEW DRUG ADDON: CPT

## 2024-08-16 PROCEDURE — 85025 COMPLETE CBC W/AUTO DIFF WBC: CPT

## 2024-08-16 PROCEDURE — 86850 RBC ANTIBODY SCREEN: CPT

## 2024-08-16 PROCEDURE — 73552 X-RAY EXAM OF FEMUR 2/>: CPT

## 2024-08-16 PROCEDURE — 86901 BLOOD TYPING SEROLOGIC RH(D): CPT

## 2024-08-16 PROCEDURE — 72125 CT NECK SPINE W/O DYE: CPT

## 2024-08-16 PROCEDURE — 80048 BASIC METABOLIC PNL TOTAL CA: CPT

## 2024-08-16 PROCEDURE — 80053 COMPREHEN METABOLIC PANEL: CPT

## 2024-08-16 PROCEDURE — 71045 X-RAY EXAM CHEST 1 VIEW: CPT

## 2024-08-16 PROCEDURE — 72170 X-RAY EXAM OF PELVIS: CPT

## 2024-08-16 PROCEDURE — 99285 EMERGENCY DEPT VISIT HI MDM: CPT

## 2024-08-16 PROCEDURE — 82948 REAGENT STRIP/BLOOD GLUCOSE: CPT

## 2024-08-16 PROCEDURE — 96365 THER/PROPH/DIAG IV INF INIT: CPT

## 2024-08-16 PROCEDURE — 99285 EMERGENCY DEPT VISIT HI MDM: CPT | Performed by: EMERGENCY MEDICINE

## 2024-08-16 RX ORDER — ENOXAPARIN SODIUM 100 MG/ML
30 INJECTION SUBCUTANEOUS EVERY 12 HOURS
Status: DISCONTINUED | OUTPATIENT
Start: 2024-08-16 | End: 2024-08-20 | Stop reason: HOSPADM

## 2024-08-16 RX ORDER — SODIUM CHLORIDE, SODIUM GLUCONATE, SODIUM ACETATE, POTASSIUM CHLORIDE, MAGNESIUM CHLORIDE, SODIUM PHOSPHATE, DIBASIC, AND POTASSIUM PHOSPHATE .53; .5; .37; .037; .03; .012; .00082 G/100ML; G/100ML; G/100ML; G/100ML; G/100ML; G/100ML; G/100ML
500 INJECTION, SOLUTION INTRAVENOUS ONCE
Status: COMPLETED | OUTPATIENT
Start: 2024-08-16 | End: 2024-08-17

## 2024-08-16 RX ORDER — ACETAMINOPHEN 160 MG/5ML
650 SUSPENSION ORAL EVERY 4 HOURS PRN
Status: DISCONTINUED | OUTPATIENT
Start: 2024-08-16 | End: 2024-08-20 | Stop reason: HOSPADM

## 2024-08-16 RX ORDER — RISPERIDONE 1 MG/1
1 TABLET ORAL
Status: DISCONTINUED | OUTPATIENT
Start: 2024-08-16 | End: 2024-08-19

## 2024-08-16 RX ORDER — ALBUTEROL SULFATE 90 UG/1
2 AEROSOL, METERED RESPIRATORY (INHALATION) EVERY 4 HOURS PRN
Status: DISCONTINUED | OUTPATIENT
Start: 2024-08-16 | End: 2024-08-20 | Stop reason: HOSPADM

## 2024-08-16 RX ORDER — ONDANSETRON 2 MG/ML
4 INJECTION INTRAMUSCULAR; INTRAVENOUS EVERY 6 HOURS PRN
Status: DISCONTINUED | OUTPATIENT
Start: 2024-08-16 | End: 2024-08-20 | Stop reason: HOSPADM

## 2024-08-16 RX ORDER — FLUTICASONE FUROATE AND VILANTEROL 100; 25 UG/1; UG/1
1 POWDER RESPIRATORY (INHALATION) 2 TIMES DAILY
Status: DISCONTINUED | OUTPATIENT
Start: 2024-08-17 | End: 2024-08-20 | Stop reason: HOSPADM

## 2024-08-16 RX ORDER — CLONAZEPAM 1 MG/1
1 TABLET ORAL EVERY 6 HOURS
Status: DISCONTINUED | OUTPATIENT
Start: 2024-08-16 | End: 2024-08-19

## 2024-08-16 RX ORDER — SENNOSIDES 8.6 MG
2 TABLET ORAL DAILY
Status: DISCONTINUED | OUTPATIENT
Start: 2024-08-17 | End: 2024-08-20 | Stop reason: HOSPADM

## 2024-08-16 RX ORDER — ACETAMINOPHEN 10 MG/ML
1000 INJECTION, SOLUTION INTRAVENOUS ONCE
Status: COMPLETED | OUTPATIENT
Start: 2024-08-16 | End: 2024-08-16

## 2024-08-16 RX ORDER — HALOPERIDOL 1 MG/1
2 TABLET ORAL 3 TIMES DAILY
Status: DISCONTINUED | OUTPATIENT
Start: 2024-08-16 | End: 2024-08-19

## 2024-08-16 RX ORDER — POLYETHYLENE GLYCOL 3350 17 G/17G
17 POWDER, FOR SOLUTION ORAL DAILY
Status: DISCONTINUED | OUTPATIENT
Start: 2024-08-17 | End: 2024-08-20 | Stop reason: HOSPADM

## 2024-08-16 RX ORDER — HYDROMORPHONE HCL IN WATER/PF 6 MG/30 ML
0.2 PATIENT CONTROLLED ANALGESIA SYRINGE INTRAVENOUS EVERY 2 HOUR PRN
Status: DISCONTINUED | OUTPATIENT
Start: 2024-08-16 | End: 2024-08-19

## 2024-08-16 RX ADMIN — ACETAMINOPHEN 1000 MG: 10 INJECTION INTRAVENOUS at 18:31

## 2024-08-16 RX ADMIN — ENOXAPARIN SODIUM 30 MG: 30 INJECTION SUBCUTANEOUS at 22:11

## 2024-08-16 RX ADMIN — TRANEXAMIC ACID 550 MG: 100 INJECTION, SOLUTION INTRAVENOUS at 18:21

## 2024-08-16 RX ADMIN — MORPHINE SULFATE 2 MG: 2 INJECTION, SOLUTION INTRAMUSCULAR; INTRAVENOUS at 17:58

## 2024-08-16 RX ADMIN — SODIUM CHLORIDE, SODIUM GLUCONATE, SODIUM ACETATE, POTASSIUM CHLORIDE, MAGNESIUM CHLORIDE, SODIUM PHOSPHATE, DIBASIC, AND POTASSIUM PHOSPHATE 500 ML: .53; .5; .37; .037; .03; .012; .00082 INJECTION, SOLUTION INTRAVENOUS at 23:41

## 2024-08-16 NOTE — ED PROVIDER NOTES
History  Chief Complaint   Patient presents with    Hip Injury     Arriving via ems from [facility]. Patient had an unwitnessed fall yesterday, unknown head strike, - thinners. Patient has a fracture of the R hip. Hx of dementia.      Kylie is a 78-year-old female with a past medical history of dementia, depression, COPD, asthma, ADHD, anxiety, osteoporosis and hypertension who presents to the emergency department due to an unwitnessed fall at her nursing home, and a right hip fracture.  Patient presents with paperwork that reports that the patient had an unwitnessed fall at her care facility, unknown head strike, negative for thinners.  They did an x-ray at their facility which showed a fracture, however no images were provided.  Patient is nonverbal at this time, unknown if this is patient's baseline.              Prior to Admission Medications   Prescriptions Last Dose Informant Patient Reported? Taking?   ADVAIR DISKUS 250-50 MCG/DOSE inhaler  Outside Facility (Specify) Yes No   Sig: Inhale 1 puff 2 (two) times a day    PROAIR  (90 Base) MCG/ACT inhaler  Outside Facility (Specify) Yes No   Sig: Inhale 2 puffs every 4 (four) hours as needed for wheezing or shortness of breath    acetaminophen (TYLENOL) 325 mg tablet   Yes No   Sig: Take 650 mg by mouth every 4 (four) hours as needed for mild pain   cholecalciferol (VITAMIN D3) 1,000 units tablet   Yes No   Sig: Take 1,000 Units by mouth daily   clonazePAM (KlonoPIN) 1 mg tablet   Yes No   Sig: Take 1 mg by mouth every 6 (six) hours   haloperidol (HALDOL) 2 mg tablet   Yes No   Sig: Take 2 mg by mouth 3 (three) times a day   lidocaine (LIDODERM) 5 %   No No   Sig: Apply 1 patch topically daily Remove & Discard patch within 12 hours or as directed by MD   magnesium hydroxide (MILK OF MAGNESIA) 400 mg/5 mL oral suspension  Outside Facility (Specify) Yes No   Sig: Take by mouth daily as needed for constipation   risperiDONE (RisperDAL) 1 mg tablet   No No    Sig: Take 1 tablet (1 mg total) by mouth daily at bedtime   senna (SENOKOT) 8.6 MG tablet   Yes No   Sig: Take 2 tablets by mouth daily at bedtime   sertraline (ZOLOFT) 50 mg tablet   No No   Sig: Take 1 tablet (50 mg total) by mouth daily      Facility-Administered Medications: None       Past Medical History:   Diagnosis Date    ADHD     Anxiety disorder     Asthma     controlled    Bilateral sacroiliitis (HCC)     Cancer (HCC)     breast    COPD (chronic obstructive pulmonary disease) (HCC)     Depression     History of fall     History of prediabetes     Hypertension     Iron deficiency anemia     Kidney stone     Myofascial pain     Osteoporosis     S/P lumbar spinal fusion        Past Surgical History:   Procedure Laterality Date    BACK SURGERY      injection    CERVICAL SPINE SURGERY      Multilevel posterior cervical decompression / fusion    COLONOSCOPY W/ BIOPSIES  2006    HERNIA REPAIR      HYSTERECTOMY      IR EPIDURAL STEROID INJECTION  2014    IR EPIDURAL STEROID INJECTION  10/30/2017    IR EPIDURAL STEROID INJECTION  10/11/2017    MASTECTOMY Right     NECK SURGERY      MN ARTHRODESIS COMBINED TQ 1NTRSPC LUMBAR N/A 2019    Procedure: Left L4-5 hemilaminectomy and bilateral foraminotomy and Minimally invasive transforaminal lumbar interbody fusion with pedicle screw and myke fixation fusion L4-5, right-sided approach; minimally invasive right L5-S1 foraminotomy and diskectomy;  Surgeon: Adilson Urias MD;  Location: [facility];  Service: Neurosurgery    TONSILLECTOMY         Family History   Problem Relation Age of Onset    Diabetes Mother     Heart disease Mother          80's    No Known Problems Father     Heart disease Maternal Grandmother      I have reviewed and agree with the history as documented.    E-Cigarette/Vaping    E-Cigarette Use Never User      E-Cigarette/Vaping Substances    Nicotine No     THC No     CBD No     Flavoring No     Other No     Unknown No       Social History     Tobacco Use    Smoking status: Former     Current packs/day: 0.50     Average packs/day: 0.5 packs/day for 55.0 years (27.5 ttl pk-yrs)     Types: Cigarettes    Smokeless tobacco: Never   Vaping Use    Vaping status: Never Used   Substance Use Topics    Alcohol use: Not Currently    Drug use: No     Comment: former marijuanna use per jen        Review of Systems   Unable to perform ROS: Dementia       Physical Exam  ED Triage Vitals   Temperature Pulse Respirations Blood Pressure SpO2   08/16/24 1743 08/16/24 1740 08/16/24 1740 08/16/24 1740 08/16/24 1740   98.7 °F (37.1 °C) 103 16 (!) 176/98 96 %      Temp src Heart Rate Source Patient Position - Orthostatic VS BP Location FiO2 (%)   -- 08/16/24 1745 -- 08/16/24 1740 --    Monitor  Right arm       Pain Score       08/16/24 1758       8             Orthostatic Vital Signs  Vitals:    08/16/24 1740 08/16/24 1745 08/16/24 1800 08/16/24 1815   BP: (!) 176/98 (!) 176/98 142/78 (!) 157/107   Pulse: 103 103 102 104       Physical Exam  Vitals and nursing note reviewed.   Constitutional:       Comments: Patient is mildly cachectic   HENT:      Head: Normocephalic and atraumatic.      Right Ear: External ear normal.      Left Ear: External ear normal.      Nose: Nose normal.      Mouth/Throat:      Mouth: Mucous membranes are moist.      Pharynx: Oropharynx is clear.   Eyes:      Extraocular Movements: Extraocular movements intact.      Conjunctiva/sclera: Conjunctivae normal.   Cardiovascular:      Rate and Rhythm: Normal rate and regular rhythm.      Heart sounds: Normal heart sounds.   Pulmonary:      Effort: Pulmonary effort is normal.      Breath sounds: Normal breath sounds.   Abdominal:      General: Abdomen is flat.      Tenderness: There is no abdominal tenderness.   Musculoskeletal:      Comments: Right hip is currently abducted, patient would flinch to pain with palpation over the right hip.   Skin:     General: Skin is warm and dry.       Findings: Bruising present.   Neurological:      Mental Status: She is alert.      Comments: Patient is demented, currently not answering questions or following commands, unknown if this is patient's baseline.         ED Medications  Medications   morphine injection 2 mg (2 mg Intravenous Given 8/16/24 1758)   tranexamic Acid 550 mg in sodium chloride 0.9 % 100 mL IVPB (0 mg Intravenous Stopped 8/16/24 1831)   acetaminophen (Ofirmev) injection 1,000 mg (0 mg Intravenous Stopped 8/16/24 1902)       Diagnostic Studies  Results Reviewed       Procedure Component Value Units Date/Time    Comprehensive metabolic panel [[id number]]  (Abnormal) Collected: 08/16/24 1809    Lab Status: Final result Specimen: Blood from Arm, Right Updated: 08/16/24 1836     Sodium 150 mmol/L      Potassium 3.9 mmol/L      Chloride 116 mmol/L      CO2 24 mmol/L      ANION GAP 10 mmol/L      BUN 32 mg/dL      Creatinine 0.87 mg/dL      Glucose 329 mg/dL      Calcium 9.3 mg/dL      AST 13 U/L      ALT 29 U/L      Alkaline Phosphatase 81 U/L      Total Protein 7.2 g/dL      Albumin 3.8 g/dL      Total Bilirubin 0.40 mg/dL      eGFR 64 ml/min/1.73sq m     Narrative:      National Kidney Disease Foundation guidelines for Chronic Kidney Disease (CKD):     Stage 1 with normal or high GFR (GFR > 90 mL/min/1.73 square meters)    Stage 2 Mild CKD (GFR = 60-89 mL/min/1.73 square meters)    Stage 3A Moderate CKD (GFR = 45-59 mL/min/1.73 square meters)    Stage 3B Moderate CKD (GFR = 30-44 mL/min/1.73 square meters)    Stage 4 Severe CKD (GFR = 15-29 mL/min/1.73 square meters)    Stage 5 End Stage CKD (GFR <15 mL/min/1.73 square meters)  Note: GFR calculation is accurate only with a steady state creatinine    CBC and differential [[id number]]  (Abnormal) Collected: 08/16/24 1809    Lab Status: Final result Specimen: Blood from Arm, Right Updated: 08/16/24 1823     WBC 9.58 Thousand/uL      RBC 4.72 Million/uL      Hemoglobin 14.7 g/dL      Hematocrit 46.3 %       MCV 98 fL      MCH 31.1 pg      MCHC 31.7 g/dL      RDW 13.1 %      MPV 10.5 fL      Platelets 373 Thousands/uL      nRBC 0 /100 WBCs      Segmented % 75 %      Immature Grans % 0 %      Lymphocytes % 12 %      Monocytes % 9 %      Eosinophils Relative 3 %      Basophils Relative 1 %      Absolute Neutrophils 7.12 Thousands/µL      Absolute Immature Grans 0.03 Thousand/uL      Absolute Lymphocytes 1.17 Thousands/µL      Absolute Monocytes 0.88 Thousand/µL      Eosinophils Absolute 0.32 Thousand/µL      Basophils Absolute 0.06 Thousands/µL                    CT head without contrast   Final Result by Aurelio Leahy MD (08/16 1923)         1. No intracranial hemorrhage or calvarial fracture.   2. Mild, chronic microangiopathy.                  Workstation performed: [id number]         CT cervical spine without contrast   Final Result by Marcelo Felipe MD (08/16 2014)      No acute cervical spine fracture or traumatic malalignment.      C4-C7 posterior spinal fixation hardware. No acute hardware complication      Additional chronic/incidental findings as detailed above.      Please see same day CT head without contrast for further evaluation.                  Workstation performed: [id number]         XR femur 2 vw right   ED Interpretation by Fiorella Walsh MD (08/16 1932)   Patient has an impacted femoral neck fracture.      XR chest 1 view   ED Interpretation by Fiorella Walsh MD (08/16 1932)   No acute cardiac, pulmonary, or osseous processes noted.        XR pelvis ap only 1 or 2 vw   ED Interpretation by Fiorella Walsh MD (08/16 1932)   Patient has an impacted right sided femoral neck fracture.            Procedures  Procedures      ED Course  ED Course as of 08/16/24 2042   Fri Aug 16, 2024   1938 Comprehensive metabolic panel(!)  Significant for hyperglycemia, sodium corrects to 154.   1940 CT head without contrast  1. No intracranial hemorrhage or calvarial fracture.  2. Mild, chronic  microangiopathy.        2039 Patient admitted to trauma service.   2041 CT cervical spine without contrast  No acute cervical spine fracture or traumatic malalignment.     C4-C7 posterior spinal fixation hardware. No acute hardware complication     Additional chronic/incidental findings as detailed above.                             SBIRT 22yo+      Flowsheet Row Most Recent Value   Initial Alcohol Screen: US AUDIT-C     1. How often do you have a drink containing alcohol? 0 Filed at: 08/16/2024 1742   2. How many drinks containing alcohol do you have on a typical day you are drinking?  0 Filed at: 08/16/2024 1742   3a. Male UNDER 65: How often do you have five or more drinks on one occasion? 0 Filed at: 08/16/2024 1742   3b. FEMALE Any Age, or MALE 65+: How often do you have 4 or more drinks on one occassion? 0 Filed at: 08/16/2024 1742   Audit-C Score 0 Filed at: 08/16/2024 1742   FRDEDY: How many times in the past year have you...    Used an illegal drug or used a prescription medication for non-medical reasons? Never Filed at: 08/16/2024 1742                  Medical Decision Making  Kylie is a 78-year-old female with a past medical history of dementia, depression, COPD, asthma, ADHD, anxiety, osteoporosis and hypertension who presents to the emergency department due to an unwitnessed fall at her nursing home, and a right hip fracture.    DDx includes but is not limited to: R hip fracture, ICH,     See ED course for MDM    Patient admitted to trauma service.     Multiple attempts were made to reach out to all of the contact numbers in the patient's chart without luck.      Amount and/or Complexity of Data Reviewed  Labs: ordered. Decision-making details documented in ED Course.  Radiology: ordered and independent interpretation performed. Decision-making details documented in ED Course.    Risk  Prescription drug management.  Decision regarding hospitalization.          Disposition  Final diagnoses:   Fall,  initial encounter   Closed fracture of right hip, initial encounter (HCC)   Hyperglycemia   Hypernatremia     Time reflects when diagnosis was documented in both MDM as applicable and the Disposition within this note       Time User Action Codes Description Comment    8/16/2024  8:39 PM Fiorella Walsh [W19.XXXA] Fall, initial encounter     8/16/2024  8:39 PM Fiorella Walsh [S72.001A] Closed fracture of right hip, initial encounter (HCC)     8/16/2024  8:39 PM Fiorella Walsh [R73.9] Hyperglycemia     8/16/2024  8:39 PM Fiorella Walsh [E87.0] Hypernatremia           ED Disposition       ED Disposition   Admit    Condition   Stable    Date/Time   Fri Aug 16, 2024 2038    Comment   Case was discussed with Trauma and the patient's admission status was agreed to be Admission Status: inpatient status to the service of Dr. Black .               Follow-up Information    None         Patient's Medications   Discharge Prescriptions    No medications on file     No discharge procedures on file.    PDMP Review       None             ED Provider  Attending physically available and evaluated Kylie TARA Ornelas. I managed the patient along with the ED Attending.    Electronically Signed by           Fiorella Walsh MD  08/16/24 2042

## 2024-08-17 ENCOUNTER — ANESTHESIA EVENT (INPATIENT)
Dept: PERIOP | Facility: HOSPITAL | Age: 79
DRG: 522 | End: 2024-08-17
Payer: MEDICARE

## 2024-08-17 ENCOUNTER — ANESTHESIA (INPATIENT)
Dept: PERIOP | Facility: HOSPITAL | Age: 79
DRG: 522 | End: 2024-08-17
Payer: MEDICARE

## 2024-08-17 PROBLEM — F03.90 DEMENTIA (HCC): Status: ACTIVE | Noted: 2019-01-30

## 2024-08-17 PROBLEM — S72.001A CLOSED FRACTURE OF NECK OF RIGHT FEMUR (HCC): Status: ACTIVE | Noted: 2024-08-17
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
## 2024-08-17 LAB
ANION GAP SERPL CALCULATED.3IONS-SCNC: 9 MMOL/L (ref 4–13)
BASOPHILS # BLD AUTO: 0.04 THOUSANDS/ÂΜL (ref 0–0.1)
BASOPHILS # BLD AUTO: 0.06 THOUSANDS/ÂΜL (ref 0–0.1)
BASOPHILS NFR BLD AUTO: 1 % (ref 0–1)
BASOPHILS NFR BLD AUTO: 1 % (ref 0–1)
BUN SERPL-MCNC: 27 MG/DL (ref 5–25)
BUN SERPL-MCNC: 27 MG/DL (ref 5–25)
BUN SERPL-MCNC: 28 MG/DL (ref 5–25)
CALCIUM SERPL-MCNC: 8.6 MG/DL (ref 8.4–10.2)
CALCIUM SERPL-MCNC: 8.9 MG/DL (ref 8.4–10.2)
CALCIUM SERPL-MCNC: 8.9 MG/DL (ref 8.4–10.2)
CHLORIDE SERPL-SCNC: 116 MMOL/L (ref 96–108)
CHLORIDE SERPL-SCNC: 117 MMOL/L (ref 96–108)
CHLORIDE SERPL-SCNC: 117 MMOL/L (ref 96–108)
CO2 SERPL-SCNC: 26 MMOL/L (ref 21–32)
CO2 SERPL-SCNC: 27 MMOL/L (ref 21–32)
CO2 SERPL-SCNC: 29 MMOL/L (ref 21–32)
CREAT SERPL-MCNC: 0.6 MG/DL (ref 0.6–1.3)
CREAT SERPL-MCNC: 0.76 MG/DL (ref 0.6–1.3)
CREAT SERPL-MCNC: 0.85 MG/DL (ref 0.6–1.3)
EOSINOPHIL # BLD AUTO: 0.42 THOUSAND/ÂΜL (ref 0–0.61)
EOSINOPHIL # BLD AUTO: 0.45 THOUSAND/ÂΜL (ref 0–0.61)
EOSINOPHIL NFR BLD AUTO: 4 % (ref 0–6)
EOSINOPHIL NFR BLD AUTO: 5 % (ref 0–6)
ERYTHROCYTE [DISTWIDTH] IN BLOOD BY AUTOMATED COUNT: 13 % (ref 11.6–15.1)
ERYTHROCYTE [DISTWIDTH] IN BLOOD BY AUTOMATED COUNT: 13.2 % (ref 11.6–15.1)
GFR SERPL CREATININE-BSD FRML MDRD: 66 ML/MIN/1.73SQ M
GFR SERPL CREATININE-BSD FRML MDRD: 76 ML/MIN/1.73SQ M
GFR SERPL CREATININE-BSD FRML MDRD: 88 ML/MIN/1.73SQ M
GLUCOSE SERPL-MCNC: 115 MG/DL (ref 65–140)
GLUCOSE SERPL-MCNC: 180 MG/DL (ref 65–140)
GLUCOSE SERPL-MCNC: 186 MG/DL (ref 65–140)
GLUCOSE SERPL-MCNC: 187 MG/DL (ref 65–140)
GLUCOSE SERPL-MCNC: 192 MG/DL (ref 65–140)
GLUCOSE SERPL-MCNC: 203 MG/DL (ref 65–140)
GLUCOSE SERPL-MCNC: 257 MG/DL (ref 65–140)
HCT VFR BLD AUTO: 43.3 % (ref 34.8–46.1)
HCT VFR BLD AUTO: 44.8 % (ref 34.8–46.1)
HGB BLD-MCNC: 13.7 G/DL (ref 11.5–15.4)
HGB BLD-MCNC: 13.7 G/DL (ref 11.5–15.4)
IMM GRANULOCYTES # BLD AUTO: 0.03 THOUSAND/UL (ref 0–0.2)
IMM GRANULOCYTES # BLD AUTO: 0.07 THOUSAND/UL (ref 0–0.2)
IMM GRANULOCYTES NFR BLD AUTO: 0 % (ref 0–2)
IMM GRANULOCYTES NFR BLD AUTO: 1 % (ref 0–2)
INR PPP: 1.19 (ref 0.85–1.19)
LYMPHOCYTES # BLD AUTO: 0.95 THOUSANDS/ÂΜL (ref 0.6–4.47)
LYMPHOCYTES # BLD AUTO: 1.22 THOUSANDS/ÂΜL (ref 0.6–4.47)
LYMPHOCYTES NFR BLD AUTO: 15 % (ref 14–44)
LYMPHOCYTES NFR BLD AUTO: 8 % (ref 14–44)
MCH RBC QN AUTO: 30.8 PG (ref 26.8–34.3)
MCH RBC QN AUTO: 31 PG (ref 26.8–34.3)
MCHC RBC AUTO-ENTMCNC: 30.6 G/DL (ref 31.4–37.4)
MCHC RBC AUTO-ENTMCNC: 31.6 G/DL (ref 31.4–37.4)
MCV RBC AUTO: 101 FL (ref 82–98)
MCV RBC AUTO: 98 FL (ref 82–98)
MONOCYTES # BLD AUTO: 0.58 THOUSAND/ÂΜL (ref 0.17–1.22)
MONOCYTES # BLD AUTO: 0.75 THOUSAND/ÂΜL (ref 0.17–1.22)
MONOCYTES NFR BLD AUTO: 6 % (ref 4–12)
MONOCYTES NFR BLD AUTO: 7 % (ref 4–12)
NEUTROPHILS # BLD AUTO: 10.3 THOUSANDS/ÂΜL (ref 1.85–7.62)
NEUTROPHILS # BLD AUTO: 5.76 THOUSANDS/ÂΜL (ref 1.85–7.62)
NEUTS SEG NFR BLD AUTO: 72 % (ref 43–75)
NEUTS SEG NFR BLD AUTO: 80 % (ref 43–75)
NRBC BLD AUTO-RTO: 0 /100 WBCS
NRBC BLD AUTO-RTO: 0 /100 WBCS
PLATELET # BLD AUTO: 325 THOUSANDS/UL (ref 149–390)
PLATELET # BLD AUTO: 327 THOUSANDS/UL (ref 149–390)
PMV BLD AUTO: 10.1 FL (ref 8.9–12.7)
PMV BLD AUTO: 10.3 FL (ref 8.9–12.7)
POTASSIUM SERPL-SCNC: 3.7 MMOL/L (ref 3.5–5.3)
POTASSIUM SERPL-SCNC: 3.8 MMOL/L (ref 3.5–5.3)
POTASSIUM SERPL-SCNC: 3.9 MMOL/L (ref 3.5–5.3)
PROTHROMBIN TIME: 15.8 SECONDS (ref 12.3–15)
RBC # BLD AUTO: 4.42 MILLION/UL (ref 3.81–5.12)
RBC # BLD AUTO: 4.45 MILLION/UL (ref 3.81–5.12)
SODIUM SERPL-SCNC: 152 MMOL/L (ref 135–147)
SODIUM SERPL-SCNC: 153 MMOL/L (ref 135–147)
SODIUM SERPL-SCNC: 154 MMOL/L (ref 135–147)
WBC # BLD AUTO: 12.58 THOUSAND/UL (ref 4.31–10.16)
WBC # BLD AUTO: 8.05 THOUSAND/UL (ref 4.31–10.16)

## 2024-08-17 PROCEDURE — 85025 COMPLETE CBC W/AUTO DIFF WBC: CPT

## 2024-08-17 PROCEDURE — 27236 TREAT THIGH FRACTURE: CPT | Performed by: PHYSICIAN ASSISTANT

## 2024-08-17 PROCEDURE — 27236 TREAT THIGH FRACTURE: CPT | Performed by: ORTHOPAEDIC SURGERY

## 2024-08-17 PROCEDURE — 80048 BASIC METABOLIC PNL TOTAL CA: CPT

## 2024-08-17 PROCEDURE — 99223 1ST HOSP IP/OBS HIGH 75: CPT | Performed by: ORTHOPAEDIC SURGERY

## 2024-08-17 PROCEDURE — C1776 JOINT DEVICE (IMPLANTABLE): HCPCS | Performed by: ORTHOPAEDIC SURGERY

## 2024-08-17 PROCEDURE — 0SRR0J9 REPLACEMENT OF RIGHT HIP JOINT, FEMORAL SURFACE WITH SYNTHETIC SUBSTITUTE, CEMENTED, OPEN APPROACH: ICD-10-PCS | Performed by: ORTHOPAEDIC SURGERY

## 2024-08-17 PROCEDURE — 83036 HEMOGLOBIN GLYCOSYLATED A1C: CPT | Performed by: NURSE PRACTITIONER

## 2024-08-17 PROCEDURE — 82948 REAGENT STRIP/BLOOD GLUCOSE: CPT

## 2024-08-17 PROCEDURE — 85025 COMPLETE CBC W/AUTO DIFF WBC: CPT | Performed by: NURSE PRACTITIONER

## 2024-08-17 PROCEDURE — 80048 BASIC METABOLIC PNL TOTAL CA: CPT | Performed by: NURSE PRACTITIONER

## 2024-08-17 PROCEDURE — C1713 ANCHOR/SCREW BN/BN,TIS/BN: HCPCS | Performed by: ORTHOPAEDIC SURGERY

## 2024-08-17 PROCEDURE — 99232 SBSQ HOSP IP/OBS MODERATE 35: CPT | Performed by: SURGERY

## 2024-08-17 DEVICE — BONE PREPARATION KIT
Type: IMPLANTABLE DEVICE | Site: HIP | Status: FUNCTIONAL
Brand: BIOPREP

## 2024-08-17 DEVICE — ARTICUL/EZE FEMORAL HEAD DIAMETER 28MM +1.5 12/14 TAPER
Type: IMPLANTABLE DEVICE | Site: HIP | Status: FUNCTIONAL
Brand: ARTICUL/EZE

## 2024-08-17 DEVICE — SELF CENTERING BI-POLAR HEAD 28MM ID 47MM OD
Type: IMPLANTABLE DEVICE | Site: HIP | Status: FUNCTIONAL
Brand: SELF CENTERING

## 2024-08-17 DEVICE — CEMENTRALIZER STEM CENTRALIZER 9.25MM CEMENTED
Type: IMPLANTABLE DEVICE | Site: HIP | Status: FUNCTIONAL
Brand: CEMENTRALIZER

## 2024-08-17 DEVICE — SUMMIT FEMORAL STEM 12/14 TAPER CEMENTED SIZE 2 STD 97MM
Type: IMPLANTABLE DEVICE | Site: HIP | Status: FUNCTIONAL
Brand: SUMMIT

## 2024-08-17 DEVICE — SMARTSET HIGH PERFORMANCE MV MEDIUM VISCOSITY BONE CEMENT 40G
Type: IMPLANTABLE DEVICE | Site: HIP | Status: FUNCTIONAL
Brand: SMARTSET

## 2024-08-17 RX ORDER — PROMETHAZINE HYDROCHLORIDE 25 MG/ML
12.5 INJECTION, SOLUTION INTRAMUSCULAR; INTRAVENOUS ONCE AS NEEDED
Status: DISCONTINUED | OUTPATIENT
Start: 2024-08-17 | End: 2024-08-17 | Stop reason: HOSPADM

## 2024-08-17 RX ORDER — SODIUM CHLORIDE, SODIUM LACTATE, POTASSIUM CHLORIDE, CALCIUM CHLORIDE 600; 310; 30; 20 MG/100ML; MG/100ML; MG/100ML; MG/100ML
125 INJECTION, SOLUTION INTRAVENOUS CONTINUOUS
Status: DISCONTINUED | OUTPATIENT
Start: 2024-08-17 | End: 2024-08-17

## 2024-08-17 RX ORDER — PROPOFOL 10 MG/ML
INJECTION, EMULSION INTRAVENOUS AS NEEDED
Status: DISCONTINUED | OUTPATIENT
Start: 2024-08-17 | End: 2024-08-17

## 2024-08-17 RX ORDER — CHLORHEXIDINE GLUCONATE ORAL RINSE 1.2 MG/ML
15 SOLUTION DENTAL ONCE
Status: COMPLETED | OUTPATIENT
Start: 2024-08-17 | End: 2024-08-17

## 2024-08-17 RX ORDER — SODIUM CHLORIDE, SODIUM LACTATE, POTASSIUM CHLORIDE, CALCIUM CHLORIDE 600; 310; 30; 20 MG/100ML; MG/100ML; MG/100ML; MG/100ML
50 INJECTION, SOLUTION INTRAVENOUS CONTINUOUS
Status: DISCONTINUED | OUTPATIENT
Start: 2024-08-17 | End: 2024-08-17

## 2024-08-17 RX ORDER — ONDANSETRON 2 MG/ML
INJECTION INTRAMUSCULAR; INTRAVENOUS AS NEEDED
Status: DISCONTINUED | OUTPATIENT
Start: 2024-08-17 | End: 2024-08-17

## 2024-08-17 RX ORDER — SODIUM CHLORIDE, SODIUM LACTATE, POTASSIUM CHLORIDE, CALCIUM CHLORIDE 600; 310; 30; 20 MG/100ML; MG/100ML; MG/100ML; MG/100ML
INJECTION, SOLUTION INTRAVENOUS CONTINUOUS PRN
Status: DISCONTINUED | OUTPATIENT
Start: 2024-08-17 | End: 2024-08-17

## 2024-08-17 RX ORDER — FENTANYL CITRATE/PF 50 MCG/ML
25 SYRINGE (ML) INJECTION
Status: DISCONTINUED | OUTPATIENT
Start: 2024-08-17 | End: 2024-08-17 | Stop reason: HOSPADM

## 2024-08-17 RX ORDER — INSULIN LISPRO 100 [IU]/ML
1-5 INJECTION, SOLUTION INTRAVENOUS; SUBCUTANEOUS
Status: DISCONTINUED | OUTPATIENT
Start: 2024-08-17 | End: 2024-08-18

## 2024-08-17 RX ORDER — LABETALOL HYDROCHLORIDE 5 MG/ML
5 INJECTION, SOLUTION INTRAVENOUS
Status: DISCONTINUED | OUTPATIENT
Start: 2024-08-17 | End: 2024-08-17 | Stop reason: HOSPADM

## 2024-08-17 RX ORDER — LIDOCAINE HYDROCHLORIDE 10 MG/ML
INJECTION, SOLUTION EPIDURAL; INFILTRATION; INTRACAUDAL; PERINEURAL AS NEEDED
Status: DISCONTINUED | OUTPATIENT
Start: 2024-08-17 | End: 2024-08-17

## 2024-08-17 RX ORDER — ALBUTEROL SULFATE 0.83 MG/ML
2.5 SOLUTION RESPIRATORY (INHALATION) ONCE AS NEEDED
Status: DISCONTINUED | OUTPATIENT
Start: 2024-08-17 | End: 2024-08-17 | Stop reason: HOSPADM

## 2024-08-17 RX ORDER — CEFAZOLIN SODIUM 2 G/50ML
2000 SOLUTION INTRAVENOUS
Status: COMPLETED | OUTPATIENT
Start: 2024-08-17 | End: 2024-08-17

## 2024-08-17 RX ORDER — PHENYLEPHRINE HCL IN 0.9% NACL 1 MG/10 ML
SYRINGE (ML) INTRAVENOUS AS NEEDED
Status: DISCONTINUED | OUTPATIENT
Start: 2024-08-17 | End: 2024-08-17

## 2024-08-17 RX ORDER — LABETALOL HYDROCHLORIDE 5 MG/ML
5 INJECTION, SOLUTION INTRAVENOUS
Status: DISCONTINUED | OUTPATIENT
Start: 2024-08-17 | End: 2024-08-17

## 2024-08-17 RX ORDER — HYDROMORPHONE HCL/PF 1 MG/ML
0.5 SYRINGE (ML) INJECTION
Status: DISCONTINUED | OUTPATIENT
Start: 2024-08-17 | End: 2024-08-17 | Stop reason: HOSPADM

## 2024-08-17 RX ORDER — TRANEXAMIC ACID 10 MG/ML
1000 INJECTION, SOLUTION INTRAVENOUS
Status: COMPLETED | OUTPATIENT
Start: 2024-08-17 | End: 2024-08-17

## 2024-08-17 RX ORDER — ONDANSETRON 2 MG/ML
4 INJECTION INTRAMUSCULAR; INTRAVENOUS ONCE AS NEEDED
Status: DISCONTINUED | OUTPATIENT
Start: 2024-08-17 | End: 2024-08-17 | Stop reason: HOSPADM

## 2024-08-17 RX ORDER — ROCURONIUM BROMIDE 10 MG/ML
INJECTION, SOLUTION INTRAVENOUS AS NEEDED
Status: DISCONTINUED | OUTPATIENT
Start: 2024-08-17 | End: 2024-08-17

## 2024-08-17 RX ORDER — MAGNESIUM HYDROXIDE 1200 MG/15ML
LIQUID ORAL AS NEEDED
Status: DISCONTINUED | OUTPATIENT
Start: 2024-08-17 | End: 2024-08-17 | Stop reason: HOSPADM

## 2024-08-17 RX ORDER — FENTANYL CITRATE 50 UG/ML
INJECTION, SOLUTION INTRAMUSCULAR; INTRAVENOUS AS NEEDED
Status: DISCONTINUED | OUTPATIENT
Start: 2024-08-17 | End: 2024-08-17

## 2024-08-17 RX ORDER — ACETAMINOPHEN 10 MG/ML
INJECTION, SOLUTION INTRAVENOUS AS NEEDED
Status: DISCONTINUED | OUTPATIENT
Start: 2024-08-17 | End: 2024-08-17

## 2024-08-17 RX ORDER — CEFAZOLIN SODIUM 2 G/50ML
2000 SOLUTION INTRAVENOUS EVERY 8 HOURS
Status: COMPLETED | OUTPATIENT
Start: 2024-08-17 | End: 2024-08-18

## 2024-08-17 RX ORDER — DEXTROSE MONOHYDRATE 50 MG/ML
50 INJECTION, SOLUTION INTRAVENOUS CONTINUOUS
Status: DISCONTINUED | OUTPATIENT
Start: 2024-08-17 | End: 2024-08-19

## 2024-08-17 RX ADMIN — LIDOCAINE HYDROCHLORIDE 50 MG: 10 INJECTION, SOLUTION EPIDURAL; INFILTRATION; INTRACAUDAL at 10:36

## 2024-08-17 RX ADMIN — PROPOFOL 100 MG: 10 INJECTION, EMULSION INTRAVENOUS at 10:36

## 2024-08-17 RX ADMIN — FENTANYL CITRATE 25 MCG: 50 INJECTION INTRAMUSCULAR; INTRAVENOUS at 11:59

## 2024-08-17 RX ADMIN — CHLORHEXIDINE GLUCONATE 15 ML: 1.2 RINSE ORAL at 09:15

## 2024-08-17 RX ADMIN — ACETAMINOPHEN 1000 MG: 10 INJECTION INTRAVENOUS at 11:15

## 2024-08-17 RX ADMIN — CEFAZOLIN SODIUM 2000 MG: 2 SOLUTION INTRAVENOUS at 17:58

## 2024-08-17 RX ADMIN — PROPOFOL 30 MG: 10 INJECTION, EMULSION INTRAVENOUS at 11:44

## 2024-08-17 RX ADMIN — Medication 100 MCG: at 10:35

## 2024-08-17 RX ADMIN — FENTANYL CITRATE 25 MCG: 50 INJECTION INTRAMUSCULAR; INTRAVENOUS at 11:15

## 2024-08-17 RX ADMIN — DEXTROSE 50 ML/HR: 5 SOLUTION INTRAVENOUS at 20:34

## 2024-08-17 RX ADMIN — ENOXAPARIN SODIUM 30 MG: 30 INJECTION SUBCUTANEOUS at 23:05

## 2024-08-17 RX ADMIN — PHENYLEPHRINE HYDROCHLORIDE 30 MCG/MIN: 50 INJECTION INTRAVENOUS at 11:05

## 2024-08-17 RX ADMIN — INSULIN LISPRO 1 UNITS: 100 INJECTION, SOLUTION INTRAVENOUS; SUBCUTANEOUS at 17:58

## 2024-08-17 RX ADMIN — ONDANSETRON 4 MG: 2 INJECTION INTRAMUSCULAR; INTRAVENOUS at 10:41

## 2024-08-17 RX ADMIN — FENTANYL CITRATE 25 MCG: 50 INJECTION INTRAMUSCULAR; INTRAVENOUS at 11:56

## 2024-08-17 RX ADMIN — FENTANYL CITRATE 25 MCG: 50 INJECTION INTRAMUSCULAR; INTRAVENOUS at 10:36

## 2024-08-17 RX ADMIN — ROCURONIUM BROMIDE 10 MG: 10 INJECTION INTRAVENOUS at 11:27

## 2024-08-17 RX ADMIN — INSULIN HUMAN 5 UNITS: 100 INJECTION, SOLUTION PARENTERAL at 11:12

## 2024-08-17 RX ADMIN — SUGAMMADEX 200 MG: 100 INJECTION, SOLUTION INTRAVENOUS at 12:24

## 2024-08-17 RX ADMIN — SODIUM CHLORIDE, SODIUM LACTATE, POTASSIUM CHLORIDE, AND CALCIUM CHLORIDE: .6; .31; .03; .02 INJECTION, SOLUTION INTRAVENOUS at 10:29

## 2024-08-17 RX ADMIN — SODIUM CHLORIDE, SODIUM LACTATE, POTASSIUM CHLORIDE, AND CALCIUM CHLORIDE 50 ML/HR: .6; .31; .03; .02 INJECTION, SOLUTION INTRAVENOUS at 19:26

## 2024-08-17 RX ADMIN — LABETALOL HYDROCHLORIDE 5 MG: 5 INJECTION, SOLUTION INTRAVENOUS at 12:58

## 2024-08-17 RX ADMIN — TRANEXAMIC ACID 1000 MG: 10 INJECTION, SOLUTION INTRAVENOUS at 10:55

## 2024-08-17 RX ADMIN — SODIUM CHLORIDE, SODIUM LACTATE, POTASSIUM CHLORIDE, AND CALCIUM CHLORIDE 50 ML/HR: .6; .31; .03; .02 INJECTION, SOLUTION INTRAVENOUS at 17:21

## 2024-08-17 RX ADMIN — PROPOFOL 20 MG: 10 INJECTION, EMULSION INTRAVENOUS at 11:27

## 2024-08-17 RX ADMIN — CEFAZOLIN SODIUM 2000 MG: 2 SOLUTION INTRAVENOUS at 10:29

## 2024-08-17 RX ADMIN — ROCURONIUM BROMIDE 40 MG: 10 INJECTION INTRAVENOUS at 10:36

## 2024-08-17 RX ADMIN — SODIUM CHLORIDE, SODIUM LACTATE, POTASSIUM CHLORIDE, AND CALCIUM CHLORIDE 125 ML/HR: .6; .31; .03; .02 INJECTION, SOLUTION INTRAVENOUS at 15:21

## 2024-08-17 NOTE — ED ATTENDING ATTESTATION
8/16/2024  I, Evans Barrett MD, saw and evaluated the patient. I have discussed the patient with the resident/non-physician practitioner and agree with the resident's/non-physician practitioner's findings, Plan of Care, and MDM as documented in the resident's/non-physician practitioner's note, except where noted. All available labs and Radiology studies were reviewed.  I was present for key portions of any procedure(s) performed by the resident/non-physician practitioner and I was immediately available to provide assistance.       At this point I agree with the current assessment done in the Emergency Department.  I have conducted an independent evaluation of this patient a history and physical is as follows:  Briefly, 78-year-old female with unwitnessed fall and right hip fracture.  Patient unable to provide history due to dementia.  Vital signs overall reassuring, examination remarkable for tenderness to palpation of the right hip as well as shortening and external rotation of that lower extremity, pulses intact distal.  Plain films confirmed suspected fracture.  Admitted to trauma service after initial workup and treatment.  Hemodynamically stable and comfortable at time of admit.  ED Course         Critical Care Time  Procedures

## 2024-08-17 NOTE — DISCHARGE INSTR - AVS FIRST PAGE
Discharge Instructions - Orthopedics  Kylie Ornelas 78 y.o. female MRN: [id number]  Unit/Bed#: PACU 2    Weight Bearing Status:                                           Weightbearing as tolerated right lower extremity     DVT prophylaxis:  Lovenox x 30 days     Pain:  Continue analgesics as directed    Dressing Instructions:   Please keep clean, dry and intact until follow up     Hip instructions:  Patient should wear abduction pillow at all times while in bed x 30 days   Follow posterior hip precautions: Posterior hip precautions: do not bend forward >90 degrees, do not cross legs, do not roll legs inward, do not abduct surgical leg (move leg out to side)     Appt Instructions:   If you do not have your appointment, please call the clinic at [phone]. Follow up with Dr. Lawrence in 2 weeks.  Otherwise follow up as scheduled.    Contact the office sooner if you experience any increased numbness/tingling in the extremities.

## 2024-08-17 NOTE — QUICK NOTE
Orthopedics   Kylie Ornelas 78 y.o. female MRN: [id number]  Unit/Bed#: W -01      Diagnosis: right displaced femoral neck fracture    Procedure: to OR 8/17/2024 for right hip cemented hemiarthroplasty with Dr. Lawrence    Labs:  0   Lab Value Date/Time    HCT 46.3 (H) 08/16/2024 1809    HCT 43.3 08/05/2024 1207    HCT 33.1 (L) 03/13/2020 1817    HCT 30.8 (L) 08/21/2014 1459    HCT 29.2 (L) 08/16/2014 0537    HCT 31.4 (L) 08/15/2014 1633    HGB 14.7 08/16/2024 1809    HGB 13.9 08/05/2024 1207    HGB 9.6 (L) 03/13/2020 1817    HGB 9.2 (L) 08/21/2014 1459    HGB 8.6 (L) 08/16/2014 0537    HGB 9.4 (L) 08/15/2014 1633    INR 1.05 08/05/2024 1207    INR 1.05 08/15/2014 1633    WBC 9.58 08/16/2024 1809    WBC 3.57 (L) 08/05/2024 1207    WBC 4.81 03/13/2020 1817    WBC 6.04 08/21/2014 1459    WBC 4.26 (L) 08/16/2014 0537    WBC 4.64 08/15/2014 1633       Meds:    Current Facility-Administered Medications:     acetaminophen (TYLENOL) oral suspension 650 mg, 650 mg, Oral, Q4H PRN, Miguelina Meade MD    albuterol (PROVENTIL HFA,VENTOLIN HFA) inhaler 2 puff, 2 puff, Inhalation, Q4H PRN, Miguelina Meade MD    [START ON 8/17/2024] Cholecalciferol (VITAMIN D3) tablet 1,000 Units, 1,000 Units, Oral, Daily, Miguelina Meade MD    clonazePAM (KlonoPIN) tablet 1 mg, 1 mg, Oral, Q6H, Miguelina Meade MD    enoxaparin (LOVENOX) subcutaneous injection 30 mg, 30 mg, Subcutaneous, Q12H, Miguelina Meade MD, 30 mg at 08/16/24 2211    [START ON 8/17/2024] Fluticasone Furoate-Vilanterol 100-25 mcg/actuation 1 puff, 1 puff, Inhalation, BID, Miguelina Meade MD    haloperidol (HALDOL) tablet 2 mg, 2 mg, Oral, TID, Miguelina Meade MD    HYDROmorphone HCl (DILAUDID) injection 0.2 mg, 0.2 mg, Intravenous, Q2H PRN, Miguelina Meade MD    multi-electrolyte (ISOLYTE-S PH 7.4) bolus 500 mL, 500 mL, Intravenous, Once, Miguelina Meade MD, 500 mL at 08/16/24 2341    ondansetron (ZOFRAN) injection 4 mg, 4 mg,  "Intravenous, Q6H PRN, Miguelina Meade MD    [START ON 8/17/2024] polyethylene glycol (MIRALAX) packet 17 g, 17 g, Oral, Daily, Miguelina Meade MD    risperiDONE (RisperDAL) tablet 1 mg, 1 mg, Oral, HS, Miguelina Meade MD    [START ON 8/17/2024] senna (SENOKOT) tablet 17.2 mg, 2 tablet, Oral, Daily, Miguelina Meade MD    [START ON 8/17/2024] sertraline (ZOLOFT) tablet 50 mg, 50 mg, Oral, Daily, Miguelina Meade MD    Blood Culture:   No results found for: \"BLOODCX\"    Wound Culture:   No results found for: \"WOUNDCULT\"    Ins and Outs:  I/O last 24 hours:  In: 200 [IV Piggyback:200]  Out: -       CXR: on chart    EKG: ordered    Blood:  type and screen complete    Abx: 2 grams Ancef on call for OR    NPO: at midnight    Anticoag: per primary    Consent: to be done in AM    Clearance: cleared by trauma (discussed with Dr. Meade)    "

## 2024-08-17 NOTE — OR NURSING
Pt incontinent for medium amount of stool. Pt thoroughly cleaned with chlorhexidine gluconate/water mix and dried.

## 2024-08-17 NOTE — PROGRESS NOTES
Critical access hospital  Progress Note  Name: Kylie Ornelas I  MRN: [id number]  Unit/Bed#: W -01 I Date of Admission: 8/16/2024   Date of Service: 8/17/2024 I Hospital Day: 1    Assessment & Plan   Closed fracture of neck of right femur (HCC)  Assessment & Plan  - s/p fall in nursing home   - R femoral neck fracture noted on Xray  - Ortho consulted, plan for OR this AM          Fall  Assessment & Plan  - s/p unwitnessed fall in nursing facility   - See below for further injuries    COPD (chronic obstructive pulmonary disease) ([facility])  Assessment & Plan  Hx of COPD  - Continue home meds    Dementia ([facility])  Assessment & Plan  - Pt hs hx of Alzheimers dementia.   - Continue home meds  - Geriatrics consult              Bowel Regimen: Miralax, Senokot  VTE Prophylaxis:Sequential compression device (Venodyne)  and Enoxaparin (Lovenox)     Disposition: Continue medsur level of care    Subjective   Chief Complaint: Hip pain    Subjective: Pt resting in bed, in no acute distress. She is nonverbal at baseline.      Objective   Vitals:   Temp:  [98.7 °F (37.1 °C)-98.9 °F (37.2 °C)] 98.9 °F (37.2 °C)  HR:  [] 95  Resp:  [16-18] 16  BP: (138-176)/() 158/92    I/O         08/15 0701  08/16 0700 08/16 0701  08/17 0700 08/17 0701  08/18 0700    IV Piggyback  200     Total Intake(mL/kg)  200 (3.6)     Net  +200            Unmeasured Stool Occurrence  1 x              Physical Exam:   GENERAL APPEARANCE: NAD  NEURO: GCS 11  HEENT: Dry mucous membranes  CV: RRR  LUNGS: CTAB  GI: Abdomen soft, nontender  : Voiding  MSK: Pain with extension of R hip. 2+ radial and pedal pulses  SKIN: warm, dry    Invasive Devices       Peripheral Intravenous Line  Duration             Peripheral IV 08/16/24 Right;Ventral (anterior) Forearm <1 day                          Lab Results: BMP/CMP:   Lab Results   Component Value Date    SODIUM 152 (H) 08/17/2024    K 3.7 08/17/2024     (H) 08/17/2024    CO2 26  08/17/2024    BUN 27 (H) 08/17/2024    CREATININE 0.60 08/17/2024    CALCIUM 8.9 08/17/2024    AST 13 08/16/2024    ALT 29 08/16/2024    ALKPHOS 81 08/16/2024    EGFR 88 08/17/2024    and CBC:   Lab Results   Component Value Date    WBC 8.05 08/17/2024    HGB 13.7 08/17/2024    HCT 43.3 08/17/2024    MCV 98 08/17/2024     08/17/2024    RBC 4.42 08/17/2024    MCH 31.0 08/17/2024    MCHC 31.6 08/17/2024    RDW 13.0 08/17/2024    MPV 10.1 08/17/2024    NRBC 0 08/17/2024     Imaging: I have personally reviewed pertinent reports.     Other Studies: n/a

## 2024-08-17 NOTE — H&P (VIEW-ONLY)
Orthopedics   Kylie Ornelas 78 y.o. female MRN: [id number]  Unit/Bed#: W -01      Chief Complaint:   right hip pain    HPI:   78 y.o.female PMH severe dementia presents from [facility] last evening status post unwitnessed fall 2 days ago with behaviors indicating pain of right hip pain and inability to bear weight. Unknown Headstrike, unknown LOC, does not take Blood thinners.  Patient is unable to verbally communicate on my exam today. Patient does grimace upon palpation of right hip.  no open wounds noted. No other complaints at this time. PMH significant for COPD, anxiety.     Spoke with patient's legal guardian Salvador Gamal ([phone]). He reports patient has been deemed incompetent due to severe dementia. He believes he last saw patient about a month ago, unsure of exact baseline ambulatory status but notes she was pretty active at that time and does not believe she was using an assistive device at that time. He provides verbal consent for surgery today.     Review Of Systems:   Skin: Normal  Neuro: See HPI  Musculoskeletal: See HPI  14 point review of systems negative except as stated above     Past Medical History:   Past Medical History:   Diagnosis Date    ADHD     Anxiety disorder     Asthma     controlled    Bilateral sacroiliitis (HCC)     Cancer (HCC)     breast    COPD (chronic obstructive pulmonary disease) (HCC)     Depression     History of fall     History of prediabetes     Hypertension     Iron deficiency anemia     Kidney stone     Myofascial pain     Osteoporosis     S/P lumbar spinal fusion        Past Surgical History:   Past Surgical History:   Procedure Laterality Date    BACK SURGERY  2017    injection    CERVICAL SPINE SURGERY  2013    Multilevel posterior cervical decompression / fusion    COLONOSCOPY W/ BIOPSIES  2006    HERNIA REPAIR      HYSTERECTOMY      IR EPIDURAL STEROID INJECTION  12/1/2014    IR EPIDURAL STEROID INJECTION  10/30/2017    IR EPIDURAL  STEROID INJECTION  10/11/2017    MASTECTOMY Right     NECK SURGERY      [place] ARTHRODESIS COMBINED TQ 1NTRSPC LUMBAR N/A 2019    Procedure: Left L4-5 hemilaminectomy and bilateral foraminotomy and Minimally invasive transforaminal lumbar interbody fusion with pedicle screw and myke fixation fusion L4-5, right-sided approach; minimally invasive right L5-S1 foraminotomy and diskectomy;  Surgeon: Adilson Urias MD;  Location: [facility];  Service: Neurosurgery    TONSILLECTOMY         Family History:  Family history reviewed and non-contributory  Family History   Problem Relation Age of Onset    Diabetes Mother     Heart disease Mother          80's    No Known Problems Father     Heart disease Maternal Grandmother        Social History:  Social History     Socioeconomic History    Marital status:      Spouse name: None    Number of children: None    Years of education: None    Highest education level: None   Occupational History    None   Tobacco Use    Smoking status: Former     Current packs/day: 0.50     Average packs/day: 0.5 packs/day for 55.0 years (27.5 ttl pk-yrs)     Types: Cigarettes    Smokeless tobacco: Never   Vaping Use    Vaping status: Never Used   Substance and Sexual Activity    Alcohol use: Not Currently    Drug use: No     Comment: former marijuanna use per jen    Sexual activity: None   Other Topics Concern    None   Social History Narrative    FROM [place]        Most recent tobacco use screenin2019    Do you currently or have you served in the [employer]: No    Were you activated, into active duty, as a member of the [employer] or as a Reservist: No    Occupation: disability    Education:    pt can't remember    Marital status:     Sexual orientation: Heterosexual    Exercise level: None    Diet: Regular    Has smoked since age: 15    Alcohol intake: Occasional    4- 8oz bottles a month    Caffeine intake: Moderate 2- cups a day    Chewing  tobacco: none    Illicit drugs: marijuana    per pt. Maximilian she stop years ago.    Guns present in home: No    Seat belts used routinely: Yes    Sunscreen used routinely: Yes    Smoke alarm in home: Yes    Advance directive: Yes    Live alone or with others: with others    Are there stairs in your home: Yes    International travel: no    Pets: No    Deaf or serious difficulty hearing: No    Blind or serious difficulty seeing: Yes    Difficulty concentrating, remembering or making decisions: No    Difficulty walking or climbing stairs: No    Difficulty dressing or bathing: No    Difficulty doing errands alone: No     Social Determinants of Health     Financial Resource Strain: Not on file   Food Insecurity: Not on file   Transportation Needs: Not on file   Physical Activity: Not on file   Stress: Not on file   Social Connections: Not on file   Intimate Partner Violence: Not on file   Housing Stability: Not on file       Allergies:   No Known Allergies        Labs:  0   Lab Value Date/Time    HCT 43.3 08/17/2024 0450    HCT 46.3 (H) 08/16/2024 1809    HCT 43.3 08/05/2024 1207    HCT 30.8 (L) 08/21/2014 1459    HCT 29.2 (L) 08/16/2014 0537    HCT 31.4 (L) 08/15/2014 1633    HGB 13.7 08/17/2024 0450    HGB 14.7 08/16/2024 1809    HGB 13.9 08/05/2024 1207    HGB 9.2 (L) 08/21/2014 1459    HGB 8.6 (L) 08/16/2014 0537    HGB 9.4 (L) 08/15/2014 1633    INR 1.19 08/16/2024 2352    INR 1.05 08/15/2014 1633    WBC 8.05 08/17/2024 0450    WBC 9.58 08/16/2024 1809    WBC 3.57 (L) 08/05/2024 1207    WBC 6.04 08/21/2014 1459    WBC 4.26 (L) 08/16/2014 0537    WBC 4.64 08/15/2014 1633       Meds:    Current Facility-Administered Medications:     acetaminophen (TYLENOL) oral suspension 650 mg, 650 mg, Oral, Q4H PRN, Miguelina Meade MD    albuterol (PROVENTIL HFA,VENTOLIN HFA) inhaler 2 puff, 2 puff, Inhalation, Q4H PRN, Miguelina Meade MD    Cholecalciferol (VITAMIN D3) tablet 1,000 Units, 1,000 Units, Oral, Daily,  "Miguelina Meade MD    clonazePAM (KlonoPIN) tablet 1 mg, 1 mg, Oral, Q6H, Miguelina Meade MD    enoxaparin (LOVENOX) subcutaneous injection 30 mg, 30 mg, Subcutaneous, Q12H, Miguelina Meade MD, 30 mg at 08/16/24 2211    Fluticasone Furoate-Vilanterol 100-25 mcg/actuation 1 puff, 1 puff, Inhalation, BID, Miguelina Meade MD    haloperidol (HALDOL) tablet 2 mg, 2 mg, Oral, TID, Miguelina Meade MD    HYDROmorphone HCl (DILAUDID) injection 0.2 mg, 0.2 mg, Intravenous, Q2H PRN, Miguelina Meade MD    ondansetron (ZOFRAN) injection 4 mg, 4 mg, Intravenous, Q6H PRN, Miguelina Meade MD    polyethylene glycol (MIRALAX) packet 17 g, 17 g, Oral, Daily, Miguelina Meade MD    risperiDONE (RisperDAL) tablet 1 mg, 1 mg, Oral, HS, Miguelina Meade MD    senna (SENOKOT) tablet 17.2 mg, 2 tablet, Oral, Daily, Miguelina Meade MD    sertraline (ZOLOFT) tablet 50 mg, 50 mg, Oral, Daily, Miguelina Meade MD    Blood Culture:   No results found for: \"BLOODCX\"    Wound Culture:   No results found for: \"WOUNDCULT\"    Ins and Outs:  I/O last 24 hours:  In: 200 [IV Piggyback:200]  Out: -       Physical Exam:   /92   Pulse 95   Temp 98.9 °F (37.2 °C)   Resp 16   Wt 55.4 kg (122 lb 2.2 oz)   SpO2 93%   BMI 23.08 kg/m²   Gen: No acute distress, resting comfortably in bed  HEENT: Eyes clear, moist mucus membranes, hearing intact  Respiratory: No audible wheezing or stridor  Cardiovascular: Well Perfused peripherally, 2+ distal pulse  Abdomen: nondistended, no peritoneal signs  Musculoskeletal: right lower extremity  Skin intact. No erythema, ecchymosis  Tender to palpation over hip  ROM not assessed 2/2 known fracture  Unable to assess 2/2 mental status   Positive ankle dorsi/plantar flexion, EHL/FHL  2+ DP/ PT pulse  Musculature is soft and compressible, no pain with passive stretch  Right leg appears shortened, abducted, and externally rotated.     Tertiary exam was negative for " additional palpable stepoffs or additional bony tenderness to palpation    Radiology:   I personally reviewed the films with Dr. Lawrence   X-rays right femur and pelvis shows right displaced/impacted femoral neck fracture    Assessment:  78 y.o.female status post fall with right femoral neck fracture    Plan:   Case reviewed and discussed with Dr. Lawrence   Non weight bearing right lower extremity  Plan for OR today for right hemiarthroplasty   Informed consent obtained from legal guardian over the phone this AM (Salvador Yeung [phone]  Pre op labs reviewed   NPO at midnight  Txa and ancef ordered   Analgesics for pain  DVT ppx per primary   Medical management per primary team   Body mass index is 23.08 kg/m². normal. Recommend nutrition and physical activity.  Dispo: Ortho will follow    Nadja Ray PA-C

## 2024-08-17 NOTE — INTERVAL H&P NOTE
H&P reviewed. After examining the patient I find no changes in the patients condition since the H&P had been written.    Vitals:    08/17/24 0919   BP: 136/70   Pulse: 86   Resp: 18   Temp: (!) 97.2 °F (36.2 °C)   SpO2: 96%

## 2024-08-17 NOTE — PLAN OF CARE
Problem: Potential for Falls  Goal: Patient will remain free of falls  Description: INTERVENTIONS:  - Educate patient/family on patient safety including physical limitations  - Instruct patient to call for assistance with activity   - Consult OT/PT to assist with strengthening/mobility   - Keep Call bell within reach  - Keep bed low and locked with side rails adjusted as appropriate  - Keep care items and personal belongings within reach  - Initiate and maintain comfort rounds  - Make Fall Risk Sign visible to staff  - Offer Toileting every  Hours, in advance of need  - Initiate/Maintain alarm  - Obtain necessary fall risk management equipment:   - Apply yellow socks and bracelet for high fall risk patients  - Consider moving patient to room near nurses station  8/17/2024 1017 by Joycelyn Wetzel RN  Outcome: Progressing  8/17/2024 1017 by Joycelyn Wetzel RN  Outcome: Progressing     Problem: PAIN - ADULT  Goal: Verbalizes/displays adequate comfort level or baseline comfort level  Description: Interventions:  - Encourage patient to monitor pain and request assistance  - Assess pain using appropriate pain scale  - Administer analgesics based on type and severity of pain and evaluate response  - Implement non-pharmacological measures as appropriate and evaluate response  - Consider cultural and social influences on pain and pain management  - Notify physician/advanced practitioner if interventions unsuccessful or patient reports new pain  Outcome: Progressing     Problem: INFECTION - ADULT  Goal: Absence or prevention of progression during hospitalization  Description: INTERVENTIONS:  - Assess and monitor for signs and symptoms of infection  - Monitor lab/diagnostic results  - Monitor all insertion sites, i.e. indwelling lines, tubes, and drains  - Monitor endotracheal if appropriate and nasal secretions for changes in amount and color  - [place] appropriate cooling/warming therapies per order  - Administer  medications as ordered  - Instruct and encourage patient and family to use good hand hygiene technique  - Identify and instruct in appropriate isolation precautions for identified infection/condition  Outcome: Progressing  Goal: Absence of fever/infection during neutropenic period  Description: INTERVENTIONS:  - Monitor WBC    Outcome: Progressing     Problem: SAFETY ADULT  Goal: Patient will remain free of falls  Description: INTERVENTIONS:  - Educate patient/family on patient safety including physical limitations  - Instruct patient to call for assistance with activity   - Consult OT/PT to assist with strengthening/mobility   - Keep Call bell within reach  - Keep bed low and locked with side rails adjusted as appropriate  - Keep care items and personal belongings within reach  - Initiate and maintain comfort rounds  - Make Fall Risk Sign visible to staff  - Offer Toileting every  Hours, in advance of need  - Initiate/Maintain alarm  - Obtain necessary fall risk management equipment:   - Apply yellow socks and bracelet for high fall risk patients  - Consider moving patient to room near nurses station  8/17/2024 1017 by Joycelyn Wetzel RN  Outcome: Progressing  8/17/2024 1017 by Joycelyn Wetzel RN  Outcome: Progressing  Goal: Maintain or return to baseline ADL function  Description: INTERVENTIONS:  -  Assess patient's ability to carry out ADLs; assess patient's baseline for ADL function and identify physical deficits which impact ability to perform ADLs (bathing, care of mouth/teeth, toileting, grooming, dressing, etc.)  - Assess/evaluate cause of self-care deficits   - Assess range of motion  - Assess patient's mobility; develop plan if impaired  - Assess patient's need for assistive devices and provide as appropriate  - Encourage maximum independence but intervene and supervise when necessary  - Involve family in performance of ADLs  - Assess for home care needs following discharge   - Consider OT consult to  assist with ADL evaluation and planning for discharge  - Provide patient education as appropriate  Outcome: Progressing  Goal: Maintains/Returns to pre admission functional level  Description: INTERVENTIONS:  - Perform AM-PAC 6 Click Basic Mobility/ Daily Activity assessment daily.  - Set and communicate daily mobility goal to care team and patient/family/caregiver.   - Collaborate with rehabilitation services on mobility goals if consulted  - Perform Range of Motion  times a day.  - Reposition patient every  hours.  - Dangle patient  times a day  - Stand patient  times a day  - Ambulate patient times a day  - Out of bed to chair  times a day   - Out of bed for meals  times a day  - Out of bed for toileting  - Record patient progress and toleration of activity level   Outcome: Progressing     Problem: DISCHARGE PLANNING  Goal: Discharge to home or other facility with appropriate resources  Description: INTERVENTIONS:  - Identify barriers to discharge w/patient and caregiver  - Arrange for needed discharge resources and transportation as appropriate  - Identify discharge learning needs (meds, wound care, etc.)  - Arrange for interpretive services to assist at discharge as needed  - Refer to Case Management Department for coordinating discharge planning if the patient needs post-hospital services based on physician/advanced practitioner order or complex needs related to functional status, cognitive ability, or social support system  Outcome: Progressing     Problem: Knowledge Deficit  Goal: Patient/family/caregiver demonstrates understanding of disease process, treatment plan, medications, and discharge instructions  Description: Complete learning assessment and assess knowledge base.  Interventions:  - Provide teaching at level of understanding  - Provide teaching via preferred learning methods  Outcome: Progressing

## 2024-08-17 NOTE — OP NOTE
OPERATIVE REPORT  PATIENT NAME: Kylie Ornelas  : 1945  MRN: [id number]  Pt Location:  AN OR ROOM 01    Surgery Date: 2024    Surgeons and Role:     * Jabier Lawrence DO - Primary   Nadja Lagos PA-C utilized during the case for assistance with prepping draping positioning retraction of soft tissue during approach and implantation as well as dislocation reduction of hip.  Also assisted with closure and dressing application    Preop Diagnosis:  Closed displaced fracture of right femoral neck (HCC) [S72.001A]    Post-Op Diagnosis Codes:     * Closed displaced fracture of right femoral neck (HCC) [S72.001A]    Procedure(s):  Right - HEMIARTHROPLASTY HIP (BIPOLAR) - cemented right hip hemiarthroplasty and all associated procedures    Specimens:  * No specimens in log *    Estimated Blood Loss:   Minimal    Drains:  * No LDAs found *    Anesthesia Type:   Choice     Operative Indications:  Closed displaced fracture of right femoral neck (HCC) [S72.001A]    Operative Findings:  There is extensive scar tissue around the fracture site the external rotators were not identifiable it was 1 conglomeration.  The sciatic nerve was easily visualized.  There was no hematoma when entering the capsule.    Complications:   None      Hip Approach: Posterior    Procedure and Technique:  Patient was identified in the preoperative area. The right lower extremity was marked the consent H and P had been reviewed.  The patient was brought back to the operative suite where she was given a general anesthetic. She was then placed in a lateral recumbent position with the operative side up.  An axillary roll was used under the dependent side. The lower extremity was protected with foam padding to protect the peroneal nerve. The right lower extremities prepped and draped in this lateral recumbent position with a beanbag positioner.  After proper time-out commenced identified the right lower extremity as the operative site and  incision was made curvilinear area in line with the femur and curving posterior from the posterior aspect of the greater trochanter. We dissected down through the subcutaneous tissue using electrocautery device identified our fascia.  Made a incision in the fascia and then using our finger to protect the underlying tissue we dissected the gluteus muscle.  We then used a Ho elevator to resect and retract the trochanteric bursal tissue.  We excised the bursal tissue.  There was extensive scar tissue encountered.  The external rotators were basically a conglomeration as if there was a tear here.  They were all in scar tissue.  The sciatic nerve was easily identifiable.  We are able to make a incision in the capsule following to the posterior acetabulum and the T and the capsule pulling the external rotators off with the capsule all in 1.  Ethibond into the capsule.  There was scar tissue along the femoral neck and no signs of acute bleeding here.  We were able to excise the femoral head we sized this to be 47.  We then used our trial bipolar component.  We found this to be 47.  We then moved onto our femur used our box cut to start our intramedullary position at 25° of anteversion. We then used our opening Reamer and then broached up to a size to.  We found good rotational stability we trialed head and neck sizes and found best fit to be standard offset size 2 stem with a 28 inner diameter 47 outer diameter [place] cemented stem.  Our rotation at 90° of hip flexion was 80 degrees.  We selected the implant to be as above.  We then drilled 2 holes into the lateral aspect of the greater trochanter for reattachment of capsule and piriformis.  We closed the rest of the capsule with 5.  Ethibond.  Passed our sutures lateral to the greater trochanter and tied laterally.  We then irrigated once again closed the gluteus musculature with 1 Vicryl interrupted sutures.  We then ran a #1 strata fix.  We then closed deep adipose  tissue with 0 Vicryl and the subcutaneous tissue with 2 O Vicryl. Staples were used on the skin.  Mepilex dressings were applied patient had anesthesia reversed and taken back to PACU with hip abductor pillow in place.   I was present for the entire procedure. and A qualified resident physician was not available.    Patient Disposition:  PACU               SIGNATURE: Jabier Lawrence DO  DATE: August 17, 2024  TIME: 10:58 AM

## 2024-08-17 NOTE — CONSULTS
Orthopedics   Kylie Ornelas 78 y.o. female MRN: [id number]  Unit/Bed#: W -01      Chief Complaint:   right hip pain    HPI:   78 y.o.female PMH severe dementia presents from [facility] last evening status post unwitnessed fall 2 days ago with behaviors indicating pain of right hip pain and inability to bear weight. Unknown Headstrike, unknown LOC, does not take Blood thinners.  Patient is unable to verbally communicate on my exam today. Patient does grimace upon palpation of right hip.  no open wounds noted. No other complaints at this time. PMH significant for COPD, anxiety.     Spoke with patient's legal guardian Salvador Gamal ([phone]). He reports patient has been deemed incompetent due to severe dementia. He believes he last saw patient about a month ago, unsure of exact baseline ambulatory status but notes she was pretty active at that time and does not believe she was using an assistive device at that time. He provides verbal consent for surgery today.     Review Of Systems:   Skin: Normal  Neuro: See HPI  Musculoskeletal: See HPI  14 point review of systems negative except as stated above     Past Medical History:   Past Medical History:   Diagnosis Date    ADHD     Anxiety disorder     Asthma     controlled    Bilateral sacroiliitis (HCC)     Cancer (HCC)     breast    COPD (chronic obstructive pulmonary disease) (HCC)     Depression     History of fall     History of prediabetes     Hypertension     Iron deficiency anemia     Kidney stone     Myofascial pain     Osteoporosis     S/P lumbar spinal fusion        Past Surgical History:   Past Surgical History:   Procedure Laterality Date    BACK SURGERY  2017    injection    CERVICAL SPINE SURGERY  2013    Multilevel posterior cervical decompression / fusion    COLONOSCOPY W/ BIOPSIES  2006    HERNIA REPAIR      HYSTERECTOMY      IR EPIDURAL STEROID INJECTION  12/1/2014    IR EPIDURAL STEROID INJECTION  10/30/2017    IR EPIDURAL  STEROID INJECTION  10/11/2017    MASTECTOMY Right     NECK SURGERY      VA ARTHRODESIS COMBINED TQ 1NTRSPC LUMBAR N/A 2019    Procedure: Left L4-5 hemilaminectomy and bilateral foraminotomy and Minimally invasive transforaminal lumbar interbody fusion with pedicle screw and myke fixation fusion L4-5, right-sided approach; minimally invasive right L5-S1 foraminotomy and diskectomy;  Surgeon: Adilson Urias MD;  Location: [facility];  Service: Neurosurgery    TONSILLECTOMY         Family History:  Family history reviewed and non-contributory  Family History   Problem Relation Age of Onset    Diabetes Mother     Heart disease Mother          80's    No Known Problems Father     Heart disease Maternal Grandmother        Social History:  Social History     Socioeconomic History    Marital status:      Spouse name: None    Number of children: None    Years of education: None    Highest education level: None   Occupational History    None   Tobacco Use    Smoking status: Former     Current packs/day: 0.50     Average packs/day: 0.5 packs/day for 55.0 years (27.5 ttl pk-yrs)     Types: Cigarettes    Smokeless tobacco: Never   Vaping Use    Vaping status: Never Used   Substance and Sexual Activity    Alcohol use: Not Currently    Drug use: No     Comment: former marijuanna use per jen    Sexual activity: None   Other Topics Concern    None   Social History Narrative    FROM [facility]        Most recent tobacco use screenin2019    Do you currently or have you served in the [employer]: No    Were you activated, into active duty, as a member of the [employer] or as a Reservist: No    Occupation: disability    Education:    pt can't remember    Marital status:     Sexual orientation: Heterosexual    Exercise level: None    Diet: Regular    Has smoked since age: 15    Alcohol intake: Occasional    4- 8oz bottles a month    Caffeine intake: Moderate 2- cups a day    Chewing  tobacco: none    Illicit drugs: marijuana    per pt. Maximilian she stop years ago.    Guns present in home: No    Seat belts used routinely: Yes    Sunscreen used routinely: Yes    Smoke alarm in home: Yes    Advance directive: Yes    Live alone or with others: with others    Are there stairs in your home: Yes    International travel: no    Pets: No    Deaf or serious difficulty hearing: No    Blind or serious difficulty seeing: Yes    Difficulty concentrating, remembering or making decisions: No    Difficulty walking or climbing stairs: No    Difficulty dressing or bathing: No    Difficulty doing errands alone: No     Social Determinants of Health     Financial Resource Strain: Not on file   Food Insecurity: Not on file   Transportation Needs: Not on file   Physical Activity: Not on file   Stress: Not on file   Social Connections: Not on file   Intimate Partner Violence: Not on file   Housing Stability: Not on file       Allergies:   No Known Allergies        Labs:  0   Lab Value Date/Time    HCT 43.3 08/17/2024 0450    HCT 46.3 (H) 08/16/2024 1809    HCT 43.3 08/05/2024 1207    HCT 30.8 (L) 08/21/2014 1459    HCT 29.2 (L) 08/16/2014 0537    HCT 31.4 (L) 08/15/2014 1633    HGB 13.7 08/17/2024 0450    HGB 14.7 08/16/2024 1809    HGB 13.9 08/05/2024 1207    HGB 9.2 (L) 08/21/2014 1459    HGB 8.6 (L) 08/16/2014 0537    HGB 9.4 (L) 08/15/2014 1633    INR 1.19 08/16/2024 2352    INR 1.05 08/15/2014 1633    WBC 8.05 08/17/2024 0450    WBC 9.58 08/16/2024 1809    WBC 3.57 (L) 08/05/2024 1207    WBC 6.04 08/21/2014 1459    WBC 4.26 (L) 08/16/2014 0537    WBC 4.64 08/15/2014 1633       Meds:    Current Facility-Administered Medications:     acetaminophen (TYLENOL) oral suspension 650 mg, 650 mg, Oral, Q4H PRN, Miguelina Meade MD    albuterol (PROVENTIL HFA,VENTOLIN HFA) inhaler 2 puff, 2 puff, Inhalation, Q4H PRN, Miguelina Meade MD    Cholecalciferol (VITAMIN D3) tablet 1,000 Units, 1,000 Units, Oral, Daily,  "Miguelina Meade MD    clonazePAM (KlonoPIN) tablet 1 mg, 1 mg, Oral, Q6H, Miguelina Meade MD    enoxaparin (LOVENOX) subcutaneous injection 30 mg, 30 mg, Subcutaneous, Q12H, Miguelina Meade MD, 30 mg at 08/16/24 2211    Fluticasone Furoate-Vilanterol 100-25 mcg/actuation 1 puff, 1 puff, Inhalation, BID, Miguelina Meade MD    haloperidol (HALDOL) tablet 2 mg, 2 mg, Oral, TID, Miguelina Meade MD    HYDROmorphone HCl (DILAUDID) injection 0.2 mg, 0.2 mg, Intravenous, Q2H PRN, Miguelina Meade MD    ondansetron (ZOFRAN) injection 4 mg, 4 mg, Intravenous, Q6H PRN, Miguelina Meade MD    polyethylene glycol (MIRALAX) packet 17 g, 17 g, Oral, Daily, Miguelina Meade MD    risperiDONE (RisperDAL) tablet 1 mg, 1 mg, Oral, HS, Miguelina Meade MD    senna (SENOKOT) tablet 17.2 mg, 2 tablet, Oral, Daily, Miguelina Meade MD    sertraline (ZOLOFT) tablet 50 mg, 50 mg, Oral, Daily, Miguelina Meade MD    Blood Culture:   No results found for: \"BLOODCX\"    Wound Culture:   No results found for: \"WOUNDCULT\"    Ins and Outs:  I/O last 24 hours:  In: 200 [IV Piggyback:200]  Out: -       Physical Exam:   /92   Pulse 95   Temp 98.9 °F (37.2 °C)   Resp 16   Wt 55.4 kg (122 lb 2.2 oz)   SpO2 93%   BMI 23.08 kg/m²   Gen: No acute distress, resting comfortably in bed  HEENT: Eyes clear, moist mucus membranes, hearing intact  Respiratory: No audible wheezing or stridor  Cardiovascular: Well Perfused peripherally, 2+ distal pulse  Abdomen: nondistended, no peritoneal signs  Musculoskeletal: right lower extremity  Skin intact. No erythema, ecchymosis  Tender to palpation over hip  ROM not assessed 2/2 known fracture  Unable to assess 2/2 mental status   Positive ankle dorsi/plantar flexion, EHL/FHL  2+ DP/ PT pulse  Musculature is soft and compressible, no pain with passive stretch  Right leg appears shortened, abducted, and externally rotated.     Tertiary exam was negative for " additional palpable stepoffs or additional bony tenderness to palpation    Radiology:   I personally reviewed the films with Dr. Lawrence   X-rays right femur and pelvis shows right displaced/impacted femoral neck fracture    Assessment:  78 y.o.female status post fall with right femoral neck fracture    Plan:   Case reviewed and discussed with Dr. Lawrence   Non weight bearing right lower extremity  Plan for OR today for right hemiarthroplasty   Informed consent obtained from legal guardian over the phone this AM (Salvador Yeung [phone]  Pre op labs reviewed   NPO at midnight  Txa and ancef ordered   Analgesics for pain  DVT ppx per primary   Medical management per primary team   Body mass index is 23.08 kg/m². normal. Recommend nutrition and physical activity.  Dispo: Ortho will follow    Nadja Ray PA-C

## 2024-08-17 NOTE — ASSESSMENT & PLAN NOTE
- s/p fall in nursing home   - R femoral neck fracture noted on Xray  - Ortho consulted, plan for OR this AM

## 2024-08-17 NOTE — ANESTHESIA POSTPROCEDURE EVALUATION
Post-Op Assessment Note    CV Status:  Stable    Pain management: adequate       Mental Status:  Arousable, sleepy and lethargic   Hydration Status:  Euvolemic   PONV Controlled:  Controlled   Airway Patency:  Patent     Post Op Vitals Reviewed: Yes    No anethesia notable event occurred.    Staff: CRNA               BP (!) 180/86 (08/17/24 1252)    Temp 98.2 °F (36.8 °C) (08/17/24 1252)    Pulse 90 (08/17/24 1252)   Resp      SpO2   96

## 2024-08-17 NOTE — QUICK NOTE
"Acute Care Surgery   Post-Op Check Progress Note   Kylie Ornelas 78 y.o. female MRN: [id number]  Unit/Bed#: W -01 Encounter: [id number]    Assessment and Plan:    78-year-old female with closed right femoral neck fracture status post Right hemiarthroplasty.  Orthopedics continues to follow  Weightbearing status per orthopedics  Postop labs pending  Multimodal pain regimen  Encourage incentive spirometry use  PT/OT      Subjective/Objective     Subjective: \"What\"    ROS is extremely limited due to baseline dementia and current mental status.  She denies having any pain on my exam.    Objective:     Blood pressure 114/77, pulse 81, temperature (!) 97 °F (36.1 °C), resp. rate 16, weight 55.4 kg (122 lb 2.2 oz), SpO2 92%.,Body mass index is 23.08 kg/m².      Intake/Output Summary (Last 24 hours) at 8/17/2024 1547  Last data filed at 8/17/2024 1224  Gross per 24 hour   Intake 1000 ml   Output --   Net 1000 ml       Invasive Devices       Peripheral Intravenous Line  Duration             Peripheral IV 08/16/24 Right;Ventral (anterior) Forearm <1 day    Peripheral IV 08/17/24 Left;Medial Wrist <1 day                    Physical Exam:    GENERAL APPEARANCE: Patient in no acute distress.  HEENT: NCAT; PERRL, EOMs intact; Mucous membranes moist  CV: Regular rate and rhythm; + S1, S2; no murmur/gallops/rubs appreciated.  LUNGS: Clear to auscultation; no wheezes/rales/rhonci.  ABD: NABS; soft; non-distended; non-tender.  EXT: +2 pulses bilaterally upper & lower extremities; no clubbing/cyanosis/edema.  Right surgical dressing is clean, dry, intact.  Minimal swelling.  No hematoma appreciated.  NEURO: GCS 13--due to confusion and needing verbal cues to open her eyes; no focal neurologic deficits; neurovascularly intact.  SKIN: Warm, dry and well perfused; no rash; no jaundice.                MONTY Inman  8/17/2024    "

## 2024-08-17 NOTE — ANESTHESIA PREPROCEDURE EVALUATION
Review of Systems/Medical History  Patient summary reviewed.  Chart reviewed.      Cardiovascular  EKG reviewed. Exercise tolerance (METS): >4 METS. Hypertension , CAD     Pulmonary   Smoker cigarette smoker   , Asthma , well controlled/ stable Last rescue: < 1 month ago   Comment: Smoked this am     GI/Hepatic        Kidney stones       Endo/Other     GYN     Breast cancer        Hematology   Anemia ,     Musculoskeletal   Back pain , spinal stenosis   Arthritis     Neurology   Psychology    Depression      Comment: ADHD           Physical Exam    Airway    Mallampati score: II  TM Distance: >3 FB  Neck ROM: full     Dental   Comment: Multiple Cracked teeth     Cardiovascular      Pulmonary   Rhonchi    Other Findings  post-pubertal.      Lab Results   Component Value Date    GLUC 257 (H) 08/17/2024    GLUF 168 (H) 07/17/2019    ALT 29 08/16/2024    AST 13 08/16/2024    BUN 27 (H) 08/17/2024    CALCIUM 8.9 08/17/2024     (H) 08/17/2024    CO2 26 08/17/2024    CREATININE 0.60 08/17/2024    INR 1.19 08/16/2024    HCT 43.3 08/17/2024    HGB 13.7 08/17/2024    PROT 7.6 08/21/2014    HGBA1C 6.9 10/12/2023    MG 2.3 (H) 01/04/2023    PHOS 3.5 08/16/2014     08/17/2024    K 3.7 08/17/2024     (L) 08/21/2014    WBC 8.05 08/17/2024       Anesthesia Plan  ASA Score- 3     Anesthesia Type- general with ASA Monitors.         Additional Monitors:     Airway Plan: ETT.    Comment: Patient seen and examined.  History reviewed.  Patient to be done under general anesthesia with ETT and routine monitors.  Risks discussed with the patient. Consent obtained..       Plan Factors-Exercise tolerance (METS): >4 METS.    Chart reviewed. EKG reviewed.  Existing labs reviewed. Patient summary reviewed.     Patient not instructed to abstain from smoking on day of procedure. Patient smoked on day of surgery.            Induction- intravenous.    Postoperative Plan- Plan for postoperative opioid use. Planned trial  extubation    Perioperative Resuscitation Plan - Level 1 - Full Code.       Informed Consent- Anesthetic plan and risks discussed with legal guardian.  I personally reviewed this patient with the CRNA. Discussed and agreed on the Anesthesia Plan with the CRNA..

## 2024-08-17 NOTE — OCCUPATIONAL THERAPY NOTE
Occupational Therapy Cancelled Session        Patient Name: Kylie Ornelsa  Today's Date: 8/17/2024 08/17/24 0936   Note Type   Note type Cancelled Session   Cancel Reasons Patient to operating room   Additional Comments OT orders received and chart reviewed, pt to OR for  right hemiarthroplasty, will cancel OT IE on this date and complete s/p R hemiarthroplasty. WBS apprecaited and updated activity orders. Will f/u when pt is available.     Moni Ruiz, OTR/L

## 2024-08-18 PROBLEM — E87.0 HYPERNATREMIA: Status: ACTIVE | Noted: 2024-08-18

## 2024-08-18 PROBLEM — R73.9 HYPERGLYCEMIA: Status: ACTIVE | Noted: 2024-08-18

## 2024-08-18 LAB
ANION GAP SERPL CALCULATED.3IONS-SCNC: 7 MMOL/L (ref 4–13)
ANION GAP SERPL CALCULATED.3IONS-SCNC: 7 MMOL/L (ref 4–13)
ANION GAP SERPL CALCULATED.3IONS-SCNC: 9 MMOL/L (ref 4–13)
ANION GAP SERPL CALCULATED.3IONS-SCNC: 9 MMOL/L (ref 4–13)
BASOPHILS # BLD AUTO: 0.06 THOUSANDS/ÂΜL (ref 0–0.1)
BASOPHILS NFR BLD AUTO: 1 % (ref 0–1)
BUN SERPL-MCNC: 21 MG/DL (ref 5–25)
BUN SERPL-MCNC: 22 MG/DL (ref 5–25)
BUN SERPL-MCNC: 24 MG/DL (ref 5–25)
BUN SERPL-MCNC: 26 MG/DL (ref 5–25)
CALCIUM SERPL-MCNC: 8.4 MG/DL (ref 8.4–10.2)
CALCIUM SERPL-MCNC: 8.4 MG/DL (ref 8.4–10.2)
CALCIUM SERPL-MCNC: 8.5 MG/DL (ref 8.4–10.2)
CALCIUM SERPL-MCNC: 8.6 MG/DL (ref 8.4–10.2)
CHLORIDE SERPL-SCNC: 113 MMOL/L (ref 96–108)
CHLORIDE SERPL-SCNC: 116 MMOL/L (ref 96–108)
CO2 SERPL-SCNC: 24 MMOL/L (ref 21–32)
CO2 SERPL-SCNC: 26 MMOL/L (ref 21–32)
CO2 SERPL-SCNC: 27 MMOL/L (ref 21–32)
CO2 SERPL-SCNC: 28 MMOL/L (ref 21–32)
CREAT SERPL-MCNC: 0.64 MG/DL (ref 0.6–1.3)
CREAT SERPL-MCNC: 0.66 MG/DL (ref 0.6–1.3)
CREAT SERPL-MCNC: 0.72 MG/DL (ref 0.6–1.3)
CREAT SERPL-MCNC: 0.73 MG/DL (ref 0.6–1.3)
EOSINOPHIL # BLD AUTO: 0.14 THOUSAND/ÂΜL (ref 0–0.61)
EOSINOPHIL NFR BLD AUTO: 1 % (ref 0–6)
ERYTHROCYTE [DISTWIDTH] IN BLOOD BY AUTOMATED COUNT: 13.1 % (ref 11.6–15.1)
EST. AVERAGE GLUCOSE BLD GHB EST-MCNC: 166 MG/DL
GFR SERPL CREATININE-BSD FRML MDRD: 80 ML/MIN/1.73SQ M
GFR SERPL CREATININE-BSD FRML MDRD: 81 ML/MIN/1.73SQ M
GFR SERPL CREATININE-BSD FRML MDRD: 86 ML/MIN/1.73SQ M
GFR SERPL CREATININE-BSD FRML MDRD: 86 ML/MIN/1.73SQ M
GLUCOSE SERPL-MCNC: 133 MG/DL (ref 65–140)
GLUCOSE SERPL-MCNC: 164 MG/DL (ref 65–140)
GLUCOSE SERPL-MCNC: 252 MG/DL (ref 65–140)
GLUCOSE SERPL-MCNC: 282 MG/DL (ref 65–140)
GLUCOSE SERPL-MCNC: 283 MG/DL (ref 65–140)
GLUCOSE SERPL-MCNC: 286 MG/DL (ref 65–140)
GLUCOSE SERPL-MCNC: 292 MG/DL (ref 65–140)
GLUCOSE SERPL-MCNC: 308 MG/DL (ref 65–140)
GLUCOSE SERPL-MCNC: 92 MG/DL (ref 65–140)
HBA1C MFR BLD: 7.4 %
HCT VFR BLD AUTO: 41.1 % (ref 34.8–46.1)
HGB BLD-MCNC: 12.6 G/DL (ref 11.5–15.4)
IMM GRANULOCYTES # BLD AUTO: 0.04 THOUSAND/UL (ref 0–0.2)
IMM GRANULOCYTES NFR BLD AUTO: 0 % (ref 0–2)
LYMPHOCYTES # BLD AUTO: 0.9 THOUSANDS/ÂΜL (ref 0.6–4.47)
LYMPHOCYTES NFR BLD AUTO: 9 % (ref 14–44)
MCH RBC QN AUTO: 31 PG (ref 26.8–34.3)
MCHC RBC AUTO-ENTMCNC: 30.7 G/DL (ref 31.4–37.4)
MCV RBC AUTO: 101 FL (ref 82–98)
MONOCYTES # BLD AUTO: 0.68 THOUSAND/ÂΜL (ref 0.17–1.22)
MONOCYTES NFR BLD AUTO: 7 % (ref 4–12)
NEUTROPHILS # BLD AUTO: 8.2 THOUSANDS/ÂΜL (ref 1.85–7.62)
NEUTS SEG NFR BLD AUTO: 82 % (ref 43–75)
NRBC BLD AUTO-RTO: 0 /100 WBCS
PLATELET # BLD AUTO: 293 THOUSANDS/UL (ref 149–390)
PMV BLD AUTO: 10.8 FL (ref 8.9–12.7)
POTASSIUM SERPL-SCNC: 3.4 MMOL/L (ref 3.5–5.3)
POTASSIUM SERPL-SCNC: 3.6 MMOL/L (ref 3.5–5.3)
POTASSIUM SERPL-SCNC: 3.7 MMOL/L (ref 3.5–5.3)
POTASSIUM SERPL-SCNC: 3.8 MMOL/L (ref 3.5–5.3)
RBC # BLD AUTO: 4.07 MILLION/UL (ref 3.81–5.12)
SODIUM SERPL-SCNC: 147 MMOL/L (ref 135–147)
SODIUM SERPL-SCNC: 148 MMOL/L (ref 135–147)
SODIUM SERPL-SCNC: 148 MMOL/L (ref 135–147)
SODIUM SERPL-SCNC: 149 MMOL/L (ref 135–147)
WBC # BLD AUTO: 10.02 THOUSAND/UL (ref 4.31–10.16)

## 2024-08-18 PROCEDURE — 80048 BASIC METABOLIC PNL TOTAL CA: CPT | Performed by: NURSE PRACTITIONER

## 2024-08-18 PROCEDURE — 85025 COMPLETE CBC W/AUTO DIFF WBC: CPT | Performed by: NURSE PRACTITIONER

## 2024-08-18 PROCEDURE — 87081 CULTURE SCREEN ONLY: CPT | Performed by: SURGERY

## 2024-08-18 PROCEDURE — 99024 POSTOP FOLLOW-UP VISIT: CPT | Performed by: ORTHOPAEDIC SURGERY

## 2024-08-18 PROCEDURE — 82948 REAGENT STRIP/BLOOD GLUCOSE: CPT

## 2024-08-18 PROCEDURE — 92610 EVALUATE SWALLOWING FUNCTION: CPT

## 2024-08-18 PROCEDURE — 99232 SBSQ HOSP IP/OBS MODERATE 35: CPT | Performed by: SURGERY

## 2024-08-18 RX ORDER — INSULIN LISPRO 100 [IU]/ML
5 INJECTION, SOLUTION INTRAVENOUS; SUBCUTANEOUS ONCE
Status: COMPLETED | OUTPATIENT
Start: 2024-08-18 | End: 2024-08-18

## 2024-08-18 RX ORDER — INSULIN LISPRO 100 [IU]/ML
3 INJECTION, SOLUTION INTRAVENOUS; SUBCUTANEOUS
Status: DISCONTINUED | OUTPATIENT
Start: 2024-08-18 | End: 2024-08-18

## 2024-08-18 RX ORDER — INSULIN LISPRO 100 [IU]/ML
1-5 INJECTION, SOLUTION INTRAVENOUS; SUBCUTANEOUS
Status: DISCONTINUED | OUTPATIENT
Start: 2024-08-18 | End: 2024-08-20 | Stop reason: HOSPADM

## 2024-08-18 RX ORDER — INSULIN LISPRO 100 [IU]/ML
3 INJECTION, SOLUTION INTRAVENOUS; SUBCUTANEOUS
Status: DISCONTINUED | OUTPATIENT
Start: 2024-08-18 | End: 2024-08-20 | Stop reason: HOSPADM

## 2024-08-18 RX ADMIN — CLONAZEPAM 1 MG: 1 TABLET ORAL at 11:33

## 2024-08-18 RX ADMIN — DEXTROSE 50 ML/HR: 5 SOLUTION INTRAVENOUS at 14:36

## 2024-08-18 RX ADMIN — HYDROMORPHONE HYDROCHLORIDE 0.2 MG: 0.2 INJECTION, SOLUTION INTRAMUSCULAR; INTRAVENOUS; SUBCUTANEOUS at 22:03

## 2024-08-18 RX ADMIN — HYDROMORPHONE HYDROCHLORIDE 0.2 MG: 0.2 INJECTION, SOLUTION INTRAMUSCULAR; INTRAVENOUS; SUBCUTANEOUS at 16:12

## 2024-08-18 RX ADMIN — INSULIN LISPRO 2 UNITS: 100 INJECTION, SOLUTION INTRAVENOUS; SUBCUTANEOUS at 08:54

## 2024-08-18 RX ADMIN — ENOXAPARIN SODIUM 30 MG: 30 INJECTION SUBCUTANEOUS at 22:03

## 2024-08-18 RX ADMIN — ENOXAPARIN SODIUM 30 MG: 30 INJECTION SUBCUTANEOUS at 11:33

## 2024-08-18 RX ADMIN — HYDROMORPHONE HYDROCHLORIDE 0.2 MG: 0.2 INJECTION, SOLUTION INTRAMUSCULAR; INTRAVENOUS; SUBCUTANEOUS at 03:03

## 2024-08-18 RX ADMIN — HYDROMORPHONE HYDROCHLORIDE 0.2 MG: 0.2 INJECTION, SOLUTION INTRAMUSCULAR; INTRAVENOUS; SUBCUTANEOUS at 11:33

## 2024-08-18 RX ADMIN — INSULIN LISPRO 5 UNITS: 100 INJECTION, SOLUTION INTRAVENOUS; SUBCUTANEOUS at 13:31

## 2024-08-18 RX ADMIN — INSULIN LISPRO 3 UNITS: 100 INJECTION, SOLUTION INTRAVENOUS; SUBCUTANEOUS at 12:18

## 2024-08-18 RX ADMIN — INSULIN LISPRO 3 UNITS: 100 INJECTION, SOLUTION INTRAVENOUS; SUBCUTANEOUS at 16:11

## 2024-08-18 RX ADMIN — CEFAZOLIN SODIUM 2000 MG: 2 SOLUTION INTRAVENOUS at 03:08

## 2024-08-18 RX ADMIN — INSULIN LISPRO 1 UNITS: 100 INJECTION, SOLUTION INTRAVENOUS; SUBCUTANEOUS at 16:11

## 2024-08-18 NOTE — ASSESSMENT & PLAN NOTE
- Status post unwitnessed fall with the below noted injuries.  - Fall precautions.  - Geriatric Medicine consultation for evaluation, medication review and recommendations.  - PT and OT evaluation and treatment as indicated.  - Case Management consultation for disposition planning.

## 2024-08-18 NOTE — PROGRESS NOTES
[facility]  Progress Note  Name: Kylie Ornelas I  MRN: [id number]  Unit/Bed#: W -01 I Date of Admission: 8/16/2024   Date of Service: 8/18/2024 I Hospital Day: 2    Assessment & Plan   Fall  Assessment & Plan  - Status post unwitnessed fall with the below noted injuries.  - Fall precautions.  - Geriatric Medicine consultation for evaluation, medication review and recommendations.  - PT and OT evaluation and treatment as indicated.  - Case Management consultation for disposition planning.      COPD (chronic obstructive pulmonary disease) ([facility])  Assessment & Plan  Hx of COPD  - Continue home inhaler regimen  - Outpatient follow up with PCP    Dementia ([facility])  Assessment & Plan  - Pt hs hx of Alzheimers dementia.   - Continue home meds  - Geriatrics consult, appreciate recommendations  - Delirium precautions  - Frequent reorientation    * Closed fracture of neck of right femur ([facility])  Assessment & Plan  - Right femur fracture, present on admission.  - Status post fall on 8/16.  - Appreciate Orthopedic surgery evaluation, recommendations and interventions as noted.   - 8/17 right hip hemiarthroplasty  - Maintain weightbearing as tolerated status on the right lower extremity.  - Monitor right lower extremity neurovascular exam.  - Continue multimodal analgesic regimen.  - Continue DVT prophylaxis.  - PT and OT evaluation and treatment as indicated.  - Outpatient follow up with Orthopedic surgery for re-evaluation.               Bowel Regimen: Miralax, senna  VTE Prophylaxis:Enoxaparin (Lovenox)     Disposition: Continue current level of care.  Pending PT/OT evaluation.    Subjective   Chief Complaint: N/A    Subjective: Evaluation limited secondary to patient's history of dementia.  Patient does not appear to be in any pain, resting comfortably in bed at this time.  Per nursing, no acute events overnight.     Objective   Vitals:   Temp:  [97 °F (36.1 °C)-98.2 °F (36.8 °C)] 97.4 °F (36.3  °C)  HR:  [] 106  Resp:  [15-18] 16  BP: (114-180)/() 154/84    I/O         08/16 0701  08/17 0700 08/17 0701 08/18 0700    P.O.  0    I.V. (mL/kg)  924.2 (16.7)    IV Piggyback 200     Total Intake(mL/kg) 200 (3.6) 924.2 (16.7)    Net +200 +924.2          Unmeasured Urine Occurrence  1 x    Unmeasured Stool Occurrence 1 x              Physical Exam:   GENERAL APPEARANCE: Patient in no acute distress.  HEENT: NCAT; PERRL, EOMs intact; Mucous membranes moist  NECK / BACK: ROM normal  CV: Regular rate and rhythm; no murmur/gallops/rubs appreciated.  CHEST / LUNGS: Clear to auscultation; no wheezes/rales/rhonci.  ABD: NABS; soft; non-distended; non-tender.  : voiding  EXT: RLE surgical dressing CDI; +2 pulses bilaterally upper & lower extremities; no edema.  NEURO: GCS 11 (E4V2M5), at baseline secondary to dementia; no focal neurologic deficits; neurovascularly intact.  SKIN: Warm, dry and well perfused; no rash; no jaundice.      Invasive Devices       Peripheral Intravenous Line  Duration             Peripheral IV 08/16/24 Right;Ventral (anterior) Forearm 1 day    Peripheral IV 08/17/24 Left;Medial Wrist <1 day                          Lab Results: Results: I have personally reviewed all pertinent laboratory/tests results, BMP/CMP:   Lab Results   Component Value Date    SODIUM 148 (H) 08/18/2024    K 3.7 08/18/2024     (H) 08/18/2024    CO2 26 08/18/2024    BUN 24 08/18/2024    CREATININE 0.73 08/18/2024    CALCIUM 8.4 08/18/2024    EGFR 80 08/18/2024   , and CBC:   Lab Results   Component Value Date    WBC 12.58 (H) 08/17/2024    HGB 13.7 08/17/2024    HCT 44.8 08/17/2024     (H) 08/17/2024     08/17/2024    RBC 4.45 08/17/2024    MCH 30.8 08/17/2024    MCHC 30.6 (L) 08/17/2024    RDW 13.2 08/17/2024    MPV 10.3 08/17/2024    NRBC 0 08/17/2024     Imaging: I have personally reviewed pertinent reports.     Other Studies: None

## 2024-08-18 NOTE — CASE MANAGEMENT
Case Management Assessment & Discharge Planning Note    Patient name Kylie Ornelas  Location W /W -01 MRN [id number]  : 1945 Date 2024       Current Admission Date: 2024  Current Admission Diagnosis:Closed fracture of neck of right femur (HCC)   Patient Active Problem List    Diagnosis Date Noted Date Diagnosed    Hypernatremia 2024     Hyperglycemia 2024     Fall 2024     Closed fracture of neck of right femur (HCC) 2024     Unspecified psychosis not due to a substance or known physiological condition ([facility]) 03/10/2023     Anxiety disorder      Chronic bilateral back pain 2020     History of lumbar surgery 2020     Mood disorder ([facility]) 2019     COPD (chronic obstructive pulmonary disease) ([facility]) 2019     Iron deficiency anemia 2019     History of breast cancer 2019     Acute pain      Constipation      Encephalopathy acute      Anemia      Delirium      Spondylolisthesis of lumbar region 2019     Spondylosis without myelopathy or radiculopathy, lumbar region 2019     Spinal stenosis of lumbar region with neurogenic claudication      Dementia ([facility]) 2019     Midline low back pain without sciatica 2019     Ambulatory dysfunction 2019     Essential hypertension 2019       LOS (days): 2  Geometric Mean LOS (GMLOS) (days):   Days to GMLOS:     OBJECTIVE:    Risk of Unplanned Readmission Score: 16.65         Current admission status: Inpatient       Preferred Pharmacy:   [id number] [address] [address]  Phone: [phone] Fax: [phone]    Primary Care Provider: Marcella Schmid MD    Primary Insurance: MEDICARE  Secondary Insurance: [place] [employer] [facility]    ASSESSMENT:  Active Health Care Proxies    There are no active Health Care Proxies on file.                 Readmission Root Cause  30 Day  Readmission: No    Patient Information  Admitted from:: Facility  Mental Status: Confused  During Assessment patient was accompanied by: Not accompanied during assessment  Assessment information provided by:: Other - please comment (Facility Staff)  Primary Caregiver: Other (Comment)  Caregiver's Name:: Facility Staff- [facility] Residence: [place]  What city do you live in?: [place]  Home entry access options. Select all that apply.: No steps to enter home  Type of Current Residence: Facility  Upon entering residence, is there a bedroom on the main floor (no further steps)?: Yes  Upon entering residence, is there a bathroom on the main floor (no further steps)?: Yes  Living Arrangements: Other (Comment)    Activities of Daily Living Prior to Admission  Functional Status: Total dependent  Completes ADLs independently?: No  Level of ADL dependence: Total Dependent  Ambulates independently?: No  Level of ambulatory dependence: Total Dependent  Does patient use assisted devices?: Yes  Assisted Devices (DME) used: Wheelchair  Does patient currently own DME?: No  Does patient have a history of Outpatient Therapy (PT/OT)?: No  Does the patient have a history of Short-Term Rehab?: Yes  Does patient have a history of HHC?: No  Does patient currently have HHC?: No         Patient Information Continued  Income Source: Pension/senior living  Does patient have prescription coverage?: Yes  Does patient receive dialysis treatments?: No  Does patient have a history of substance abuse?: No  Does patient have a history of Mental Health Diagnosis?: No         Means of Transportation  Means of Transport to Appts:: Other (Comment) (Facility transport)      Social Determinants of Health (SDOH)      Flowsheet Row Most Recent Value   Housing Stability    In the last 12 months, was there a time when you were not able to pay the mortgage or rent on time? N   At any time in the past 12 months, were you homeless or living in a  shelter (including now)? N   Transportation Needs    In the past 12 months, has lack of transportation kept you from medical appointments or from getting medications? no   In the past 12 months, has lack of transportation kept you from meetings, work, or from getting things needed for daily living? No   Food Insecurity    Within the past 12 months, you worried that your food would run out before you got the money to buy more. Never true   Within the past 12 months, the food you bought just didn't last and you didn't have money to get more. Never true   Utilities    In the past 12 months has the electric, gas, oil, or water company threatened to shut off services in your home? No            DISCHARGE DETAILS:    Discharge planning discussed with:: Facility  [place] of Choice: Yes  Comments - Freedom of Choice: Cm spoke with facility regarding patient. Patient fully dependent prior to admission and wheel chair bound since patient had COVID over two weeks ago. Prior to Two weeks ago she was able to ambulate with walker.              Other Referral/Resources/Interventions Provided:  Interventions: Facility Return  Referral Comments: Referral back to [facility]

## 2024-08-18 NOTE — ASSESSMENT & PLAN NOTE
- Pt hs hx of Alzheimers dementia.   - Nonverbal at baseline  - Continue home meds  - Geriatrics consult, appreciate recommendations  - Delirium precautions  - Frequent reorientation

## 2024-08-18 NOTE — PROGRESS NOTES
Progress Note - Orthopedics   Kylie Ornelas 78 y.o. female MRN: [id number]  Unit/Bed#: W -01      Subjective:    78 y.o.female patient seen and examined bedside this morning. Currently resting in bed. Does not verbally communicate nor open her eyes during encounter. Unable to follow all commands with exception of wiggling toes. Does have abduction pillow in place.     Labs:  0   Lab Value Date/Time    HCT 41.1 08/18/2024 0620    HCT 44.8 08/17/2024 1528    HCT 43.3 08/17/2024 0450    HCT 30.8 (L) 08/21/2014 1459    HCT 29.2 (L) 08/16/2014 0537    HCT 31.4 (L) 08/15/2014 1633    HGB 12.6 08/18/2024 0620    HGB 13.7 08/17/2024 1528    HGB 13.7 08/17/2024 0450    HGB 9.2 (L) 08/21/2014 1459    HGB 8.6 (L) 08/16/2014 0537    HGB 9.4 (L) 08/15/2014 1633    INR 1.19 08/16/2024 2352    INR 1.05 08/15/2014 1633    WBC 10.02 08/18/2024 0620    WBC 12.58 (H) 08/17/2024 1528    WBC 8.05 08/17/2024 0450    WBC 6.04 08/21/2014 1459    WBC 4.26 (L) 08/16/2014 0537    WBC 4.64 08/15/2014 1633       Meds:    Current Facility-Administered Medications:     acetaminophen (TYLENOL) oral suspension 650 mg, 650 mg, Oral, Q4H PRN, Nadja Defrancisco, PA-C    albuterol (PROVENTIL HFA,VENTOLIN HFA) inhaler 2 puff, 2 puff, Inhalation, Q4H PRN, Nadja Defrancisco, PA-C    Cholecalciferol (VITAMIN D3) tablet 1,000 Units, 1,000 Units, Oral, Daily, Nadja Defrancisco, PA-C    clonazePAM (KlonoPIN) tablet 1 mg, 1 mg, Oral, Q6H, Nadja Defrancisco, PA-C    dextrose 5 % infusion, 50 mL/hr, Intravenous, Continuous, Deepthi Joe PA-C, Last Rate: 50 mL/hr at 08/17/24 2034, 50 mL/hr at 08/17/24 2034    enoxaparin (LOVENOX) subcutaneous injection 30 mg, 30 mg, Subcutaneous, Q12H, Nadja Ray PA-C, 30 mg at 08/17/24 2305    Fluticasone Furoate-Vilanterol 100-25 mcg/actuation 1 puff, 1 puff, Inhalation, BID, Nadja Ray PA-C    haloperidol (HALDOL) tablet 2 mg, 2 mg, Oral, TID, Nadja Ray PA-C    HYDROmorphone  "HCl (DILAUDID) injection 0.2 mg, 0.2 mg, Intravenous, Q2H PRN, Nadja Defrancisco, PA-C, 0.2 mg at 08/18/24 0303    insulin lispro (HumALOG/ADMELOG) 100 units/mL subcutaneous injection 1-5 Units, 1-5 Units, Subcutaneous, TID AC, 2 Units at 08/18/24 0854 **AND** Fingerstick Glucose (POCT), , , TID AC, MONTY Inman    lactated ringers bolus 1,000 mL, 1,000 mL, Intravenous, Once PRN **AND** lactated ringers bolus 1,000 mL, 1,000 mL, Intravenous, Once PRN, Nadja Defrancisco, PA-C    ondansetron (ZOFRAN) injection 4 mg, 4 mg, Intravenous, Q6H PRN, Nadja Defrancisco, PA-C    polyethylene glycol (MIRALAX) packet 17 g, 17 g, Oral, Daily, Nadja Defrancisco, PA-C    risperiDONE (RisperDAL) tablet 1 mg, 1 mg, Oral, HS, Nadja Defrancisco, PA-C    senna (SENOKOT) tablet 17.2 mg, 2 tablet, Oral, Daily, Nadja Defrancisco, PA-C    sertraline (ZOLOFT) tablet 50 mg, 50 mg, Oral, Daily, Nadja Defrancisco, PA-C    sodium chloride 0.9 % bolus 1,000 mL, 1,000 mL, Intravenous, Once PRN **AND** sodium chloride 0.9 % bolus 1,000 mL, 1,000 mL, Intravenous, Once PRN, Nadja Defrancisco, PA-C    Blood Culture:   No results found for: \"BLOODCX\"    Wound Culture:   No results found for: \"WOUNDCULT\"    Ins and Outs:  I/O last 24 hours:  In: 924.2 [I.V.:924.2]  Out: -           Physical:  Vitals:    08/18/24 0724   BP: 155/78   Pulse: 102   Resp: 17   Temp: 97.6 °F (36.4 °C)   SpO2: 92%     Musculoskeletal: right Lower Extremity  Skin intact . No erythema or ecchymosis.  Dressing C/D/I without strikethrough. Appears patient was picking at distal edge of mepilex dressing, reinforced with tegaderm.   Does not wince upon palpation of hip   Muscles of thigh and calf soft and compressible  Unable to assess sensation/motor function 2/2 acuity of mental status   Motor intact to +FHL/EHL (able to wiggle toes)  2+ DP pulse  Digits warm and well perfused  Capillary refill < 2 seconds    Assessment:    78 y.o.female POD 1 s/p right hip " hemiarthroplasty with Dr. Lawrence 8/17/2024 .     Plan:  WBAT right lower extremity with assistance.  Posterior hip precautions, abduction pillow  Will monitor for ABLA and administer IVF/prbc as indicated for Greater than 2 gram drop or Hgb < 7. Hgb 12.6 this AM. Management per primary team   PT/OT  Incentive spirometry  Pain control  DVT ppx per primary. Recommend lovenox x 30 days on discharge   Diet per primary  Medical management per primary team  Dispo: Ortho will follow  Patient to follow up outpatient with Dr. Lawrence 2 weeks post-op    Nadja Ray PA-C

## 2024-08-18 NOTE — SPEECH THERAPY NOTE
Speech-Language Pathology Bedside Swallow Evaluation      Patient Name: Kylie Ornelas    Today's Date: 8/18/2024     Problem List  Principal Problem:    Closed fracture of neck of right femur (HCC)  Active Problems:    Dementia (HCC)    COPD (chronic obstructive pulmonary disease) (HCC)    Fall    Hypernatremia    Hyperglycemia      Past Medical History  Past Medical History:   Diagnosis Date    ADHD     Anxiety disorder     Asthma     controlled    Bilateral sacroiliitis (HCC)     Cancer (HCC)     breast    COPD (chronic obstructive pulmonary disease) (HCC)     Depression     History of fall     History of prediabetes     Hypertension     Iron deficiency anemia     Kidney stone     Myofascial pain     Osteoporosis     S/P lumbar spinal fusion        Past Surgical History  Past Surgical History:   Procedure Laterality Date    BACK SURGERY  2017    injection    CERVICAL SPINE SURGERY  2013    Multilevel posterior cervical decompression / fusion    COLONOSCOPY W/ BIOPSIES  2006    HERNIA REPAIR      HYSTERECTOMY      IR EPIDURAL STEROID INJECTION  12/1/2014    IR EPIDURAL STEROID INJECTION  10/30/2017    IR EPIDURAL STEROID INJECTION  10/11/2017    MASTECTOMY Right     NECK SURGERY  2012    IN ARTHRODESIS COMBINED TQ 1NTRSPC LUMBAR N/A 7/17/2019    Procedure: Left L4-5 hemilaminectomy and bilateral foraminotomy and Minimally invasive transforaminal lumbar interbody fusion with pedicle screw and myke fixation fusion L4-5, right-sided approach; minimally invasive right L5-S1 foraminotomy and diskectomy;  Surgeon: Adilson Urias MD;  Location: [facility];  Service: Neurosurgery    TONSILLECTOMY         Summary   Pt presented with s/s suggestive of significant oral dysphagia with aspirtion risk 2* lethargy on advanced demential but no gross overt s/s pharyngeal dysphagia or aspiration with limited amoutns of conservative materials this am.      Recommended Diet: puree/level 1 diet and nectar thick liquids   Recommended  "Form of Meds: crushed with puree   Aspiration precautions and swallowing strategies:   upright posture and feed if/en fully alert  slow rate of feeding, small bites, small cp sips (pt not able to use straw durign evaluation)  Other Recommendations: Continue frequent oral care        Current Medical Status  Kylie Ornelas is a 77 yo F [facility] resident with PMH as stated above (including advanced dementia, COPD, anxiety) who was admitted 8/16 s/p fall with R hip pain and inability to bear weight.   DX: closed displaced fracture of right femoral neck.  She is s/p R hemiarthroplasty (8/17).  P/O Swallowing Evaluation requested and evaluation completed bedside.     Current Precautions:  Fall & Hip    Allergies:  No known food allergies    Past medical history:  Please see H&P for details    Special Studies of 8/16:   CT of the head:   1. No intracranial hemorrhage or calvarial fracture.  2. Mild, chronic microangiopathy.    CT of the cervical spine:   No acute cervical spine fracture or traumatic malalignment.  C4-C7 posterior spinal fixation hardware. No acute hardware complication  Additional chronic/incidental findings as detailed above.     CXR: No acute cardiopulmonary disease.     XR pelvis: Right femoral neck fracture with displacement. Pelvis appears intact     Social/Education/Vocational Hx:  Pt lives in SNF/ECF    Swallow Information   Current Risks for Dysphagia & Aspiration: advanced dementia, risk for decompensation  Current Symptoms/Concerns: poorly responsive at times per staff  Current Diet: regular diet and thin liquids   Baseline Diet: puree/level 1 diet, thin liquid      Baseline Assessment   Behavior/Cognition: waxing and waning arousal level and decreased attention  Speech/Language Status: not able to to follow commands and limited verbal output(eg \"yeah, what)  Patient Positioning: upright in bed  Pain Status/Interventions/Response to Interventions:  No report of or nonverbal indications of " "pain.       Swallow Mechanism Exam- unable to follow commands for OME, hence cursory assessment at rest, during speech and eating acts:   Facial: symmetrical  Labial: WFF- pursed lips for straw only on rare occasions (primarily bit/chewed on straw)  Lingual: occasional intelligible utterances but limited ROM when reflexive licking of lips attempted to clear puree from lips  Velum: unable to visualize  Mandible: inconsistent and limited oral opening when presented with tsp and cup  Dentition: partial, plapated and no loose teeth identified  Vocal quality:clear for the min amt produced  Volitional Cough: unable to initiate volitional cough   Respiratory Status: on RA        Consistencies Assessed and Performance   Consistencies Administered: thin liquids, nectar thick, and puree    Oral Stage: IMPAIRED  When presented with straw, patient primarily bit with chewed on straw.  She only rarely demonstrated appropriate suck to retrieve thin liquid.  When small amounts of nectar thick liquid were placed on the lip or anterior oral cavity (i.e. small cup sip), patient did reflexively \"slurp\" to retrieve nectar thick liquid.  Bolus formation and transfer varied in timeliness from prompt to moderately delayed with all materials.  Labial leakage occurred with small trials of thin liquid but not with thick liquid or purée.    Pharyngeal Stage:   Swallow Mechanics:  Swallowing initiation/laryngeal rise visualized .   No coughing, throat clearing, change in O2 saturation or respiratory status noted today with purée or nectar thick liquid    Esophageal Concerns: No known history    Strategies and Efficacy: Patient was positioned fully upright and provided with small boluses slowly over time.  Verbal and tactile stimulation was provided to maximize alertness and engagement.    Summary and Recommendations (see above)    Results Reviewed with: RN     Treatment Recommended: yes     Frequency of treatment: A minimum of 2 times " weekly    Patient Stated Goal: Patient unable to state      Short Term Goals:  -Pt will tolerate increasing amounts of pureed foods and nectar thick liquid with no significant s/s oral or pharyngeal dysphagia across 1--3 diagnostic session/s    -Pt will tolerate trials of thin liquid with no significant s/s oral or pharyngeal dysphagia across 2-4 diagnostic session/s.    -Pt will tolerate Dysphagia 3/advanced (dental soft) diet and honey thick nectar thick thin liquid with no significant s/s oral or pharyngeal dysphagia across 1-3 diagnostic session/s.    -Patient’s caregivers will demonstrate adherence to recommended diet, as well as application of aspiration precautions and compensatory strategies.    Renetta Pa MS CCC-SLP  [place] License [id number]

## 2024-08-18 NOTE — ASSESSMENT & PLAN NOTE
- Right femur fracture, present on admission.  - Status post fall on 8/16.  - Appreciate Orthopedic surgery evaluation, recommendations and interventions as noted.   - 8/17 right hip hemiarthroplasty  - Maintain weightbearing as tolerated status on the right lower extremity.  - Monitor right lower extremity neurovascular exam.  - Continue multimodal analgesic regimen.  - Continue DVT prophylaxis.  - PT and OT evaluation and treatment as indicated.  - Outpatient follow up with Orthopedic surgery for re-evaluation.

## 2024-08-18 NOTE — ASSESSMENT & PLAN NOTE
- Patient with noted hyperglycemia on admission without formal diagnosis of diabetes  - Continue to monitor BG closely  - SSI, adjust as indicated  - Consider endocrinology consult if remains uncontrolled  - Outpatient follow-up with PCP

## 2024-08-18 NOTE — ASSESSMENT & PLAN NOTE
- Patient with sodium of 150 on admission  - Started on lactated Ringer's, transition to D5W with improvement in sodium  - 8/18 sodium 148 this morning  - Continue IV fluid infusion  - q6hr BMP to monitor  - Outpatient follow-up with PCP

## 2024-08-18 NOTE — QUICK NOTE
Attempted to call family contacts including caregiver/POAJosie and significant other Casimiro Cannon--no answer

## 2024-08-18 NOTE — PLAN OF CARE
Problem: Potential for Falls  Goal: Patient will remain free of falls  Description: INTERVENTIONS:  - Educate patient/family on patient safety including physical limitations  - Instruct patient to call for assistance with activity   - Consult OT/PT to assist with strengthening/mobility   - Keep Call bell within reach  - Keep bed low and locked with side rails adjusted as appropriate  - Keep care items and personal belongings within reach  - Initiate and maintain comfort rounds  - Make Fall Risk Sign visible to staff  - Offer Toileting every  Hours, in advance of need  - Initiate/Maintain alarm  - Obtain necessary fall risk management equipment:   - Apply yellow socks and bracelet for high fall risk patients  - Consider moving patient to room near nurses station  Outcome: Progressing     Problem: PAIN - ADULT  Goal: Verbalizes/displays adequate comfort level or baseline comfort level  Description: Interventions:  - Encourage patient to monitor pain and request assistance  - Assess pain using appropriate pain scale  - Administer analgesics based on type and severity of pain and evaluate response  - Implement non-pharmacological measures as appropriate and evaluate response  - Consider cultural and social influences on pain and pain management  - Notify physician/advanced practitioner if interventions unsuccessful or patient reports new pain  Outcome: Progressing     Problem: INFECTION - ADULT  Goal: Absence or prevention of progression during hospitalization  Description: INTERVENTIONS:  - Assess and monitor for signs and symptoms of infection  - Monitor lab/diagnostic results  - Monitor all insertion sites, i.e. indwelling lines, tubes, and drains  - Monitor endotracheal if appropriate and nasal secretions for changes in amount and color  - [place] appropriate cooling/warming therapies per order  - Administer medications as ordered  - Instruct and encourage patient and family to use good hand hygiene technique  -  Identify and instruct in appropriate isolation precautions for identified infection/condition  Outcome: Progressing  Goal: Absence of fever/infection during neutropenic period  Description: INTERVENTIONS:  - Monitor WBC    Outcome: Progressing     Problem: SAFETY ADULT  Goal: Patient will remain free of falls  Description: INTERVENTIONS:  - Educate patient/family on patient safety including physical limitations  - Instruct patient to call for assistance with activity   - Consult OT/PT to assist with strengthening/mobility   - Keep Call bell within reach  - Keep bed low and locked with side rails adjusted as appropriate  - Keep care items and personal belongings within reach  - Initiate and maintain comfort rounds  - Make Fall Risk Sign visible to staff  - Offer Toileting every  Hours, in advance of need  - Initiate/Maintain alarm  - Obtain necessary fall risk management equipment:   - Apply yellow socks and bracelet for high fall risk patients  - Consider moving patient to room near nurses station  Outcome: Progressing  Goal: Maintain or return to baseline ADL function  Description: INTERVENTIONS:  -  Assess patient's ability to carry out ADLs; assess patient's baseline for ADL function and identify physical deficits which impact ability to perform ADLs (bathing, care of mouth/teeth, toileting, grooming, dressing, etc.)  - Assess/evaluate cause of self-care deficits   - Assess range of motion  - Assess patient's mobility; develop plan if impaired  - Assess patient's need for assistive devices and provide as appropriate  - Encourage maximum independence but intervene and supervise when necessary  - Involve family in performance of ADLs  - Assess for home care needs following discharge   - Consider OT consult to assist with ADL evaluation and planning for discharge  - Provide patient education as appropriate  Outcome: Progressing  Goal: Maintains/Returns to pre admission functional level  Description:  INTERVENTIONS:  - Perform AM-PAC 6 Click Basic Mobility/ Daily Activity assessment daily.  - Set and communicate daily mobility goal to care team and patient/family/caregiver.   - Collaborate with rehabilitation services on mobility goals if consulted  - Perform Range of Motion  times a day.  - Reposition patient every  hours.  - Dangle patient  times a day  - Stand patient  times a day  - Ambulate patient  times a day  - Out of bed to chair  times a day   - Out of bed for meals times a day  - Out of bed for toileting  - Record patient progress and toleration of activity level   Outcome: Progressing     Problem: DISCHARGE PLANNING  Goal: Discharge to home or other facility with appropriate resources  Description: INTERVENTIONS:  - Identify barriers to discharge w/patient and caregiver  - Arrange for needed discharge resources and transportation as appropriate  - Identify discharge learning needs (meds, wound care, etc.)  - Arrange for interpretive services to assist at discharge as needed  - Refer to Case Management Department for coordinating discharge planning if the patient needs post-hospital services based on physician/advanced practitioner order or complex needs related to functional status, cognitive ability, or social support system  Outcome: Progressing     Problem: Knowledge Deficit  Goal: Patient/family/caregiver demonstrates understanding of disease process, treatment plan, medications, and discharge instructions  Description: Complete learning assessment and assess knowledge base.  Interventions:  - Provide teaching at level of understanding  - Provide teaching via preferred learning methods  Outcome: Progressing     Problem: Nutrition/Hydration-ADULT  Goal: Nutrient/Hydration intake appropriate for improving, restoring or maintaining nutritional needs  Description: Monitor and assess patient's nutrition/hydration status for malnutrition. Collaborate with interdisciplinary team and initiate plan and  interventions as ordered.  Monitor patient's weight and dietary intake as ordered or per policy. Utilize nutrition screening tool and intervene as necessary. Determine patient's food preferences and provide high-protein, high-caloric foods as appropriate.     INTERVENTIONS:  - Monitor oral intake, urinary output, labs, and treatment plans  - Assess nutrition and hydration status and recommend course of action  - Evaluate amount of meals eaten  - Assist patient with eating if necessary   - Allow adequate time for meals  - Recommend/ encourage appropriate diets, oral nutritional supplements, and vitamin/mineral supplements  - Order, calculate, and assess calorie counts as needed  - Recommend, monitor, and adjust tube feedings and TPN/PPN based on assessed needs  - Assess need for intravenous fluids  - Provide specific nutrition/hydration education as appropriate  - Include patient/family/caregiver in decisions related to nutrition  Outcome: Progressing     Problem: Prexisting or High Potential for Compromised Skin Integrity  Goal: Skin integrity is maintained or improved  Description: INTERVENTIONS:  - Identify patients at risk for skin breakdown  - Assess and monitor skin integrity  - Assess and monitor nutrition and hydration status  - Monitor labs   - Assess for incontinence   - Turn and reposition patient  - Assist with mobility/ambulation  - Relieve pressure over bony prominences  - Avoid friction and shearing  - Provide appropriate hygiene as needed including keeping skin clean and dry  - Evaluate need for skin moisturizer/barrier cream  - Collaborate with interdisciplinary team   - Patient/family teaching  - Consider wound care consult   Outcome: Progressing

## 2024-08-18 NOTE — PLAN OF CARE
Problem: Potential for Falls  Goal: Patient will remain free of falls  Description: INTERVENTIONS:  - Educate patient/family on patient safety including physical limitations  - Instruct patient to call for assistance with activity   - Consult OT/PT to assist with strengthening/mobility   - Keep Call bell within reach  - Keep bed low and locked with side rails adjusted as appropriate  - Keep care items and personal belongings within reach  - Initiate and maintain comfort rounds  - Make Fall Risk Sign visible to staff  - Offer Toileting every  Hours, in advance of need  - Initiate/Maintain alarm  - Obtain necessary fall risk management equipment:   - Apply yellow socks and bracelet for high fall risk patients  - Consider moving patient to room near nurses station  Outcome: Progressing     Problem: PAIN - ADULT  Goal: Verbalizes/displays adequate comfort level or baseline comfort level  Description: Interventions:  - Encourage patient to monitor pain and request assistance  - Assess pain using appropriate pain scale  - Administer analgesics based on type and severity of pain and evaluate response  - Implement non-pharmacological measures as appropriate and evaluate response  - Consider cultural and social influences on pain and pain management  - Notify physician/advanced practitioner if interventions unsuccessful or patient reports new pain  Outcome: Progressing     Problem: INFECTION - ADULT  Goal: Absence or prevention of progression during hospitalization  Description: INTERVENTIONS:  - Assess and monitor for signs and symptoms of infection  - Monitor lab/diagnostic results  - Monitor all insertion sites, i.e. indwelling lines, tubes, and drains  - Monitor endotracheal if appropriate and nasal secretions for changes in amount and color  - [place] appropriate cooling/warming therapies per order  - Administer medications as ordered  - Instruct and encourage patient and family to use good hand hygiene technique  -  Identify and instruct in appropriate isolation precautions for identified infection/condition  Outcome: Progressing     Problem: SAFETY ADULT  Goal: Patient will remain free of falls  Description: INTERVENTIONS:  - Educate patient/family on patient safety including physical limitations  - Instruct patient to call for assistance with activity   - Consult OT/PT to assist with strengthening/mobility   - Keep Call bell within reach  - Keep bed low and locked with side rails adjusted as appropriate  - Keep care items and personal belongings within reach  - Initiate and maintain comfort rounds  - Make Fall Risk Sign visible to staff  - Offer Toileting every  Hours, in advance of need  - Initiate/Maintain alarm  - Obtain necessary fall risk management equipment:  - Apply yellow socks and bracelet for high fall risk patients  - Consider moving patient to room near nurses station  Outcome: Progressing     Problem: Nutrition/Hydration-ADULT  Goal: Nutrient/Hydration intake appropriate for improving, restoring or maintaining nutritional needs  Description: Monitor and assess patient's nutrition/hydration status for malnutrition. Collaborate with interdisciplinary team and initiate plan and interventions as ordered.  Monitor patient's weight and dietary intake as ordered or per policy. Utilize nutrition screening tool and intervene as necessary. Determine patient's food preferences and provide high-protein, high-caloric foods as appropriate.     INTERVENTIONS:  - Monitor oral intake, urinary output, labs, and treatment plans  - Assess nutrition and hydration status and recommend course of action  - Evaluate amount of meals eaten  - Assist patient with eating if necessary   - Allow adequate time for meals  - Recommend/ encourage appropriate diets, oral nutritional supplements, and vitamin/mineral supplements  - Order, calculate, and assess calorie counts as needed  - Recommend, monitor, and adjust tube feedings and TPN/PPN based  on assessed needs  - Assess need for intravenous fluids  - Provide specific nutrition/hydration education as appropriate  - Include patient/family/caregiver in decisions related to nutrition  Outcome: Progressing     Problem: Prexisting or High Potential for Compromised Skin Integrity  Goal: Skin integrity is maintained or improved  Description: INTERVENTIONS:  - Identify patients at risk for skin breakdown  - Assess and monitor skin integrity  - Assess and monitor nutrition and hydration status  - Monitor labs   - Assess for incontinence   - Turn and reposition patient  - Assist with mobility/ambulation  - Relieve pressure over bony prominences  - Avoid friction and shearing  - Provide appropriate hygiene as needed including keeping skin clean and dry  - Evaluate need for skin moisturizer/barrier cream  - Collaborate with interdisciplinary team   - Patient/family teaching  - Consider wound care consult   Outcome: Progressing

## 2024-08-19 PROBLEM — R13.10 DYSPHAGIA: Status: ACTIVE | Noted: 2024-08-19

## 2024-08-19 PROBLEM — Z91.89 AT RISK FOR DELIRIUM: Status: ACTIVE | Noted: 2024-08-19

## 2024-08-19 LAB
ANION GAP SERPL CALCULATED.3IONS-SCNC: 11 MMOL/L (ref 4–13)
ANION GAP SERPL CALCULATED.3IONS-SCNC: 9 MMOL/L (ref 4–13)
BASOPHILS # BLD AUTO: 0.03 THOUSANDS/ÂΜL (ref 0–0.1)
BASOPHILS NFR BLD AUTO: 0 % (ref 0–1)
BUN SERPL-MCNC: 21 MG/DL (ref 5–25)
BUN SERPL-MCNC: 21 MG/DL (ref 5–25)
CALCIUM SERPL-MCNC: 8.3 MG/DL (ref 8.4–10.2)
CALCIUM SERPL-MCNC: 8.3 MG/DL (ref 8.4–10.2)
CHLORIDE SERPL-SCNC: 111 MMOL/L (ref 96–108)
CHLORIDE SERPL-SCNC: 112 MMOL/L (ref 96–108)
CO2 SERPL-SCNC: 23 MMOL/L (ref 21–32)
CO2 SERPL-SCNC: 25 MMOL/L (ref 21–32)
CREAT SERPL-MCNC: 0.57 MG/DL (ref 0.6–1.3)
CREAT SERPL-MCNC: 0.6 MG/DL (ref 0.6–1.3)
EOSINOPHIL # BLD AUTO: 0.1 THOUSAND/ÂΜL (ref 0–0.61)
EOSINOPHIL NFR BLD AUTO: 1 % (ref 0–6)
ERYTHROCYTE [DISTWIDTH] IN BLOOD BY AUTOMATED COUNT: 13.2 % (ref 11.6–15.1)
GFR SERPL CREATININE-BSD FRML MDRD: 88 ML/MIN/1.73SQ M
GFR SERPL CREATININE-BSD FRML MDRD: 90 ML/MIN/1.73SQ M
GLUCOSE SERPL-MCNC: 173 MG/DL (ref 65–140)
GLUCOSE SERPL-MCNC: 175 MG/DL (ref 65–140)
GLUCOSE SERPL-MCNC: 203 MG/DL (ref 65–140)
GLUCOSE SERPL-MCNC: 211 MG/DL (ref 65–140)
GLUCOSE SERPL-MCNC: 219 MG/DL (ref 65–140)
GLUCOSE SERPL-MCNC: 290 MG/DL (ref 65–140)
HCT VFR BLD AUTO: 36.1 % (ref 34.8–46.1)
HGB BLD-MCNC: 11.4 G/DL (ref 11.5–15.4)
IMM GRANULOCYTES # BLD AUTO: 0.03 THOUSAND/UL (ref 0–0.2)
IMM GRANULOCYTES NFR BLD AUTO: 0 % (ref 0–2)
LYMPHOCYTES # BLD AUTO: 0.73 THOUSANDS/ÂΜL (ref 0.6–4.47)
LYMPHOCYTES NFR BLD AUTO: 10 % (ref 14–44)
MAGNESIUM SERPL-MCNC: 2.2 MG/DL (ref 1.9–2.7)
MCH RBC QN AUTO: 31.1 PG (ref 26.8–34.3)
MCHC RBC AUTO-ENTMCNC: 31.6 G/DL (ref 31.4–37.4)
MCV RBC AUTO: 98 FL (ref 82–98)
MONOCYTES # BLD AUTO: 0.52 THOUSAND/ÂΜL (ref 0.17–1.22)
MONOCYTES NFR BLD AUTO: 7 % (ref 4–12)
MRSA NOSE QL CULT: NORMAL
NEUTROPHILS # BLD AUTO: 5.89 THOUSANDS/ÂΜL (ref 1.85–7.62)
NEUTS SEG NFR BLD AUTO: 82 % (ref 43–75)
NRBC BLD AUTO-RTO: 0 /100 WBCS
PHOSPHATE SERPL-MCNC: 2.7 MG/DL (ref 2.3–4.1)
PLATELET # BLD AUTO: 255 THOUSANDS/UL (ref 149–390)
PMV BLD AUTO: 10.7 FL (ref 8.9–12.7)
POTASSIUM SERPL-SCNC: 3.4 MMOL/L (ref 3.5–5.3)
POTASSIUM SERPL-SCNC: 3.5 MMOL/L (ref 3.5–5.3)
RBC # BLD AUTO: 3.67 MILLION/UL (ref 3.81–5.12)
SODIUM SERPL-SCNC: 145 MMOL/L (ref 135–147)
SODIUM SERPL-SCNC: 146 MMOL/L (ref 135–147)
T4 FREE SERPL-MCNC: 1.74 NG/DL (ref 0.61–1.12)
TSH SERPL DL<=0.05 MIU/L-ACNC: 0.22 UIU/ML (ref 0.45–4.5)
VIT B12 SERPL-MCNC: 257 PG/ML (ref 180–914)
WBC # BLD AUTO: 7.3 THOUSAND/UL (ref 4.31–10.16)

## 2024-08-19 PROCEDURE — 82948 REAGENT STRIP/BLOOD GLUCOSE: CPT

## 2024-08-19 PROCEDURE — 92526 ORAL FUNCTION THERAPY: CPT

## 2024-08-19 PROCEDURE — 97167 OT EVAL HIGH COMPLEX 60 MIN: CPT

## 2024-08-19 PROCEDURE — 84443 ASSAY THYROID STIM HORMONE: CPT | Performed by: FAMILY MEDICINE

## 2024-08-19 PROCEDURE — 83735 ASSAY OF MAGNESIUM: CPT

## 2024-08-19 PROCEDURE — 99024 POSTOP FOLLOW-UP VISIT: CPT

## 2024-08-19 PROCEDURE — 97163 PT EVAL HIGH COMPLEX 45 MIN: CPT

## 2024-08-19 PROCEDURE — 84100 ASSAY OF PHOSPHORUS: CPT

## 2024-08-19 PROCEDURE — 99232 SBSQ HOSP IP/OBS MODERATE 35: CPT | Performed by: SURGERY

## 2024-08-19 PROCEDURE — 84439 ASSAY OF FREE THYROXINE: CPT | Performed by: FAMILY MEDICINE

## 2024-08-19 PROCEDURE — 85025 COMPLETE CBC W/AUTO DIFF WBC: CPT

## 2024-08-19 PROCEDURE — 80048 BASIC METABOLIC PNL TOTAL CA: CPT | Performed by: NURSE PRACTITIONER

## 2024-08-19 PROCEDURE — 99223 1ST HOSP IP/OBS HIGH 75: CPT | Performed by: FAMILY MEDICINE

## 2024-08-19 PROCEDURE — 82607 VITAMIN B-12: CPT | Performed by: FAMILY MEDICINE

## 2024-08-19 RX ORDER — CLONAZEPAM 0.5 MG/1
0.5 TABLET ORAL 4 TIMES DAILY
Status: DISCONTINUED | OUTPATIENT
Start: 2024-08-19 | End: 2024-08-20 | Stop reason: HOSPADM

## 2024-08-19 RX ORDER — HALOPERIDOL 1 MG/1
2 TABLET ORAL
Status: DISCONTINUED | OUTPATIENT
Start: 2024-08-19 | End: 2024-08-20 | Stop reason: HOSPADM

## 2024-08-19 RX ADMIN — INSULIN LISPRO 3 UNITS: 100 INJECTION, SOLUTION INTRAVENOUS; SUBCUTANEOUS at 16:36

## 2024-08-19 RX ADMIN — INSULIN LISPRO 2 UNITS: 100 INJECTION, SOLUTION INTRAVENOUS; SUBCUTANEOUS at 09:47

## 2024-08-19 RX ADMIN — ACETAMINOPHEN 650 MG: 650 SUSPENSION ORAL at 17:58

## 2024-08-19 RX ADMIN — INSULIN LISPRO 1 UNITS: 100 INJECTION, SOLUTION INTRAVENOUS; SUBCUTANEOUS at 12:19

## 2024-08-19 RX ADMIN — DEXTROSE 50 ML/HR: 5 SOLUTION INTRAVENOUS at 12:23

## 2024-08-19 RX ADMIN — INSULIN LISPRO 3 UNITS: 100 INJECTION, SOLUTION INTRAVENOUS; SUBCUTANEOUS at 16:37

## 2024-08-19 RX ADMIN — ENOXAPARIN SODIUM 30 MG: 30 INJECTION SUBCUTANEOUS at 23:40

## 2024-08-19 RX ADMIN — INSULIN LISPRO 1 UNITS: 100 INJECTION, SOLUTION INTRAVENOUS; SUBCUTANEOUS at 22:40

## 2024-08-19 RX ADMIN — INSULIN LISPRO 3 UNITS: 100 INJECTION, SOLUTION INTRAVENOUS; SUBCUTANEOUS at 09:47

## 2024-08-19 RX ADMIN — ENOXAPARIN SODIUM 30 MG: 30 INJECTION SUBCUTANEOUS at 10:11

## 2024-08-19 NOTE — PROGRESS NOTES
[facility]  Progress Note  Name: Kylie Ornelas I  MRN: [id number]  Unit/Bed#: W -01 I Date of Admission: 8/16/2024   Date of Service: 8/19/2024 I Hospital Day: 3    Assessment & Plan   Hyperglycemia  Assessment & Plan  - Patient with noted hyperglycemia on admission without formal diagnosis of diabetes  - Continue to monitor BG closely  - SSI, adjust as indicated  - Consider endocrinology consult if remains uncontrolled  - Outpatient follow-up with PCP    Hypernatremia  Assessment & Plan  - Patient with sodium of 150 on admission  - Started on lactated Ringer's, transition to D5W with improvement in sodium  - 8/18 sodium 148 this morning  - 8/19 sodium 147   - Continue IV fluid infusion  - q6hr BMP to monitor  - Outpatient follow-up with PCP    Fall  Assessment & Plan  - Status post unwitnessed fall with the below noted injuries.  - Fall precautions.  - Geriatric Medicine consultation for evaluation, medication review and recommendations.  - PT and OT evaluation and treatment as indicated.  - Case Management consultation for disposition planning.      COPD (chronic obstructive pulmonary disease) ([facility])  Assessment & Plan  Hx of COPD  - Continue home inhaler regimen  - Outpatient follow up with PCP    Dementia ([facility])  Assessment & Plan  - Pt hs hx of Alzheimers dementia.   - Nonverbal at baseline  - Continue home meds  - Geriatrics consult, appreciate recommendations  - Delirium precautions  - Frequent reorientation    * Closed fracture of neck of right femur ([facility])  Assessment & Plan  - Right femur fracture, present on admission.  - Status post fall on 8/16.  - Appreciate Orthopedic surgery evaluation, recommendations and interventions as noted.   - 8/17 right hip hemiarthroplasty  - Maintain weightbearing as tolerated status on the right lower extremity.  - Monitor right lower extremity neurovascular exam.  - Continue multimodal analgesic regimen.  - Continue DVT prophylaxis.  - PT  and OT evaluation and treatment as indicated.  - Outpatient follow up with Orthopedic surgery for re-evaluation.               Bowel Regimen: Miralax  VTE Prophylaxis:Sequential compression device (Venodyne)  and Enoxaparin (Lovenox)     Disposition: Pending placement    Subjective   Chief Complaint:     Subjective: Pt resting comfortably in bed, at her baseline mental status. No acute events overnight     Objective   Vitals:   Temp:  [98.3 °F (36.8 °C)-99.9 °F (37.7 °C)] 98.3 °F (36.8 °C)  HR:  [] 95  Resp:  [16-20] 16  BP: ()/(66-87) 149/87    I/O         08/17 0701 08/18 0700 08/18 0701  08/19 0700 08/19 0701 08/20 0700    P.O. 0 30     I.V. (mL/kg) 924.2 (16.7) 10 (0.2)     IV Piggyback       Total Intake(mL/kg) 924.2 (16.7) 40 (0.7)     Urine (mL/kg/hr)  106 (0.1)     Total Output  106     Net +924.2 -66            Unmeasured Urine Occurrence 1 x 1 x              Physical Exam:   GENERAL APPEARANCE: NAD  NEURO: At baseline mental status  HEENT: dry mucous membranes  CV: RRR  LUNGS: CTAB  GI: Abdomen soft, nontender  : Voiding  MSK: 2+ pedal pulses, clean and dry incision site  SKIN: warm, dry    Invasive Devices       Peripheral Intravenous Line  Duration             Peripheral IV 08/16/24 Right;Ventral (anterior) Forearm 2 days    Peripheral IV 08/17/24 Left;Medial Wrist 1 day                          Lab Results: BMP/CMP:   Lab Results   Component Value Date    SODIUM 146 08/19/2024    K 3.5 08/19/2024     (H) 08/19/2024    CO2 25 08/19/2024    BUN 21 08/19/2024    CREATININE 0.60 08/19/2024    CALCIUM 8.3 (L) 08/19/2024    EGFR 88 08/19/2024    and CBC:   Lab Results   Component Value Date    WBC 7.30 08/19/2024    HGB 11.4 (L) 08/19/2024    HCT 36.1 08/19/2024    MCV 98 08/19/2024     08/19/2024    RBC 3.67 (L) 08/19/2024    MCH 31.1 08/19/2024    MCHC 31.6 08/19/2024    RDW 13.2 08/19/2024    MPV 10.7 08/19/2024    NRBC 0 08/19/2024     Imaging: I have personally reviewed  pertinent reports.     Other Studies: n/a

## 2024-08-19 NOTE — ASSESSMENT & PLAN NOTE
Patient with history of constipation.   Patient was taking Senna 8.6 mg, 2 tablets daily at nursing home.   Optimize bowel regimen.   Monitor for urinary retention.   Continue current regimen per primary team.

## 2024-08-19 NOTE — ASSESSMENT & PLAN NOTE
Patient with noted hyperglycemia on admission without formal diagnosis of diabetes.   A1C of 7.4.   Continue management with insulin lispro per primary team.   Avoid hypoglycemia.  Continue to monitor BG closely.   Consider endocrinology consult if remains uncontrolled.

## 2024-08-19 NOTE — ASSESSMENT & PLAN NOTE
Sodium level 154 upon admission on 8/16/2024.   Current sodium level is 143.   Continue to monitor BMP

## 2024-08-19 NOTE — ASSESSMENT & PLAN NOTE
Pt. Is oriented to person only at baseline, occasionally follows simple commands.   Currently nonverbal. Per nursing home, she speaks minimally at baseline.   Pt. Lives at [facility]   Dependent for instrumental ADLs  Dependent for ADLs  Score of 15 out of 28 on MMSE in July of 2019  [facility] aggressive behaviors recently including cursing and digging nails into nursing staff.   Was taking Haldol 2 mg daily at bedtime prior to admission. Recommend holding this medication as she has not taken it while inpatient.   CT head shows decreased attenuation in periventricular and subcortical white matter demonstrating an appearance that is statistically most likely to represent mild microangiopathic change. Atherosclerotic calcifications noted.   TSH 0.221, T4 1.74  B12 257  Vit D, 25-OH 40 on 4/5/2023. Patient receives Vit D3 supplementation 1000 units daily.   [place] to person, place, time, and situation as needed  Monitor for behaviors   Monitor for mental status change  Provide supportive care   Assist with feeding

## 2024-08-19 NOTE — PHYSICAL THERAPY NOTE
PHYSICAL THERAPY EVALUATION  DATE: 08/19/24  TIME: 8997-0882    NAME:  Kylie Ornelas  AGE:   78 y.o.  Mrn:   [id number]  Length Of Stay: 3    ADMIT DX:  Hypernatremia [E87.0]  Hip pain [M25.559]  Hyperglycemia [R73.9]  Closed fracture of right hip, initial encounter ([facility]) [S72.001A]    Past Medical History:   Diagnosis Date    ADHD     Anxiety disorder     Asthma     controlled    Bilateral sacroiliitis ([facility])     Cancer ([facility])     breast    COPD (chronic obstructive pulmonary disease) ([facility])     Depression     History of fall     History of prediabetes     Hypertension     Iron deficiency anemia     Kidney stone     Myofascial pain     Osteoporosis     S/P lumbar spinal fusion      Past Surgical History:   Procedure Laterality Date    BACK SURGERY  2017    injection    CERVICAL SPINE SURGERY  2013    Multilevel posterior cervical decompression / fusion    COLONOSCOPY W/ BIOPSIES  2006    HERNIA REPAIR      HYSTERECTOMY      IR EPIDURAL STEROID INJECTION  12/1/2014    IR EPIDURAL STEROID INJECTION  10/30/2017    IR EPIDURAL STEROID INJECTION  10/11/2017    MASTECTOMY Right     NECK SURGERY  2012    ME ARTHRODESIS COMBINED TQ 1NTRSPC LUMBAR N/A 7/17/2019    Procedure: Left L4-5 hemilaminectomy and bilateral foraminotomy and Minimally invasive transforaminal lumbar interbody fusion with pedicle screw and myke fixation fusion L4-5, right-sided approach; minimally invasive right L5-S1 foraminotomy and diskectomy;  Surgeon: Adilson Urias MD;  [facility]: [facility];  Service: Neurosurgery    ME HEMIARTHROPLASTY HIP PARTIAL Right 8/17/2024    Procedure: HEMIARTHROPLASTY HIP (BIPOLAR) - cemented right hip hemiarthroplasty;  Surgeon: Jabier Lawrence DO;  Location: [facility];  Service: Orthopedics    TONSILLECTOMY         Performed at least 2 patient identifiers during session: Name, Birthday, ID bracelet, and Epic photo     08/19/24 1130   PT Last Visit   PT Visit Date 08/19/24   Note Type   Note type Evaluation   Pain  Assessment   Pain Assessment Tool FLACC   Pain Location/Orientation Orientation: Right;Location: Hip   Pain Onset/Description Frequency: Intermittent;Descriptor: Discomfort   Effect of Pain on Daily Activities limits tolerance of functional mobility; limits ease of mobility and active participation in mobility   Hospital Pain Intervention(s) Repositioned;Ambulation/increased activity;Emotional support   Multiple Pain Sites No   Pain Rating: FLACC (Rest) - Face 0   Pain Rating: FLACC (Rest) - Legs 0   Pain Rating: FLACC (Rest) - Activity 0   Pain Rating: FLACC (Rest) - Cry 0   Pain Rating: FLACC (Rest) - Consolability 0   Score: FLACC (Rest) 0   Pain Rating: FLACC (Activity) - Face 2   Pain Rating: FLACC (Activity) - Legs 1   Pain Rating: FLACC (Activity) - Activity 2   Pain Rating: FLACC (Activity) - Cry 2   Pain Rating: FLACC (Activity) - Consolability 0   Score: FLACC (Activity) 7   Restrictions/Precautions   Weight Bearing Precautions Per Order Yes   RLE Weight Bearing Per Order WBAT  (s/p bipolar hemiarthroplasty of R hip by Dr. Lawrence 8/17/24)   Braces or Orthoses Other (Comment)  (hip abduction wedge)   Other Precautions Cognitive;Chair Alarm;Bed Alarm;WBS;Multiple lines;Fall Risk;Pain  (POSTERIOR HIP PRECAUTIONS)   Home Living   Type of Home SNF  ([facility])   Home Layout Access   Prior Function   Level of [place] Needs assistance with ADLs;Needs assistance with functional mobility;Needs assistance with IADLS   Lives With Facility staff   Receives Help From Personal care attendant  (unable to determine if pt receives PT/OT services)   IADLs Family/Friend/Other provides transportation;Family/Friend/Other provides meals;Family/Friend/Other provides medication management   Falls in the last 6 months 1 to 4  (at least 1 fall leading to current hospitalization, pt unable to report additional)   Vocational Retired   Comments Pt unable to provide information due to baseline cognitive impairments. At time of  justinal, pt provides no verbalizations. Per CM: pt is a resident of [facility]. True baseline was ambulatory with RW, however after multiple illnesses and hospitalization, pt declined in mobility and was requiring dependent means of transfers to/from  over the last few weeks.   General   Additional Pertinent History Pt is a 78 yr old female admitted 8/16/24 from [facility] s/p unwitnessed fall resulting in R hip fx. 8/17/24 pt underwent bipolar hemiarthroplasty of R hip by Dr. aLwrence, WBAT LANDY LAZARO.   Family/Caregiver Present No   Cognition   Overall Cognitive Status Impaired   Arousal/Participation Poorly responsive   Orientation Level Unable to assess   Memory Unable to assess   Following Commands Follows one step commands inconsistently  (<10% of session)   Comments Pt with eyes closed t/o 99% of session. Pt notably guarded and resistant to participate. Unable to effectively follow commands.   Subjective   Subjective Pt with no intentional speech, occasional non sensical muttering.   RUE Assessment   RUE Assessment WFL  (pt unable to follow commands for MMT, observed functionally)   LUE Assessment   LUE Assessment WFL  (pt unable to follow commands for MMT, observed functionally)   RLE Assessment   RLE Assessment X  (pt unable to follow commands for MMT, no spontaneous AROM of RLE noted t/o session, noted with mild hip flexion contracture while in supine)   LLE Assessment   LLE Assessment X  (pt unable to follow commands for MMT, observed functionally to 2+/5, noted with mild hip flexion contracture while in supine)   Vision-Basic Assessment   Current Vision Does not wear glasses   Coordination   Movements are Fluid and Coordinated 0   Sensation WFL   Bed Mobility   Rolling R 2  Maximal assistance  (MAXA 1 to roll towards R side, however S to maintain R sidelying with UE grasp on bedrail)   Additional items Assist x 1;Bedrails;Increased time required;Verbal cues;LE management  (trunk management)   Rolling L 2   Maximal assistance  (to achieve 1/2 roll towards L side)   Additional items Assist x 1;Increased time required;Verbal cues;LE management  (trunk management)   Supine to Sit 1  Dependent   Additional items Assist x 2;HOB elevated;Increased time required;Verbal cues;LE management  (trunk management)   Sit to Supine 1  Dependent   Additional items Assist x 2;LE management  (trunk management)   Additional Comments Pt actively resisting assistance in trunk support during supine>short sit EOB, however unable to effectively support self. Once in full upright sit EOB, pt needing MAX-Total A to maintain upright posture/balance ~20 seconds, prior to needing to return to supine.   Transfers   Sit to Stand Unable to assess   Stand to Sit Unable to assess   Stand pivot Unable to assess   Ambulation/Elevation   Gait pattern Not tested;Not appropriate   Balance   Static Sitting Poor -   Static Standing   (JAVON)   Endurance Deficit   Endurance Deficit Yes   Activity Tolerance   Activity Tolerance Patient limited by fatigue;Patient limited by pain;Other (Comment)  (impaired cognition)   Medical Staff Made Aware Spoke with RN, OT   Assessment   Prognosis Guarded   Problem List Decreased strength;Decreased range of motion;Decreased endurance;Impaired balance;Decreased mobility;Decreased coordination;Decreased cognition;Impaired judgement;Decreased safety awareness;Decreased skin integrity;Orthopedic restrictions;Pain   Assessment Pt seen for PT evaluation for mobility assessment & discharge needs. Pt admitted 8/16/2024 s/p unwitnessed fall resulting in Closed fracture of neck of right femur (HCC), underwent R bipolar jose j-arthroplasty by Dr. Lawrence 8/17/24. Comorbidities affecting pt's fnxl performance include: Dementia, depression, COPD, asthma, ADHD, anxiety, osteoporosis, HTN, falls, breast CA, lumbar and cervical spine fusions. During PT IE, pt requires MAXA for rolling, dependent A x2 for supine to/from short sit EOB, poor  tolerance of upright sitting EOB. Pt displays above outlined functional impairments & limitations, and presents below her true baseline level of functional mobility, however unable to obtain accuracy of most recent level of mobility. The AM-PAC & Barthel Index outcome tools were used to assist in determining pt safety w/ mobility/self care & appropriate d/c recommendations, see above for scores. Pt is at risk of falls d/t multiple comorbidities, impulsivity, h/o falls, impaired balance, impaired cognition, impaired insight/safety awareness, varying levels of pain , acuity of medical illness, ongoing medical treatment of primary dx, polypharmacy, and WB restriction. Pt's clinical presentation is currently unstable/unpredictable as seen in pt's presentation of changing level of pain, varying levels of cognitive performance, increased fall risk, new onset of impairment of functional mobility, decreased endurance, and new onset of weakness. Pt will benefit from continued PT services in order to address impairments, decrease risk of falls, maximize independence w/ fnxl mobility, & ensure safety w/ mobility for transition to next level of care. Based on pt presentation & impairments, pt would most appropriately benefit from Level II (moderate PT intensity) resources upon d/c.   Goals   Patient Goals no rehab goals stated by pt; to decrease burden of care   STG Expiration Date 09/08/24   Short Term Goal #1 Patient PT goals established in order to decrease burden of care. Pt will: complete all bed mobility in hospital bed with MODA in order to promote increased OOB functional mobility and simulate home environment; improve R LE strength to >/= 2+/5 MMT t/o in order to increase safety with functional mobility and decrease risk of falls; demonstrate >75% participation in LE strengthening HEP; improve static sitting balance to >/= fair grade in order to promote safety and increased independence with mobility; tolerate >10 mins  EOB in upright position with no greater than LUZ; improve AM-PAC score to >/= 11/24 in order to increase independence with mobility and decrease burden of care; improve Barthel Index score to >/= 10/100 in order to increase independence and decrease risk of falls; PT TO SEE FOR TRANSFERS AND GAIT AS APPROPRIATE.   PT Treatment Day 0   Plan   Treatment/Interventions LE strengthening/ROM;Therapeutic exercise;Endurance training;Cognitive reorientation;Patient/family training;Equipment eval/education;Bed mobility;Spoke to nursing;Compensatory technique education;Continued evaluation;Functional transfer training   PT Frequency 2-3x/wk   Discharge Recommendation   Rehab Resource Intensity Level, PT II (Moderate Resource Intensity)   AM-PAC Basic Mobility Inpatient   Turning in Flat Bed Without Bedrails 1   Lying on Back to Sitting on Edge of Flat Bed Without Bedrails 1   Moving Bed to Chair 1   Standing Up From Chair Using Arms 1   Walk in Room 1   Climb 3-5 Stairs With Railing 1   Basic Mobility Inpatient Raw Score 6   Turning Head Towards Sound 1   Follow Simple Instructions 1   Low Function Basic Mobility Raw Score  8   Low Function Basic Mobility Standardized Score  10.37   [facility] Level Of Mobility   -[facility] Goal 2: Bed activities/Dependent transfer   -[facility] Achieved 3: Sit at edge of bed   Modified Shawn Scale   Modified [place] Scale 5   Barthel Index   Feeding 0   Bathing 0   Grooming Score 0   Dressing Score 0   Bladder Score 0   Bowels Score 0   Toilet Use Score 0   Transfers (Bed/Chair) Score 0   Mobility (Level Surface) Score 0   Stairs Score 0   Barthel Index Score 0   End of Consult   Patient Position at End of Consult Supine;Bed/Chair alarm activated;All needs within reach     This session, pt required and most appropriately benefited from partial or full skilled PT/OT co-eval due to extensive physical assistance of SKILLED therapists, cognitive-communication impairments, cognitive-behavioral  deficits, decreased activity tolerance, and unpredictable medical and/or functional status. PT and OT goals were addressed separately as seen in documentation.    Based on patient's [facility] Highest Level of Mobility scores today, patient currently has a goal of -[facility] Levels: 3: SIT AT EDGE OF BED, to be completed with RN staffing each shift, in order to improve overall activity tolerance and mobility, combat hospital related deconditioning, and maximize outcomes for d/c from the acute care setting.     The patient's AM-PAC Basic Mobility Inpatient Short Form Raw Score is 6. A Raw score of less than or equal to 16 suggests the patient may benefit from discharge to post-acute rehabilitation services. Please also refer to the recommendation of the Physical Therapist for safe discharge planning.        Aurora Van PT, DPT   Available via [employer]  NPI # [id number]  [place] License - [id number]  8/19/2024

## 2024-08-19 NOTE — PLAN OF CARE
Problem: Potential for Falls  Goal: Patient will remain free of falls  Description: INTERVENTIONS:  - Educate patient/family on patient safety including physical limitations  - Instruct patient to call for assistance with activity   - Consult OT/PT to assist with strengthening/mobility   - Keep Call bell within reach  - Keep bed low and locked with side rails adjusted as appropriate  - Keep care items and personal belongings within reach  - Initiate and maintain comfort rounds  - Make Fall Risk Sign visible to staff  - Offer Toileting every  Hours, in advance of need  - Initiate/Maintain alarm  - Obtain necessary fall risk management equipment:   - Apply yellow socks and bracelet for high fall risk patients  - Consider moving patient to room near nurses station  Outcome: Progressing     Problem: PAIN - ADULT  Goal: Verbalizes/displays adequate comfort level or baseline comfort level  Description: Interventions:  - Encourage patient to monitor pain and request assistance  - Assess pain using appropriate pain scale  - Administer analgesics based on type and severity of pain and evaluate response  - Implement non-pharmacological measures as appropriate and evaluate response  - Consider cultural and social influences on pain and pain management  - Notify physician/advanced practitioner if interventions unsuccessful or patient reports new pain  Outcome: Progressing     Problem: INFECTION - ADULT  Goal: Absence or prevention of progression during hospitalization  Description: INTERVENTIONS:  - Assess and monitor for signs and symptoms of infection  - Monitor lab/diagnostic results  - Monitor all insertion sites, i.e. indwelling lines, tubes, and drains  - Monitor endotracheal if appropriate and nasal secretions for changes in amount and color  - [place] appropriate cooling/warming therapies per order  - Administer medications as ordered  - Instruct and encourage patient and family to use good hand hygiene technique  -  Identify and instruct in appropriate isolation precautions for identified infection/condition  Outcome: Progressing  Goal: Absence of fever/infection during neutropenic period  Description: INTERVENTIONS:  - Monitor WBC    Outcome: Progressing     Problem: SAFETY ADULT  Goal: Patient will remain free of falls  Description: INTERVENTIONS:  - Educate patient/family on patient safety including physical limitations  - Instruct patient to call for assistance with activity   - Consult OT/PT to assist with strengthening/mobility   - Keep Call bell within reach  - Keep bed low and locked with side rails adjusted as appropriate  - Keep care items and personal belongings within reach  - Initiate and maintain comfort rounds  - Make Fall Risk Sign visible to staff  - Offer Toileting every  Hours, in advance of need  - Initiate/Maintain alarm  - Obtain necessary fall risk management equipment  - Apply yellow socks and bracelet for high fall risk patients  - Consider moving patient to room near nurses station  Outcome: Progressing  Goal: Maintain or return to baseline ADL function  Description: INTERVENTIONS:  -  Assess patient's ability to carry out ADLs; assess patient's baseline for ADL function and identify physical deficits which impact ability to perform ADLs (bathing, care of mouth/teeth, toileting, grooming, dressing, etc.)  - Assess/evaluate cause of self-care deficits   - Assess range of motion  - Assess patient's mobility; develop plan if impaired  - Assess patient's need for assistive devices and provide as appropriate  - Encourage maximum independence but intervene and supervise when necessary  - Involve family in performance of ADLs  - Assess for home care needs following discharge   - Consider OT consult to assist with ADL evaluation and planning for discharge  - Provide patient education as appropriate  Outcome: Progressing  Goal: Maintains/Returns to pre admission functional level  Description: INTERVENTIONS:  -  Perform AM-PAC 6 Click Basic Mobility/ Daily Activity assessment daily.  - Set and communicate daily mobility goal to care team and patient/family/caregiver.   - Collaborate with rehabilitation services on mobility goals if consulted  - Perform Range of Motion  times a day.  - Reposition patient every  hours.  - Dangle patient  times a day  - Stand patient  times a day  - Ambulate patient  times a day  - Out of bed to chair  times a day   - Out of bed for meals  times a day  - Out of bed for toileting  - Record patient progress and toleration of activity level   Outcome: Progressing     Problem: DISCHARGE PLANNING  Goal: Discharge to home or other facility with appropriate resources  Description: INTERVENTIONS:  - Identify barriers to discharge w/patient and caregiver  - Arrange for needed discharge resources and transportation as appropriate  - Identify discharge learning needs (meds, wound care, etc.)  - Arrange for interpretive services to assist at discharge as needed  - Refer to Case Management Department for coordinating discharge planning if the patient needs post-hospital services based on physician/advanced practitioner order or complex needs related to functional status, cognitive ability, or social support system  Outcome: Progressing     Problem: Knowledge Deficit  Goal: Patient/family/caregiver demonstrates understanding of disease process, treatment plan, medications, and discharge instructions  Description: Complete learning assessment and assess knowledge base.  Interventions:  - Provide teaching at level of understanding  - Provide teaching via preferred learning methods  Outcome: Progressing     Problem: Nutrition/Hydration-ADULT  Goal: Nutrient/Hydration intake appropriate for improving, restoring or maintaining nutritional needs  Description: Monitor and assess patient's nutrition/hydration status for malnutrition. Collaborate with interdisciplinary team and initiate plan and interventions as  ordered.  Monitor patient's weight and dietary intake as ordered or per policy. Utilize nutrition screening tool and intervene as necessary. Determine patient's food preferences and provide high-protein, high-caloric foods as appropriate.     INTERVENTIONS:  - Monitor oral intake, urinary output, labs, and treatment plans  - Assess nutrition and hydration status and recommend course of action  - Evaluate amount of meals eaten  - Assist patient with eating if necessary   - Allow adequate time for meals  - Recommend/ encourage appropriate diets, oral nutritional supplements, and vitamin/mineral supplements  - Order, calculate, and assess calorie counts as needed  - Recommend, monitor, and adjust tube feedings and TPN/PPN based on assessed needs  - Assess need for intravenous fluids  - Provide specific nutrition/hydration education as appropriate  - Include patient/family/caregiver in decisions related to nutrition  Outcome: Progressing     Problem: Prexisting or High Potential for Compromised Skin Integrity  Goal: Skin integrity is maintained or improved  Description: INTERVENTIONS:  - Identify patients at risk for skin breakdown  - Assess and monitor skin integrity  - Assess and monitor nutrition and hydration status  - Monitor labs   - Assess for incontinence   - Turn and reposition patient  - Assist with mobility/ambulation  - Relieve pressure over bony prominences  - Avoid friction and shearing  - Provide appropriate hygiene as needed including keeping skin clean and dry  - Evaluate need for skin moisturizer/barrier cream  - Collaborate with interdisciplinary team   - Patient/family teaching  - Consider wound care consult   Outcome: Progressing

## 2024-08-19 NOTE — QUICK NOTE
"Called to update patient's guardian, .  Contact information is to his office phone.  Left a message with  fully updating.   notes that patient has had a decline over the past week or so leading up to admission, noting her to be \"lethargic\".  Mr Yeung is to call with any questions or concerns.  "

## 2024-08-19 NOTE — ASSESSMENT & PLAN NOTE
Speech pathology consulted, appreciate input   Recommended Diet: puree/level 1 diet and nectar thick liquids   Recommended Form of Meds: crushed with puree   Aspiration precautions and swallowing strategies:   upright posture and feed if fully alert  slow rate of feeding, small bites, small cup sips (pt not able to use straw during evaluation)  Other Recommendations: Continue frequent oral care  Patient requires assistance with feeding.

## 2024-08-19 NOTE — SPEECH THERAPY NOTE
Speech Language/Pathology    Speech/Language Pathology Progress Note    Patient Name: Kylie Ornelas  Today's Date: 8/19/2024         Subjective:  Pt seen at lunch for dysphagia tx follow up. Pt on dysphagia 1 puree w/ nectar thick liquids, eyes mostly closed throughout session, but pt pantomime feeds her self and readily opens her mouth for next bite.     Objective:  Pt ate servings of applesauce, peaches, and van pudding, 4 oz of NTL water and apple juice by cup. Pt w/ fair bolus retrieval. Mild labial leakage/anterior spillage. Slow bolus transfer. Oral residue noted. Swallows were timely and complete w/ no overt s/s aspiration noted.     Assessment:  Pt tolerated puree foods and NTL by cup w/o overt s/s aspiration and good po intake.     Plan/Recommendations:  Cont puree w/ nectar thick liquids  Feed when awake and alert  Meds crushed  Will cont to follow      Smita Oden MA CCC-SLP  Speech Pathologist  Available via [employer]

## 2024-08-19 NOTE — ASSESSMENT & PLAN NOTE
Patient presented to ED on 8/16/2024 s/p unwitnessed fall at [facility].   Per shelter, patient was found on the floor, leaning against her bed on 8/14.   XR R femur shows significantly displaced femoral neck fracture.   OR on 8/17 for R hip cemented hemiarthroplasty.  Trauma follows, appreciate input.   Maintain weightbearing as tolerated status on the right lower extremity.  Continue multimodal analgesic regimen.  Continue DVT prophylaxis.  PT and OT evaluation and treatment as indicated.  Outpatient follow up with Orthopedic surgery for re-evaluation.

## 2024-08-19 NOTE — ASSESSMENT & PLAN NOTE
- Patient with sodium of 150 on admission  - Started on lactated Ringer's, transition to D5W with improvement in sodium  - 8/18 sodium 148 this morning  - 8/19 sodium 147   - Continue IV fluid infusion  - q6hr BMP to monitor  - Outpatient follow-up with PCP

## 2024-08-19 NOTE — PROGRESS NOTES
Progress Note - Orthopedics   Kylie Ornelas 78 y.o. female MRN: [id number]  Unit/Bed#: W -01    Subjective:    78 y.o.female, now POD#2 right hip hemiarthroplasty on 8/17 with Dr. Lawrence, seen and examined bedside this morning. Currently resting in bed, posterior hip precaution pillow in place. Does not verbally communicate nor open her eyes during encounter. Unable to follow commands. Responds by wiggling toes when palpating foot. SCD in place    Labs:  0   Lab Value Date/Time    HCT 36.1 08/19/2024 0646    HCT 41.1 08/18/2024 0620    HCT 44.8 08/17/2024 1528    HCT 30.8 (L) 08/21/2014 1459    HCT 29.2 (L) 08/16/2014 0537    HCT 31.4 (L) 08/15/2014 1633    HGB 11.4 (L) 08/19/2024 0646    HGB 12.6 08/18/2024 0620    HGB 13.7 08/17/2024 1528    HGB 9.2 (L) 08/21/2014 1459    HGB 8.6 (L) 08/16/2014 0537    HGB 9.4 (L) 08/15/2014 1633    INR 1.19 08/16/2024 2352    INR 1.05 08/15/2014 1633    WBC 7.30 08/19/2024 0646    WBC 10.02 08/18/2024 0620    WBC 12.58 (H) 08/17/2024 1528    WBC 6.04 08/21/2014 1459    WBC 4.26 (L) 08/16/2014 0537    WBC 4.64 08/15/2014 1633       Meds:    Current Facility-Administered Medications:     acetaminophen (TYLENOL) oral suspension 650 mg, 650 mg, Oral, Q4H PRN, Nadja Defluisciadam PA-ANCELMO    albuterol (PROVENTIL HFA,VENTOLIN HFA) inhaler 2 puff, 2 puff, Inhalation, Q4H PRN, Nadja Defluiscisco, PA-C    Cholecalciferol (VITAMIN D3) tablet 1,000 Units, 1,000 Units, Oral, Daily, Nadja Ray PA-ANCELMO    clonazePAM (KlonoPIN) tablet 1 mg, 1 mg, Oral, Q6H, Nadja Ray, PA-C, 1 mg at 08/18/24 1133    dextrose 5 % infusion, 50 mL/hr, Intravenous, Continuous, Deepthi Joe PA-C, Last Rate: 50 mL/hr at 08/18/24 1436, 50 mL/hr at 08/18/24 1436    enoxaparin (LOVENOX) subcutaneous injection 30 mg, 30 mg, Subcutaneous, Q12H, Nadja Ray PA-C, 30 mg at 08/18/24 2203    Fluticasone Furoate-Vilanterol 100-25 mcg/actuation 1 puff, 1 puff, Inhalation, BID, Nadja  "SALLY Ray-C    haloperidol (HALDOL) tablet 2 mg, 2 mg, Oral, TID, Nadja Ray PA-C    HYDROmorphone HCl (DILAUDID) injection 0.2 mg, 0.2 mg, Intravenous, Q2H PRN, Nadja Ray PA-C, 0.2 mg at 08/18/24 2203    insulin lispro (HumALOG/ADMELOG) 100 units/mL subcutaneous injection 1-5 Units, 1-5 Units, Subcutaneous, TID AC, 2 Units at 08/19/24 0947 **AND** Fingerstick Glucose (POCT), , , TID AC, MONTY Inmna    insulin lispro (HumALOG/ADMELOG) 100 units/mL subcutaneous injection 1-5 Units, 1-5 Units, Subcutaneous, HS, MONTY Inman    insulin lispro (HumALOG/ADMELOG) 100 units/mL subcutaneous injection 3 Units, 3 Units, Subcutaneous, TID With Meals, MONTY Inman, 3 Units at 08/19/24 0947    ondansetron (ZOFRAN) injection 4 mg, 4 mg, Intravenous, Q6H PRN, SALLY Gore-C    polyethylene glycol (MIRALAX) packet 17 g, 17 g, Oral, Daily, Nadja Ray PA-C    risperiDONE (RisperDAL) tablet 1 mg, 1 mg, Oral, HS, Nadja Ray PA-C    senna (SENOKOT) tablet 17.2 mg, 2 tablet, Oral, Daily, Nadja Ray PA-C    sertraline (ZOLOFT) tablet 50 mg, 50 mg, Oral, Daily, Nadjarakesh Goldbergcisco, PA-C    Blood Culture:   No results found for: \"BLOODCX\"    Wound Culture:   No results found for: \"WOUNDCULT\"    Ins and Outs:  I/O last 24 hours:  In: 40 [P.O.:30; I.V.:10]  Out: 106 [Urine:106]    Physical:  Vitals:    08/19/24 0702   BP: 149/87   Pulse: 95   Resp: 16   Temp: 98.3 °F (36.8 °C)   SpO2: 92%     Vitals stable, some tachycardia last night, since resolved    Musculoskeletal: right Lower Extremity  Skin intact . No erythema or ecchymosis.  Abduction pillow in place  Dressing C/D/I without strikethrough.  Does not wince upon palpation of hip   Muscles of thigh soft and compressible  Unable to assess sensation/motor function 2/2 acuity of mental status   Motor intact to +FHL/EHL (able to wiggle toes)  2+ DP pulse    Assessment:    78 y.o.female POD 2 s/p right " hip hemiarthroplasty with Dr. Lawrence 8/17/2024 .     Plan:  WBAT right lower extremity with assistance.  Posterior hip precautions, abduction pillow  Will monitor for ABLA and administer IVF/prbc as indicated for Greater than 2 gram drop or Hgb < 7. Hgb 11.4 this AM. Management per primary team   PT/OT  Incentive spirometry  Pain control  DVT ppx per primary. Recommend lovenox x 30 days on discharge   Diet per primary  Medical management per primary team  Dispo: Ortho will follow  Patient to follow up outpatient with Dr. Lawrence 2 weeks post-op    Juni Carranza PA-C

## 2024-08-19 NOTE — ASSESSMENT & PLAN NOTE
-Patient is high risk of delirium due to fall, change in environment, pain, dementia at baseline, polypharmacy   -Initiate delirium precautions  -maintain normal sleep/wake cycle  -minimize overnight interruptions, group overnight vitals/labs/nursing checks as possible  -dim lights, close blinds and turn off tv to minimize stimulation and encourage sleep environment in evenings  -ensure that pain is well controlled, consider Tylenol 975mg Q8H scheduled   -monitor for fecal and urinary retention which may precipitate delirium  -encourage early mobilization and ambulation  -provide frequent reorientation and redirection  -encourage family and friends at the bedside to help calm patient if anxious  -avoid medications which may precipitate or worsen delirium such as tramadol, benzodiazepine, anticholinergics, and antihistaminics  -encourage hydration and nutrition , assist with feeding if needed  -redirect unwanted behaviors as first line, avoid physical restraints.   -Recommend changing Clonazepam to 0.5 mg TID- hold for somnolence. Patient has only had one dose of Clonazepam inpatient.   -Discontinued Risperidone and Sertraline as patient has not been taking these medications at [facility]  - decreased haldol to 2 mg QHS- home dose, taper off as tolerate. Recommend holding this medication as she has not taken it while inpatient.   - Qtc 447, continue to monitor

## 2024-08-19 NOTE — ASSESSMENT & PLAN NOTE
Per [facility], patient is wheelchair-bound and uses a mechanical lift for transfers.  History of multiple falls.   Fall precautions.   PT/OT as indicated   OOB as tolerated

## 2024-08-19 NOTE — PLAN OF CARE
Problem: PHYSICAL THERAPY ADULT  Goal: Performs mobility at highest level of function for planned discharge setting.  See evaluation for individualized goals.  Description: Treatment/Interventions: LE strengthening/ROM, Therapeutic exercise, Endurance training, Cognitive reorientation, Patient/family training, Equipment eval/education, Bed mobility, Spoke to nursing, Compensatory technique education, Continued evaluation, Functional transfer training     See flowsheet documentation for full assessment, interventions and recommendations.  Note: Prognosis: Guarded  Problem List: Decreased strength, Decreased range of motion, Decreased endurance, Impaired balance, Decreased mobility, Decreased coordination, Decreased cognition, Impaired judgement, Decreased safety awareness, Decreased skin integrity, Orthopedic restrictions, Pain  Assessment: Pt seen for PT evaluation for mobility assessment & discharge needs. Pt admitted 8/16/2024 s/p unwitnessed fall resulting in Closed fracture of neck of right femur (HCC), underwent R bipolar jose j-arthroplasty by Dr. Lawrence 8/17/24. Comorbidities affecting pt's fnxl performance include: Dementia, depression, COPD, asthma, ADHD, anxiety, osteoporosis, HTN, falls, breast CA, lumbar and cervical spine fusions. During PT IE, pt requires MAXA for rolling, dependent A x2 for supine to/from short sit EOB, poor tolerance of upright sitting EOB. Pt displays above outlined functional impairments & limitations, and presents below her true baseline level of functional mobility, however unable to obtain accuracy of most recent level of mobility. The AM-PAC & Barthel Index outcome tools were used to assist in determining pt safety w/ mobility/self care & appropriate d/c recommendations, see above for scores. Pt is at risk of falls d/t multiple comorbidities, impulsivity, h/o falls, impaired balance, impaired cognition, impaired insight/safety awareness, varying levels of pain , acuity of  medical illness, ongoing medical treatment of primary dx, polypharmacy, and WB restriction. Pt's clinical presentation is currently unstable/unpredictable as seen in pt's presentation of changing level of pain, varying levels of cognitive performance, increased fall risk, new onset of impairment of functional mobility, decreased endurance, and new onset of weakness. Pt will benefit from continued PT services in order to address impairments, decrease risk of falls, maximize independence w/ fnxl mobility, & ensure safety w/ mobility for transition to next level of care. Based on pt presentation & impairments, pt would most appropriately benefit from Level II (moderate PT intensity) resources upon d/c.     Rehab Resource Intensity Level, PT: II (Moderate Resource Intensity)    See flowsheet documentation for full assessment.

## 2024-08-19 NOTE — ASSESSMENT & PLAN NOTE
Patient presented to ED on 8/16/2024 s/p unwitnessed fall at [facility].   Per shelter, patient was found on the floor, leaning against her bed on 8/14.   XR R femur shows significantly displaced femoral neck fracture. See below.   History of multiple falls.   Trauma follows, appreciate input.   Ensure that pain is adequately controlled.   PT/OT as indicated.   OOB as tolerated.

## 2024-08-19 NOTE — CONSULTS
Consultation - Geriatric Medicine   Kylie TARA Ornelas 78 y.o. female MRN: [id number]  Unit/Bed#: W -01 Encounter: [id number]      Assessment & Plan     Dysphagia  Assessment & Plan  Speech pathology consulted, appreciate input   Recommended Diet: puree/level 1 diet and nectar thick liquids   Recommended Form of Meds: crushed with puree   Aspiration precautions and swallowing strategies:   upright posture and feed if fully alert  slow rate of feeding, small bites, small cup sips (pt not able to use straw during evaluation)  Other Recommendations: Continue frequent oral care  Patient requires assistance with feeding.    At risk for delirium  Assessment & Plan  -Patient is high risk of delirium due to fall, change in environment, pain, dementia at baseline, polypharmacy   -Initiate delirium precautions  -maintain normal sleep/wake cycle  -minimize overnight interruptions, group overnight vitals/labs/nursing checks as possible  -dim lights, close blinds and turn off tv to minimize stimulation and encourage sleep environment in evenings  -ensure that pain is well controlled, consider Tylenol 975mg Q8H scheduled   -monitor for fecal and urinary retention which may precipitate delirium  -encourage early mobilization and ambulation  -provide frequent reorientation and redirection  -encourage family and friends at the bedside to help calm patient if anxious  -avoid medications which may precipitate or worsen delirium such as tramadol, benzodiazepine, anticholinergics, and antihistaminics  -encourage hydration and nutrition , assist with feeding if needed  -redirect unwanted behaviors as first line, avoid physical restraints.   -Clonazepam changed to 0.5 mg QID- hold for somnolence  -Discontinued Risperidone and Sertraline as patient has not been taking these medications at [facility]  - decreased haldol to 2 mg QHS- home dose, taper off as tolerated  - Qtc 447, continue to monitor    Hyperglycemia  Assessment &  Plan  Patient with noted hyperglycemia on admission without formal diagnosis of diabetes.   A1C of 7.4.   Continue management with insulin lispro per primary team.   Avoid hypoglycemia.  Continue to monitor BG closely.   Consider endocrinology consult if remains uncontrolled.     Hypernatremia  Assessment & Plan  Sodium level 154 upon admission on 8/16/2024.   Current sodium level is 146.   Continue to monitor BMP     Fall  Assessment & Plan  Patient presented to ED on 8/16/2024 s/p unwitnessed fall at [facility].   Per detention, patient was found on the floor, leaning against her bed on 8/14.   XR R femur shows significantly displaced femoral neck fracture. See below.   History of multiple falls.   Trauma follows, appreciate input.   Ensure that pain is adequately controlled.   PT/OT as indicated.   OOB as tolerated.     COPD (chronic obstructive pulmonary disease) ([facility])  Assessment & Plan  Patient with history of COPD.   Continue current regimen per primary team.   Outpatient follow-up with PCP.     Constipation  Assessment & Plan  Patient with history of constipation.   Patient was taking Senna 8.6 mg, 2 tablets daily at nursing home.   Optimize bowel regimen.   Monitor for urinary retention.   Continue current regimen per primary team.     Dementia ([facility])  Assessment & Plan  Pt. Is oriented to person only at baseline, occasionally follows simple commands.   Currently nonverbal. Per nursing home, she speaks minimally at baseline.   Pt. Lives at [facility]   Dependent for instrumental ADLs  Dependent for ADLs  Score of 15 out of 28 on MMSE in July of 2019  [facility] aggressive behaviors recently including cursing and digging nails into nursing staff.   Was taking Haldol 2 mg daily at bedtime prior to admission, will continue home dos for now, taper off as tolerated, monitor QTc  CT head shows decreased attenuation in periventricular and subcortical white matter demonstrating an  appearance that is statistically most likely to represent mild microangiopathic change. Atherosclerotic calcifications noted.   TSH 1.030 on 12/2/2019  B12 436 on 12/4/19  Vit D, 25-OH 40 on 4/5/2023. Patient receives Vit D3 supplementation 1000 units daily.   Consider rechecking TSH and B12 levels.   [place] to person, place, time, and situation as needed  Monitor for behaviors   Monitor for mental status change  Provide supportive care     Essential hypertension  Assessment & Plan  Current /87  Continue current regimen per primary team   Avoid hypotension  Monitor vitals daily     Ambulatory dysfunction  Assessment & Plan  Per [facility], patient is wheelchair-bound and uses a mechanical lift for transfers.  History of multiple falls.   Fall precautions.   PT/OT as indicated   OOB as tolerated     * Closed fracture of neck of right femur (HCC)  Assessment & Plan  Patient presented to ED on 8/16/2024 s/p unwitnessed fall at [facility].   Per [facility], patient was found on the floor, leaning against her bed on 8/14.   XR R femur shows significantly displaced femoral neck fracture.   OR on 8/17 for R hip cemented hemiarthroplasty.  Trauma follows, appreciate input.   Maintain weightbearing as tolerated status on the right lower extremity.  Continue multimodal analgesic regimen.  Continue DVT prophylaxis.  PT and OT evaluation and treatment as indicated.  Outpatient follow up with Orthopedic surgery for re-evaluation.              History of Present Illness   Physician Requesting Consult: Benigno Black MD  Reason for Consult / Principal Problem: Dementia  Hx and PE limited by: Dementia  Additional history obtained from: [facility]       HPI: Kylie Ornelas is a 78 y.o. year old female who presents with a PMH of dementia, depression, COPD, asthma, ADHD, anxiety, osteoporosis, HTN, breast cancer s/p right mastrectomy. Patient presented to ED on 8/16/2024 s/p unwitnessed fall at  "[facility]. Per halfway, patient was found on the floor, leaning against her bed on 8/14. XR R femur shows significantly displaced femoral neck fracture. OR on 8/17 for R hip cemented hemiarthroplasty. Patient also with hypernatremia present on admission - sodium level of 154. Patient has been noted to be nonverbal since admission, [facility] reports that she speaks minimally at baseline.     Per nursing home, patient occasionally responds to name and follows simple commands. She is fully dependent with ADLs and IADLs. She is wheelchair-bound and uses a mechanical lift for transfers. She is incontinent with bowel and bladder. She has episodes of agitation and aggression in which she curses and \"digs her nails into the nursing staff.\" She has been increasingly tired for the past month or so. [facility] dosage and frequency which could play a role in this. Medications adjusted according to [facility] recommendation.     Patient is examined at bedside. Patient appears very somnolent. She responds to her name, but does not answer any questions. No signs of acute distress. She was ripping off her gown at time of encounter, appears to be confused and slightly agitated. Patient does not open her eyes or follow simple commands.     Inpatient consult to Gerontology  Consult performed by: Anjelica Tran MD  Consult ordered by: Miguelina Meade MD          Review of Systems   Unable to perform ROS: Dementia           Historical Information   Past Medical History:   Diagnosis Date    ADHD     Anxiety disorder     Asthma     controlled    Bilateral sacroiliitis (HCC)     Cancer (HCC)     breast    COPD (chronic obstructive pulmonary disease) (HCC)     Depression     History of fall     History of prediabetes     Hypertension     Iron deficiency anemia     Kidney stone     Myofascial pain     Osteoporosis     S/P lumbar spinal fusion      Past Surgical History: "   Procedure Laterality Date    BACK SURGERY  2017    injection    CERVICAL SPINE SURGERY  2013    Multilevel posterior cervical decompression / fusion    COLONOSCOPY W/ BIOPSIES  2006    HERNIA REPAIR      HYSTERECTOMY      IR EPIDURAL STEROID INJECTION  2014    IR EPIDURAL STEROID INJECTION  10/30/2017    IR EPIDURAL STEROID INJECTION  10/11/2017    MASTECTOMY Right     NECK SURGERY      GA ARTHRODESIS COMBINED TQ 1NTRSPC LUMBAR N/A 2019    Procedure: Left L4-5 hemilaminectomy and bilateral foraminotomy and Minimally invasive transforaminal lumbar interbody fusion with pedicle screw and myke fixation fusion L4-5, right-sided approach; minimally invasive right L5-S1 foraminotomy and diskectomy;  Surgeon: Adilson Urias MD;  Location: [facility];  Service: Neurosurgery    GA HEMIARTHROPLASTY HIP PARTIAL Right 2024    Procedure: HEMIARTHROPLASTY HIP (BIPOLAR) - cemented right hip hemiarthroplasty;  Surgeon: Jabier Lawrence DO;  Location: [facility];  Service: Orthopedics    TONSILLECTOMY       Social History   Social History     Substance and Sexual Activity   Alcohol Use Not Currently     Social History     Substance and Sexual Activity   Drug Use No    Comment: former marijuanna use per jen     Social History     Tobacco Use   Smoking Status Former    Current packs/day: 0.50    Average packs/day: 0.5 packs/day for 55.0 years (27.5 ttl pk-yrs)    Types: Cigarettes   Smokeless Tobacco Never     Family History:   Family History   Problem Relation Age of Onset    Diabetes Mother     Heart disease Mother          80's    No Known Problems Father     Heart disease Maternal Grandmother        Meds/Allergies   current meds:   Current Facility-Administered Medications   Medication Dose Route Frequency    acetaminophen (TYLENOL) oral suspension 650 mg  650 mg Oral Q4H PRN    albuterol (PROVENTIL HFA,VENTOLIN HFA) inhaler 2 puff  2 puff Inhalation Q4H PRN    Cholecalciferol (VITAMIN D3) tablet 1,000  Units  1,000 Units Oral Daily    clonazePAM (KlonoPIN) tablet 0.5 mg  0.5 mg Oral 4x Daily    enoxaparin (LOVENOX) subcutaneous injection 30 mg  30 mg Subcutaneous Q12H    Fluticasone Furoate-Vilanterol 100-25 mcg/actuation 1 puff  1 puff Inhalation BID    haloperidol (HALDOL) tablet 2 mg  2 mg Oral HS    insulin lispro (HumALOG/ADMELOG) 100 units/mL subcutaneous injection 1-5 Units  1-5 Units Subcutaneous TID AC    insulin lispro (HumALOG/ADMELOG) 100 units/mL subcutaneous injection 1-5 Units  1-5 Units Subcutaneous HS    insulin lispro (HumALOG/ADMELOG) 100 units/mL subcutaneous injection 3 Units  3 Units Subcutaneous TID With Meals    ondansetron (ZOFRAN) injection 4 mg  4 mg Intravenous Q6H PRN    polyethylene glycol (MIRALAX) packet 17 g  17 g Oral Daily    senna (SENOKOT) tablet 17.2 mg  2 tablet Oral Daily     Home medications: confirmed by [facility]  Acetaminophen 500 mg q8hrs prn  Clonazepam 0.5 mg QID  Cetirizine 10 mg, 2 tablets daily  Haldol 2 mg daily at bedtime  Vitamin D3 1000 units daily   Senna 8.6 mg, 2 tablets daily     No Known Allergies    Objective     Intake/Output Summary (Last 24 hours) at 8/19/2024 1343  Last data filed at 8/18/2024 2208  Gross per 24 hour   Intake 40 ml   Output 106 ml   Net -66 ml     Invasive Devices       Peripheral Intravenous Line  Duration             Peripheral IV 08/16/24 Right;Ventral (anterior) Forearm 2 days    Peripheral IV 08/17/24 Left;Medial Wrist 2 days                    Physical Exam  Vitals and nursing note reviewed.   Constitutional:       General: She is not in acute distress.     Appearance: She is well-developed.      Comments: Somnolent   HENT:      Head: Normocephalic and atraumatic.      Right Ear: External ear normal.      Left Ear: External ear normal.      Mouth/Throat:      Mouth: Mucous membranes are dry.   Eyes:      Conjunctiva/sclera: Conjunctivae normal.   Cardiovascular:      Rate and Rhythm: Normal rate and regular rhythm.      Heart  sounds: No murmur heard.  Pulmonary:      Effort: Pulmonary effort is normal. No respiratory distress.      Breath sounds: Normal breath sounds.   Chest:      Comments: Right mastectomy  Abdominal:      Palpations: Abdomen is soft.      Tenderness: There is no abdominal tenderness.   Musculoskeletal:         General: No swelling.      Cervical back: Neck supple.      Comments: Abduction pillow.   Dressings clean, dry, intact.    Skin:     General: Skin is warm and dry.      Capillary Refill: Capillary refill takes less than 2 seconds.   Neurological:      Mental Status: She is alert. She is disoriented.      Motor: Weakness present.      Gait: Gait abnormal.      Comments: Responds to name.    Psychiatric:         Speech: She is noncommunicative.         Lab Results:   I have personally reviewed pertinent lab results including the following:  -CBC, CMP    I have personally reviewed the following imaging study reports in PACS:  -XR femur, XR chest, XR pelvis, CT head, CT spine cervical       Therapies:   PT: Evaluation pending  OT: Evaluation pending    VTE Prophylaxis: Enoxaparin (Lovenox)    Code Status: Level 1 - Full Code  Advance Directive and Living Will:      Power of :    POLST:      Family and Social Support: [facility] [place]  Living Arrangements: Other (Comment)  Type of Current Residence: Facility  Discharge planning discussed with:: Facility  [place] of Choice: Yes        Thank you for allowing me to participate in your patients' care. Please do not hesitate to call with any additional questions.  Anjelica Tran MD

## 2024-08-19 NOTE — PLAN OF CARE
Problem: OCCUPATIONAL THERAPY ADULT  Goal: Performs self-care activities at highest level of function for planned discharge setting.  See evaluation for individualized goals.  Description: Treatment Interventions: ADL retraining, Functional transfer training, Cognitive reorientation, Patient/family training, Equipment evaluation/education, Compensatory technique education, Continued evaluation, Activityengagement          See flowsheet documentation for full assessment, interventions and recommendations.   Note: Limitation: Decreased ADL status, Decreased Safe judgement during ADL, Decreased cognition, Decreased high-level ADLs, Decreased self-care trans  Prognosis: Poor  Assessment: Patient is a 78 y.o. female seen for OT evaluation following admission on 8/16/2024  s/p Closed fracture of neck of right femur (HCC). Please see above for comprehensive list of comorbidities and significant PMHx impacting functional performance. Upon initial evaluation, pt appears to be performing below baseline functional status. Pt requires total dependent for UB ADLs, total dependent for LB ADLs, dependent x 2 for bed mobility. The AM-PAC & Barthel Index outcome tools were used to assist in determining pt safety w/self care /mobility and appropriate d/c recommendations, see above for score. Occupational performance is affected by the following deficits: mood limitation, (+) pain , impaired learning ability d/t current cognitive status , impaired global mental status, and direction following. Personal/Environmental factors impacting D/C include: (+) Hx of falls , Assistance needed for ADL/IADLs, Assistance needed for ADLs and functional mobility, High fall risk , decreased insight toward deficits , decreased recall of precautions , health management, and baseline cognitive deficits. Supporting factors include: facility staff available for continued assistance . Patient would benefit from OT services within the acute care setting to  maximize level of functional independence in the following areas activity engagement , fall prevention , energy conservation , self-care transfers, bed mobility , functional mobility, and ADLs.  From OT standpoint, recommendation at time of D/C would be Level II (Moderate Resource Intensity) .     Rehab Resource Intensity Level, OT: II (Moderate Resource Intensity)     Bonnie Mace MS OTR/L   [place] Licensure# [id number]

## 2024-08-19 NOTE — PLAN OF CARE
Problem: Potential for Falls  Goal: Patient will remain free of falls  Description: INTERVENTIONS:  - Educate patient/family on patient safety including physical limitations  - Instruct patient to call for assistance with activity   - Consult OT/PT to assist with strengthening/mobility   - Keep Call bell within reach  - Keep bed low and locked with side rails adjusted as appropriate  - Keep care items and personal belongings within reach  - Initiate and maintain comfort rounds  - Make Fall Risk Sign visible to staff  - Offer Toileting every  Hours, in advance of need  - Initiate/Maintain alarm  - Obtain necessary fall risk management equipment:   - Apply yellow socks and bracelet for high fall risk patients  - Consider moving patient to room near nurses station  Outcome: Progressing     Problem: PAIN - ADULT  Goal: Verbalizes/displays adequate comfort level or baseline comfort level  Description: Interventions:  - Encourage patient to monitor pain and request assistance  - Assess pain using appropriate pain scale  - Administer analgesics based on type and severity of pain and evaluate response  - Implement non-pharmacological measures as appropriate and evaluate response  - Consider cultural and social influences on pain and pain management  - Notify physician/advanced practitioner if interventions unsuccessful or patient reports new pain  Outcome: Progressing     Problem: INFECTION - ADULT  Goal: Absence or prevention of progression during hospitalization  Description: INTERVENTIONS:  - Assess and monitor for signs and symptoms of infection  - Monitor lab/diagnostic results  - Monitor all insertion sites, i.e. indwelling lines, tubes, and drains  - Monitor endotracheal if appropriate and nasal secretions for changes in amount and color  - [place] appropriate cooling/warming therapies per order  - Administer medications as ordered  - Instruct and encourage patient and family to use good hand hygiene technique  -  Identify and instruct in appropriate isolation precautions for identified infection/condition  Outcome: Progressing     Problem: SAFETY ADULT  Goal: Patient will remain free of falls  Description: INTERVENTIONS:  - Educate patient/family on patient safety including physical limitations  - Instruct patient to call for assistance with activity   - Consult OT/PT to assist with strengthening/mobility   - Keep Call bell within reach  - Keep bed low and locked with side rails adjusted as appropriate  - Keep care items and personal belongings within reach  - Initiate and maintain comfort rounds  - Make Fall Risk Sign visible to staff  - Offer Toileting every  Hours, in advance of need  - Initiate/Maintain alarm  - Obtain necessary fall risk management equipment:  - Apply yellow socks and bracelet for high fall risk patients  - Consider moving patient to room near nurses station  Outcome: Progressing     Problem: Nutrition/Hydration-ADULT  Goal: Nutrient/Hydration intake appropriate for improving, restoring or maintaining nutritional needs  Description: Monitor and assess patient's nutrition/hydration status for malnutrition. Collaborate with interdisciplinary team and initiate plan and interventions as ordered.  Monitor patient's weight and dietary intake as ordered or per policy. Utilize nutrition screening tool and intervene as necessary. Determine patient's food preferences and provide high-protein, high-caloric foods as appropriate.     INTERVENTIONS:  - Monitor oral intake, urinary output, labs, and treatment plans  - Assess nutrition and hydration status and recommend course of action  - Evaluate amount of meals eaten  - Assist patient with eating if necessary   - Allow adequate time for meals  - Recommend/ encourage appropriate diets, oral nutritional supplements, and vitamin/mineral supplements  - Order, calculate, and assess calorie counts as needed  - Recommend, monitor, and adjust tube feedings and TPN/PPN based  on assessed needs  - Assess need for intravenous fluids  - Provide specific nutrition/hydration education as appropriate  - Include patient/family/caregiver in decisions related to nutrition  Outcome: Progressing     Problem: Prexisting or High Potential for Compromised Skin Integrity  Goal: Skin integrity is maintained or improved  Description: INTERVENTIONS:  - Identify patients at risk for skin breakdown  - Assess and monitor skin integrity  - Assess and monitor nutrition and hydration status  - Monitor labs   - Assess for incontinence   - Turn and reposition patient  - Assist with mobility/ambulation  - Relieve pressure over bony prominences  - Avoid friction and shearing  - Provide appropriate hygiene as needed including keeping skin clean and dry  - Evaluate need for skin moisturizer/barrier cream  - Collaborate with interdisciplinary team   - Patient/family teaching  - Consider wound care consult   Outcome: Progressing

## 2024-08-19 NOTE — OCCUPATIONAL THERAPY NOTE
Occupational Therapy Evaluation     Patient Name: Kylie Ornelas  Today's Date: 8/19/2024  Problem List  Principal Problem:    Closed fracture of neck of right femur (HCC)  Active Problems:    Ambulatory dysfunction    Essential hypertension    Dementia (HCC)    Constipation    COPD (chronic obstructive pulmonary disease) (HCC)    Fall    Hypernatremia    Hyperglycemia    At risk for delirium    Dysphagia    Past Medical History  Past Medical History:   Diagnosis Date    ADHD     Anxiety disorder     Asthma     controlled    Bilateral sacroiliitis (HCC)     Cancer (HCC)     breast    COPD (chronic obstructive pulmonary disease) (HCC)     Depression     History of fall     History of prediabetes     Hypertension     Iron deficiency anemia     Kidney stone     Myofascial pain     Osteoporosis     S/P lumbar spinal fusion      Past Surgical History  Past Surgical History:   Procedure Laterality Date    BACK SURGERY  2017    injection    CERVICAL SPINE SURGERY  2013    Multilevel posterior cervical decompression / fusion    COLONOSCOPY W/ BIOPSIES  2006    HERNIA REPAIR      HYSTERECTOMY      IR EPIDURAL STEROID INJECTION  12/1/2014    IR EPIDURAL STEROID INJECTION  10/30/2017    IR EPIDURAL STEROID INJECTION  10/11/2017    MASTECTOMY Right     NECK SURGERY  2012    WY ARTHRODESIS COMBINED TQ 1NTRSPC LUMBAR N/A 7/17/2019    Procedure: Left L4-5 hemilaminectomy and bilateral foraminotomy and Minimally invasive transforaminal lumbar interbody fusion with pedicle screw and myke fixation fusion L4-5, right-sided approach; minimally invasive right L5-S1 foraminotomy and diskectomy;  Surgeon: Adilson Urias MD;  Location: [facility];  Service: Neurosurgery    W[place] HEMIARTHROPLASTY HIP PARTIAL Right 8/17/2024    Procedure: HEMIARTHROPLASTY HIP (BIPOLAR) - cemented right hip hemiarthroplasty;  Surgeon: Jabier Lawrence DO;  Location: [facility];  Service: Orthopedics    TONSILLECTOMY           08/19/24 1148   OT Last Visit   OT  Visit Date 08/19/24   Note Type   Note type Evaluation   Pain Assessment   Pain Assessment Tool FLACC   Pain Location/Orientation Orientation: Right;Location: Hip   Pain Onset/Description Frequency: Intermittent;Descriptor: Discomfort   Effect of Pain on Daily Activities limits comfort, activity tolerance   Patient's Stated Pain Goal No pain   Hospital Pain Intervention(s) Repositioned;Ambulation/increased activity;Emotional support   Multiple Pain Sites No   Pain Rating: FLACC (Rest) - Face 0   Pain Rating: FLACC (Rest) - Legs 0   Pain Rating: FLACC (Rest) - Activity 0   Pain Rating: FLACC (Rest) - Cry 0   Pain Rating: FLACC (Rest) - Consolability 0   Score: FLACC (Rest) 0   Pain Rating: FLACC (Activity) - Face 2   Pain Rating: FLACC (Activity) - Legs 1   Pain Rating: FLACC (Activity) - Activity 2   Pain Rating: FLACC (Activity) - Cry 2   Pain Rating: FLACC (Activity) - Consolability 0   Score: FLACC (Activity) 7   Restrictions/Precautions   Weight Bearing Precautions Per Order Yes   RLE Weight Bearing Per Order WBAT  (s/p bipolar hemiarthroplasty of R hip by Dr. Lawrence 8/17/24)   Braces or Orthoses Other (Comment)  (hip abduction pillow)   Other Precautions Cognitive;Chair Alarm;Bed Alarm;WBS;THR;Multiple lines;Fall Risk;Pain  (POSTERIOR HIP PRECAUTIONS)   Home Living   Type of Home SNF  ([facility])   Home Layout One level;Performs ADLs on one level;Able to live on main level with bedroom/bathroom;Access   Prior Function   Level of [place] Needs assistance with ADLs;Needs assistance with functional mobility;Needs assistance with IADLS   Lives With Facility staff   Receives Help From Personal care attendant   IADLs Family/Friend/Other provides transportation;Family/Friend/Other provides meals;Family/Friend/Other provides medication management   Falls in the last 6 months 1 to 4  (x1 leading to current admission, pt unable to report any additional 2* cognition)   Vocational Retired   Comments Pt is a very  poor historian at baseline, unable to provide information regarding PLOF or homesetup. Per CM: pt is a resident of [facility]. Fully dependent prior to admission and WC bound since patient had COVID over two weeks ago. Prior to Two weeks she was able to ambulate with walker.   Lifestyle   Autonomy Pt is a resident of [facility]. Dependent with ADLs/IADLs and mobility. (+) falls, (-) driving   Reciprocal Relationships Supportive facility staff   Service to Others Retired   Intrinsic Gratification unable to report 2* cognition   General   Additional Pertinent History Pt presents with unwitnessed fall.  Found to have R impacted femoral neck fx, hypernatremia. Underwent R hemiarthroplasty 8/17 with Dr. Lawrence. WBAT to RLE with posterior hip precautions, abduction pillow.     PMHx:  dementia, depression, COPD, asthma, ADHD, anxiety, osteoporosis and hypertension   Family/Caregiver Present No   ADL   Eating Assistance 1  Total Assistance   Grooming Assistance 1  Total Assistance   Grooming Deficit   (pt provided w/ washcloth in hand, despite encouragment and cueing patient does not initiate or participate in activity. Resistive to [place] assistance.)   UB Bathing Assistance 1  Total Assistance   LB Bathing Assistance 1  Total Assistance   UB Dressing Assistance 1  Total Assistance   LB Dressing Assistance 1  Total Assistance   Toileting Assistance  1  Total Assistance   Toileting Deficit Perineal hygiene  (incontient of urine upon arrival to the room)   Additional Comments Unable to formally assess bathing, dressing, toileting at time of eval. The above levels of assistance are anticipated based on functional performance deficits with use of clinical judgement. Pt is limited by 2* cognition.   Bed Mobility   Rolling R 2  Maximal assistance   Additional items Assist x 1;Bedrails;Increased time required;Verbal cues;LE management  (trunk managment; maintains side-lying position for pericare w/ close supervision. Use of  BUEs on bedrails)   Rolling L 2  Maximal assistance   Additional items Assist x 1;Increased time required;Bedrails;Verbal cues;LE management  (trunk managment)   Supine to Sit 1  Dependent   Additional items Assist x 2;Increased time required;Verbal cues;LE management;HOB elevated  (trunk managment)   Sit to Supine 1  Dependent   Additional items Assist x 2;Increased time required;Verbal cues;LE management  (trunk managment)   Additional Comments Pt resistive to trunk support / assistance during supine to short sit at EOB, however unable to effectively support self w/ BUEs. With increased time and cueing pt able to acheive full upright sitting at the EOB w/ maximum to total assist to maintain balance for approx ~20 seconds before impulsively returning to supine position.   Transfers   Sit to Stand Unable to assess   Stand to Sit Unable to assess   Stand pivot Unable to assess   Balance   Static Sitting Poor -  (poor - to zero)   Activity Tolerance   Activity Tolerance Patient limited by fatigue;Patient limited by pain;Other (Comment)  (cognition)   Medical Staff Made Aware Spoke with CM, PT   Nurse Made Aware Spoke with RN   RUE Assessment   RUE Assessment WFL  (unable to formally assess as patient does not follow commands, observed functionally)   LUE Assessment   LUE Assessment WFL  (unable to formally assess as patient does not follow commands, observed functionally)   Vision-Basic Assessment   Patient Visual Report   (pt presents w/ eyes closed for the entirety of the session)   Cognition   Overall Cognitive Status Impaired   Arousal/Participation Poorly responsive   Attention JAVON   Orientation Level Unable to assess   Memory Unable to assess   Following Commands Follows one step commands inconsistently  (very minimally)   Comments Pt via wristband, name and . Pt hesistant, guarded and resistive to particiaption.   Assessment   Limitation Decreased ADL status;Decreased Safe judgement during ADL;Decreased  cognition;Decreased high-level ADLs;Decreased self-care trans   Prognosis Poor   Assessment Patient is a 78 y.o. female seen for OT evaluation following admission on 8/16/2024  s/p Closed fracture of neck of right femur (HCC). Please see above for comprehensive list of comorbidities and significant PMHx impacting functional performance. Upon initial evaluation, pt appears to be performing below baseline functional status. Pt requires total dependent for UB ADLs, total dependent for LB ADLs, dependent x 2 for bed mobility. The AM-PAC & Barthel Index outcome tools were used to assist in determining pt safety w/self care /mobility and appropriate d/c recommendations, see above for score. Occupational performance is affected by the following deficits: mood limitation, (+) pain , impaired learning ability d/t current cognitive status , impaired global mental status, and direction following. Personal/Environmental factors impacting D/C include: (+) Hx of falls , Assistance needed for ADL/IADLs, Assistance needed for ADLs and functional mobility, High fall risk , decreased insight toward deficits , decreased recall of precautions , health management, and baseline cognitive deficits. Supporting factors include: facility staff available for continued assistance . Patient would benefit from OT services within the acute care setting to maximize level of functional independence in the following areas activity engagement , fall prevention , energy conservation , self-care transfers, bed mobility , functional mobility, and ADLs.  From OT standpoint, recommendation at time of D/C would be Level II (Moderate Resource Intensity) .   Goals   Patient Goals no direct rehab goals stated on this date; to decrease burden of care   LTG Time Frame 10-14   Long Term Goal See below   Plan   Treatment Interventions ADL retraining;Functional transfer training;Cognitive reorientation;Patient/family training;Equipment  evaluation/education;Compensatory technique education;Continued evaluation;Activityengagement   Goal Expiration Date 08/29/24   OT Treatment Day 0   OT Frequency 2-3x/wk   Discharge Recommendation   Rehab Resource Intensity Level, OT II (Moderate Resource Intensity)   AM-PAC Daily Activity Inpatient   Lower Body Dressing 1   Bathing 1   Toileting 1   Upper Body Dressing 1   Grooming 1   Eating 1   Daily Activity Raw Score 6   Turning Head Towards Sound 2   Follow Simple Instructions 2   Low Function Daily Activity Raw Score 10   Low Function Daily Activity Standardized Score  17.31   AM-PAC Applied Cognition Inpatient   Following a Speech/Presentation 1   Understanding Ordinary Conversation 2   Taking Medications 1   Remembering Where Things Are Placed or Put Away 1   Remembering List of 4-5 Errands 1   Taking Care of Complicated Tasks 1   Applied Cognition Raw Score 7   Applied Cognition Standardized Score 15.17   Barthel Index   Feeding 0   Bathing 0   Grooming Score 0   Dressing Score 0   Bladder Score 0   Toilet Use Score 0   Transfers (Bed/Chair) Score 0   Mobility (Level Surface) Score 0   Stairs Score 0   Modified Shawn Scale   Modified Brown Scale 5   End of Consult   Patient Position at End of Consult Supine;Bed/Chair alarm activated;All needs within reach  (abduction pillow in place, pt provided with activity apron)   Nurse Communication Nurse aware of consult     Goals established on initial evaluation in order to achieve pt's goal of decreasing burden of care.      Pt will complete UB ADLs Max A for increased ADL independence within 10 days.     Pt will complete LB ADLs Max A for increased ADL independence within 10 days.     Pt will complete toileting Max A with use of DME for increased ADL independence within 10 days.     Pt will demonstrate proper body mechanics to complete self-care transfers with Max A and use of LRAD for increased safety and functional independence within 10 days.     Functional  mobility goals pending OOB mobility    Pt will perform grooming tasks sitting at the EOB with Max A in order to increase overall independence and return to PLOF.     Pt will demonstrate standing tolerance of 1 min with Max A and use of LRAD for increased activity tolerance during ADL/IADL tasks within 10 days.     Pt will complete bed mobility Max A for increased independence in repositioning, pressure offloading, and managing comfort.     Pt will demonstrate proper body mechanics and fall prevention strategies during 100% of tx sessions for increased safety awareness during ADL/IADLs    Pt will improve functional activity tolerance to 20 mins of sustained functional tasks to increase participation in basic self-care tasks and minimize assistance level.     Pt will attend to treatment task or activity for 5 minutes without need for redirection to improve activity engagement within 10 days.     Pt will demonstrate OOB sitting tolerance of 2-4 hr/day for increased activity tolerance and engagement in leisure activities within 10 days.     Pt benefited from co-session of skilled OT and PT therapists in order to most appropriately address functional deficits d/t regression from functioning level prior to admission , extensive physical assistance required for safe mobility , and decreased activity tolerance.  OT/PT objectives were addressed separately; please see PT note for specific goal areas targeted.    Bonnie Mace MS OTR/L   [place] Licensure# [id number]

## 2024-08-20 VITALS
WEIGHT: 122.14 LBS | BODY MASS INDEX: 23.08 KG/M2 | TEMPERATURE: 99.2 F | SYSTOLIC BLOOD PRESSURE: 151 MMHG | RESPIRATION RATE: 17 BRPM | OXYGEN SATURATION: 96 % | DIASTOLIC BLOOD PRESSURE: 76 MMHG | HEART RATE: 95 BPM

## 2024-08-20 LAB
ANION GAP SERPL CALCULATED.3IONS-SCNC: 10 MMOL/L (ref 4–13)
BASOPHILS # BLD AUTO: 0.02 THOUSANDS/ÂΜL (ref 0–0.1)
BASOPHILS NFR BLD AUTO: 0 % (ref 0–1)
BUN SERPL-MCNC: 19 MG/DL (ref 5–25)
CALCIUM SERPL-MCNC: 8.3 MG/DL (ref 8.4–10.2)
CHLORIDE SERPL-SCNC: 110 MMOL/L (ref 96–108)
CO2 SERPL-SCNC: 23 MMOL/L (ref 21–32)
CREAT SERPL-MCNC: 0.42 MG/DL (ref 0.6–1.3)
EOSINOPHIL # BLD AUTO: 0.14 THOUSAND/ÂΜL (ref 0–0.61)
EOSINOPHIL NFR BLD AUTO: 2 % (ref 0–6)
ERYTHROCYTE [DISTWIDTH] IN BLOOD BY AUTOMATED COUNT: 13 % (ref 11.6–15.1)
GFR SERPL CREATININE-BSD FRML MDRD: 99 ML/MIN/1.73SQ M
GLUCOSE SERPL-MCNC: 125 MG/DL (ref 65–140)
GLUCOSE SERPL-MCNC: 191 MG/DL (ref 65–140)
GLUCOSE SERPL-MCNC: 192 MG/DL (ref 65–140)
GLUCOSE SERPL-MCNC: 230 MG/DL (ref 65–140)
HCT VFR BLD AUTO: 35.1 % (ref 34.8–46.1)
HGB BLD-MCNC: 11.1 G/DL (ref 11.5–15.4)
IMM GRANULOCYTES # BLD AUTO: 0.03 THOUSAND/UL (ref 0–0.2)
IMM GRANULOCYTES NFR BLD AUTO: 1 % (ref 0–2)
LYMPHOCYTES # BLD AUTO: 0.79 THOUSANDS/ÂΜL (ref 0.6–4.47)
LYMPHOCYTES NFR BLD AUTO: 14 % (ref 14–44)
MCH RBC QN AUTO: 30.7 PG (ref 26.8–34.3)
MCHC RBC AUTO-ENTMCNC: 31.6 G/DL (ref 31.4–37.4)
MCV RBC AUTO: 97 FL (ref 82–98)
MONOCYTES # BLD AUTO: 0.42 THOUSAND/ÂΜL (ref 0.17–1.22)
MONOCYTES NFR BLD AUTO: 7 % (ref 4–12)
NEUTROPHILS # BLD AUTO: 4.43 THOUSANDS/ÂΜL (ref 1.85–7.62)
NEUTS SEG NFR BLD AUTO: 76 % (ref 43–75)
NRBC BLD AUTO-RTO: 0 /100 WBCS
PLATELET # BLD AUTO: 266 THOUSANDS/UL (ref 149–390)
PMV BLD AUTO: 10.7 FL (ref 8.9–12.7)
POTASSIUM SERPL-SCNC: 3.2 MMOL/L (ref 3.5–5.3)
RBC # BLD AUTO: 3.61 MILLION/UL (ref 3.81–5.12)
SARS-COV-2 RNA RESP QL NAA+PROBE: NEGATIVE
SODIUM SERPL-SCNC: 143 MMOL/L (ref 135–147)
WBC # BLD AUTO: 5.83 THOUSAND/UL (ref 4.31–10.16)

## 2024-08-20 PROCEDURE — 85025 COMPLETE CBC W/AUTO DIFF WBC: CPT | Performed by: NURSE PRACTITIONER

## 2024-08-20 PROCEDURE — 99024 POSTOP FOLLOW-UP VISIT: CPT

## 2024-08-20 PROCEDURE — 99238 HOSP IP/OBS DSCHRG MGMT 30/<: CPT | Performed by: SURGERY

## 2024-08-20 PROCEDURE — 99232 SBSQ HOSP IP/OBS MODERATE 35: CPT | Performed by: FAMILY MEDICINE

## 2024-08-20 PROCEDURE — 87635 SARS-COV-2 COVID-19 AMP PRB: CPT | Performed by: SURGERY

## 2024-08-20 PROCEDURE — 82948 REAGENT STRIP/BLOOD GLUCOSE: CPT

## 2024-08-20 PROCEDURE — 80048 BASIC METABOLIC PNL TOTAL CA: CPT | Performed by: NURSE PRACTITIONER

## 2024-08-20 RX ORDER — HALOPERIDOL 2 MG/1
2 TABLET ORAL
Start: 2024-08-20

## 2024-08-20 RX ORDER — INSULIN LISPRO 100 [IU]/ML
3 INJECTION, SOLUTION INTRAVENOUS; SUBCUTANEOUS
Start: 2024-08-20

## 2024-08-20 RX ORDER — ACETAMINOPHEN 160 MG/5ML
650 SUSPENSION ORAL EVERY 4 HOURS PRN
Start: 2024-08-20

## 2024-08-20 RX ORDER — CLONAZEPAM 0.5 MG/1
0.5 TABLET ORAL 4 TIMES DAILY
Qty: 40 TABLET | Refills: 0 | Status: SHIPPED | OUTPATIENT
Start: 2024-08-20 | End: 2024-08-30

## 2024-08-20 RX ORDER — POTASSIUM CHLORIDE 14.9 MG/ML
20 INJECTION INTRAVENOUS ONCE
Status: COMPLETED | OUTPATIENT
Start: 2024-08-20 | End: 2024-08-20

## 2024-08-20 RX ORDER — ENOXAPARIN SODIUM 100 MG/ML
40 INJECTION SUBCUTANEOUS DAILY
Start: 2024-08-20 | End: 2024-09-14

## 2024-08-20 RX ADMIN — INSULIN LISPRO 2 UNITS: 100 INJECTION, SOLUTION INTRAVENOUS; SUBCUTANEOUS at 11:50

## 2024-08-20 RX ADMIN — INSULIN LISPRO 3 UNITS: 100 INJECTION, SOLUTION INTRAVENOUS; SUBCUTANEOUS at 09:42

## 2024-08-20 RX ADMIN — POTASSIUM CHLORIDE 20 MEQ: 14.9 INJECTION, SOLUTION INTRAVENOUS at 09:30

## 2024-08-20 RX ADMIN — INSULIN LISPRO 3 UNITS: 100 INJECTION, SOLUTION INTRAVENOUS; SUBCUTANEOUS at 17:16

## 2024-08-20 RX ADMIN — ENOXAPARIN SODIUM 30 MG: 30 INJECTION SUBCUTANEOUS at 09:45

## 2024-08-20 RX ADMIN — INSULIN LISPRO 3 UNITS: 100 INJECTION, SOLUTION INTRAVENOUS; SUBCUTANEOUS at 11:50

## 2024-08-20 RX ADMIN — INSULIN LISPRO 1 UNITS: 100 INJECTION, SOLUTION INTRAVENOUS; SUBCUTANEOUS at 09:42

## 2024-08-20 RX ADMIN — CLONAZEPAM 0.5 MG: 0.5 TABLET ORAL at 09:30

## 2024-08-20 RX ADMIN — Medication 1000 UNITS: at 09:30

## 2024-08-20 NOTE — DISCHARGE SUMMARY
Formerly [facility], 1928–1983  Discharge- Kylie Ornelas 1945, 78 y.o. female MRN: [id number]  Unit/Bed#: W -01 Encounter: [id number]  Primary Care Provider: Marcella Schmid MD   Date and time admitted to hospital: 8/16/2024  5:33 PM    Dysphagia  Assessment & Plan  - Continue dysphagia diet - pureed with nectar thick liquids  - continue speech evals  - aspiration precautions  - assist with feeding    Hyperglycemia  Assessment & Plan  - Patient with noted hyperglycemia on admission without formal diagnosis of diabetes  - Continue to monitor BG closely  - SSI, adjust as indicated  - Outpatient follow-up with PCP    Hypernatremia  Assessment & Plan  - Patient with sodium of 150 on admission  - Started on lactated Ringer's, transition to D5W with improvement in sodium  - resolved with IV fluid infusion  - Outpatient follow-up with PCP    Fall  Assessment & Plan  - Status post unwitnessed fall with the below noted injuries.  - Fall precautions.  - Geriatric Medicine consultation for evaluation, medication review and recommendations.  - PT and OT evaluation and treatment as indicated.  - Case Management consultation for disposition planning.      COPD (chronic obstructive pulmonary disease) ([facility])  Assessment & Plan  Hx of COPD  - Continue home inhaler regimen  - Outpatient follow up with PCP    Dementia ([facility])  Assessment & Plan  - Pt hs hx of Alzheimers dementia.   - Nonverbal/ word salad at baseline  - Continue home meds  - Geriatrics consult, appreciate recommendations  - Delirium precautions  - Frequent reorientation    Ambulatory dysfunction  Assessment & Plan  - baseline    * Closed fracture of neck of right femur ([facility])  Assessment & Plan  - Right femur fracture, present on admission.  - Status post fall on 8/16.  - Appreciate Orthopedic surgery evaluation, recommendations and interventions as noted.   - 8/17 right hip hemiarthroplasty  - Maintain weightbearing as tolerated status on the right  lower extremity.  - Monitor right lower extremity neurovascular exam.  - Continue multimodal analgesic regimen.  - Continue DVT prophylaxis.  - PT and OT evaluation and treatment as indicated.  - Outpatient follow up with Orthopedic surgery for re-evaluation.          Bowel Regimen: MiraLAX  VTE Prophylaxis:Enoxaparin (Lovenox)     Disposition: Medically stable for discharge to [facility], where patient resides    Subjective   Chief Complaint: n/a    Subjective: Nursing staff is at bedside assisting patient with eating lunch.  She is awake and conversing, but words do not make any sense.  She is breathing well, tolerating a diet, appears comfortable.     Objective   Vitals:   Temp:  [97.4 °F (36.3 °C)-99.8 °F (37.7 °C)] 99.2 °F (37.3 °C)  HR:  [] 95  Resp:  [14-17] 17  BP: (129-151)/(65-87) 151/76    I/O         08/18 0701  08/19 0700 08/19 0701  08/20 0700 08/20 0701  08/21 0700    P.O. 30 60 260    I.V. (mL/kg) 10 (0.2) 20 (0.4)     Total Intake(mL/kg) 40 (0.7) 80 (1.4) 260 (4.7)    Urine (mL/kg/hr) 106 (0.1)      Total Output 106      Net -66 +80 +260           Unmeasured Urine Occurrence 1 x 4 x 1 x    Unmeasured Stool Occurrence  1 x              Physical Exam:   GENERAL APPEARANCE: Patient in no acute distress.  HEENT: NCAT; PERRL, EOMs intact; Mucous membranes moist  CV: Regular rate and rhythm; no murmur/gallops/rubs appreciated.  CHEST / LUNGS: Clear to auscultation; no wheezes/rales/rhonci.  ABD: NABS; soft; non-distended; non-tender.  : Incontinent  EXT: RLE surgical dressings clean, dry, intact; compartments soft; +2 pulses bilaterally upper & lower extremities; no edema.  NEURO: GCS 13 (E4V3M6) due to severe dementia  SKIN: Warm, dry and well perfused; no rash; no jaundice.      Invasive Devices       Peripheral Intravenous Line  Duration             Peripheral IV 08/16/24 Right;Ventral (anterior) Forearm 3 days    Peripheral IV 08/17/24 Left;Medial Wrist 3 days                          Lab  "Results: Results: I have personally reviewed all pertinent laboratory/tests results  Imaging: I have personally reviewed pertinent reports.     Other Studies: n/a         Medical Problems       Resolved Problems  Date Reviewed: 8/20/2024   None         Admission Date:   Admission Orders (From admission, onward)       Ordered        08/16/24 2202  Inpatient Admission  Once                            Admitting Diagnosis: Hypernatremia [E87.0]  Hip pain [M25.559]  Hyperglycemia [R73.9]  Closed fracture of right hip, initial encounter ([facility]) [S72.001A]    HPI written by Dr. Meade 8/16/24 \"Kylie Ornelas is a 78 y.o. female with hx of dementia, depression, COPD, ADHD, anxiety who presents after an unwitnessed fall at her nursing home, unknown head strike, not on blood thinners. Xrays obtained at facility showed R hip fracture. Imaging obtained in ED show same. She is nonverbal at baseline, secondary to severe dementia. \"    Procedures Performed: No orders of the defined types were placed in this encounter.      Summary of Hospital Course: Please see above and reference hospital medical records    Significant Findings, Care, Treatment and Services Provided: Patient was evaluated and admitted to the trauma service after a fall at her nursing facility sustaining a right femoral neck fracture.  Orthopedics was consulted and recommended operative management, completed right hip hemiarthroplasty on 8/17/24.  Was recommended weightbearing as tolerated postoperatively, PT and OT evaluated the patient and recommended discharge back to her nursing facility.  Patient has history of dementia and is unable to communicate verbally.  Geriatric medicine was consulted and assisted in treating the patient.  Labs were monitored, patient had hypernatremia which was resolved with IV fluid hydration.    Complications: none    Condition at Discharge: stable         Discharge instructions/Information to patient and family:   See after visit " summary for information provided to patient and family.      Provisions for Follow-Up Care:  See after visit summary for information related to follow-up care and any pertinent home health orders.      PCP: Marcella Schmid MD    Disposition: Skilled nursing facility at [facility]    Planned Readmission: No    Discharge Statement   I had direct contact with the patient on the day of discharge.     Discharge Medications:  See after visit summary for reconciled discharge medications provided to patient and family.

## 2024-08-20 NOTE — PLAN OF CARE
Problem: INFECTION - ADULT  Goal: Absence or prevention of progression during hospitalization  Description: INTERVENTIONS:  - Assess and monitor for signs and symptoms of infection  - Monitor lab/diagnostic results  - Monitor all insertion sites, i.e. indwelling lines, tubes, and drains  - Monitor endotracheal if appropriate and nasal secretions for changes in amount and color  - [place] appropriate cooling/warming therapies per order  - Administer medications as ordered  - Instruct and encourage patient and family to use good hand hygiene technique  - Identify and instruct in appropriate isolation precautions for identified infection/condition  Outcome: Progressing     Problem: PAIN - ADULT  Goal: Verbalizes/displays adequate comfort level or baseline comfort level  Description: Interventions:  - Encourage patient to monitor pain and request assistance  - Assess pain using appropriate pain scale  - Administer analgesics based on type and severity of pain and evaluate response  - Implement non-pharmacological measures as appropriate and evaluate response  - Consider cultural and social influences on pain and pain management  - Notify physician/advanced practitioner if interventions unsuccessful or patient reports new pain  Outcome: Progressing     Problem: Potential for Falls  Goal: Patient will remain free of falls  Description: INTERVENTIONS:  - Educate patient/family on patient safety including physical limitations  - Instruct patient to call for assistance with activity   - Consult OT/PT to assist with strengthening/mobility   - Keep Call bell within reach  - Keep bed low and locked with side rails adjusted as appropriate  - Keep care items and personal belongings within reach  - Initiate and maintain comfort rounds  - Make Fall Risk Sign visible to staff  - Offer Toileting every  Hours, in advance of need  - Initiate/Maintain alarm  - Obtain necessary fall risk management equipment:   - Apply yellow socks  and bracelet for high fall risk patients  - Consider moving patient to room near nurses station  Outcome: Progressing     Problem: SAFETY ADULT  Goal: Patient will remain free of falls  Description: INTERVENTIONS:  - Educate patient/family on patient safety including physical limitations  - Instruct patient to call for assistance with activity   - Consult OT/PT to assist with strengthening/mobility   - Keep Call bell within reach  - Keep bed low and locked with side rails adjusted as appropriate  - Keep care items and personal belongings within reach  - Initiate and maintain comfort rounds  - Make Fall Risk Sign visible to staff  - Offer Toileting every  Hours, in advance of need  - Initiate/Maintain alarm  - Obtain necessary fall risk management equipment:  - Apply yellow socks and bracelet for high fall risk patients  - Consider moving patient to room near nurses station  Outcome: Progressing

## 2024-08-20 NOTE — PLAN OF CARE
Problem: Potential for Falls  Goal: Patient will remain free of falls  Description: INTERVENTIONS:  - Educate patient/family on patient safety including physical limitations  - Instruct patient to call for assistance with activity   - Consult OT/PT to assist with strengthening/mobility   - Keep Call bell within reach  - Keep bed low and locked with side rails adjusted as appropriate  - Keep care items and personal belongings within reach  - Initiate and maintain comfort rounds  - Make Fall Risk Sign visible to staff  - Offer Toileting every  Hours, in advance of need  - Initiate/Maintain alarm  - Obtain necessary fall risk management equipment:   - Apply yellow socks and bracelet for high fall risk patients  - Consider moving patient to room near nurses station  8/20/2024 1828 by Joycelyn Wetzel RN  Outcome: Adequate for Discharge  8/20/2024 1216 by Joycelyn Wetzel RN  Outcome: Progressing     Problem: PAIN - ADULT  Goal: Verbalizes/displays adequate comfort level or baseline comfort level  Description: Interventions:  - Encourage patient to monitor pain and request assistance  - Assess pain using appropriate pain scale  - Administer analgesics based on type and severity of pain and evaluate response  - Implement non-pharmacological measures as appropriate and evaluate response  - Consider cultural and social influences on pain and pain management  - Notify physician/advanced practitioner if interventions unsuccessful or patient reports new pain  8/20/2024 1828 by Joycelyn Wetzel RN  Outcome: Adequate for Discharge  8/20/2024 1216 by Joycelyn Wetzel RN  Outcome: Progressing     Problem: INFECTION - ADULT  Goal: Absence or prevention of progression during hospitalization  Description: INTERVENTIONS:  - Assess and monitor for signs and symptoms of infection  - Monitor lab/diagnostic results  - Monitor all insertion sites, i.e. indwelling lines, tubes, and drains  - Monitor endotracheal if appropriate and nasal  secretions for changes in amount and color  - [place] appropriate cooling/warming therapies per order  - Administer medications as ordered  - Instruct and encourage patient and family to use good hand hygiene technique  - Identify and instruct in appropriate isolation precautions for identified infection/condition  8/20/2024 1828 by Joycelyn Wetzel RN  Outcome: Adequate for Discharge  8/20/2024 1216 by Joycelyn Wetzel RN  Outcome: Progressing  Goal: Absence of fever/infection during neutropenic period  Description: INTERVENTIONS:  - Monitor WBC    8/20/2024 1828 by Joycelyn Wetzel RN  Outcome: Adequate for Discharge  8/20/2024 1216 by Joycelyn Wetzel RN  Outcome: Progressing     Problem: SAFETY ADULT  Goal: Patient will remain free of falls  Description: INTERVENTIONS:  - Educate patient/family on patient safety including physical limitations  - Instruct patient to call for assistance with activity   - Consult OT/PT to assist with strengthening/mobility   - Keep Call bell within reach  - Keep bed low and locked with side rails adjusted as appropriate  - Keep care items and personal belongings within reach  - Initiate and maintain comfort rounds  - Make Fall Risk Sign visible to staff  - Offer Toileting every  Hours, in advance of need  - Initiate/Maintain alarm  - Obtain necessary fall risk management equipment:   - Apply yellow socks and bracelet for high fall risk patients  - Consider moving patient to room near nurses station  8/20/2024 1828 by Joycelyn Wetzel RN  Outcome: Adequate for Discharge  8/20/2024 1216 by Joycelyn Wetzel RN  Outcome: Progressing  Goal: Maintain or return to baseline ADL function  Description: INTERVENTIONS:  -  Assess patient's ability to carry out ADLs; assess patient's baseline for ADL function and identify physical deficits which impact ability to perform ADLs (bathing, care of mouth/teeth, toileting, grooming, dressing, etc.)  - Assess/evaluate cause of self-care deficits   -  Assess range of motion  - Assess patient's mobility; develop plan if impaired  - Assess patient's need for assistive devices and provide as appropriate  - Encourage maximum independence but intervene and supervise when necessary  - Involve family in performance of ADLs  - Assess for home care needs following discharge   - Consider OT consult to assist with ADL evaluation and planning for discharge  - Provide patient education as appropriate  8/20/2024 1828 by Joycelyn Wetzel RN  Outcome: Adequate for Discharge  8/20/2024 1216 by Joycelyn Wetzel RN  Outcome: Progressing  Goal: Maintains/Returns to pre admission functional level  Description: INTERVENTIONS:  - Perform AM-PAC 6 Click Basic Mobility/ Daily Activity assessment daily.  - Set and communicate daily mobility goal to care team and patient/family/caregiver.   - Collaborate with rehabilitation services on mobility goals if consulted  - Perform Range of Motion times a day.  - Reposition patient every  hours.  - Dangle patient  times a day  - Stand patient  times a day  - Ambulate patient  times a day  - Out of bed to chair  times a day   - Out of bed for meals  times a day  - Out of bed for toileting  - Record patient progress and toleration of activity level   8/20/2024 1828 by Joycelyn Wetzel RN  Outcome: Adequate for Discharge  8/20/2024 1216 by Joycelyn Wetzel RN  Outcome: Progressing     Problem: DISCHARGE PLANNING  Goal: Discharge to home or other facility with appropriate resources  Description: INTERVENTIONS:  - Identify barriers to discharge w/patient and caregiver  - Arrange for needed discharge resources and transportation as appropriate  - Identify discharge learning needs (meds, wound care, etc.)  - Arrange for interpretive services to assist at discharge as needed  - Refer to Case Management Department for coordinating discharge planning if the patient needs post-hospital services based on physician/advanced practitioner order or complex needs  related to functional status, cognitive ability, or social support system  8/20/2024 1828 by Joycelyn Wetzel RN  Outcome: Adequate for Discharge  8/20/2024 1216 by Joycelyn Wetzel RN  Outcome: Progressing     Problem: Knowledge Deficit  Goal: Patient/family/caregiver demonstrates understanding of disease process, treatment plan, medications, and discharge instructions  Description: Complete learning assessment and assess knowledge base.  Interventions:  - Provide teaching at level of understanding  - Provide teaching via preferred learning methods  8/20/2024 1828 by Joycelyn Wetzel RN  Outcome: Adequate for Discharge  8/20/2024 1216 by Joycelyn Wetzel RN  Outcome: Progressing     Problem: Nutrition/Hydration-ADULT  Goal: Nutrient/Hydration intake appropriate for improving, restoring or maintaining nutritional needs  Description: Monitor and assess patient's nutrition/hydration status for malnutrition. Collaborate with interdisciplinary team and initiate plan and interventions as ordered.  Monitor patient's weight and dietary intake as ordered or per policy. Utilize nutrition screening tool and intervene as necessary. Determine patient's food preferences and provide high-protein, high-caloric foods as appropriate.     INTERVENTIONS:  - Monitor oral intake, urinary output, labs, and treatment plans  - Assess nutrition and hydration status and recommend course of action  - Evaluate amount of meals eaten  - Assist patient with eating if necessary   - Allow adequate time for meals  - Recommend/ encourage appropriate diets, oral nutritional supplements, and vitamin/mineral supplements  - Order, calculate, and assess calorie counts as needed  - Recommend, monitor, and adjust tube feedings and TPN/PPN based on assessed needs  - Assess need for intravenous fluids  - Provide specific nutrition/hydration education as appropriate  - Include patient/family/caregiver in decisions related to nutrition  8/20/2024 1828 by  Joycelyn Wetzel RN  Outcome: Adequate for Discharge  8/20/2024 1216 by Joycelyn Wetzel RN  Outcome: Progressing     Problem: Prexisting or High Potential for Compromised Skin Integrity  Goal: Skin integrity is maintained or improved  Description: INTERVENTIONS:  - Identify patients at risk for skin breakdown  - Assess and monitor skin integrity  - Assess and monitor nutrition and hydration status  - Monitor labs   - Assess for incontinence   - Turn and reposition patient  - Assist with mobility/ambulation  - Relieve pressure over bony prominences  - Avoid friction and shearing  - Provide appropriate hygiene as needed including keeping skin clean and dry  - Evaluate need for skin moisturizer/barrier cream  - Collaborate with interdisciplinary team   - Patient/family teaching  - Consider wound care consult   8/20/2024 1828 by Joycelyn Wetzel RN  Outcome: Adequate for Discharge  8/20/2024 1216 by Joycelyn Wetzel RN  Outcome: Progressing

## 2024-08-20 NOTE — ASSESSMENT & PLAN NOTE
- Patient with sodium of 150 on admission  - Started on lactated Ringer's, transition to D5W with improvement in sodium  - resolved with IV fluid infusion  - Outpatient follow-up with PCP

## 2024-08-20 NOTE — PROGRESS NOTES
Patient:    MRN:  [id number]    Mine Request ID:  [id number]    Level of care reserved:  Skilled Nursing Facility    Partner Reserved:  [facility] S[facility] Vee [id number] [phone]    Clinical needs requested:    Geography searched:  10 miles around [id number]    Start of Service:    Request sent:  10:43am EDT on 8/18/2024 by Nay Lopez    Partner reserved:  1:00pm EDT on 8/20/2024 by Emelia Akhtar    Choice list shared:  1:00pm EDT on 8/20/2024 by Emelia Akhtar

## 2024-08-20 NOTE — ASSESSMENT & PLAN NOTE
- Patient with noted hyperglycemia on admission without formal diagnosis of diabetes  - Continue to monitor BG closely  - SSI, adjust as indicated  - Outpatient follow-up with PCP

## 2024-08-20 NOTE — ASSESSMENT & PLAN NOTE
- Pt hs hx of Alzheimers dementia.   - Nonverbal/ word salad at baseline  - Continue home meds  - Geriatrics consult, appreciate recommendations  - Delirium precautions  - Frequent reorientation

## 2024-08-20 NOTE — PLAN OF CARE
Problem: Potential for Falls  Goal: Patient will remain free of falls  Description: INTERVENTIONS:  - Educate patient/family on patient safety including physical limitations  - Instruct patient to call for assistance with activity   - Consult OT/PT to assist with strengthening/mobility   - Keep Call bell within reach  - Keep bed low and locked with side rails adjusted as appropriate  - Keep care items and personal belongings within reach  - Initiate and maintain comfort rounds  - Make Fall Risk Sign visible to staff  - Offer Toileting every  Hours, in advance of need  - Initiate/Maintain alarm  - Obtain necessary fall risk management equipment:   - Apply yellow socks and bracelet for high fall risk patients  - Consider moving patient to room near nurses station  Outcome: Progressing     Problem: PAIN - ADULT  Goal: Verbalizes/displays adequate comfort level or baseline comfort level  Description: Interventions:  - Encourage patient to monitor pain and request assistance  - Assess pain using appropriate pain scale  - Administer analgesics based on type and severity of pain and evaluate response  - Implement non-pharmacological measures as appropriate and evaluate response  - Consider cultural and social influences on pain and pain management  - Notify physician/advanced practitioner if interventions unsuccessful or patient reports new pain  Outcome: Progressing     Problem: INFECTION - ADULT  Goal: Absence or prevention of progression during hospitalization  Description: INTERVENTIONS:  - Assess and monitor for signs and symptoms of infection  - Monitor lab/diagnostic results  - Monitor all insertion sites, i.e. indwelling lines, tubes, and drains  - Monitor endotracheal if appropriate and nasal secretions for changes in amount and color  - [place] appropriate cooling/warming therapies per order  - Administer medications as ordered  - Instruct and encourage patient and family to use good hand hygiene technique  -  Identify and instruct in appropriate isolation precautions for identified infection/condition  Outcome: Progressing  Goal: Absence of fever/infection during neutropenic period  Description: INTERVENTIONS:  - Monitor WBC    Outcome: Progressing     Problem: SAFETY ADULT  Goal: Patient will remain free of falls  Description: INTERVENTIONS:  - Educate patient/family on patient safety including physical limitations  - Instruct patient to call for assistance with activity   - Consult OT/PT to assist with strengthening/mobility   - Keep Call bell within reach  - Keep bed low and locked with side rails adjusted as appropriate  - Keep care items and personal belongings within reach  - Initiate and maintain comfort rounds  - Make Fall Risk Sign visible to staff  - Offer Toileting every  Hours, in advance of need  - Initiate/Maintain alarm  - Obtain necessary fall risk management equipment:   - Apply yellow socks and bracelet for high fall risk patients  - Consider moving patient to room near nurses station  Outcome: Progressing  Goal: Maintain or return to baseline ADL function  Description: INTERVENTIONS:  -  Assess patient's ability to carry out ADLs; assess patient's baseline for ADL function and identify physical deficits which impact ability to perform ADLs (bathing, care of mouth/teeth, toileting, grooming, dressing, etc.)  - Assess/evaluate cause of self-care deficits   - Assess range of motion  - Assess patient's mobility; develop plan if impaired  - Assess patient's need for assistive devices and provide as appropriate  - Encourage maximum independence but intervene and supervise when necessary  - Involve family in performance of ADLs  - Assess for home care needs following discharge   - Consider OT consult to assist with ADL evaluation and planning for discharge  - Provide patient education as appropriate  Outcome: Progressing  Goal: Maintains/Returns to pre admission functional level  Description:  INTERVENTIONS:  - Perform AM-PAC 6 Click Basic Mobility/ Daily Activity assessment daily.  - Set and communicate daily mobility goal to care team and patient/family/caregiver.   - Collaborate with rehabilitation services on mobility goals if consulted  - Perform Range of Motion  times a day.  - Reposition patient every  hours.  - Dangle patient  times a day  - Stand patient  times a day  - Ambulate patient times a day  - Out of bed to chair  times a day   - Out of bed for meals  times a day  - Out of bed for toileting  - Record patient progress and toleration of activity level   Outcome: Progressing     Problem: DISCHARGE PLANNING  Goal: Discharge to home or other facility with appropriate resources  Description: INTERVENTIONS:  - Identify barriers to discharge w/patient and caregiver  - Arrange for needed discharge resources and transportation as appropriate  - Identify discharge learning needs (meds, wound care, etc.)  - Arrange for interpretive services to assist at discharge as needed  - Refer to Case Management Department for coordinating discharge planning if the patient needs post-hospital services based on physician/advanced practitioner order or complex needs related to functional status, cognitive ability, or social support system  Outcome: Progressing     Problem: Knowledge Deficit  Goal: Patient/family/caregiver demonstrates understanding of disease process, treatment plan, medications, and discharge instructions  Description: Complete learning assessment and assess knowledge base.  Interventions:  - Provide teaching at level of understanding  - Provide teaching via preferred learning methods  Outcome: Progressing     Problem: Nutrition/Hydration-ADULT  Goal: Nutrient/Hydration intake appropriate for improving, restoring or maintaining nutritional needs  Description: Monitor and assess patient's nutrition/hydration status for malnutrition. Collaborate with interdisciplinary team and initiate plan and  interventions as ordered.  Monitor patient's weight and dietary intake as ordered or per policy. Utilize nutrition screening tool and intervene as necessary. Determine patient's food preferences and provide high-protein, high-caloric foods as appropriate.     INTERVENTIONS:  - Monitor oral intake, urinary output, labs, and treatment plans  - Assess nutrition and hydration status and recommend course of action  - Evaluate amount of meals eaten  - Assist patient with eating if necessary   - Allow adequate time for meals  - Recommend/ encourage appropriate diets, oral nutritional supplements, and vitamin/mineral supplements  - Order, calculate, and assess calorie counts as needed  - Recommend, monitor, and adjust tube feedings and TPN/PPN based on assessed needs  - Assess need for intravenous fluids  - Provide specific nutrition/hydration education as appropriate  - Include patient/family/caregiver in decisions related to nutrition  Outcome: Progressing     Problem: Prexisting or High Potential for Compromised Skin Integrity  Goal: Skin integrity is maintained or improved  Description: INTERVENTIONS:  - Identify patients at risk for skin breakdown  - Assess and monitor skin integrity  - Assess and monitor nutrition and hydration status  - Monitor labs   - Assess for incontinence   - Turn and reposition patient  - Assist with mobility/ambulation  - Relieve pressure over bony prominences  - Avoid friction and shearing  - Provide appropriate hygiene as needed including keeping skin clean and dry  - Evaluate need for skin moisturizer/barrier cream  - Collaborate with interdisciplinary team   - Patient/family teaching  - Consider wound care consult   Outcome: Progressing

## 2024-08-20 NOTE — PROGRESS NOTES
Progress Note - Orthopedics   Kylie Ornelas 78 y.o. female MRN: [id number]  Unit/Bed#: W -01    Subjective:    78 y.o.female, now POD#3 right hip hemiarthroplasty on 8/17 with Dr. Lawrence, seen and examined bedside this morning. Currently resting in bed, posterior hip precaution pillow in place. This morning, responding to conversation with some words. She did not open her eyes during encounter. Unable to follow commands. Responds by wiggling toes when palpating foot.     Labs:  0   Lab Value Date/Time    HCT 35.1 08/20/2024 0544    HCT 36.1 08/19/2024 0646    HCT 41.1 08/18/2024 0620    HCT 30.8 (L) 08/21/2014 1459    HCT 29.2 (L) 08/16/2014 0537    HCT 31.4 (L) 08/15/2014 1633    HGB 11.1 (L) 08/20/2024 0544    HGB 11.4 (L) 08/19/2024 0646    HGB 12.6 08/18/2024 0620    HGB 9.2 (L) 08/21/2014 1459    HGB 8.6 (L) 08/16/2014 0537    HGB 9.4 (L) 08/15/2014 1633    INR 1.19 08/16/2024 2352    INR 1.05 08/15/2014 1633    WBC 5.83 08/20/2024 0544    WBC 7.30 08/19/2024 0646    WBC 10.02 08/18/2024 0620    WBC 6.04 08/21/2014 1459    WBC 4.26 (L) 08/16/2014 0537    WBC 4.64 08/15/2014 1633       Meds:    Current Facility-Administered Medications:     acetaminophen (TYLENOL) oral suspension 650 mg, 650 mg, Oral, Q4H PRN, Nadja Ashlyncidaam, PA-C, 650 mg at 08/19/24 1758    albuterol (PROVENTIL HFA,VENTOLIN HFA) inhaler 2 puff, 2 puff, Inhalation, Q4H PRN, Nadja Defjanae, PA-C    Cholecalciferol (VITAMIN D3) tablet 1,000 Units, 1,000 Units, Oral, Daily, Nadja Ray PA-C, 1,000 Units at 08/20/24 0930    clonazePAM (KlonoPIN) tablet 0.5 mg, 0.5 mg, Oral, 4x Daily, Anjelica Tran MD, 0.5 mg at 08/20/24 0930    enoxaparin (LOVENOX) subcutaneous injection 30 mg, 30 mg, Subcutaneous, Q12H, Nadja Ray PA-C, 30 mg at 08/20/24 0945    Fluticasone Furoate-Vilanterol 100-25 mcg/actuation 1 puff, 1 puff, Inhalation, BID, Nadja Ray PA-C    haloperidol (HALDOL) tablet 2 mg, 2 mg, Oral, HS, Anjelica  "MD Marc    insulin lispro (HumALOG/ADMELOG) 100 units/mL subcutaneous injection 1-5 Units, 1-5 Units, Subcutaneous, TID AC, 2 Units at 08/20/24 1150 **AND** Fingerstick Glucose (POCT), , , TID AC, MONTY Inman    insulin lispro (HumALOG/ADMELOG) 100 units/mL subcutaneous injection 1-5 Units, 1-5 Units, Subcutaneous, HS, MONTY Inman, 1 Units at 08/19/24 2240    insulin lispro (HumALOG/ADMELOG) 100 units/mL subcutaneous injection 3 Units, 3 Units, Subcutaneous, TID With Meals, MONTY Inman, 3 Units at 08/20/24 1716    ondansetron (ZOFRAN) injection 4 mg, 4 mg, Intravenous, Q6H PRN, Nadja Defluisciadam PA-C    polyethylene glycol (MIRALAX) packet 17 g, 17 g, Oral, Daily, Nadja Defluisciadam PA-C    senna (SENOKOT) tablet 17.2 mg, 2 tablet, Oral, Daily, Nadja Defranciscsharmaine, PA-C    Blood Culture:   No results found for: \"BLOODCX\"    Wound Culture:   No results found for: \"WOUNDCULT\"    Ins and Outs:  I/O last 24 hours:  In: 340 [P.O.:320; I.V.:20]  Out: -     Physical:  Vitals:    08/20/24 0756   BP: 151/76   Pulse: 95   Resp: 17   Temp: 99.2 °F (37.3 °C)   SpO2: 96%     Vitals stable, some tachycardia last night, since resolved    Musculoskeletal: right Lower Extremity  Skin intact . No erythema or ecchymosis.  Abduction pillow in place  Dressing C/D/I without strikethrough.  Does not wince upon palpation of hip   Muscles of thigh soft and compressible  Unable to assess sensation/motor function 2/2 acuity of mental status   Motor intact to +FHL/EHL/DF/PF (able to wiggle toes/ankles)  2+ DP pulse    Assessment:    78 y.o.female POD 3 s/p right hip hemiarthroplasty with Dr. Lawrence 8/17/2024 .     Plan:  WBAT right lower extremity with assistance.  Posterior hip precautions, abduction pillow  Will monitor for ABLA and administer IVF/prbc as indicated for Greater than 2 gram drop or Hgb < 7. . Management per primary team   PT/OT  Incentive spirometry  Pain control  DVT ppx per primary. Recommend " lovenox x 30 days on discharge   Diet per primary  Medical management per primary team  Dispo: Ortho will follow  Patient to follow up outpatient with Dr. Lawrence 2 weeks post-op    Juni Carranza PA-C

## 2024-08-20 NOTE — CASE MANAGEMENT
Case Management Discharge Planning Note    Patient name Kyile Ornelas  Location W /W -01 MRN [id number]  : 1945 Date 2024       Current Admission Date: 2024  Current Admission Diagnosis:Closed fracture of neck of right femur (HCC)   Patient Active Problem List    Diagnosis Date Noted Date Diagnosed    At risk for delirium 2024     Dysphagia 2024     Hypernatremia 2024     Hyperglycemia 2024     Fall 2024     Closed fracture of neck of right femur (HCC) 2024     Unspecified psychosis not due to a substance or known physiological condition ([facility]) 03/10/2023     Anxiety disorder      Chronic bilateral back pain 2020     History of lumbar surgery 2020     Mood disorder ([facility]) 2019     COPD (chronic obstructive pulmonary disease) ([facility]) 2019     Iron deficiency anemia 2019     History of breast cancer 2019     Acute pain      Constipation      Encephalopathy acute      Anemia      Delirium      Spondylolisthesis of lumbar region 2019     Spondylosis without myelopathy or radiculopathy, lumbar region 2019     Spinal stenosis of lumbar region with neurogenic claudication      Dementia ([facility]) 2019     Midline low back pain without sciatica 2019     Ambulatory dysfunction 2019     Essential hypertension 2019       LOS (days): 4  Geometric Mean LOS (GMLOS) (days): 4.1  Days to GMLOS:0.4     OBJECTIVE:  Risk of Unplanned Readmission Score: 21.38         Current admission status: Inpatient   Preferred Pharmacy:   [id number] - [place] [address] [address]  Phone: [phone] Fax: [phone]    Primary Care Provider: Marcella Schmid MD    Primary Insurance: MEDICARE  Secondary Insurance: [place] [employer] [facility]    DISCHARGE DETAILS:    Discharge planning discussed with:: Edgardo  Freedom of Choice:  Yes  Comments - Freedom of Choice: Pt is stable for DC and will be returning to [facility] where she is a LTC resident.  Pt is in need of transportation to return.     Contacts  Patient Contacts: Attny. Salvador BILL/ Vance  Relationship to Patient:: Treatment Provider  Contact Method: Phone  Reason/Outcome: Continuity of Care, Emergency Contact, Discharge Planning, Referral    Requested Home Health Care         Is the patient interested in HHC at discharge?: No    DME Referral Provided  Referral made for DME?: No    Other Referral/Resources/Interventions Provided:  Interventions: Transportation  Referral Comments: Transportation has been requested for pt to return to [facility] at 1500 via BLS.  [facility] is not able to transport pt until 1730.  This information has been forwarded to the facility and all other parties involved.    Would you like to participate in our [facility] service program?  : No - Declined    Treatment Team Recommendation: Facility Return, Short Term Rehab, SNF  Discharge Destination Plan:: Facility Return, SNF, Short Term Rehab  Transport at Discharge : [facility]  Dispatcher Contacted: Yes  Number/Name of Dispatcher: roundtrip  Transported by (Company and Unit #): [facility]  ETA of Transport (Date): 08/20/24  ETA of Transport (Time): 1730     Transfer Mode: Stretcher  Accompanied by: EMS personnel  Transfer Equipment: BLS devices  IMM Given (Date):: 08/20/24  IMM Given to:: Designee  Family notified:: Salvador ROMERO reviewed with patient's caregiver, patient's caregiver agrees with discharge determination.

## 2024-08-20 NOTE — ASSESSMENT & PLAN NOTE
- Continue dysphagia diet - pureed with nectar thick liquids  - continue speech evals  - aspiration precautions  - assist with feeding

## 2024-08-20 NOTE — PROGRESS NOTES
Progress Note - Geriatric Medicine   Kylie Ornelas 78 y.o. female MRN: [id number]  Unit/Bed#: W -01 Encounter: [id number]      Assessment/Plan:  Dysphagia  Assessment & Plan  Speech pathology consulted, appreciate input   Recommended Diet: puree/level 1 diet and nectar thick liquids   Recommended Form of Meds: crushed with puree   Aspiration precautions and swallowing strategies:   upright posture and feed if fully alert  slow rate of feeding, small bites, small cup sips (pt not able to use straw during evaluation)  Other Recommendations: Continue frequent oral care  Patient requires assistance with feeding.    At risk for delirium  Assessment & Plan  -Patient is high risk of delirium due to fall, change in environment, pain, dementia at baseline, polypharmacy   -Initiate delirium precautions  -maintain normal sleep/wake cycle  -minimize overnight interruptions, group overnight vitals/labs/nursing checks as possible  -dim lights, close blinds and turn off tv to minimize stimulation and encourage sleep environment in evenings  -ensure that pain is well controlled, consider Tylenol 975mg Q8H scheduled   -monitor for fecal and urinary retention which may precipitate delirium  -encourage early mobilization and ambulation  -provide frequent reorientation and redirection  -encourage family and friends at the bedside to help calm patient if anxious  -avoid medications which may precipitate or worsen delirium such as tramadol, benzodiazepine, anticholinergics, and antihistaminics  -encourage hydration and nutrition , assist with feeding if needed  -redirect unwanted behaviors as first line, avoid physical restraints.   -Recommend changing Clonazepam to 0.5 mg TID- hold for somnolence. Patient has only had one dose of Clonazepam inpatient.   -Discontinued Risperidone and Sertraline as patient has not been taking these medications at [facility]  - decreased haldol to 2 mg QHS- home dose, taper off as tolerate. Recommend  holding this medication as she has not taken it while inpatient.   - Qtc 447, continue to monitor    Hyperglycemia  Assessment & Plan  Patient with noted hyperglycemia on admission without formal diagnosis of diabetes.   A1C of 7.4.   Continue management with insulin lispro per primary team.   Avoid hypoglycemia.  Continue to monitor BG closely.   Consider endocrinology consult if remains uncontrolled.     Hypernatremia  Assessment & Plan  Sodium level 154 upon admission on 8/16/2024.   Current sodium level is 143.   Continue to monitor BMP     Fall  Assessment & Plan  Patient presented to ED on 8/16/2024 s/p unwitnessed fall at [facility].   Per snf, patient was found on the floor, leaning against her bed on 8/14.   XR R femur shows significantly displaced femoral neck fracture. See below.   History of multiple falls.   Trauma follows, appreciate input.   Ensure that pain is adequately controlled.   PT/OT as indicated.   OOB as tolerated.     COPD (chronic obstructive pulmonary disease) ([facility])  Assessment & Plan  Patient with history of COPD.   Continue current regimen per primary team.   Outpatient follow-up with PCP.     Constipation  Assessment & Plan  Patient with history of constipation.   Patient was taking Senna 8.6 mg, 2 tablets daily at nursing home.   Optimize bowel regimen.   Monitor for urinary retention.   Continue current regimen per primary team.     Dementia ([facility])  Assessment & Plan  Pt. Is oriented to person only at baseline, occasionally follows simple commands.   Currently nonverbal. Per nursing home, she speaks minimally at baseline.   Pt. Lives at [facility]   Dependent for instrumental ADLs  Dependent for ADLs  Score of 15 out of 28 on MMSE in July of 2019  Per [facility], aggressive behaviors recently including cursing and digging nails into nursing staff.   Was taking Haldol 2 mg daily at bedtime prior to admission. Recommend holding this medication as she has  not taken it while inpatient.   CT head shows decreased attenuation in periventricular and subcortical white matter demonstrating an appearance that is statistically most likely to represent mild microangiopathic change. Atherosclerotic calcifications noted.   TSH 0.221, T4 1.74  B12 257  Vit D, 25-OH 40 on 4/5/2023. Patient receives Vit D3 supplementation 1000 units daily.   [place] to person, place, time, and situation as needed  Monitor for behaviors   Monitor for mental status change  Provide supportive care   Assist with feeding    Essential hypertension  Assessment & Plan  Current /76  Continue current regimen per primary team   Avoid hypotension  Monitor vitals daily     Ambulatory dysfunction  Assessment & Plan  Per [facility], patient is wheelchair-bound and uses a mechanical lift for transfers.  History of multiple falls.   Fall precautions.   PT/OT as indicated   OOB as tolerated     * Closed fracture of neck of right femur (HCC)  Assessment & Plan  Patient presented to ED on 8/16/2024 s/p unwitnessed fall at [facility].   Per [facility], patient was found on the floor, leaning against her bed on 8/14.   XR R femur shows significantly displaced femoral neck fracture.   OR on 8/17 for R hip cemented hemiarthroplasty.  Trauma follows, appreciate input.   Maintain weightbearing as tolerated status on the right lower extremity.  Continue multimodal analgesic regimen.  Continue DVT prophylaxis.  PT and OT evaluation and treatment as indicated.  Outpatient follow up with Orthopedic surgery for re-evaluation.       Subjective:   Kylie is a 78 y.o. female being seen for geriatric medicine follow-up. Patient was examined at bedside. No signs of acute distress. Patient responds to name, but does not answer any questions. She denied pain.  No acute events reported by nursing staff, she ate a little of her breakfast    Review of Systems   Unable to perform ROS: Dementia         Objective:      Vitals: Blood pressure 151/76, pulse 95, temperature 99.2 °F (37.3 °C), resp. rate 17, weight 55.4 kg (122 lb 2.2 oz), SpO2 96%.,Body mass index is 23.08 kg/m².      Intake/Output Summary (Last 24 hours) at 8/20/2024 1330  Last data filed at 8/20/2024 1233  Gross per 24 hour   Intake 340 ml   Output --   Net 340 ml       Current Medications: Reviewed    Physical Exam:   Physical Exam  Vitals and nursing note reviewed.   Constitutional:       General: She is not in acute distress.     Appearance: She is well-developed.   HENT:      Head: Normocephalic and atraumatic.   Eyes:      Conjunctiva/sclera: Conjunctivae normal.   Cardiovascular:      Rate and Rhythm: Normal rate and regular rhythm.      Heart sounds: No murmur heard.  Pulmonary:      Effort: Pulmonary effort is normal. No respiratory distress.      Breath sounds: Normal breath sounds.   Abdominal:      Palpations: Abdomen is soft.      Tenderness: There is no abdominal tenderness.   Musculoskeletal:         General: No swelling.      Cervical back: Neck supple.   Skin:     General: Skin is warm and dry.      Capillary Refill: Capillary refill takes less than 2 seconds.   Neurological:      Mental Status: She is alert. She is disoriented.      Motor: Weakness present.      Gait: Gait abnormal.      Comments: Responds to name.   Psychiatric:         Mood and Affect: Mood normal.         Speech: She is noncommunicative.          Invasive Devices       Peripheral Intravenous Line  Duration             Peripheral IV 08/16/24 Right;Ventral (anterior) Forearm 3 days    Peripheral IV 08/17/24 Left;Medial Wrist 3 days                    Lab, Imaging and other studies: I have personally reviewed pertinent reports.

## 2024-09-05 ENCOUNTER — APPOINTMENT (OUTPATIENT)
Dept: RADIOLOGY | Facility: AMBULARY SURGERY CENTER | Age: 79
End: 2024-09-05
Attending: ORTHOPAEDIC SURGERY
Payer: MEDICARE

## 2024-09-05 ENCOUNTER — OFFICE VISIT (OUTPATIENT)
Dept: OBGYN CLINIC | Facility: CLINIC | Age: 79
End: 2024-09-05

## 2024-09-05 DIAGNOSIS — Z87.81 STATUS POST CLOSED FRACTURE OF RIGHT HIP: ICD-10-CM

## 2024-09-05 DIAGNOSIS — Z87.81 STATUS POST CLOSED FRACTURE OF RIGHT HIP: Primary | ICD-10-CM

## 2024-09-05 PROCEDURE — 73502 X-RAY EXAM HIP UNI 2-3 VIEWS: CPT

## 2024-09-05 PROCEDURE — 99024 POSTOP FOLLOW-UP VISIT: CPT | Performed by: ORTHOPAEDIC SURGERY

## 2024-09-05 NOTE — PROGRESS NOTES
"  Assessment:   Status Post  HEMIARTHROPLASTY HIP (BIPOLAR) - cemented right hip hemiarthroplasty - Right on 8/17/2024 PO #1    Plan:   PO #1   Xrays reviewed with patient   Weight bearing as tolerated to the right lower extremity   Posterior hip precautions, abduction pillow   Continue PT/OT   Continue Lovenox as directed   Follow up in 4 weeks     Follow Up:  4 weeks    To Do Next Visit:  X-rays of the  right  hip      CHIEF COMPLAINT:  Chief Complaint   Patient presents with    Right Hip - Post-op         SUBJECTIVE:  Kylie Ornelas is a 78 y.o. year old female who presents for follow up after HEMIARTHROPLASTY HIP (BIPOLAR) - cemented right hip hemiarthroplasty - Right. History severely limited as patient was very sleepy. Patient able to respond saying \"she's awake.\" Denies pain.      PHYSICAL EXAMINATION:    MUSCULOSKELETAL EXAMINATION: Right lower extremity   General: Exam extremely limited as patient was very sleepy. Patient able to wake up and answer stating she was \"awake\" Denied pain.   Incision: Clean, dry, intact, healing well  No ian-incisional erythema  no drainage or purulence  No fluctuance or swelling   Range of Motion: As expected  Neurovascular status: Sensation intact to light touch L1-S1  Motor intact L1-S1  Done today:Sutures out and Steri strips applied      STUDIES REVIEWED:  I have personally reviewed pertinent films in PACS and my interpretation is xray right hip reveals stable alignment of hardware consistent with right hemiarthroplasty.      PROCEDURES PERFORMED:  Procedures   Staples out. Area cleansed with alcohol. Steri strips applied. Patient tolerated procedure well.    "

## 2024-09-13 NOTE — ED NOTES
Insurance Authorization for admission:   Phone call placed to [employer]:[id number]     Spoke to Ernesto Mena  3 days approved  Level of care: [facility]  Review on 12/5/19 with Caitlin Miller @ [phone] [facility] [id number]  Authorization # [id number]        EVS (Eligibility Verification System) called - [phone]  Automated system indicates: fee for service      Insurance Authorization for Transportation:  OFFICE CLOSED CALL BACK DURING BUSINESS HOURS   Phone call placed to [facility]   Phone number  [phone]  Spoke to **     Authorization #: ** [Negative] : Heme/Lymph

## 2024-10-03 ENCOUNTER — OFFICE VISIT (OUTPATIENT)
Dept: OBGYN CLINIC | Facility: CLINIC | Age: 79
End: 2024-10-03

## 2024-10-03 ENCOUNTER — APPOINTMENT (OUTPATIENT)
Dept: RADIOLOGY | Facility: AMBULARY SURGERY CENTER | Age: 79
End: 2024-10-03
Attending: ORTHOPAEDIC SURGERY
Payer: MEDICARE

## 2024-10-03 VITALS — HEIGHT: 61 IN | BODY MASS INDEX: 23.03 KG/M2 | WEIGHT: 122 LBS

## 2024-10-03 DIAGNOSIS — Z87.81 STATUS POST CLOSED FRACTURE OF RIGHT HIP: ICD-10-CM

## 2024-10-03 DIAGNOSIS — Z96.649 S/P HIP HEMIARTHROPLASTY: Primary | ICD-10-CM

## 2024-10-03 PROCEDURE — 73502 X-RAY EXAM HIP UNI 2-3 VIEWS: CPT

## 2024-10-03 PROCEDURE — 99024 POSTOP FOLLOW-UP VISIT: CPT | Performed by: ORTHOPAEDIC SURGERY

## 2024-10-03 NOTE — PROGRESS NOTES
Assessment:   Status Post  HEMIARTHROPLASTY HIP (BIPOLAR) - cemented right hip hemiarthroplasty - Right on 8/17/2024 PO #2    Plan:   PO #2   Patient history and exam severely limited secondary to underlying dementia  X-rays reviewed  Weightbearing as tolerated with assistance  Continue PT OT  Lovenox for 30 days postop  Pain control as needed  Posterior hip precautions and abduction pillow  Follow-up in 6 weeks      Follow Up:  6 weeks    To Do Next Visit:  X-rays of the  right  hip      CHIEF COMPLAINT:  Chief Complaint   Patient presents with    Right Hip - Post-op         SUBJECTIVE:  Kylie Ornelas is a 78 y.o. year old female who presents for follow up after HEMIARTHROPLASTY HIP (BIPOLAR) - cemented right hip hemiarthroplasty - Right. Today patient history severely limited secondary to underlying dementia.     PHYSICAL EXAMINATION:    MUSCULOSKELETAL EXAMINATION: Right lower extremity  Patient exam severely limited secondary to underlying dementia  General: Patient has severe dementia  Incision: Clean, dry, intact, healing well  No ian-incisional erythema  no drainage or purulence  No fluctuance or swelling   Range of Motion: As expected  Neurovascular status: Sensation intact to light touch L1-S1  Motor intact L1-S1  No grimacing on exam with palpation of the right hip or calf         STUDIES REVIEWED:  I have personally reviewed pertinent films in PACS and my interpretation is x-ray right hip reveals stable alignment of hardware consistent with right hemiarthroplasty.      PROCEDURES PERFORMED:  Procedures  No Procedures performed today

## 2024-10-24 ENCOUNTER — HOSPICE ADMISSION (OUTPATIENT)
Dept: HOME HOSPICE | Facility: HOSPICE | Age: 79
End: 2024-10-24
Payer: MEDICARE

## 2024-10-24 ENCOUNTER — HOME CARE VISIT (OUTPATIENT)
Dept: HOME HEALTH SERVICES | Facility: HOME HEALTHCARE | Age: 79
End: 2024-10-24
Payer: MEDICARE

## 2024-10-25 ENCOUNTER — HOME CARE VISIT (OUTPATIENT)
Dept: HOME HOSPICE | Facility: HOSPICE | Age: 79
End: 2024-10-25
Payer: MEDICARE

## 2024-10-25 ENCOUNTER — HOME CARE VISIT (OUTPATIENT)
Dept: HOME HEALTH SERVICES | Facility: HOME HEALTHCARE | Age: 79
End: 2024-10-25
Payer: MEDICARE

## 2024-10-25 VITALS — HEART RATE: 84 BPM | SYSTOLIC BLOOD PRESSURE: 112 MMHG | RESPIRATION RATE: 16 BRPM | DIASTOLIC BLOOD PRESSURE: 78 MMHG

## 2024-10-25 DIAGNOSIS — G30.9 ALZHEIMER'S DEMENTIA, UNSPECIFIED DEMENTIA SEVERITY, UNSPECIFIED TIMING OF DEMENTIA ONSET, UNSPECIFIED WHETHER BEHAVIORAL, PSYCHOTIC, OR MOOD DISTURBANCE OR ANXIETY (HCC): ICD-10-CM

## 2024-10-25 DIAGNOSIS — F02.80 ALZHEIMER'S DEMENTIA, UNSPECIFIED DEMENTIA SEVERITY, UNSPECIFIED TIMING OF DEMENTIA ONSET, UNSPECIFIED WHETHER BEHAVIORAL, PSYCHOTIC, OR MOOD DISTURBANCE OR ANXIETY (HCC): ICD-10-CM

## 2024-10-25 DIAGNOSIS — Z51.5 HOSPICE CARE PATIENT: Primary | ICD-10-CM

## 2024-10-25 PROCEDURE — G0299 HHS/HOSPICE OF RN EA 15 MIN: HCPCS

## 2024-10-25 PROCEDURE — G0155 HHCP-SVS OF CSW,EA 15 MIN: HCPCS

## 2024-10-25 RX ORDER — LORAZEPAM 2 MG/ML
CONCENTRATE ORAL
Qty: 30 ML | Refills: 4 | Status: SHIPPED | OUTPATIENT
Start: 2024-10-25

## 2024-10-25 RX ORDER — MORPHINE SULFATE 100 MG/5ML
SOLUTION, CONCENTRATE ORAL
Qty: 30 ML | Refills: 0 | Status: SHIPPED | OUTPATIENT
Start: 2024-10-25

## 2024-10-25 RX ORDER — ACETAMINOPHEN 160 MG/5ML
30 SUSPENSION ORAL 3 TIMES DAILY
Qty: 473 ML | Refills: 4 | Status: SHIPPED | OUTPATIENT
Start: 2024-10-25

## 2024-10-25 NOTE — TELEPHONE ENCOUNTER
10/25/2024 3:01 PM  [facility] facility patient requests SOC medications.  Filled electronically via Epic as per [place] State Law.    Requested Prescriptions     Signed Prescriptions Disp Refills    acetaminophen (TYLENOL) 160 mg/5 mL suspension 473 mL 4     Sig: Take 30 mL (960 mg total) by mouth 3 (three) times a day NTE 3gms / 24 hours     Authorizing Provider: MARISELA THAPA    LORazepam (ATIVAN) 2 mg/mL concentrated solution 30 mL 4     Sig: Take 1 mL (2 mg total) by mouth 2 (two) times a day. May also take 0.5 mL (1 mg total) every 6 (six) hours as needed for anxiety or sleep (dyspnea).     Authorizing Provider: MARISELA THAPA    Morphine Sulfate, Concentrate, 20 mg/mL concentrated solution 30 mL 0     Sig: Take 0.25 mL (5 mg total) by mouth every 8 (eight) hours. May also take 0.25 mL (5 mg total) every 2 (two) hours as needed (pain / SOB). Max Daily Amount: 75 mg.     Authorizing Provider: MARISELA THAPA CRNP  [facility]  Hospice Answering Service: [phone]  You can find me on [employer]!

## 2024-10-28 ENCOUNTER — HOME CARE VISIT (OUTPATIENT)
Dept: HOME HOSPICE | Facility: HOSPICE | Age: 79
End: 2024-10-28
Payer: MEDICARE

## 2024-10-29 ENCOUNTER — HOME CARE VISIT (OUTPATIENT)
Dept: HOME HEALTH SERVICES | Facility: HOME HEALTHCARE | Age: 79
End: 2024-10-29
Payer: MEDICARE

## 2024-10-29 VITALS
SYSTOLIC BLOOD PRESSURE: 120 MMHG | HEART RATE: 74 BPM | TEMPERATURE: 97.4 F | RESPIRATION RATE: 18 BRPM | DIASTOLIC BLOOD PRESSURE: 60 MMHG

## 2024-10-29 PROCEDURE — G0156 HHCP-SVS OF AIDE,EA 15 MIN: HCPCS

## 2024-10-29 PROCEDURE — G0299 HHS/HOSPICE OF RN EA 15 MIN: HCPCS

## 2024-10-30 ENCOUNTER — HOME CARE VISIT (OUTPATIENT)
Dept: HOME HEALTH SERVICES | Facility: HOME HEALTHCARE | Age: 79
End: 2024-10-30
Payer: MEDICARE

## 2024-10-30 PROCEDURE — G0156 HHCP-SVS OF AIDE,EA 15 MIN: HCPCS

## 2024-10-31 ENCOUNTER — HOME CARE VISIT (OUTPATIENT)
Dept: HOME HEALTH SERVICES | Facility: HOME HEALTHCARE | Age: 79
End: 2024-10-31
Payer: MEDICARE

## 2024-10-31 ENCOUNTER — HOME CARE VISIT (OUTPATIENT)
Dept: HOME HOSPICE | Facility: HOSPICE | Age: 79
End: 2024-10-31
Payer: MEDICARE

## 2024-10-31 PROCEDURE — G0299 HHS/HOSPICE OF RN EA 15 MIN: HCPCS

## 2024-10-31 PROCEDURE — G0156 HHCP-SVS OF AIDE,EA 15 MIN: HCPCS

## 2024-11-01 ENCOUNTER — HOME CARE VISIT (OUTPATIENT)
Dept: HOME HEALTH SERVICES | Facility: HOME HEALTHCARE | Age: 79
End: 2024-11-01
Payer: MEDICARE

## 2024-11-01 VITALS
HEART RATE: 76 BPM | RESPIRATION RATE: 18 BRPM | TEMPERATURE: 97.4 F | DIASTOLIC BLOOD PRESSURE: 72 MMHG | SYSTOLIC BLOOD PRESSURE: 118 MMHG | OXYGEN SATURATION: 98 %

## 2024-11-01 PROCEDURE — G0300 HHS/HOSPICE OF LPN EA 15 MIN: HCPCS

## 2024-11-01 PROCEDURE — G0156 HHCP-SVS OF AIDE,EA 15 MIN: HCPCS

## 2024-11-08 PROCEDURE — 10330087 HSPC SERVICE INTENSITY ADD-ON

## 2024-11-19 ENCOUNTER — HOME CARE VISIT (OUTPATIENT)
Dept: HOME HOSPICE | Facility: HOSPICE | Age: 79
End: 2024-11-19
Payer: MEDICARE

## 2025-05-28 NOTE — ASSESSMENT & PLAN NOTE
PT PRESENTS TO ED VIA POV FROM HOME LEFT ANKLE INJURY. PT WAS STEPPING DOWN A STAIR AND FELT PAIN. PT IS AMBULATORY IN TRIAGE.    · Patient deemed to have no capacity back in January 2019  · Neuropsychology consulted, appreciate input  · At this time, patient continues to not have capacity to make informed medical decisions  · See note above

## (undated) DEVICE — SURGIFOAM 8.5 X 12.5

## (undated) DEVICE — GLOVE SRG BIOGEL ECLIPSE 8

## (undated) DEVICE — HOOD: Brand: T7PLUS

## (undated) DEVICE — TIBURON SPLIT SHEET: Brand: CONVERTORS

## (undated) DEVICE — FLOSEAL HEMOSTATIC MATRIX, 5 ML: Brand: FLOSEAL

## (undated) DEVICE — SPONGE PVP SCRUB WING STERILE

## (undated) DEVICE — INSTRUMENT 8670015 PAK 2 NEEDLES TROCAR

## (undated) DEVICE — GLOVE SRG BIOGEL 8

## (undated) DEVICE — DRAPE C-ARMOUR

## (undated) DEVICE — BETHLEHEM TOTAL HIP, KIT: Brand: CARDINAL HEALTH

## (undated) DEVICE — PENCIL ELECTROSURG E-Z CLEAN -0035H

## (undated) DEVICE — CAPIT HIP STEM CEMENTED

## (undated) DEVICE — GUIDEWIRE 2345050 BLUNT THREADED 24IN

## (undated) DEVICE — SNAP KOVER: Brand: UNBRANDED

## (undated) DEVICE — ELECTRODE BLADE MOD  E-Z CLEAN 6.5IN -0014M

## (undated) DEVICE — INTENDED FOR TISSUE SEPARATION, AND OTHER PROCEDURES THAT REQUIRE A SHARP SURGICAL BLADE TO PUNCTURE OR CUT.: Brand: BARD-PARKER SAFETY BLADES SIZE 15, STERILE

## (undated) DEVICE — PROXIMATE SKIN STAPLERS (35 WIDE) CONTAINS 35 STAINLESS STEEL STAPLES (FIXED HEAD): Brand: PROXIMATE

## (undated) DEVICE — CEMENT MIXING SYSTEM WITH FEMORAL BREAKWAY NOZZLE: Brand: REVOLUTION

## (undated) DEVICE — SMARTSEAL WEDGE FEMORAL PRESSURISER: Brand: SMARTSEAL

## (undated) DEVICE — SUT VICRYL 1 CT-1 27 IN J261H

## (undated) DEVICE — HEWSON SUTURE RETRIEVER: Brand: HEWSON SUTURE RETRIEVER

## (undated) DEVICE — MONITORING SPINAL IMPULSE CASE FEE

## (undated) DEVICE — ABDUCTION PILLOW FOAM POSITIONER: Brand: CARDINAL HEALTH

## (undated) DEVICE — ANTIBACTERIAL VIOLET BRAIDED (POLYGLACTIN 910), SYNTHETIC ABSORBABLE SUTURE: Brand: COATED VICRYL

## (undated) DEVICE — DRAPE SHEET X-LG

## (undated) DEVICE — THE FLOSEAL MALLEABLE TIP AND TRIMMABLE TIP ARE INTENDED FOR DELIVERY OF FLOSEAL HEMOSTATIC MATRIX.: Brand: FLOSEAL SPECIAL APPLICATOR TIPS

## (undated) DEVICE — GLOVE INDICATOR PI UNDERGLOVE SZ 8 BLUE

## (undated) DEVICE — DISPOSABLE EQUIPMENT COVER: Brand: SMALL TOWEL DRAPE

## (undated) DEVICE — 3M™ STERI-STRIP™ REINFORCED ADHESIVE SKIN CLOSURES, R1542, 1/4 IN X 1-1/2 IN (6 MM X 38 MM), 6 STRIPS/ENVELOPE: Brand: 3M™ STERI-STRIP™

## (undated) DEVICE — PROXIMATE SKIN STAPLE EXTRACTORS: Brand: PROXIMATE

## (undated) DEVICE — INTENDED FOR TISSUE SEPARATION, AND OTHER PROCEDURES THAT REQUIRE A SHARP SURGICAL BLADE TO PUNCTURE OR CUT.: Brand: BARD-PARKER ® CARBON RIB-BACK BLADES

## (undated) DEVICE — CAPIT HIP BIPOLAR HEAD POR PRIMARY

## (undated) DEVICE — SUT VICRYL 2-0 CT-1 27 IN J259H

## (undated) DEVICE — SKN PRP WNG SPNGE PVP SCRB STR: Brand: MEDLINE INDUSTRIES, INC.

## (undated) DEVICE — POV-IOD SWAB STICKS

## (undated) DEVICE — HANDPIECE SET WITH RETRACTABLE COAXIAL FAN SPRAY TIP AND SUCTION TUBE: Brand: INTERPULSE

## (undated) DEVICE — SUPPLY FEE STD

## (undated) DEVICE — HOOD WITH PEEL AWAY FACE SHIELD: Brand: T7PLUS

## (undated) DEVICE — DURASEAL® EXACT SPINAL SEALANT SYSTEM 3ML 5 PACK: Brand: DURASEAL EXACT SPINAL SEALANT SYSTEM

## (undated) DEVICE — PREP SURGICAL PURPREP 26ML

## (undated) DEVICE — TRAY FOLEY 16FR URIMETER SURESTEP

## (undated) DEVICE — SUT ETHIBOND 5 V-37 30 IN MB66G

## (undated) DEVICE — TOOL 15BA50 LEGEND 15CM 5MM BA: Brand: MIDAS REX™

## (undated) DEVICE — 2108 SERIES SAGITTAL BLADE (18.6 X 0.64 X 61.1MM)

## (undated) DEVICE — 3M™ IOBAN™ 2 ANTIMICROBIAL INCISE DRAPE 6640EZ: Brand: IOBAN™ 2

## (undated) DEVICE — SUT VICRYL 0 CT-1 36 IN J946H

## (undated) DEVICE — DRESSING MEPILEX AG BORDER 4 X 4 IN

## (undated) DEVICE — DRAPE MICROSCOPE OPMI PENTERO

## (undated) DEVICE — DRESSING MEPILEX AG BORDER POST-OP 4 X 10 IN

## (undated) DEVICE — SUT VICRYL PLUS 3-0 RB-1 CR/8 18 IN VCP713D

## (undated) DEVICE — BETHLEHEM UNIVERSAL SPINE, KIT: Brand: CARDINAL HEALTH

## (undated) DEVICE — LIGHT HANDLE COVER SLEEVE DISP BLUE STELLAR

## (undated) DEVICE — GLOVE INDICATOR PI UNDERGLOVE SZ 8.5 BLUE